# Patient Record
Sex: FEMALE | Race: WHITE | NOT HISPANIC OR LATINO | Employment: OTHER | URBAN - METROPOLITAN AREA
[De-identification: names, ages, dates, MRNs, and addresses within clinical notes are randomized per-mention and may not be internally consistent; named-entity substitution may affect disease eponyms.]

---

## 2017-01-09 ENCOUNTER — HOSPITAL ENCOUNTER (OUTPATIENT)
Dept: RADIOLOGY | Facility: HOSPITAL | Age: 67
Discharge: HOME | End: 2017-01-09
Attending: NURSE PRACTITIONER
Payer: COMMERCIAL

## 2017-01-09 ENCOUNTER — OFFICE VISIT (OUTPATIENT)
Dept: FAMILY MEDICINE | Facility: CLINIC | Age: 67
End: 2017-01-09
Payer: COMMERCIAL

## 2017-01-09 VITALS
DIASTOLIC BLOOD PRESSURE: 60 MMHG | HEART RATE: 74 BPM | TEMPERATURE: 98 F | SYSTOLIC BLOOD PRESSURE: 134 MMHG | HEIGHT: 67 IN | BODY MASS INDEX: 36.2 KG/M2 | WEIGHT: 230.63 LBS

## 2017-01-09 DIAGNOSIS — R10.9 ABDOMINAL SPASMS: Primary | ICD-10-CM

## 2017-01-09 DIAGNOSIS — R10.9 ABDOMINAL SPASMS: ICD-10-CM

## 2017-01-09 PROCEDURE — 1159F MED LIST DOCD IN RCRD: CPT | Mod: S$GLB,,, | Performed by: NURSE PRACTITIONER

## 2017-01-09 PROCEDURE — 99999 PR PBB SHADOW E&M-EST. PATIENT-LVL IV: CPT | Mod: PBBFAC,,, | Performed by: NURSE PRACTITIONER

## 2017-01-09 PROCEDURE — 3075F SYST BP GE 130 - 139MM HG: CPT | Mod: S$GLB,,, | Performed by: NURSE PRACTITIONER

## 2017-01-09 PROCEDURE — 3078F DIAST BP <80 MM HG: CPT | Mod: S$GLB,,, | Performed by: NURSE PRACTITIONER

## 2017-01-09 PROCEDURE — 74020 XR ABDOMEN FLAT AND ERECT: CPT | Mod: 26,,, | Performed by: RADIOLOGY

## 2017-01-09 PROCEDURE — 99213 OFFICE O/P EST LOW 20 MIN: CPT | Mod: S$GLB,,, | Performed by: NURSE PRACTITIONER

## 2017-01-09 PROCEDURE — 74020 XR ABDOMEN FLAT AND ERECT: CPT | Mod: TC,PO

## 2017-01-09 RX ORDER — DICYCLOMINE HYDROCHLORIDE 20 MG/1
20 TABLET ORAL 2 TIMES DAILY
Qty: 20 TABLET | Refills: 0 | Status: SHIPPED | OUTPATIENT
Start: 2017-01-09 | End: 2017-03-31 | Stop reason: SDUPTHER

## 2017-01-09 NOTE — PROGRESS NOTES
Subjective:       Patient ID: Zora Davis is a 66 y.o. female.    Chief Complaint: Abdominal Pain and Back Pain    Abdominal Pain   This is a new problem. The current episode started in the past 7 days. The onset quality is gradual. The problem occurs intermittently. The problem has been unchanged. The pain is located in the RUQ and LUQ. The quality of the pain is cramping (spasm). Pain radiation: back. Pertinent negatives include no anorexia, arthralgias, belching, constipation, diarrhea, dysuria, fever, flatus, frequency, headaches, hematochezia, hematuria, melena, myalgias, nausea, vomiting or weight loss. Nothing aggravates the pain. The pain is relieved by nothing. She has tried nothing for the symptoms. The treatment provided no relief. Prior diagnostic workup includes ultrasound (US, flat and erect, labs ordered today). There is no history of abdominal surgery, colon cancer, Crohn's disease, gallstones, GERD, irritable bowel syndrome, pancreatitis, PUD or ulcerative colitis.     Past Medical History   Diagnosis Date    Atrial fibrillation     Diverticulosis     Fatty liver     Hypertension     Liver disease     FLORES (nonalcoholic steatohepatitis)     Thyroid disease      Social History     Social History    Marital status:      Spouse name: N/A    Number of children: N/A    Years of education: N/A     Occupational History    Not on file.     Social History Main Topics    Smoking status: Former Smoker     Years: 40.00    Smokeless tobacco: Not on file      Comment: quit 35 years ago    Alcohol use Yes      Comment: socially    Drug use: No    Sexual activity: Yes     Partners: Male     Social History Narrative     Past Surgical History   Procedure Laterality Date     section      Hysterectomy      Breast surgery      Breast tumor      Cholecystectomy         Review of Systems   Constitutional: Negative.  Negative for fever and weight loss.   HENT: Negative.    Eyes:  Negative.    Respiratory: Negative.    Cardiovascular: Negative.    Gastrointestinal: Negative for anorexia, constipation, diarrhea, flatus, hematochezia, melena, nausea and vomiting.        Abdominal cramping   Endocrine: Negative.    Genitourinary: Negative.  Negative for dysuria, frequency and hematuria.   Musculoskeletal: Negative.  Negative for arthralgias and myalgias.   Skin: Negative.    Allergic/Immunologic: Negative.    Neurological: Negative.  Negative for headaches.   Psychiatric/Behavioral: Negative.        Objective:      Physical Exam   Constitutional: She is oriented to person, place, and time. She appears well-developed and well-nourished.   HENT:   Head: Normocephalic.   Right Ear: External ear normal.   Left Ear: External ear normal.   Nose: Nose normal.   Mouth/Throat: Oropharynx is clear and moist.   Eyes: Conjunctivae are normal. Pupils are equal, round, and reactive to light.   Neck: Normal range of motion. Neck supple.   Cardiovascular: Normal rate, regular rhythm and normal heart sounds.    Pulmonary/Chest: Effort normal and breath sounds normal.   Abdominal: Soft. Bowel sounds are normal. There is no tenderness.   Musculoskeletal: Normal range of motion.   Neurological: She is alert and oriented to person, place, and time.   Skin: Skin is warm and dry.   Psychiatric: She has a normal mood and affect. Her behavior is normal. Judgment and thought content normal.   Nursing note and vitals reviewed.      Assessment:       1. Abdominal spasms        Plan:           Zora was seen today for abdominal pain and back pain.    Diagnoses and all orders for this visit:    Abdominal spasms  Comments:  Upper abdomen  Orders:  -     Amylase; Future  -     Lipase; Future  -     WBC; Future  -     Hepatic function panel; Future  -     US Abdomen Limited; Future  -     X-Ray Abdomen Flat And Erect; Future  -     dicyclomine (BENTYL) 20 mg tablet; Take 1 tablet (20 mg total) by mouth 2 (two) times  daily.

## 2017-01-09 NOTE — MR AVS SNAPSHOT
[facility]  [address]  [place] [address]  Phone: [phone]  Fax: [phone]                  Zora Davis   2017 3:00 PM   Office Visit    Description:  Female : 1950   Provider:  Dara Stephens NP   Department:  [facility]           Reason for Visit     Abdominal Pain     Back Pain           Diagnoses this Visit        Comments    Abdominal spasms    -  Primary Upper abdomen           To Do List           Future Appointments        Provider Department Dept Phone    1/10/2017 2:45 PM [facility] US1 [facility] [phone]    2017 7:45 AM [facility] XR1 [facility] [phone]    2017 4:15 PM LABORATORY, [place] [facility] [phone]      Goals (5 Years of Data)     None       These Medications        Disp Refills Start End    dicyclomine (BENTYL) 20 mg tablet 20 tablet 0 2017    Take 1 tablet (20 mg total) by mouth 2 (two) times daily. - Oral    [facility]  [address] Ph #: [phone]         [facility]     [facility] -  Assistance  Registered nurses in the [facility] provide clinical advisement, health education, appointment booking, and other advisory services.  Call for this free service at [phone].             Medications           Message regarding Medications     Verify the changes and/or additions to your medication regime listed below are the same as discussed with your clinician today.  If any of these changes or additions are incorrect, please notify your healthcare provider.        START taking these NEW medications        Refills    dicyclomine (BENTYL) 20 mg tablet 0    Sig: Take 1 tablet (20 mg total) by mouth 2 (two) times daily.    Class: Normal    Route: Oral           Verify that the below list of medications is an accurate  "representation of the medications you are currently taking.  If none reported, the list may be blank. If incorrect, please contact your healthcare provider. Carry this list with you in case of emergency.           Current Medications     albuterol (PROVENTIL HFA) 90 mcg/actuation inhaler Inhale 2 puffs into the lungs every 6 (six) hours as needed for Wheezing.    aspirin (ECOTRIN) 81 MG EC tablet Take 81 mg by mouth once daily.    hydrochlorothiazide (HYDRODIURIL) 25 MG tablet TAKE ONE TABLET BY MOUTH EVERY DAY    hydrocodone-acetaminophen 7.5-325mg (NORCO) 7.5-325 mg per tablet Take 1 tablet by mouth every 6 (six) hours as needed for Pain.    levothyroxine (SYNTHROID) 50 MCG tablet Take 1 tablet (50 mcg total) by mouth once daily.    losartan (COZAAR) 100 MG tablet TAKE 1 TABLET BY MOUTH ONCE DAILY    metoprolol tartrate (LOPRESSOR) 25 MG tablet Take 1 tablet (25 mg total) by mouth once daily.    sucralfate (CARAFATE) 1 gram tablet TAKE ONE TABLET BY MOUTH FOUR TIMES DAILY    dicyclomine (BENTYL) 20 mg tablet Take 1 tablet (20 mg total) by mouth 2 (two) times daily.    methylPREDNISolone (MEDROL DOSEPACK) 4 mg tablet use as directed    pantoprazole (PROTONIX) 40 MG tablet Take 1 tablet (40 mg total) by mouth once daily.           Clinical Reference Information           Vital Signs - Last Recorded  Most recent update: 1/9/2017  2:56 PM by Funmilayo Nuñez LPN    BP Pulse Temp Ht Wt BMI    134/60 74 97.9 °F (36.6 °C) 5' 7" (1.702 m) 104.6 kg (230 lb 9.6 oz) 36.12 kg/m2      Blood Pressure          Most Recent Value    BP  134/60      Allergies as of 1/9/2017     No Known Allergies      Immunizations Administered on Date of Encounter - 1/9/2017     None      Orders Placed During Today's Visit      Normal Orders This Visit    Amylase     Hepatic function panel     Lipase     Future Labs/Procedures Expected by Expires    Amylase  1/9/2017 3/10/2018    Amylase  1/9/2017 3/10/2018    Hepatic function panel  1/9/2017 " 3/10/2018    Hepatic function panel  1/9/2017 3/10/2018    Lipase  1/9/2017 3/10/2018    Lipase  1/9/2017 3/10/2018    US Abdomen Limited  1/9/2017 1/9/2018    WBC  1/9/2017 3/10/2018    WBC  1/9/2017 3/10/2018    X-Ray Abdomen Flat And Erect  1/9/2017 1/9/2018      Instructions    Report to ER immediately if symptoms worsen

## 2017-01-10 ENCOUNTER — HOSPITAL ENCOUNTER (OUTPATIENT)
Dept: RADIOLOGY | Facility: HOSPITAL | Age: 67
Discharge: HOME | End: 2017-01-10
Attending: NURSE PRACTITIONER
Payer: COMMERCIAL

## 2017-01-10 DIAGNOSIS — R10.9 ABDOMINAL SPASMS: ICD-10-CM

## 2017-01-10 PROCEDURE — 76705 ECHO EXAM OF ABDOMEN: CPT | Mod: 26,,, | Performed by: RADIOLOGY

## 2017-01-10 PROCEDURE — 76705 ECHO EXAM OF ABDOMEN: CPT | Mod: TC,PO

## 2017-01-11 LAB
ALBUMIN SERPL-MCNC: 3.6 G/DL (ref 3.6–5.1)
ALBUMIN/GLOB SERPL: 1 (CALC) (ref 1–2.5)
ALP SERPL-CCNC: 78 U/L (ref 33–130)
ALT SERPL-CCNC: 13 U/L (ref 6–29)
AMYLASE SERPL-CCNC: 42 U/L (ref 21–101)
AST SERPL-CCNC: 13 U/L (ref 10–35)
BILIRUB DIRECT SERPL-MCNC: 0.1 MG/DL
BILIRUB INDIRECT SERPL-MCNC: 0.5 MG/DL (CALC) (ref 0.2–1.2)
BILIRUB SERPL-MCNC: 0.6 MG/DL (ref 0.2–1.2)
GLOBULIN SER CALC-MCNC: 3.6 G/DL (CALC) (ref 1.9–3.7)
LIPASE SERPL-CCNC: 10 U/L (ref 7–60)
PROT SERPL-MCNC: 7.2 G/DL (ref 6.1–8.1)
WBC # BLD AUTO: 7.8 THOUSAND/UL (ref 3.8–10.8)

## 2017-01-24 ENCOUNTER — PATIENT MESSAGE (OUTPATIENT)
Dept: FAMILY MEDICINE | Facility: CLINIC | Age: 67
End: 2017-01-24

## 2017-01-24 DIAGNOSIS — I10 ESSENTIAL HYPERTENSION: ICD-10-CM

## 2017-01-24 RX ORDER — LOSARTAN POTASSIUM 100 MG/1
100 TABLET ORAL DAILY
Qty: 30 TABLET | Refills: 5 | Status: SHIPPED | OUTPATIENT
Start: 2017-01-24 | End: 2017-02-15 | Stop reason: SDUPTHER

## 2017-02-07 ENCOUNTER — OFFICE VISIT (OUTPATIENT)
Dept: GASTROENTEROLOGY | Facility: CLINIC | Age: 67
End: 2017-02-07
Payer: COMMERCIAL

## 2017-02-07 ENCOUNTER — PATIENT MESSAGE (OUTPATIENT)
Dept: GASTROENTEROLOGY | Facility: CLINIC | Age: 67
End: 2017-02-07

## 2017-02-07 VITALS
HEIGHT: 67 IN | DIASTOLIC BLOOD PRESSURE: 74 MMHG | SYSTOLIC BLOOD PRESSURE: 129 MMHG | HEART RATE: 80 BPM | BODY MASS INDEX: 45.99 KG/M2 | WEIGHT: 293 LBS

## 2017-02-07 DIAGNOSIS — K76.0 FATTY LIVER DISEASE, NONALCOHOLIC: Primary | ICD-10-CM

## 2017-02-07 PROCEDURE — 1159F MED LIST DOCD IN RCRD: CPT | Mod: S$GLB,,, | Performed by: INTERNAL MEDICINE

## 2017-02-07 PROCEDURE — 1160F RVW MEDS BY RX/DR IN RCRD: CPT | Mod: S$GLB,,, | Performed by: INTERNAL MEDICINE

## 2017-02-07 PROCEDURE — 3074F SYST BP LT 130 MM HG: CPT | Mod: S$GLB,,, | Performed by: INTERNAL MEDICINE

## 2017-02-07 PROCEDURE — 3078F DIAST BP <80 MM HG: CPT | Mod: S$GLB,,, | Performed by: INTERNAL MEDICINE

## 2017-02-07 PROCEDURE — 99214 OFFICE O/P EST MOD 30 MIN: CPT | Mod: S$GLB,,, | Performed by: INTERNAL MEDICINE

## 2017-02-07 PROCEDURE — 1126F AMNT PAIN NOTED NONE PRSNT: CPT | Mod: S$GLB,,, | Performed by: INTERNAL MEDICINE

## 2017-02-07 PROCEDURE — 99999 PR PBB SHADOW E&M-EST. PATIENT-LVL III: CPT | Mod: PBBFAC,,, | Performed by: INTERNAL MEDICINE

## 2017-02-07 PROCEDURE — 1157F ADVNC CARE PLAN IN RCRD: CPT | Mod: S$GLB,,, | Performed by: INTERNAL MEDICINE

## 2017-02-07 RX ORDER — CLINDAMYCIN PHOSPHATE 11.9 MG/ML
SOLUTION TOPICAL
COMMUNITY
Start: 2017-01-27 | End: 2017-04-13

## 2017-02-07 RX ORDER — DOXYCYCLINE HYCLATE 50 MG/1
50 CAPSULE ORAL
COMMUNITY
Start: 2017-01-27 | End: 2017-02-26

## 2017-02-07 NOTE — PATIENT INSTRUCTIONS
Non-Alcoholic Fatty Liver Disease (NAFLD)  NAFLD is a common disease of the liver. It occurs when there is too much fat in the liver. If NAFLD is severe, it can cause liver damage that appears similar to the damage caused by drinking too much alcohol. However, NAFLD is not caused by drinking alcohol. This sheet tells you more about NAFLD and how it can be managed.    How the Liver Works  The liver is an organ located in the upper right side of the abdomen. It has many important functions. These include:  · Metabolizing proteins, carbohydrates, and fats  · Making a substance called bile that helps break down fats  · Storing and releasing sugar (glucose) into the blood to give the body energy  · Removing toxins from the blood  · Helping with the clotting of blood  Understanding NAFLD  A healthy liver may contain some fat. But if too much fat builds up in the liver, this causes NAFLD. NAFLD can be mild, causing fatty liver. Or it can be more severe and show inflammation, as well as the fat, and cause non-alcoholic steatohepatitis (FLORES). FLORES can lead to cirrhosis. With fatty liver, the liver simply contains more fat than normal. This extra fat usually causes no damage to the liver. With FLORES, the fatty liver becomes inflamed over time. FLORES is serious because it can lead to scarring of the liver (fibrosis). Over time, the scarring may lead to cirrhosis, which can eventually cause liver failure or liver cancer.  Causes and Risk Factors of NAFLD  The cause of NAFLD is unknown. But certain risk factors make the problem more likely to occur. These include:  · Obesity  · Prediabetes or diabetes  · High levels of cholesterol and triglycerides (types of fat found in the blood)  · Exposure to certain medications   Symptoms of NAFLD  Most people with NAFLD have no symptoms. If symptoms do occur, they can include:  · Tiredness  · Weakness  · Weight loss  · Loss of appetite  · Nausea and vomiting  · Abdominal pain and  cramping  · Yellowing of the skin and eyes (jaundice); dark urine, or light-colored stools  · Swelling in the abdomen or legs  Diagnosing NAFLD  Your health care provider may suspect you have NAFLD if routine blood tests show elevated levels of liver enzymes. This may mean that a liver problem is possible. One or more imaging tests, such as an ultrasound, CT scan, or MRI scan, may be done. Additional blood tests may be done to look for other causes of liver disease. A liver biopsy may also be done. During this test, a hollow needle is used to remove a tiny amount of tissue from the liver. This tissue is then studied in a lab. This test can detect signs of damage involving liver tissue. It can also help determine the cause of the damage and tell the difference between fatty liver and FLORES.  Treating NAFLD  Treatment for NAFLD varies for each person. Your doctor will monitor your health and treat any symptoms or underlying health problems you have. Your doctor will also work with you to control your risk factors so that damage to your liver is less likely. Your plan may include:  · Losing excess weight  · Getting regular exercise  · Controlling diabetes and high cholesterol or triglyceride levels  · Taking medications and vitamins as prescribed by your doctor  · Quitting smoking  · Avoiding drinking alcohol  · Eating a healthy and balanced diet  Living with NAFLD  If NAFLD is caught early, it can be managed with treatment. Your health care provider will discuss further treatment options with you as needed.  Date Last Reviewed: 11/26/2014 © 2000-2016 [employer]. [address] [place] [address], [place] [address]. All rights reserved. This information is not intended as a substitute for professional medical care. Always follow your healthcare professional's instructions.

## 2017-02-07 NOTE — MR AVS SNAPSHOT
[facility]  [address] [facility] [address]  Phone: [phone]                  Zora Davis   2017 3:40 PM   Office Visit    Description:  Female : 1950   Provider:  Nate Richey MD   Department:  [facility]           Reason for Visit     Fatty Liver           Diagnoses this Visit        Comments    Fatty liver disease, nonalcoholic    -  Primary            To Do List           Future Appointments        Provider Department Dept Phone    2017 3:40 PM Nate Richey MD [facility] [phone]      Goals (5 Years of Data)     None      Follow-Up and Disposition     Return if symptoms worsen or fail to improve.      [facility]     [facility] -  Assistance  Registered nurses in the [facility] provide clinical advisement, health education, appointment booking, and other advisory services.  Call for this free service at [phone].             Medications           Message regarding Medications     Verify the changes and/or additions to your medication regime listed below are the same as discussed with your clinician today.  If any of these changes or additions are incorrect, please notify your healthcare provider.             Verify that the below list of medications is an accurate representation of the medications you are currently taking.  If none reported, the list may be blank. If incorrect, please contact your healthcare provider. Carry this list with you in case of emergency.           Current Medications     albuterol (PROVENTIL HFA) 90 mcg/actuation inhaler Inhale 2 puffs into the lungs every 6 (six) hours as needed for Wheezing.    aspirin (ECOTRIN) 81 MG EC tablet Take 81 mg by mouth once daily.    clindamycin (CLEOCIN T) 1 % external solution Apply topically 2 (two) times daily.    doxycycline (VIBRAMYCIN) 50 MG capsule Take 50 mg by mouth.    hydrochlorothiazide  "(HYDRODIURIL) 25 MG tablet TAKE ONE TABLET BY MOUTH EVERY DAY    hydrocodone-acetaminophen 7.5-325mg (NORCO) 7.5-325 mg per tablet Take 1 tablet by mouth every 6 (six) hours as needed for Pain.    levothyroxine (SYNTHROID) 50 MCG tablet Take 1 tablet (50 mcg total) by mouth once daily.    losartan (COZAAR) 100 MG tablet Take 1 tablet (100 mg total) by mouth once daily.    methylPREDNISolone (MEDROL DOSEPACK) 4 mg tablet use as directed    metoprolol tartrate (LOPRESSOR) 25 MG tablet Take 1 tablet (25 mg total) by mouth once daily.    sucralfate (CARAFATE) 1 gram tablet TAKE ONE TABLET BY MOUTH FOUR TIMES DAILY    pantoprazole (PROTONIX) 40 MG tablet Take 1 tablet (40 mg total) by mouth once daily.           Clinical Reference Information           Your Vitals Were     BP Pulse Height Weight BMI    129/74 80 5' 7" (1.702 m) 151 kg (332 lb 14.3 oz) 52.14 kg/m2      Blood Pressure          Most Recent Value    BP  129/74      Allergies as of 2/7/2017     No Known Allergies      Immunizations Administered on Date of Encounter - 2/7/2017     None      Instructions      Non-Alcoholic Fatty Liver Disease (NAFLD)  NAFLD is a common disease of the liver. It occurs when there is too much fat in the liver. If NAFLD is severe, it can cause liver damage that appears similar to the damage caused by drinking too much alcohol. However, NAFLD is not caused by drinking alcohol. This sheet tells you more about NAFLD and how it can be managed.    How the Liver Works  The liver is an organ located in the upper right side of the abdomen. It has many important functions. These include:  · Metabolizing proteins, carbohydrates, and fats  · Making a substance called bile that helps break down fats  · Storing and releasing sugar (glucose) into the blood to give the body energy  · Removing toxins from the blood  · Helping with the clotting of blood  Understanding NAFLD  A healthy liver may contain some fat. But if too much fat builds up in the " liver, this causes NAFLD. NAFLD can be mild, causing fatty liver. Or it can be more severe and show inflammation, as well as the fat, and cause non-alcoholic steatohepatitis (FLORES). FLORES can lead to cirrhosis. With fatty liver, the liver simply contains more fat than normal. This extra fat usually causes no damage to the liver. With FLORES, the fatty liver becomes inflamed over time. FLORES is serious because it can lead to scarring of the liver (fibrosis). Over time, the scarring may lead to cirrhosis, which can eventually cause liver failure or liver cancer.  Causes and Risk Factors of NAFLD  The cause of NAFLD is unknown. But certain risk factors make the problem more likely to occur. These include:  · Obesity  · Prediabetes or diabetes  · High levels of cholesterol and triglycerides (types of fat found in the blood)  · Exposure to certain medications   Symptoms of NAFLD  Most people with NAFLD have no symptoms. If symptoms do occur, they can include:  · Tiredness  · Weakness  · Weight loss  · Loss of appetite  · Nausea and vomiting  · Abdominal pain and cramping  · Yellowing of the skin and eyes (jaundice); dark urine, or light-colored stools  · Swelling in the abdomen or legs  Diagnosing NAFLD  Your health care provider may suspect you have NAFLD if routine blood tests show elevated levels of liver enzymes. This may mean that a liver problem is possible. One or more imaging tests, such as an ultrasound, CT scan, or MRI scan, may be done. Additional blood tests may be done to look for other causes of liver disease. A liver biopsy may also be done. During this test, a hollow needle is used to remove a tiny amount of tissue from the liver. This tissue is then studied in a lab. This test can detect signs of damage involving liver tissue. It can also help determine the cause of the damage and tell the difference between fatty liver and FLORES.  Treating NAFLD  Treatment for NAFLD varies for each person. Your doctor will  monitor your health and treat any symptoms or underlying health problems you have. Your doctor will also work with you to control your risk factors so that damage to your liver is less likely. Your plan may include:  · Losing excess weight  · Getting regular exercise  · Controlling diabetes and high cholesterol or triglyceride levels  · Taking medications and vitamins as prescribed by your doctor  · Quitting smoking  · Avoiding drinking alcohol  · Eating a healthy and balanced diet  Living with NAFLD  If NAFLD is caught early, it can be managed with treatment. Your health care provider will discuss further treatment options with you as needed.  Date Last Reviewed: 11/26/2014 © 2000-2016 [employer]. [address] [place] [address], [place] [address]. All rights reserved. This information is not intended as a substitute for professional medical care. Always follow your healthcare professional's instructions.             Language Assistance Services     ATTENTION: Language assistance services are available, free of charge. Please call [phone].      ATENCIÓN: Si habla español, tiene a vance disposición servicios gratuitos de asistencia lingüística. Llame al [phone].     DEZ Ý: N?u b?n nói Ti?ng Vi?t, có các d?ch v? h? tr? ngôn ng? mi?n phí dành cho b?n. G?i s? [phone].         [facility] complies with applicable Federal civil rights laws and does not discriminate on the basis of race, color, national origin, age, disability, or sex.

## 2017-02-07 NOTE — PROGRESS NOTES
"Subjective:       Zora Davis is a 67 y.o. female who is referred for evaluation of abdominal pain. The pain is located in the LUQ. The pain is described as sharp, and is 6/10 in intensity. Onset was 4 weeks ago. Symptoms have been completely resolved since. Aggravating factors include: movement.  Alleviating factors include: none. Associated symptoms include: none. The patient denies anorexia, belching, chills, constipation, diarrhea, fever, headache, hematochezia, melena and vomiting.    The following portions of the patient's history were reviewed and updated as appropriate: She  has a past medical history of Atrial fibrillation; Diverticulosis; Fatty liver; Hypertension; Liver disease; FLORES (nonalcoholic steatohepatitis); and Thyroid disease.  She  has a past surgical history that includes  section; Hysterectomy; Breast surgery; breast tumor; and Cholecystectomy.  Her family history includes Colon cancer in her paternal aunt; Hypertension in her father.  She  reports that she has quit smoking. She quit after 40.00 years of use. She does not have any smokeless tobacco history on file. She reports that she drinks alcohol. She reports that she does not use illicit drugs.  She has a current medication list which includes the following prescription(s): albuterol, aspirin, clindamycin, doxycycline, hydrochlorothiazide, hydrocodone-acetaminophen 7.5-325mg, levothyroxine, losartan, methylprednisolone, metoprolol tartrate, sucralfate, and pantoprazole.  She has No Known Allergies..    Review of Systems  A comprehensive review of systems was negative except for: Gastrointestinal: positive for abdominal pain      Objective:         Visit Vitals    /74    Pulse 80    Ht 5' 7" (1.702 m)    Wt (!) 151 kg (332 lb 14.3 oz)    BMI 52.14 kg/m2       Visit Vitals    /74    Pulse 80    Ht 5' 7" (1.702 m)    Wt (!) 151 kg (332 lb 14.3 oz)    BMI 52.14 kg/m2       General Appearance:    Alert, " cooperative, no distress, appears stated age   Head:    Normocephalic, without obvious abnormality, atraumatic   Eyes:    PERRL, conjunctiva/corneas clear, EOM's intact, fundi     benign, both eyes   Ears:    Normal TM's and external ear canals, both ears   Nose:   Nares normal, septum midline, mucosa normal, no drainage    or sinus tenderness   Throat:   Lips, mucosa, and tongue normal; teeth and gums normal   Neck:   Supple, symmetrical, trachea midline, no adenopathy;     thyroid:  no enlargement/tenderness/nodules; no carotid    bruit or JVD   Back:     Symmetric, no curvature, ROM normal, no CVA tenderness   Lungs:     Clear to auscultation bilaterally, respirations unlabored   Chest Wall:    No tenderness or deformity    Heart:    Regular rate and rhythm, S1 and S2 normal, no murmur, rub   or gallop   Abdomen:     Soft, non-tender, bowel sounds active all four quadrants,     no masses, no organomegaly   Extremities:   Extremities normal, atraumatic, no cyanosis or edema   Pulses:   2+ and symmetric all extremities   Skin:   Skin color, texture, turgor normal, no rashes or lesions   Lymph nodes:   Cervical, supraclavicular, and axillary nodes normal   Neurologic:   CNII-XII intact, normal strength, sensation and reflexes     throughout       Laboratory  Lab Results   Component Value Date    WBC 7.8 01/10/2017    RBC 4.71 07/22/2015    HGB 13.3 07/22/2015    HCT 40.9 07/22/2015     07/22/2015     Lab Results   Component Value Date    AMYLASE 42 01/10/2017     Lab Results   Component Value Date    LIPASE 10 01/10/2017         Assessment:      Abdominal pain, likely secondary to back issues.     1. Fatty liver disease, nonalcoholic      Abdominal pain has resolved. Patient's labs are all normal. She recently had an EGD and a Colonoscopy. A recent ultrasound was OK except for fatty liver.        Plan:      The management of Fatty Liver consists of:  -- Gradual Weight loss. A 20% weight reduction translates  to improved symptoms.  -- Exercise 4 times a week for 30 minutes will mobilize some of the fat away from your liver and make your insulin more effective.  -- A low carb high protein diet, rich in vegetables and increased water consumption, staying away from carbonated, sugary drinks.   -- Avoid processed foods.   These are the only effective measures to manage fatty liver. If after a year of adhering to these measures and effectively reducing your Body Mass Index plus decreasing the insulin resistance, you continue to have symptoms, we may need to start medications to treat Fatty liver. These medications are used to treat diabetes and work well for insulin resistance. Unfortunately, these medications can have side effects.     The risks and benefits of my recommendations, as well as other treatment options were discussed with the patient today. Questions were answered.  Follow up: 1 year and as needed.

## 2017-02-15 DIAGNOSIS — E03.9 HYPOTHYROIDISM (ACQUIRED): ICD-10-CM

## 2017-02-15 DIAGNOSIS — I10 ESSENTIAL HYPERTENSION: ICD-10-CM

## 2017-02-15 RX ORDER — LEVOTHYROXINE SODIUM 50 UG/1
50 TABLET ORAL DAILY
Qty: 90 TABLET | Refills: 1 | Status: SHIPPED | OUTPATIENT
Start: 2017-02-15 | End: 2017-11-14 | Stop reason: SDUPTHER

## 2017-02-15 RX ORDER — LOSARTAN POTASSIUM 100 MG/1
100 TABLET ORAL DAILY
Qty: 90 TABLET | Refills: 1 | Status: SHIPPED | OUTPATIENT
Start: 2017-02-15 | End: 2017-08-14 | Stop reason: SDUPTHER

## 2017-02-15 RX ORDER — HYDROCHLOROTHIAZIDE 25 MG/1
25 TABLET ORAL DAILY
Qty: 90 TABLET | Refills: 1 | Status: SHIPPED | OUTPATIENT
Start: 2017-02-15 | End: 2017-11-14 | Stop reason: SDUPTHER

## 2017-02-15 RX ORDER — METOPROLOL TARTRATE 25 MG/1
25 TABLET, FILM COATED ORAL DAILY
Qty: 90 TABLET | Refills: 1 | Status: SHIPPED | OUTPATIENT
Start: 2017-02-15 | End: 2018-02-22 | Stop reason: SDUPTHER

## 2017-02-15 NOTE — TELEPHONE ENCOUNTER
I am refilling a requested medication x 1 for the patient and reviewed the chart.  The patient is due for a physical.  Please book the patient with me 2-6 weeks

## 2017-03-03 ENCOUNTER — TELEPHONE (OUTPATIENT)
Dept: FAMILY MEDICINE | Facility: CLINIC | Age: 67
End: 2017-03-03

## 2017-03-03 DIAGNOSIS — Z00.00 ROUTINE ADULT HEALTH MAINTENANCE: Primary | ICD-10-CM

## 2017-04-03 RX ORDER — DICYCLOMINE HYDROCHLORIDE 20 MG/1
TABLET ORAL
Qty: 60 TABLET | Refills: 11 | Status: SHIPPED | OUTPATIENT
Start: 2017-04-03 | End: 2017-04-13

## 2017-04-04 ENCOUNTER — PATIENT OUTREACH (OUTPATIENT)
Dept: ADMINISTRATIVE | Facility: HOSPITAL | Age: 67
End: 2017-04-04
Payer: COMMERCIAL

## 2017-04-04 DIAGNOSIS — Z12.31 ENCOUNTER FOR SCREENING MAMMOGRAM FOR BREAST CANCER: Primary | ICD-10-CM

## 2017-04-07 ENCOUNTER — LAB VISIT (OUTPATIENT)
Dept: LAB | Facility: HOSPITAL | Age: 67
End: 2017-04-07
Attending: FAMILY MEDICINE
Payer: COMMERCIAL

## 2017-04-07 DIAGNOSIS — Z00.00 ROUTINE ADULT HEALTH MAINTENANCE: ICD-10-CM

## 2017-04-07 DIAGNOSIS — Z11.59 NEED FOR HEPATITIS C SCREENING TEST: ICD-10-CM

## 2017-04-07 LAB
ALBUMIN SERPL BCP-MCNC: 3.3 G/DL
ALP SERPL-CCNC: 80 U/L
ALT SERPL W/O P-5'-P-CCNC: 12 U/L
ANION GAP SERPL CALC-SCNC: 10 MMOL/L
AST SERPL-CCNC: 15 U/L
BASOPHILS # BLD AUTO: 0.03 K/UL
BASOPHILS NFR BLD: 0.5 %
BILIRUB SERPL-MCNC: 0.5 MG/DL
BUN SERPL-MCNC: 15 MG/DL
CALCIUM SERPL-MCNC: 8.6 MG/DL
CHLORIDE SERPL-SCNC: 107 MMOL/L
CHOLEST/HDLC SERPL: 4 {RATIO}
CO2 SERPL-SCNC: 25 MMOL/L
CREAT SERPL-MCNC: 0.8 MG/DL
DIFFERENTIAL METHOD: NORMAL
EOSINOPHIL # BLD AUTO: 0.2 K/UL
EOSINOPHIL NFR BLD: 2.8 %
ERYTHROCYTE [DISTWIDTH] IN BLOOD BY AUTOMATED COUNT: 13.9 %
EST. GFR  (AFRICAN AMERICAN): >60 ML/MIN/1.73 M^2
EST. GFR  (NON AFRICAN AMERICAN): >60 ML/MIN/1.73 M^2
GLUCOSE SERPL-MCNC: 91 MG/DL
HCT VFR BLD AUTO: 38.9 %
HCV AB SERPL QL IA: NEGATIVE
HDL/CHOLESTEROL RATIO: 24.7 %
HDLC SERPL-MCNC: 194 MG/DL
HDLC SERPL-MCNC: 48 MG/DL
HGB BLD-MCNC: 13 G/DL
LDLC SERPL CALC-MCNC: 125.2 MG/DL
LYMPHOCYTES # BLD AUTO: 2.1 K/UL
LYMPHOCYTES NFR BLD: 32.3 %
MCH RBC QN AUTO: 27.5 PG
MCHC RBC AUTO-ENTMCNC: 33.4 %
MCV RBC AUTO: 82 FL
MONOCYTES # BLD AUTO: 0.5 K/UL
MONOCYTES NFR BLD: 7.8 %
NEUTROPHILS # BLD AUTO: 3.7 K/UL
NEUTROPHILS NFR BLD: 56.6 %
NONHDLC SERPL-MCNC: 146 MG/DL
PLATELET # BLD AUTO: 258 K/UL
PMV BLD AUTO: 10.1 FL
POTASSIUM SERPL-SCNC: 3.9 MMOL/L
PROT SERPL-MCNC: 7.6 G/DL
RBC # BLD AUTO: 4.72 M/UL
SODIUM SERPL-SCNC: 142 MMOL/L
TRIGL SERPL-MCNC: 104 MG/DL
TSH SERPL DL<=0.005 MIU/L-ACNC: 2.62 UIU/ML
WBC # BLD AUTO: 6.51 K/UL

## 2017-04-07 PROCEDURE — 80061 LIPID PANEL: CPT

## 2017-04-07 PROCEDURE — 84443 ASSAY THYROID STIM HORMONE: CPT

## 2017-04-07 PROCEDURE — 85025 COMPLETE CBC W/AUTO DIFF WBC: CPT

## 2017-04-07 PROCEDURE — 80053 COMPREHEN METABOLIC PANEL: CPT

## 2017-04-07 PROCEDURE — 36415 COLL VENOUS BLD VENIPUNCTURE: CPT | Mod: PO

## 2017-04-07 PROCEDURE — 86803 HEPATITIS C AB TEST: CPT

## 2017-04-13 ENCOUNTER — OFFICE VISIT (OUTPATIENT)
Dept: FAMILY MEDICINE | Facility: CLINIC | Age: 67
End: 2017-04-13
Payer: COMMERCIAL

## 2017-04-13 ENCOUNTER — HOSPITAL ENCOUNTER (OUTPATIENT)
Dept: RADIOLOGY | Facility: HOSPITAL | Age: 67
Discharge: HOME | End: 2017-04-13
Attending: FAMILY MEDICINE
Payer: COMMERCIAL

## 2017-04-13 VITALS
WEIGHT: 237 LBS | DIASTOLIC BLOOD PRESSURE: 71 MMHG | BODY MASS INDEX: 37.2 KG/M2 | HEIGHT: 67 IN | SYSTOLIC BLOOD PRESSURE: 137 MMHG | HEART RATE: 70 BPM

## 2017-04-13 DIAGNOSIS — Z12.31 OTHER SCREENING MAMMOGRAM: ICD-10-CM

## 2017-04-13 DIAGNOSIS — Z00.00 ROUTINE CHECK-UP: Primary | ICD-10-CM

## 2017-04-13 PROCEDURE — 99999 PR PBB SHADOW E&M-EST. PATIENT-LVL III: CPT | Mod: PBBFAC,,, | Performed by: FAMILY MEDICINE

## 2017-04-13 PROCEDURE — 3078F DIAST BP <80 MM HG: CPT | Mod: S$GLB,,, | Performed by: FAMILY MEDICINE

## 2017-04-13 PROCEDURE — 77067 SCR MAMMO BI INCL CAD: CPT | Mod: 26,,, | Performed by: RADIOLOGY

## 2017-04-13 PROCEDURE — 77063 BREAST TOMOSYNTHESIS BI: CPT | Mod: 26,,, | Performed by: RADIOLOGY

## 2017-04-13 PROCEDURE — 3075F SYST BP GE 130 - 139MM HG: CPT | Mod: S$GLB,,, | Performed by: FAMILY MEDICINE

## 2017-04-13 PROCEDURE — 99397 PER PM REEVAL EST PAT 65+ YR: CPT | Mod: S$GLB,,, | Performed by: FAMILY MEDICINE

## 2017-04-13 PROCEDURE — 77067 SCR MAMMO BI INCL CAD: CPT | Mod: TC

## 2017-04-13 NOTE — PROGRESS NOTES
The patient presents today for general health evaluation and counseling      Past Medical History:  Past Medical History:   Diagnosis Date    Atrial fibrillation     Diverticulosis     Fatty liver     Hypertension     Liver disease     FLORES (nonalcoholic steatohepatitis)     Thyroid disease      Past Surgical History:   Procedure Laterality Date    BREAST SURGERY      breast tumor       SECTION      CHOLECYSTECTOMY      HYSTERECTOMY       Review of patient's allergies indicates:  No Known Allergies  Current Outpatient Prescriptions on File Prior to Visit   Medication Sig Dispense Refill    aspirin (ECOTRIN) 81 MG EC tablet Take 81 mg by mouth once daily.      hydrochlorothiazide (HYDRODIURIL) 25 MG tablet Take 1 tablet (25 mg total) by mouth once daily. 90 tablet 1    levothyroxine (SYNTHROID) 50 MCG tablet Take 1 tablet (50 mcg total) by mouth once daily. 90 tablet 1    losartan (COZAAR) 100 MG tablet Take 1 tablet (100 mg total) by mouth once daily. 90 tablet 1    metoprolol tartrate (LOPRESSOR) 25 MG tablet Take 1 tablet (25 mg total) by mouth once daily. 90 tablet 1    sucralfate (CARAFATE) 1 gram tablet TAKE ONE TABLET BY MOUTH FOUR TIMES DAILY 120 tablet 5    pantoprazole (PROTONIX) 40 MG tablet Take 1 tablet (40 mg total) by mouth once daily. 30 tablet 11    [DISCONTINUED] albuterol (PROVENTIL HFA) 90 mcg/actuation inhaler Inhale 2 puffs into the lungs every 6 (six) hours as needed for Wheezing. 18 g 0    [DISCONTINUED] clindamycin (CLEOCIN T) 1 % external solution Apply topically 2 (two) times daily.      [DISCONTINUED] dicyclomine (BENTYL) 20 mg tablet TAKE 1 TABLET BY MOUTH TWICE DAILY 60 tablet 11    [DISCONTINUED] hydrocodone-acetaminophen 7.5-325mg (NORCO) 7.5-325 mg per tablet Take 1 tablet by mouth every 6 (six) hours as needed for Pain. 20 tablet 0    [DISCONTINUED] methylPREDNISolone (MEDROL DOSEPACK) 4 mg tablet use as directed 1 Package 0     No current  facility-administered medications on file prior to visit.      Social History     Social History    Marital status:      Spouse name: N/A    Number of children: N/A    Years of education: N/A     Occupational History    Not on file.     Social History Main Topics    Smoking status: Former Smoker     Years: 40.00    Smokeless tobacco: Not on file      Comment: quit 35 years ago    Alcohol use Yes      Comment: socially    Drug use: No    Sexual activity: Yes     Partners: Male     Other Topics Concern    Not on file     Social History Narrative     Family History   Problem Relation Age of Onset    Colon cancer Paternal Aunt     Hypertension Father          ROS:GENERAL: No fever, chills, fatigability or weight loss.  SKIN: No rashes, itching or changes in color or texture of skin.  HEAD: No headaches or recent head trauma.EYES: Visual acuity fine. No photophobia, ocular pain or diplopia.EARS: Denies ear pain, discharge or vertigo.NOSE: No loss of smell, no epistaxis or postnasal drip.MOUTH & THROAT: No hoarseness or change in voice. No excessive gum bleeding.NODES: Denies swollen glands.  CHEST: Denies DE PAZ, cyanosis, wheezing, cough and sputum production.  CARDIOVASCULAR: Denies chest pain, PND, orthopnea or reduced exercise tolerance.  ABDOMEN: Appetite fine. No weight loss. Denies diarrhea, abdominal pain, hematemesis or blood in stool.  URINARY: No flank pain, dysuria or hematuria.  PERIPHERAL VASCULAR: No claudication or cyanosis.  MUSCULOSKELETAL: See above.  NEUROLOGIC: No history of seizures, paralysis, alteration of gait or coordination.  PE:   HEAD: Normocephalic, atraumatic.EYES: PERRL. EOMI.   EARS: TM's intact. Light reflex normal. No retraction or perforation.   NOSE: Mucosa pink. Airway clear.MOUTH & THROAT: No tonsillar enlargement. No pharyngeal erythema or exudate. No stridor.  NODES: No cervical, axillary or inguinal lymph node enlargement.  CHEST: Lungs clear to  auscultation.  CARDIOVASCULAR: Normal S1, S2. No rubs, murmurs or gallops.  ABDOMEN: Bowel sounds normal. Not distended. Soft. No tenderness or masses.  MUSCULOSKELETAL: No palpable abnormality  NEUROLOGIC: Cranial Nerves: II-XII grossly intact.  Motor: 5/5 strength major flexors/extensors.  DTR's: Knees, Ankles 2+ and equal bilaterally; downgoing toes.  Sensory: Intact to light touch distally.  Gait & Posture: Normal gait and fine motion. No cerebellar signs.     Impression:Routine health check  Plan:Lab eval  Rec diet and ex recs  Rev age appropriate screenings

## 2017-08-14 DIAGNOSIS — I10 ESSENTIAL HYPERTENSION: ICD-10-CM

## 2017-08-14 RX ORDER — LOSARTAN POTASSIUM 100 MG/1
TABLET ORAL
Qty: 90 TABLET | Refills: 1 | Status: SHIPPED | OUTPATIENT
Start: 2017-08-14 | End: 2018-02-22 | Stop reason: SDUPTHER

## 2017-11-14 DIAGNOSIS — E03.9 HYPOTHYROIDISM (ACQUIRED): ICD-10-CM

## 2017-11-15 RX ORDER — LEVOTHYROXINE SODIUM 50 UG/1
TABLET ORAL
Qty: 90 TABLET | Refills: 4 | Status: SHIPPED | OUTPATIENT
Start: 2017-11-15 | End: 2018-02-22 | Stop reason: SDUPTHER

## 2017-11-15 RX ORDER — HYDROCHLOROTHIAZIDE 25 MG/1
TABLET ORAL
Qty: 90 TABLET | Refills: 4 | Status: SHIPPED | OUTPATIENT
Start: 2017-11-15 | End: 2018-02-22 | Stop reason: SDUPTHER

## 2018-01-04 ENCOUNTER — OFFICE VISIT (OUTPATIENT)
Dept: FAMILY MEDICINE | Facility: CLINIC | Age: 68
End: 2018-01-04
Payer: MEDICARE

## 2018-01-04 VITALS
HEIGHT: 67 IN | TEMPERATURE: 98 F | BODY MASS INDEX: 38.65 KG/M2 | SYSTOLIC BLOOD PRESSURE: 133 MMHG | WEIGHT: 246.25 LBS | DIASTOLIC BLOOD PRESSURE: 76 MMHG | HEART RATE: 87 BPM

## 2018-01-04 DIAGNOSIS — J06.9 VIRAL UPPER RESPIRATORY TRACT INFECTION WITH COUGH: Primary | ICD-10-CM

## 2018-01-04 PROCEDURE — 99213 OFFICE O/P EST LOW 20 MIN: CPT | Mod: S$GLB,,, | Performed by: NURSE PRACTITIONER

## 2018-01-04 PROCEDURE — 99999 PR PBB SHADOW E&M-EST. PATIENT-LVL III: CPT | Mod: PBBFAC,,, | Performed by: NURSE PRACTITIONER

## 2018-01-04 RX ORDER — PREDNISONE 20 MG/1
20 TABLET ORAL 2 TIMES DAILY
Qty: 10 TABLET | Refills: 0 | Status: SHIPPED | OUTPATIENT
Start: 2018-01-04 | End: 2018-01-09

## 2018-01-04 RX ORDER — ALBUTEROL SULFATE 90 UG/1
2 AEROSOL, METERED RESPIRATORY (INHALATION) EVERY 6 HOURS PRN
Qty: 18 G | Refills: 1 | Status: ON HOLD | OUTPATIENT
Start: 2018-01-04 | End: 2020-06-01 | Stop reason: CLARIF

## 2018-01-04 NOTE — PROGRESS NOTES
Subjective:       Patient ID: Zora Davis is a 67 y.o. female.    Chief Complaint: head congestion and Cough    URI    This is a new problem. The current episode started in the past 7 days. The problem has been gradually worsening. Associated symptoms include congestion, coughing, rhinorrhea, sinus pain and sneezing. Pertinent negatives include no abdominal pain, chest pain, diarrhea, ear pain, headaches, rash, sore throat or vomiting.       Review of Systems   Constitutional: Negative for fatigue, fever and unexpected weight change.   HENT: Positive for congestion, rhinorrhea, sinus pain and sneezing. Negative for ear pain and sore throat.    Eyes: Negative for pain and visual disturbance.   Respiratory: Positive for cough. Negative for shortness of breath.    Cardiovascular: Negative for chest pain and palpitations.   Gastrointestinal: Negative for abdominal pain, diarrhea and vomiting.   Musculoskeletal: Negative for arthralgias and myalgias.   Skin: Negative for color change and rash.   Neurological: Negative for dizziness and headaches.   Psychiatric/Behavioral: Negative for dysphoric mood and sleep disturbance. The patient is not nervous/anxious.        Vitals:    01/04/18 1312   BP: 133/76   Pulse: 87   Temp: 98.2 °F (36.8 °C)       Objective:     Current Outpatient Prescriptions   Medication Sig Dispense Refill    hydroCHLOROthiazide (HYDRODIURIL) 25 MG tablet TAKE 1 TABLET ONE TIME DAILY 90 tablet 4    levothyroxine (SYNTHROID) 50 MCG tablet TAKE 1 TABLET ONE TIME DAILY 90 tablet 4    losartan (COZAAR) 100 MG tablet TAKE 1 TABLET ONE TIME DAILY 90 tablet 1    metoprolol tartrate (LOPRESSOR) 25 MG tablet Take 1 tablet (25 mg total) by mouth once daily. 90 tablet 1    albuterol 90 mcg/actuation inhaler Inhale 2 puffs into the lungs every 6 (six) hours as needed for Wheezing or Shortness of Breath. Rescue 18 g 1     No current facility-administered medications for this visit.        Physical Exam    Constitutional: She is oriented to person, place, and time. She appears well-developed and well-nourished. No distress.   HENT:   Head: Normocephalic and atraumatic.   Right Ear: Tympanic membrane normal.   Left Ear: Tympanic membrane normal.   Nose: Mucosal edema and rhinorrhea present.   Mouth/Throat: Posterior oropharyngeal edema (post nasal mucus) present.   Eyes: EOM are normal. Pupils are equal, round, and reactive to light.   Neck: Normal range of motion. Neck supple.   Cardiovascular: Normal rate and regular rhythm.    Pulmonary/Chest: Effort normal and breath sounds normal.   Dry cough   Musculoskeletal: Normal range of motion.   Neurological: She is alert and oriented to person, place, and time.   Skin: Skin is warm and dry. No rash noted.   Psychiatric: She has a normal mood and affect. Judgment normal.   Nursing note and vitals reviewed.      Assessment:       1. Viral upper respiratory tract infection with cough        Plan:   Viral upper respiratory tract infection with cough    Other orders  -     predniSONE (DELTASONE) 20 MG tablet; Take 1 tablet (20 mg total) by mouth 2 (two) times daily.  Dispense: 10 tablet; Refill: 0  -     albuterol 90 mcg/actuation inhaler; Inhale 2 puffs into the lungs every 6 (six) hours as needed for Wheezing or Shortness of Breath. Rescue  Dispense: 18 g; Refill: 1        Return if symptoms worsen or fail to improve.

## 2018-01-11 ENCOUNTER — TELEPHONE (OUTPATIENT)
Dept: FAMILY MEDICINE | Facility: CLINIC | Age: 68
End: 2018-01-11

## 2018-01-11 RX ORDER — AZITHROMYCIN 250 MG/1
TABLET, FILM COATED ORAL
Qty: 6 TABLET | Refills: 0 | Status: SHIPPED | OUTPATIENT
Start: 2018-01-11 | End: 2018-05-03

## 2018-01-11 NOTE — TELEPHONE ENCOUNTER
----- Message from Nanda Jarvis sent at 1/10/2018 12:39 PM CST -----  Contact: pt  States she was seen on Thursday last week and she thinks she has a sinus infection now. States she would like to get an antibiotic called in. States the mucus coming out of her head is green. Please call pt at [phone]. Thank you

## 2018-02-22 DIAGNOSIS — I10 ESSENTIAL HYPERTENSION: ICD-10-CM

## 2018-02-22 DIAGNOSIS — E03.9 HYPOTHYROIDISM (ACQUIRED): ICD-10-CM

## 2018-02-22 RX ORDER — LOSARTAN POTASSIUM 100 MG/1
TABLET ORAL
Qty: 90 TABLET | Refills: 0 | Status: SHIPPED | OUTPATIENT
Start: 2018-02-22 | End: 2018-05-09 | Stop reason: SDUPTHER

## 2018-02-22 RX ORDER — METOPROLOL TARTRATE 25 MG/1
25 TABLET, FILM COATED ORAL DAILY
Qty: 90 TABLET | Refills: 0 | Status: SHIPPED | OUTPATIENT
Start: 2018-02-22 | End: 2018-05-09 | Stop reason: SDUPTHER

## 2018-02-22 RX ORDER — HYDROCHLOROTHIAZIDE 25 MG/1
TABLET ORAL
Qty: 90 TABLET | Refills: 0 | Status: SHIPPED | OUTPATIENT
Start: 2018-02-22 | End: 2018-05-09 | Stop reason: SDUPTHER

## 2018-02-22 RX ORDER — LEVOTHYROXINE SODIUM 50 UG/1
TABLET ORAL
Qty: 90 TABLET | Refills: 0 | Status: SHIPPED | OUTPATIENT
Start: 2018-02-22 | End: 2018-05-09 | Stop reason: SDUPTHER

## 2018-05-03 ENCOUNTER — LAB VISIT (OUTPATIENT)
Dept: LAB | Facility: HOSPITAL | Age: 68
End: 2018-05-03
Attending: FAMILY MEDICINE
Payer: MEDICARE

## 2018-05-03 ENCOUNTER — OFFICE VISIT (OUTPATIENT)
Dept: FAMILY MEDICINE | Facility: CLINIC | Age: 68
End: 2018-05-03
Payer: MEDICARE

## 2018-05-03 ENCOUNTER — HOSPITAL ENCOUNTER (OUTPATIENT)
Dept: RADIOLOGY | Facility: HOSPITAL | Age: 68
Discharge: HOME | End: 2018-05-03
Attending: NURSE PRACTITIONER
Payer: MEDICARE

## 2018-05-03 VITALS
WEIGHT: 241 LBS | HEART RATE: 69 BPM | OXYGEN SATURATION: 98 % | HEIGHT: 67 IN | BODY MASS INDEX: 37.83 KG/M2 | DIASTOLIC BLOOD PRESSURE: 69 MMHG | SYSTOLIC BLOOD PRESSURE: 166 MMHG | TEMPERATURE: 98 F

## 2018-05-03 DIAGNOSIS — R10.84 GENERALIZED ABDOMINAL PAIN: ICD-10-CM

## 2018-05-03 DIAGNOSIS — R10.10 UPPER ABDOMINAL PAIN: ICD-10-CM

## 2018-05-03 DIAGNOSIS — R10.13 EPIGASTRIC PAIN: ICD-10-CM

## 2018-05-03 DIAGNOSIS — R10.10 UPPER ABDOMINAL PAIN: Primary | ICD-10-CM

## 2018-05-03 LAB
ALBUMIN SERPL BCP-MCNC: 3.5 G/DL
ALP SERPL-CCNC: 77 U/L
ALT SERPL W/O P-5'-P-CCNC: 38 U/L
AMYLASE SERPL-CCNC: 49 U/L
AST SERPL-CCNC: 26 U/L
BILIRUB DIRECT SERPL-MCNC: 0.2 MG/DL
BILIRUB SERPL-MCNC: 0.7 MG/DL
LIPASE SERPL-CCNC: 13 U/L
PROT SERPL-MCNC: 7.8 G/DL
WBC # BLD AUTO: 6.59 K/UL

## 2018-05-03 PROCEDURE — 99213 OFFICE O/P EST LOW 20 MIN: CPT | Mod: S$GLB,,, | Performed by: NURSE PRACTITIONER

## 2018-05-03 PROCEDURE — 3077F SYST BP >= 140 MM HG: CPT | Mod: S$GLB,,, | Performed by: NURSE PRACTITIONER

## 2018-05-03 PROCEDURE — 82150 ASSAY OF AMYLASE: CPT

## 2018-05-03 PROCEDURE — 36415 COLL VENOUS BLD VENIPUNCTURE: CPT | Mod: PO

## 2018-05-03 PROCEDURE — 74176 CT ABD & PELVIS W/O CONTRAST: CPT | Mod: 26,,, | Performed by: RADIOLOGY

## 2018-05-03 PROCEDURE — 83690 ASSAY OF LIPASE: CPT

## 2018-05-03 PROCEDURE — 25500020 PHARM REV CODE 255: Mod: PO | Performed by: NURSE PRACTITIONER

## 2018-05-03 PROCEDURE — 3078F DIAST BP <80 MM HG: CPT | Mod: S$GLB,,, | Performed by: NURSE PRACTITIONER

## 2018-05-03 PROCEDURE — 93010 ELECTROCARDIOGRAM REPORT: CPT | Mod: S$GLB,,, | Performed by: INTERNAL MEDICINE

## 2018-05-03 PROCEDURE — 80076 HEPATIC FUNCTION PANEL: CPT

## 2018-05-03 PROCEDURE — 3008F BODY MASS INDEX DOCD: CPT | Mod: S$GLB,,, | Performed by: NURSE PRACTITIONER

## 2018-05-03 PROCEDURE — 99999 PR PBB SHADOW E&M-EST. PATIENT-LVL IV: CPT | Mod: PBBFAC,,, | Performed by: NURSE PRACTITIONER

## 2018-05-03 PROCEDURE — 74176 CT ABD & PELVIS W/O CONTRAST: CPT | Mod: TC,PO

## 2018-05-03 PROCEDURE — 85048 AUTOMATED LEUKOCYTE COUNT: CPT

## 2018-05-03 PROCEDURE — 93005 ELECTROCARDIOGRAM TRACING: CPT | Mod: S$GLB,,, | Performed by: NURSE PRACTITIONER

## 2018-05-03 RX ORDER — DICYCLOMINE HYDROCHLORIDE 20 MG/1
20 TABLET ORAL 2 TIMES DAILY
Qty: 20 TABLET | Refills: 0 | Status: SHIPPED | OUTPATIENT
Start: 2018-05-03 | End: 2018-05-13

## 2018-05-03 RX ADMIN — IOHEXOL 30 ML: 350 INJECTION, SOLUTION INTRAVENOUS at 02:05

## 2018-05-03 NOTE — PROGRESS NOTES
Subjective:       Patient ID: Zora Davis is a 68 y.o. female.    Chief Complaint: Abdominal Pain (on the left)    Abdominal Pain   This is a new problem. The current episode started in the past 7 days (x 2 d). The onset quality is gradual. The problem occurs intermittently. The problem has been gradually improving (No pain at present). The pain is located in the epigastric region, LUQ and RUQ. The pain is moderate. The quality of the pain is aching. The abdominal pain does not radiate. Pertinent negatives include no anorexia, arthralgias, belching, constipation, diarrhea, dysuria, fever, flatus, frequency, headaches, hematochezia, hematuria, melena, myalgias, nausea, vomiting or weight loss. Nothing aggravates the pain. The pain is relieved by nothing. She has tried nothing for the symptoms. The treatment provided no relief. Prior diagnostic workup includes CT scan. There is no history of abdominal surgery, colon cancer, Crohn's disease, gallstones, GERD, irritable bowel syndrome, pancreatitis, PUD or ulcerative colitis. Patient's medical history does not include kidney stones and UTI.     Past Medical History:   Diagnosis Date    Atrial fibrillation     Diverticulosis     Fatty liver     Hypertension     Liver disease     FLORES (nonalcoholic steatohepatitis)     Thyroid disease      Social History     Social History    Marital status:      Spouse name: N/A    Number of children: N/A    Years of education: N/A     Occupational History    Not on file.     Social History Main Topics    Smoking status: Former Smoker     Years: 40.00    Smokeless tobacco: Not on file      Comment: quit 35 years ago    Alcohol use Yes      Comment: socially    Drug use: No    Sexual activity: Yes     Partners: Male     Social History Narrative    No narrative on file     Past Surgical History:   Procedure Laterality Date    BREAST SURGERY      breast tumor       SECTION      CHOLECYSTECTOMY       HYSTERECTOMY         Review of Systems   Constitutional: Negative.  Negative for fever and weight loss.   HENT: Negative.    Eyes: Negative.    Respiratory: Negative.    Cardiovascular: Negative.    Gastrointestinal: Positive for abdominal pain. Negative for anorexia, constipation, diarrhea, flatus, hematochezia, melena, nausea and vomiting.   Endocrine: Negative.    Genitourinary: Negative.  Negative for dysuria, frequency and hematuria.   Musculoskeletal: Negative.  Negative for arthralgias and myalgias.   Skin: Negative.    Allergic/Immunologic: Negative.    Neurological: Negative.  Negative for headaches.   Psychiatric/Behavioral: Negative.        Objective:      Physical Exam   Constitutional: She is oriented to person, place, and time. She appears well-developed and well-nourished.   HENT:   Head: Normocephalic.   Right Ear: External ear normal.   Left Ear: External ear normal.   Nose: Nose normal.   Mouth/Throat: Oropharynx is clear and moist.   Eyes: Conjunctivae are normal. Pupils are equal, round, and reactive to light.   Neck: Normal range of motion. Neck supple.   Cardiovascular: Normal rate, regular rhythm and normal heart sounds.    Pulmonary/Chest: Effort normal and breath sounds normal.   Abdominal: Soft. Bowel sounds are normal. There is no tenderness.   Musculoskeletal: Normal range of motion.   Neurological: She is alert and oriented to person, place, and time.   Skin: Skin is warm and dry. Capillary refill takes 2 to 3 seconds.   Psychiatric: She has a normal mood and affect. Her behavior is normal. Judgment and thought content normal.   Nursing note and vitals reviewed.      Assessment:       1. Upper abdominal pain    2. Epigastric pain    3. Generalized abdominal pain        Plan:           Zora was seen today for abdominal pain.    Diagnoses and all orders for this visit:    Upper abdominal pain  Epigastric pain  Generalized abdominal pain  -     Amylase; Future  -     Lipase;  Future  -     Hepatic function panel; Future  -     WBC; Future  -     H. pylori antigen, stool; Future  -     IN OFFICE EKG 12-LEAD (to Muse)  -     CT Abdomen Without Contrast; Future  -     dicyclomine (BENTYL) 20 mg tablet; Take 1 tablet (20 mg total) by mouth 2 (two) times daily.  Report to ER immediately if symptoms worsen

## 2018-05-04 ENCOUNTER — LAB VISIT (OUTPATIENT)
Dept: LAB | Facility: HOSPITAL | Age: 68
End: 2018-05-04
Attending: NURSE PRACTITIONER
Payer: MEDICARE

## 2018-05-04 DIAGNOSIS — R10.13 EPIGASTRIC PAIN: ICD-10-CM

## 2018-05-04 PROCEDURE — 87338 HPYLORI STOOL AG IA: CPT

## 2018-05-08 ENCOUNTER — TELEPHONE (OUTPATIENT)
Dept: FAMILY MEDICINE | Facility: CLINIC | Age: 68
End: 2018-05-08

## 2018-05-08 DIAGNOSIS — R10.13 EPIGASTRIC PAIN: Primary | ICD-10-CM

## 2018-05-08 RX ORDER — SUCRALFATE 1 G/1
1 TABLET ORAL 4 TIMES DAILY
Qty: 40 TABLET | Refills: 0 | Status: SHIPPED | OUTPATIENT
Start: 2018-05-08 | End: 2018-05-18

## 2018-05-08 NOTE — TELEPHONE ENCOUNTER
----- Message from Yanira Almanzar sent at 5/8/2018  9:43 AM CDT -----  Pt states she's calling to get her test results / received the CAT scan results and is still having sme discomfort and states that an upper GI was mentioned and is following up on that being scheduled and can be reached at [phone]

## 2018-05-08 NOTE — TELEPHONE ENCOUNTER
Pt states she is still have upper GI pain and is wondering what the next step would be on this issue. Pt also states she has take Carafate before and is wonder would that help with the issue.

## 2018-05-09 DIAGNOSIS — E03.9 HYPOTHYROIDISM (ACQUIRED): ICD-10-CM

## 2018-05-09 DIAGNOSIS — I10 ESSENTIAL HYPERTENSION: ICD-10-CM

## 2018-05-09 LAB — H PYLORI AG STL QL: NOT DETECTED

## 2018-05-10 RX ORDER — LOSARTAN POTASSIUM 100 MG/1
TABLET ORAL
Qty: 90 TABLET | Refills: 1 | Status: SHIPPED | OUTPATIENT
Start: 2018-05-10 | End: 2018-11-03 | Stop reason: SDUPTHER

## 2018-05-10 RX ORDER — HYDROCHLOROTHIAZIDE 25 MG/1
TABLET ORAL
Qty: 90 TABLET | Refills: 1 | Status: SHIPPED | OUTPATIENT
Start: 2018-05-10 | End: 2018-11-03 | Stop reason: SDUPTHER

## 2018-05-10 RX ORDER — LEVOTHYROXINE SODIUM 50 UG/1
TABLET ORAL
Qty: 90 TABLET | Refills: 1 | Status: SHIPPED | OUTPATIENT
Start: 2018-05-10 | End: 2018-11-03 | Stop reason: SDUPTHER

## 2018-05-10 RX ORDER — METOPROLOL TARTRATE 25 MG/1
TABLET, FILM COATED ORAL
Qty: 90 TABLET | Refills: 1 | Status: SHIPPED | OUTPATIENT
Start: 2018-05-10 | End: 2018-11-03 | Stop reason: SDUPTHER

## 2018-05-10 NOTE — TELEPHONE ENCOUNTER
I am refilling a requested medication x 1 for the patient and reviewed the chart.  The patient is due for a Mammo/dexa/lab /physical.  Please book the patient for lab and OV with NP 2-6 weeks

## 2018-05-11 ENCOUNTER — INITIAL CONSULT (OUTPATIENT)
Dept: GASTROENTEROLOGY | Facility: CLINIC | Age: 68
End: 2018-05-11
Payer: MEDICARE

## 2018-05-11 VITALS
HEART RATE: 71 BPM | HEIGHT: 67 IN | DIASTOLIC BLOOD PRESSURE: 80 MMHG | SYSTOLIC BLOOD PRESSURE: 130 MMHG | WEIGHT: 240.94 LBS | BODY MASS INDEX: 37.82 KG/M2

## 2018-05-11 DIAGNOSIS — R13.14 PHARYNGOESOPHAGEAL DYSPHAGIA: ICD-10-CM

## 2018-05-11 DIAGNOSIS — R12 HEARTBURN: ICD-10-CM

## 2018-05-11 DIAGNOSIS — R10.13 EPIGASTRIC PAIN: Primary | ICD-10-CM

## 2018-05-11 PROCEDURE — 99214 OFFICE O/P EST MOD 30 MIN: CPT | Mod: S$GLB,,, | Performed by: NURSE PRACTITIONER

## 2018-05-11 PROCEDURE — 3075F SYST BP GE 130 - 139MM HG: CPT | Mod: S$GLB,,, | Performed by: NURSE PRACTITIONER

## 2018-05-11 PROCEDURE — 3079F DIAST BP 80-89 MM HG: CPT | Mod: S$GLB,,, | Performed by: NURSE PRACTITIONER

## 2018-05-11 PROCEDURE — 99999 PR PBB SHADOW E&M-EST. PATIENT-LVL IV: CPT | Mod: PBBFAC,,, | Performed by: NURSE PRACTITIONER

## 2018-05-11 RX ORDER — DOXYCYCLINE HYCLATE 50 MG/1
50 CAPSULE ORAL 2 TIMES DAILY
Refills: 1 | COMMUNITY
Start: 2018-05-09 | End: 2018-05-11

## 2018-05-11 RX ORDER — PANTOPRAZOLE SODIUM 40 MG/1
40 TABLET, DELAYED RELEASE ORAL DAILY
Qty: 30 TABLET | Refills: 2 | Status: SHIPPED | OUTPATIENT
Start: 2018-05-11 | End: 2018-10-24 | Stop reason: SDUPTHER

## 2018-05-11 NOTE — PATIENT INSTRUCTIONS
Abdominal Pain    Abdominal pain is pain in the stomach or belly area. Everyone has this pain from time to time. In many cases it goes away on its own. But abdominal pain can sometimes be due to a serious problem, such as appendicitis. So its important to know when to seek help.  Causes of abdominal pain  There are many possible causes of abdominal pain. Common causes in adults include:  · Constipation, diarrhea, or gas  · Stomach acid flowing back up into the esophagus (acid reflux or heartburn)  · Severe acid reflux, called GERD (gastroesophageal reflux disease)  · A sore in the lining of the stomach or small intestine (peptic ulcer)  · Inflammation of the gallbladder, liver, or pancreas  · Gallstones or kidney stones  · Appendicitis   · Intestinal blockage   · An internal organ pushing through a muscle or other tissue (hernia)  · Urinary tract infections  · In women, menstrual cramps, fibroids, or endometriosis  · Inflammation or infection of the intestines  Diagnosing the cause of abdominal pain  Your healthcare provider will do a physical exam help find the cause of your pain. If needed, tests will be ordered. Belly pain has many possible causes. So it can be hard to find the reason for your pain. Giving details about your pain can help. Tell your provider where and when you feel the pain, and what makes it better or worse. Also let your provider know if you have other symptoms such as:  · Fever  · Tiredness  · Upset stomach (nausea)  · Vomiting  · Changes in bathroom habits  Treating abdominal pain  Some causes of pain need emergency medical treatment right away. These include appendicitis or a bowel blockage. Other problems can be treated with rest, fluids, or medicines. Your healthcare provider can give you specific instructions for treatment or self-care based on what is causing your pain.  If you have vomiting or diarrhea, sip water or other clear fluids. When you are ready to eat solid foods again,  start with small amounts of easy-to-digest, low-fat foods. These include apple sauce, toast, or crackers.   When to seek medical care  Call 911 or go to the hospital right away if you:  · Cant pass stool and are vomiting  · Are vomiting blood or have bloody diarrhea or black, tarry diarrhea  · Have chest, neck, or shoulder pain  · Feel like you might pass out  · Have pain in your shoulder blades with nausea  · Have sudden, severe belly pain  · Have new, severe pain unlike any you have felt before  · Have a belly that is rigid, hard, and tender to touch  Call your healthcare provider if you have:  · Pain for more than 5 days  · Bloating for more than 2 days  · Diarrhea for more than 5 days  · A fever of 100.4°F (38.0°C) or higher, or as directed by your provider  · Pain that gets worse  · Weight loss for no reason  · Continued lack of appetite  · Blood in your stool  How to prevent abdominal pain  Here are some tips to help prevent abdominal pain:  · Eat smaller amounts of food at one time.  · Avoid greasy, fried, or other high-fat foods.  · Avoid foods that give you gas.  · Exercise regularly.  · Drink plenty of fluids.  To help prevent GERD symptoms:  · Quit smoking.  · Reduce alcohol and certain foods that increase stomach acid.  · Avoid aspirin and over-the-counter pain and fever medicines (NSAIDS or nonsteroidal anti-inflammatory drugs), if possible  · Lose extra weight.  · Finish eating at least 2 hours before you go to bed or lie down.  · Raise the head of your bed.  Date Last Reviewed: 7/1/2016  © 4712-7873 [email or web address]. [address] [place] [address], [place] [address]. All rights reserved. This information is not intended as a substitute for professional medical care. Always follow your healthcare professional's instructions.

## 2018-05-11 NOTE — LETTER
May 14, 2018      [place]  [address] [place] [address]           [facility]  [address] [facility] [address] [place] [address]  Phone: [phone]          Patient: Zora Davis   MR Number: [id number]   YOB: 1950   Date of Visit: 5/11/2018       Dear Dara Stephens:    Thank you for referring Zora Davis to me for evaluation. Attached you will find relevant portions of my assessment and plan of care.    If you have questions, please do not hesitate to call me. I look forward to following Zora Davis along with you.    Sincerely,        Enclosure  CC:  No Recipients    If you would like to receive this communication electronically, please contact [email or web address] or [phone] to request more information on [employer] Link access.    For providers and/or their staff who would like to refer a patient to [facility], please contact us through our one-stop-shop provider referral line, Remi Alicea, at [phone].    If you feel you have received this communication in error or would no longer like to receive these types of communications, please e-mail [email or web address]

## 2018-05-21 RX ORDER — MULTIVITAMIN/IRON/FOLIC ACID 18MG-0.4MG
TABLET ORAL
COMMUNITY
End: 2019-04-24

## 2018-05-21 RX ORDER — EPINEPHRINE 0.22MG
200 AEROSOL WITH ADAPTER (ML) INHALATION DAILY
COMMUNITY
End: 2019-04-24

## 2018-05-22 ENCOUNTER — ANESTHESIA (OUTPATIENT)
Dept: ENDOSCOPY | Facility: HOSPITAL | Age: 68
End: 2018-05-22
Payer: MEDICARE

## 2018-05-22 ENCOUNTER — HOSPITAL ENCOUNTER (OUTPATIENT)
Facility: HOSPITAL | Age: 68
Discharge: HOME | End: 2018-05-22
Attending: INTERNAL MEDICINE | Admitting: INTERNAL MEDICINE
Payer: MEDICARE

## 2018-05-22 ENCOUNTER — SURGERY (OUTPATIENT)
Age: 68
End: 2018-05-22

## 2018-05-22 ENCOUNTER — ANESTHESIA EVENT (OUTPATIENT)
Dept: ENDOSCOPY | Facility: HOSPITAL | Age: 68
End: 2018-05-22
Payer: MEDICARE

## 2018-05-22 VITALS
DIASTOLIC BLOOD PRESSURE: 90 MMHG | SYSTOLIC BLOOD PRESSURE: 153 MMHG | WEIGHT: 242 LBS | BODY MASS INDEX: 37.98 KG/M2 | OXYGEN SATURATION: 99 % | HEIGHT: 67 IN | HEART RATE: 70 BPM | TEMPERATURE: 98 F | RESPIRATION RATE: 16 BRPM

## 2018-05-22 DIAGNOSIS — R13.10 DYSPHAGIA: ICD-10-CM

## 2018-05-22 LAB — H PYLORI INDEX VALUE: NEGATIVE

## 2018-05-22 PROCEDURE — 63600175 PHARM REV CODE 636 W HCPCS: Mod: PO | Performed by: NURSE ANESTHETIST, CERTIFIED REGISTERED

## 2018-05-22 PROCEDURE — 43248 EGD GUIDE WIRE INSERTION: CPT | Mod: ,,, | Performed by: INTERNAL MEDICINE

## 2018-05-22 PROCEDURE — D9220A PRA ANESTHESIA: Mod: ANES,,, | Performed by: ANESTHESIOLOGY

## 2018-05-22 PROCEDURE — 43239 EGD BIOPSY SINGLE/MULTIPLE: CPT | Mod: PO | Performed by: INTERNAL MEDICINE

## 2018-05-22 PROCEDURE — 88305 TISSUE EXAM BY PATHOLOGIST: CPT | Performed by: PATHOLOGY

## 2018-05-22 PROCEDURE — 87449 NOS EACH ORGANISM AG IA: CPT | Mod: PO | Performed by: INTERNAL MEDICINE

## 2018-05-22 PROCEDURE — 43239 EGD BIOPSY SINGLE/MULTIPLE: CPT | Mod: 59,,, | Performed by: INTERNAL MEDICINE

## 2018-05-22 PROCEDURE — 37000008 HC ANESTHESIA 1ST 15 MINUTES: Mod: PO | Performed by: INTERNAL MEDICINE

## 2018-05-22 PROCEDURE — 43248 EGD GUIDE WIRE INSERTION: CPT | Mod: PO | Performed by: INTERNAL MEDICINE

## 2018-05-22 PROCEDURE — 27201012 HC FORCEPS, HOT/COLD, DISP: Mod: PO | Performed by: INTERNAL MEDICINE

## 2018-05-22 PROCEDURE — D9220A PRA ANESTHESIA: Mod: CRNA,,, | Performed by: NURSE ANESTHETIST, CERTIFIED REGISTERED

## 2018-05-22 PROCEDURE — 37000009 HC ANESTHESIA EA ADD 15 MINS: Mod: PO | Performed by: INTERNAL MEDICINE

## 2018-05-22 PROCEDURE — 88305 TISSUE EXAM BY PATHOLOGIST: CPT | Mod: 26,,, | Performed by: PATHOLOGY

## 2018-05-22 RX ORDER — PROPOFOL 10 MG/ML
VIAL (ML) INTRAVENOUS
Status: DISCONTINUED | OUTPATIENT
Start: 2018-05-22 | End: 2018-05-22

## 2018-05-22 RX ORDER — LIDOCAINE HCL/PF 100 MG/5ML
SYRINGE (ML) INTRAVENOUS
Status: DISCONTINUED | OUTPATIENT
Start: 2018-05-22 | End: 2018-05-22

## 2018-05-22 RX ORDER — SODIUM CHLORIDE, SODIUM LACTATE, POTASSIUM CHLORIDE, CALCIUM CHLORIDE 600; 310; 30; 20 MG/100ML; MG/100ML; MG/100ML; MG/100ML
INJECTION, SOLUTION INTRAVENOUS CONTINUOUS
Status: DISCONTINUED | OUTPATIENT
Start: 2018-05-22 | End: 2018-05-22 | Stop reason: HOSPADM

## 2018-05-22 RX ADMIN — PROPOFOL 50 MG: 10 INJECTION, EMULSION INTRAVENOUS at 07:05

## 2018-05-22 RX ADMIN — SODIUM CHLORIDE, SODIUM LACTATE, POTASSIUM CHLORIDE, CALCIUM CHLORIDE: 600; 310; 30; 20 INJECTION, SOLUTION INTRAVENOUS at 07:05

## 2018-05-22 RX ADMIN — LIDOCAINE HYDROCHLORIDE 75 MG: 20 INJECTION, SOLUTION INTRAVENOUS at 07:05

## 2018-05-22 RX ADMIN — PROPOFOL 125 MG: 10 INJECTION, EMULSION INTRAVENOUS at 07:05

## 2018-05-22 NOTE — TRANSFER OF CARE
"Anesthesia Transfer of Care Note    Patient: Zora Davis    Procedure(s) Performed: Procedure(s) (LRB):  ESOPHAGOGASTRODUODENOSCOPY (EGD) (N/A)    Patient location: PACU    Anesthesia Type: general    Transport from OR: Transported from OR on room air with adequate spontaneous ventilation    Post pain: adequate analgesia    Post assessment: no apparent anesthetic complications    Post vital signs: stable    Level of consciousness: awake    Nausea/Vomiting: no nausea/vomiting    Complications: none    Transfer of care protocol was followed      Last vitals:   Visit Vitals  BP (!) 147/80 (BP Location: Right arm, Patient Position: Lying)   Pulse 81   Temp 36.2 °C (97.2 °F) (Skin)   Resp 18   Ht 5' 7" (1.702 m)   Wt 109.8 kg (242 lb)   SpO2 96%   Breastfeeding? No   BMI 37.90 kg/m²     "

## 2018-05-22 NOTE — H&P
History & Physical - Short Stay  Gastroenterology      SUBJECTIVE:     Procedure: EGD    Chief Complaint/Indication for Procedure: Dysphagia, Epigastric Pain and Reflux    PTA Medications   Medication Sig    coenzyme Q10 (CO Q-10) 100 mg capsule Take 200 mg by mouth once daily.    ergocalciferol, vitamin D2, (VITAMIN D ORAL) Take 5,000 mg by mouth once daily.    hydroCHLOROthiazide (HYDRODIURIL) 25 MG tablet TAKE 1 TABLET DAILY    levothyroxine (SYNTHROID) 50 MCG tablet TAKE 1 TABLET DAILY    losartan (COZAAR) 100 MG tablet TAKE 1 TABLET DAILY    metoprolol tartrate (LOPRESSOR) 25 MG tablet TAKE 1 TABLET DAILY    multivitamin-minerals-lutein Tab Take 1 tablet by mouth once daily.    pantoprazole (PROTONIX) 40 MG tablet Take 1 tablet (40 mg total) by mouth once daily. Before breakfast    albuterol 90 mcg/actuation inhaler Inhale 2 puffs into the lungs every 6 (six) hours as needed for Wheezing or Shortness of Breath. Rescue    glucosamine/chondr vance A sod (GLUCOSAMINE-CHONDROITIN) 1,500-1,200 mg/30 mL Liqd Take by mouth.       Review of patient's allergies indicates:  No Known Allergies     Past Medical History:   Diagnosis Date    Atrial fibrillation     Diverticulosis     Fatty liver     Hypertension     Liver disease     FLORES (nonalcoholic steatohepatitis)     Thyroid disease      Past Surgical History:   Procedure Laterality Date    BREAST SURGERY      breast tumor       SECTION      CHOLECYSTECTOMY      COLONOSCOPY  2014    Dr. Richey, repeat in 5 years    HYSTERECTOMY      UPPER GASTROINTESTINAL ENDOSCOPY  2015    Dr. Padron     Family History   Problem Relation Age of Onset    Colon cancer Paternal Aunt     COPD Paternal Aunt         colon    Hypertension Father     Crohn's disease Neg Hx     Stomach cancer Neg Hx     Ulcerative colitis Neg Hx     Esophageal cancer Neg Hx      Social History   Substance Use Topics    Smoking status: Former Smoker     Years:  40.00    Smokeless tobacco: Never Used      Comment: quit 35 years ago    Alcohol use Yes      Comment: socially         OBJECTIVE:     Vital Signs (Most Recent)  Temp: 97.2 °F (36.2 °C) (05/22/18 0700)  Pulse: 81 (05/22/18 0700)  Resp: 18 (05/22/18 0700)  BP: (!) 147/80 (05/22/18 0700)  SpO2: 96 % (05/22/18 0700)    Physical Exam:                                                       GENERAL:  Comfortable, in no acute distress.                                 HEENT EXAM:  Nonicteric.  No adenopathy.  Oropharynx is clear.               NECK:  Supple.                                                               LUNGS:  Clear.                                                               CARDIAC:  Regular rate and rhythm.  S1, S2.  No murmur.                      ABDOMEN:  Soft, positive bowel sounds, nontender.  No hepatosplenomegaly or masses.  No rebound or guarding.                                             EXTREMITIES:  No edema.     MENTAL STATUS:  Normal, alert and oriented.      ASSESSMENT/PLAN:     Assessment: Dysphagia, Epigastric Pain and Reflux    Plan: EGD    Anesthesia Plan: General    ASA Grade: ASA 2 - Patient with mild systemic disease with no functional limitations    MALLAMPATI SCORE:  I (soft palate, uvula, fauces, and tonsillar pillars visible)

## 2018-05-22 NOTE — [employer] PATIENT INSTRUCTIONS
Discharge Summary/Instructions after an Endoscopic Procedure  Patient Name: Zora Davis  Patient MRN: [id number]  Patient YOB: 1950  Tuesday, May 22, 2018  Efrain Steel MD  RESTRICTIONS:  During your procedure today, you received medications for sedation.  These   medications may affect your judgment, balance and coordination.  Therefore,   for 24 hours, you have the following restrictions:   - DO NOT drive a car, operate machinery, make legal/financial decisions,   sign important papers or drink alcohol.    ACTIVITY:  The following day: return to full activity including work, except no heavy   lifting, straining or running for 3 days if polyps were removed.  DIET:  Eat and drink normally unless instructed otherwise.     TREATMENT FOR COMMON SIDE EFFECTS:  - Mild abdominal pain, nausea, belching, bloating or excessive gas:  rest,   eat lightly and use a heating pad.  - Sore Throat: treat with throat lozenges and/or gargle with warm salt   water.  - Because air was used during the procedure, expelling large amounts of air   from your rectum or belching is normal.  - If a bowel prep was taken, you may not have a bowel movement for 1-3 days.    This is normal.  SYMPTOMS TO WATCH FOR AND REPORT TO YOUR PHYSICIAN:  1. Abdominal pain or bloating, other than gas cramps.  2. Chest pain.  3. Back pain.  4. Signs of infection such as: chills or fever occurring within 24 hours   after the procedure.  5. Rectal bleeding, which would show as bright red, maroon, or black stools.   (A tablespoon of blood from the rectum is not serious, especially if   hemorrhoids are present.)  6. Vomiting.  7. Weakness or dizziness.  GO DIRECTLY TO THE NEAREST EMERGENCY ROOM IF YOU HAVE ANY OF THE FOLLOWING:      Difficulty breathing              Chills and/or fever over 101 F   Persistent vomiting and/or vomiting blood   Severe abdominal pain   Severe chest pain   Black, tarry stools   Bleeding- more than one  tablespoon   Any other symptom or condition that you feel may need urgent attention  Your doctor recommends these additional instructions:  If any biopsies were taken, your doctors clinic will contact you in 1 to 2   weeks with any results.  - Await pathology and Crystal test results.   - Continue present medications.   - Discharge patient to home (ambulatory).  For questions, problems or results please call your physician - Efrain Steel MD at Work: [phone].  EMERGENCY PHONE NUMBER: [phone], LAB RESULTS: [phone]  IF A COMPLICATION OR EMERGENCY SITUATION ARISES AND YOU ARE UNABLE TO REACH   YOUR PHYSICIAN - GO DIRECTLY TO THE EMERGENCY ROOM.  ___________________________________________  Nurse Signature  ___________________________________________  Patient/Designated Responsible Party Signature  Efrain Steel MD  5/22/2018 7:48:19 AM  This report has been verified and signed electronically.  [employer]

## 2018-05-22 NOTE — ANESTHESIA POSTPROCEDURE EVALUATION
"Anesthesia Post Evaluation    Patient: Zora Davis    Procedure(s) Performed: Procedure(s) (LRB):  ESOPHAGOGASTRODUODENOSCOPY (EGD) (N/A)    Final Anesthesia Type: general  Patient location during evaluation: PACU  Patient participation: Yes- Able to Participate  Level of consciousness: awake and alert  Post-procedure vital signs: reviewed and stable  Pain management: adequate  Airway patency: patent  PONV status at discharge: No PONV  Anesthetic complications: no      Cardiovascular status: hemodynamically stable and blood pressure returned to baseline  Respiratory status: unassisted, spontaneous ventilation and room air  Hydration status: euvolemic  Follow-up not needed.        Visit Vitals  BP (!) 153/90 (BP Location: Left arm, Patient Position: Lying)   Pulse 70   Temp 36.7 °C (98 °F) (Skin)   Resp 16   Ht 5' 7" (1.702 m)   Wt 109.8 kg (242 lb)   SpO2 99%   Breastfeeding? No   BMI 37.90 kg/m²       Pain/Andrew Score: Pain Assessment Performed: Yes (5/22/2018  8:14 AM)  Presence of Pain: denies (5/22/2018  8:14 AM)  Andrew Score: 10 (5/22/2018  8:14 AM)      "

## 2018-05-22 NOTE — ANESTHESIA PREPROCEDURE EVALUATION
05/22/2018  Zora Davis is a 68 y.o., female.    Anesthesia Evaluation      I have reviewed the Medications.     Review of Systems  Anesthesia Hx:  No problems with previous Anesthesia   Social:  Former Smoker    Cardiovascular:   Hypertension Dysrhythmias atrial fibrillation    Pulmonary:  Pulmonary Normal    Renal/:  Renal/ Normal     Hepatic/GI:   Hepatitis (FLORES)    Neurological:  Neurology Normal    Endocrine:   Hypothyroidism        Physical Exam  General:  Obesity    Airway/Jaw/Neck:  Airway Findings: Mouth Opening: Normal General Airway Assessment: Adult  Mallampati: II  Jaw/Neck Findings:  Neck ROM: Extension Decreased, Mild       Chest/Lungs:  Chest/Lungs Findings: Clear to auscultation, Normal Respiratory Rate     Heart/Vascular:  Heart Findings: Rate: Normal  Rhythm: Regular Rhythm  Sounds: Normal  Heart murmur: negative Vascular Findings: Normal (No carotid bruits.)       Mental Status:  Mental Status Findings:  Cooperative, Alert and Oriented         Anesthesia Plan  Type of Anesthesia, risks & benefits discussed:  Anesthesia Type:  general  Patient's Preference:   Intra-op Monitoring Plan:   Intra-op Monitoring Plan Comments:   Post Op Pain Control Plan:   Post Op Pain Control Plan Comments:   Induction:   IV  Beta Blocker:  Patient is on a Beta-Blocker and has received one dose within the past 24 hours (No further documentation required).       Informed Consent: Patient understands risks and agrees with Anesthesia plan.  Questions answered. Anesthesia consent signed with patient.  ASA Score: 3     Day of Surgery Review of History & Physical:        Anesthesia Plan Notes: Propofol general.        Ready For Surgery From Anesthesia Perspective.

## 2018-05-22 NOTE — DISCHARGE SUMMARY
Discharge Note  Short Stay      SUMMARY     Admit Date: 5/22/2018    Attending Physician: Efrani Steel MD     Discharge Physician: Efrain Steel MD    Discharge Date: 5/22/2018 7:49 AM    Final Diagnosis: Heartburn [R12]  Epigastric abdominal pain [R10.13]  Dysphagia, unspecified type [R13.10]    Disposition: HOME OR SELF CARE    Patient Instructions:   Current Discharge Medication List      START taking these medications    Details   ranitidine (ZANTAC) 300 MG tablet Take 1 tablet (300 mg total) by mouth every evening.  Qty: 30 tablet, Refills: 2         CONTINUE these medications which have NOT CHANGED    Details   coenzyme Q10 (CO Q-10) 100 mg capsule Take 200 mg by mouth once daily.      ergocalciferol, vitamin D2, (VITAMIN D ORAL) Take 5,000 mg by mouth once daily.      hydroCHLOROthiazide (HYDRODIURIL) 25 MG tablet TAKE 1 TABLET DAILY  Qty: 90 tablet, Refills: 1      levothyroxine (SYNTHROID) 50 MCG tablet TAKE 1 TABLET DAILY  Qty: 90 tablet, Refills: 1    Associated Diagnoses: Hypothyroidism (acquired)      losartan (COZAAR) 100 MG tablet TAKE 1 TABLET DAILY  Qty: 90 tablet, Refills: 1    Associated Diagnoses: Essential hypertension      metoprolol tartrate (LOPRESSOR) 25 MG tablet TAKE 1 TABLET DAILY  Qty: 90 tablet, Refills: 1    Associated Diagnoses: Essential hypertension      multivitamin-minerals-lutein Tab Take 1 tablet by mouth once daily.      pantoprazole (PROTONIX) 40 MG tablet Take 1 tablet (40 mg total) by mouth once daily. Before breakfast  Qty: 30 tablet, Refills: 2    Associated Diagnoses: Epigastric pain; Heartburn      albuterol 90 mcg/actuation inhaler Inhale 2 puffs into the lungs every 6 (six) hours as needed for Wheezing or Shortness of Breath. Rescue  Qty: 18 g, Refills: 1      glucosamine/chondr vance A sod (GLUCOSAMINE-CHONDROITIN) 1,500-1,200 mg/30 mL Liqd Take by mouth.             Discharge Procedure Orders (must include Diet, Follow-up, Activity)    Follow Up:  Follow up  with PCP as previously scheduled  Resume routine diet.  Activity as tolerated.    No driving day of procedure.

## 2018-05-22 NOTE — DISCHARGE INSTRUCTIONS

## 2018-06-01 ENCOUNTER — PATIENT OUTREACH (OUTPATIENT)
Dept: ADMINISTRATIVE | Facility: HOSPITAL | Age: 68
End: 2018-06-01

## 2018-06-01 DIAGNOSIS — M89.9 BONE DISORDER: Primary | ICD-10-CM

## 2018-06-01 DIAGNOSIS — Z12.31 ENCOUNTER FOR SCREENING MAMMOGRAM FOR BREAST CANCER: ICD-10-CM

## 2018-06-01 NOTE — PROGRESS NOTES
Spoke with pt concerning overdue health maintenance. Pt scheduled mammogram and dexa on 6/15/2018. Dexa scan ordered. Appt letter sent.

## 2018-07-16 ENCOUNTER — PATIENT OUTREACH (OUTPATIENT)
Dept: ADMINISTRATIVE | Facility: HOSPITAL | Age: 68
End: 2018-07-16

## 2018-07-16 DIAGNOSIS — E07.9 THYROID DISEASE: ICD-10-CM

## 2018-07-16 DIAGNOSIS — Z00.00 HEALTH CARE MAINTENANCE: ICD-10-CM

## 2018-07-16 DIAGNOSIS — I10 HYPERTENSION, UNSPECIFIED TYPE: Primary | ICD-10-CM

## 2018-07-16 DIAGNOSIS — Z13.1 SCREENING FOR DIABETES MELLITUS: ICD-10-CM

## 2018-07-16 DIAGNOSIS — Z13.220 SCREENING FOR CHOLESTEROL LEVEL: ICD-10-CM

## 2018-07-24 ENCOUNTER — OFFICE VISIT (OUTPATIENT)
Dept: FAMILY MEDICINE | Facility: CLINIC | Age: 68
End: 2018-07-24
Payer: MEDICARE

## 2018-07-24 ENCOUNTER — LAB VISIT (OUTPATIENT)
Dept: LAB | Facility: HOSPITAL | Age: 68
End: 2018-07-24
Attending: FAMILY MEDICINE
Payer: MEDICARE

## 2018-07-24 VITALS
SYSTOLIC BLOOD PRESSURE: 129 MMHG | HEART RATE: 76 BPM | DIASTOLIC BLOOD PRESSURE: 70 MMHG | BODY MASS INDEX: 37.61 KG/M2 | TEMPERATURE: 98 F | WEIGHT: 239.63 LBS | HEIGHT: 67 IN

## 2018-07-24 DIAGNOSIS — Z00.00 ROUTINE CHECK-UP: ICD-10-CM

## 2018-07-24 DIAGNOSIS — Z23 IMMUNIZATION DUE: ICD-10-CM

## 2018-07-24 DIAGNOSIS — I10 HYPERTENSION, UNSPECIFIED TYPE: ICD-10-CM

## 2018-07-24 DIAGNOSIS — Z00.00 ROUTINE CHECK-UP: Primary | ICD-10-CM

## 2018-07-24 DIAGNOSIS — E07.9 THYROID DISEASE: ICD-10-CM

## 2018-07-24 DIAGNOSIS — G47.33 OSA (OBSTRUCTIVE SLEEP APNEA): ICD-10-CM

## 2018-07-24 DIAGNOSIS — K29.70 GASTRITIS, PRESENCE OF BLEEDING UNSPECIFIED, UNSPECIFIED CHRONICITY, UNSPECIFIED GASTRITIS TYPE: ICD-10-CM

## 2018-07-24 LAB
BASOPHILS # BLD AUTO: 0.06 K/UL
BASOPHILS NFR BLD: 0.7 %
DIFFERENTIAL METHOD: ABNORMAL
EOSINOPHIL # BLD AUTO: 0.1 K/UL
EOSINOPHIL NFR BLD: 1.5 %
ERYTHROCYTE [DISTWIDTH] IN BLOOD BY AUTOMATED COUNT: 14.3 %
HCT VFR BLD AUTO: 42.9 %
HGB BLD-MCNC: 13.9 G/DL
IMM GRANULOCYTES # BLD AUTO: 0.02 K/UL
IMM GRANULOCYTES NFR BLD AUTO: 0.2 %
LYMPHOCYTES # BLD AUTO: 4.9 K/UL
LYMPHOCYTES NFR BLD: 54.7 %
MCH RBC QN AUTO: 28.5 PG
MCHC RBC AUTO-ENTMCNC: 32.4 G/DL
MCV RBC AUTO: 88 FL
MONOCYTES # BLD AUTO: 0.7 K/UL
MONOCYTES NFR BLD: 8.3 %
NEUTROPHILS # BLD AUTO: 3.1 K/UL
NEUTROPHILS NFR BLD: 34.6 %
NRBC BLD-RTO: 0 /100 WBC
PLATELET # BLD AUTO: 265 K/UL
PMV BLD AUTO: 10.4 FL
RBC # BLD AUTO: 4.88 M/UL
WBC # BLD AUTO: 8.87 K/UL

## 2018-07-24 PROCEDURE — 3074F SYST BP LT 130 MM HG: CPT | Mod: S$GLB,,, | Performed by: FAMILY MEDICINE

## 2018-07-24 PROCEDURE — 80053 COMPREHEN METABOLIC PANEL: CPT

## 2018-07-24 PROCEDURE — 85025 COMPLETE CBC W/AUTO DIFF WBC: CPT

## 2018-07-24 PROCEDURE — 80061 LIPID PANEL: CPT

## 2018-07-24 PROCEDURE — 99397 PER PM REEVAL EST PAT 65+ YR: CPT | Mod: 25,S$GLB,, | Performed by: FAMILY MEDICINE

## 2018-07-24 PROCEDURE — 36415 COLL VENOUS BLD VENIPUNCTURE: CPT | Mod: PO

## 2018-07-24 PROCEDURE — G0009 ADMIN PNEUMOCOCCAL VACCINE: HCPCS | Mod: S$GLB,,, | Performed by: FAMILY MEDICINE

## 2018-07-24 PROCEDURE — 84443 ASSAY THYROID STIM HORMONE: CPT

## 2018-07-24 PROCEDURE — 82607 VITAMIN B-12: CPT

## 2018-07-24 PROCEDURE — 99999 PR PBB SHADOW E&M-EST. PATIENT-LVL III: CPT | Mod: PBBFAC,,, | Performed by: FAMILY MEDICINE

## 2018-07-24 PROCEDURE — 3078F DIAST BP <80 MM HG: CPT | Mod: S$GLB,,, | Performed by: FAMILY MEDICINE

## 2018-07-24 PROCEDURE — 90670 PCV13 VACCINE IM: CPT | Mod: S$GLB,,, | Performed by: FAMILY MEDICINE

## 2018-07-24 RX ORDER — UBIDECARENONE 75 MG
500 CAPSULE ORAL
COMMUNITY
End: 2021-12-30

## 2018-07-24 NOTE — PROGRESS NOTES
The patient presents today for general health evaluation and counseling Also fu gastritis recent EGD neg h pylori. Hx obesity and HBP and AF Today pt reports  witnesses apnic episodes      Past Medical History:  Past Medical History:   Diagnosis Date    Atrial fibrillation     Diverticulosis     Fatty liver     Hypertension     Liver disease     FLORES (nonalcoholic steatohepatitis)     Thyroid disease      Past Surgical History:   Procedure Laterality Date    BREAST SURGERY      breast tumor       SECTION      CHOLECYSTECTOMY      COLONOSCOPY  2014    Dr. Richey, repeat in 5 years    ESOPHAGOGASTRODUODENOSCOPY N/A 2018    Procedure: ESOPHAGOGASTRODUODENOSCOPY (EGD);  Surgeon: Efrain Steel MD;  Location[facility];  Service: Endoscopy;  Laterality: N/A;    HYSTERECTOMY      UPPER GASTROINTESTINAL ENDOSCOPY  2015    Dr. Padron     Review of patient's allergies indicates:  No Known Allergies  Current Outpatient Prescriptions on File Prior to Visit   Medication Sig Dispense Refill    albuterol 90 mcg/actuation inhaler Inhale 2 puffs into the lungs every 6 (six) hours as needed for Wheezing or Shortness of Breath. Rescue 18 g 1    coenzyme Q10 (CO Q-10) 100 mg capsule Take 200 mg by mouth once daily.      ergocalciferol, vitamin D2, (VITAMIN D ORAL) Take 5,000 mg by mouth once daily.      glucosamine/chondr vance A sod (GLUCOSAMINE-CHONDROITIN) 1,500-1,200 mg/30 mL Liqd Take by mouth.      hydroCHLOROthiazide (HYDRODIURIL) 25 MG tablet TAKE 1 TABLET DAILY 90 tablet 1    levothyroxine (SYNTHROID) 50 MCG tablet TAKE 1 TABLET DAILY 90 tablet 1    losartan (COZAAR) 100 MG tablet TAKE 1 TABLET DAILY 90 tablet 1    metoprolol tartrate (LOPRESSOR) 25 MG tablet TAKE 1 TABLET DAILY 90 tablet 1    multivitamin-minerals-lutein Tab Take 1 tablet by mouth once daily.      pantoprazole (PROTONIX) 40 MG tablet Take 1 tablet (40 mg total) by mouth once daily. Before breakfast  30 tablet 2    ranitidine (ZANTAC) 300 MG tablet Take 1 tablet (300 mg total) by mouth every evening. 30 tablet 2     No current facility-administered medications on file prior to visit.      Social History     Social History    Marital status:      Spouse name: N/A    Number of children: N/A    Years of education: N/A     Occupational History    Not on file.     Social History Main Topics    Smoking status: Former Smoker     Years: 40.00    Smokeless tobacco: Never Used      Comment: quit 35 years ago    Alcohol use Yes      Comment: socially    Drug use: No    Sexual activity: Yes     Partners: Male     Other Topics Concern    Not on file     Social History Narrative    No narrative on file     Family History   Problem Relation Age of Onset    Colon cancer Paternal Aunt     COPD Paternal Aunt         colon    Hypertension Father     Crohn's disease Neg Hx     Stomach cancer Neg Hx     Ulcerative colitis Neg Hx     Esophageal cancer Neg Hx          ROS:GENERAL: No fever, chills, fatigability or weight loss.  SKIN: No rashes, itching or changes in color or texture of skin.  HEAD: No headaches or recent head trauma.EYES: Visual acuity fine. No photophobia, ocular pain or diplopia.EARS: Denies ear pain, discharge or vertigo.NOSE: No loss of smell, no epistaxis or postnasal drip.MOUTH & THROAT: No hoarseness or change in voice. No excessive gum bleeding.NODES: Denies swollen glands.  CHEST: Denies DE PAZ, cyanosis, wheezing, cough and sputum production.  CARDIOVASCULAR: Denies chest pain, PND, orthopnea or reduced exercise tolerance.  ABDOMEN: Appetite fine. No weight loss. Denies diarrhea, abdominal pain, hematemesis or blood in stool.  URINARY: No flank pain, dysuria or hematuria.  PERIPHERAL VASCULAR: No claudication or cyanosis.  MUSCULOSKELETAL: See above.  NEUROLOGIC: No history of seizures, paralysis, alteration of gait or coordination.  PE:   HEAD: Normocephalic, atraumatic.EYES: PERRL.  EOMI.   EARS: TM's intact. Light reflex normal. No retraction or perforation.   NOSE: Mucosa pink. Airway clear.MOUTH & THROAT: No tonsillar enlargement. No pharyngeal erythema or exudate. No stridor.  NODES: No cervical, axillary or inguinal lymph node enlargement.  CHEST: Lungs clear to auscultation.  CARDIOVASCULAR: Normal S1, S2. No rubs, murmurs or gallops.  ABDOMEN: Bowel sounds normal. Not distended. Soft. No tenderness or masses.  MUSCULOSKELETAL: No palpable abnormality  NEUROLOGIC: Cranial Nerves: II-XII grossly intact.  Motor: 5/5 strength major flexors/extensors.  DTR's: Knees, Ankles 2+ and equal bilaterally; downgoing toes.  Sensory: Intact to light touch distally.  Gait & Posture: Normal gait and fine motion. No cerebellar signs.     Impression:Routine health check  JOSE DE JESUS-witness apnea and daytime hypersomnolence   Gastritis  Obesity  Hypothyroid   Plan:Lab eval  Rec diet and ex recs  Rev age appropriate screenings    Pn vaccine Prev 13 today in 1 yr start Pn 23

## 2018-07-25 ENCOUNTER — PATIENT MESSAGE (OUTPATIENT)
Dept: FAMILY MEDICINE | Facility: CLINIC | Age: 68
End: 2018-07-25

## 2018-07-25 LAB
ALBUMIN SERPL BCP-MCNC: 3.7 G/DL
ALP SERPL-CCNC: 75 U/L
ALT SERPL W/O P-5'-P-CCNC: 32 U/L
ANION GAP SERPL CALC-SCNC: 9 MMOL/L
AST SERPL-CCNC: 24 U/L
BILIRUB SERPL-MCNC: 0.7 MG/DL
BUN SERPL-MCNC: 13 MG/DL
CALCIUM SERPL-MCNC: 9.7 MG/DL
CHLORIDE SERPL-SCNC: 104 MMOL/L
CHOLEST SERPL-MCNC: 235 MG/DL
CHOLEST/HDLC SERPL: 4.8 {RATIO}
CO2 SERPL-SCNC: 28 MMOL/L
CREAT SERPL-MCNC: 0.9 MG/DL
EST. GFR  (AFRICAN AMERICAN): >60 ML/MIN/1.73 M^2
EST. GFR  (NON AFRICAN AMERICAN): >60 ML/MIN/1.73 M^2
GLUCOSE SERPL-MCNC: 97 MG/DL
HDLC SERPL-MCNC: 49 MG/DL
HDLC SERPL: 20.9 %
LDLC SERPL CALC-MCNC: 149 MG/DL
NONHDLC SERPL-MCNC: 186 MG/DL
POTASSIUM SERPL-SCNC: 4.2 MMOL/L
PROT SERPL-MCNC: 8.3 G/DL
SODIUM SERPL-SCNC: 141 MMOL/L
TRIGL SERPL-MCNC: 185 MG/DL
TSH SERPL DL<=0.005 MIU/L-ACNC: 2.09 UIU/ML
VIT B12 SERPL-MCNC: 455 PG/ML

## 2018-07-26 ENCOUNTER — HOSPITAL ENCOUNTER (OUTPATIENT)
Dept: RADIOLOGY | Facility: HOSPITAL | Age: 68
Discharge: HOME | End: 2018-07-26
Attending: FAMILY MEDICINE
Payer: MEDICARE

## 2018-07-26 VITALS — WEIGHT: 239 LBS | HEIGHT: 67 IN | BODY MASS INDEX: 37.51 KG/M2

## 2018-07-26 DIAGNOSIS — Z12.31 ENCOUNTER FOR SCREENING MAMMOGRAM FOR BREAST CANCER: ICD-10-CM

## 2018-07-26 DIAGNOSIS — M89.9 BONE DISORDER: ICD-10-CM

## 2018-07-26 PROCEDURE — 77067 SCR MAMMO BI INCL CAD: CPT | Mod: 26,,, | Performed by: RADIOLOGY

## 2018-07-26 PROCEDURE — 77063 BREAST TOMOSYNTHESIS BI: CPT | Mod: 26,,, | Performed by: RADIOLOGY

## 2018-07-26 PROCEDURE — 77080 DXA BONE DENSITY AXIAL: CPT | Mod: TC,PO

## 2018-07-26 PROCEDURE — 77080 DXA BONE DENSITY AXIAL: CPT | Mod: 26,,, | Performed by: RADIOLOGY

## 2018-07-26 PROCEDURE — 77063 BREAST TOMOSYNTHESIS BI: CPT | Mod: TC,PO

## 2018-07-27 ENCOUNTER — TELEPHONE (OUTPATIENT)
Dept: FAMILY MEDICINE | Facility: CLINIC | Age: 68
End: 2018-07-27

## 2018-07-27 DIAGNOSIS — E55.9 VITAMIN D DEFICIENCY: Primary | ICD-10-CM

## 2018-07-27 NOTE — TELEPHONE ENCOUNTER
----- Message from Bossman Muro MD sent at 7/27/2018  8:23 AM CDT -----  Bone density-Borderline osteopenia-no worse than previous. Mammo was good.Repeat bone density 3y, get vit d level when convenient

## 2018-07-27 NOTE — TELEPHONE ENCOUNTER
----- Message from Donita Morales sent at 7/27/2018  9:21 AM CDT -----  Pt at [phone]//states she is returning your call//please call again//thanks/lh

## 2018-07-30 ENCOUNTER — LAB VISIT (OUTPATIENT)
Dept: LAB | Facility: HOSPITAL | Age: 68
End: 2018-07-30
Attending: FAMILY MEDICINE
Payer: MEDICARE

## 2018-07-30 DIAGNOSIS — E55.9 VITAMIN D DEFICIENCY: ICD-10-CM

## 2018-07-30 LAB — 25(OH)D3+25(OH)D2 SERPL-MCNC: 45 NG/ML

## 2018-07-30 PROCEDURE — 82306 VITAMIN D 25 HYDROXY: CPT

## 2018-07-30 PROCEDURE — 36415 COLL VENOUS BLD VENIPUNCTURE: CPT | Mod: PO

## 2018-08-06 ENCOUNTER — HOSPITAL ENCOUNTER (OUTPATIENT)
Dept: SLEEP MEDICINE | Facility: HOSPITAL | Age: 68
Discharge: HOME | End: 2018-08-06
Attending: FAMILY MEDICINE
Payer: MEDICARE

## 2018-08-06 DIAGNOSIS — F51.5 NIGHTMARES: ICD-10-CM

## 2018-08-06 DIAGNOSIS — F51.04 PSYCHOPHYSIOLOGICAL INSOMNIA: ICD-10-CM

## 2018-08-06 DIAGNOSIS — G47.33 OBSTRUCTIVE SLEEP APNEA: Primary | ICD-10-CM

## 2018-08-06 DIAGNOSIS — Z72.821 INADEQUATE SLEEP HYGIENE: ICD-10-CM

## 2018-08-06 DIAGNOSIS — G47.63 SLEEP-RELATED BRUXISM: ICD-10-CM

## 2018-08-06 PROCEDURE — 95810 POLYSOM 6/> YRS 4/> PARAM: CPT | Mod: 26,,, | Performed by: PSYCHOLOGIST

## 2018-08-06 PROCEDURE — 95810 POLYSOM 6/> YRS 4/> PARAM: CPT

## 2018-08-29 ENCOUNTER — PATIENT MESSAGE (OUTPATIENT)
Dept: FAMILY MEDICINE | Facility: CLINIC | Age: 68
End: 2018-08-29

## 2018-08-29 ENCOUNTER — OFFICE VISIT (OUTPATIENT)
Dept: PULMONOLOGY | Facility: CLINIC | Age: 68
End: 2018-08-29
Payer: MEDICARE

## 2018-08-29 ENCOUNTER — TELEPHONE (OUTPATIENT)
Dept: FAMILY MEDICINE | Facility: CLINIC | Age: 68
End: 2018-08-29

## 2018-08-29 VITALS
DIASTOLIC BLOOD PRESSURE: 78 MMHG | OXYGEN SATURATION: 98 % | WEIGHT: 240.31 LBS | HEIGHT: 67 IN | BODY MASS INDEX: 37.72 KG/M2 | RESPIRATION RATE: 16 BRPM | HEART RATE: 62 BPM | SYSTOLIC BLOOD PRESSURE: 126 MMHG

## 2018-08-29 DIAGNOSIS — G47.33 OBSTRUCTIVE SLEEP APNEA: Primary | ICD-10-CM

## 2018-08-29 DIAGNOSIS — E66.01 SEVERE OBESITY (BMI 35.0-35.9 WITH COMORBIDITY): ICD-10-CM

## 2018-08-29 DIAGNOSIS — I10 HYPERTENSION, UNSPECIFIED TYPE: ICD-10-CM

## 2018-08-29 DIAGNOSIS — G47.33 OSA (OBSTRUCTIVE SLEEP APNEA): Primary | ICD-10-CM

## 2018-08-29 DIAGNOSIS — F51.04 PSYCHOPHYSIOLOGICAL INSOMNIA: ICD-10-CM

## 2018-08-29 DIAGNOSIS — G47.63 SLEEP-RELATED BRUXISM: ICD-10-CM

## 2018-08-29 PROCEDURE — 99204 OFFICE O/P NEW MOD 45 MIN: CPT | Mod: S$GLB,,, | Performed by: NURSE PRACTITIONER

## 2018-08-29 PROCEDURE — 3078F DIAST BP <80 MM HG: CPT | Mod: S$GLB,,, | Performed by: NURSE PRACTITIONER

## 2018-08-29 PROCEDURE — 99999 PR PBB SHADOW E&M-EST. PATIENT-LVL III: CPT | Mod: PBBFAC,,, | Performed by: NURSE PRACTITIONER

## 2018-08-29 PROCEDURE — 3074F SYST BP LT 130 MM HG: CPT | Mod: S$GLB,,, | Performed by: NURSE PRACTITIONER

## 2018-08-29 NOTE — LETTER
August 29, 2018      Bossman Muro MD  [address] [facility] [address]           [facility]  [address] [place] [address]  Phone: [phone]  Fax: [phone]          Patient: Zora Davis   MR Number: [id number]   YOB: 1950   Date of Visit: 8/29/2018       Dear Dr. Bossman Muro:    Thank you for referring Zora Davis to me for evaluation. Attached you will find relevant portions of my assessment and plan of care.    If you have questions, please do not hesitate to call me. I look forward to following Zora Davis along with you.    Sincerely,    Briana Harding, NP    Enclosure  CC:  No Recipients    If you would like to receive this communication electronically, please contact [email or web address] or [phone] to request more information on [employer] Link access.    For providers and/or their staff who would like to refer a patient to [facility], please contact us through our one-stop-shop provider referral line, [facility], at [phone].    If you feel you have received this communication in error or would no longer like to receive these types of communications, please e-mail [email or web address]

## 2018-08-29 NOTE — ASSESSMENT & PLAN NOTE
Improves with benadryl 50 mg or tylenol pm.  Improved since Retired from [employer] in April 2018.

## 2018-08-29 NOTE — ASSESSMENT & PLAN NOTE
Thinking about bariatric sleeve surgery  Obesity present all of adult life, highest weight 250 lbs  Lost to 216 lbs in 2016 with low carb diet and gain all weight back.   Present weight 240 lbs.

## 2018-08-29 NOTE — PROGRESS NOTES
Subjective:      Patient ID: Zora Davis is a 68 y.o. female.    Patient Active Problem List   Diagnosis    Hypertension    Abnormal CT of the abdomen    Abdominal pain, right lower quadrant    Other chronic nonalcoholic liver disease    Family history of malignant neoplasm of gastrointestinal tract    Abdominal pain    Gastritis    Dysphagia    Thyroid disease    Obstructive sleep apnea    Psychophysiological insomnia    Sleep-related bruxism    Inadequate sleep hygiene    Severe obesity (BMI 35.0-35.9 with comorbidity)     she has been referred by Bossman Muro MD for evaluation and management for   Chief Complaint   Patient presents with    Sleep Apnea     Chief Complaint: Sleep Apnea    HPI:  She presents for a sleep evaluation with review of PSG 8/6/2018 ordered by Primary Care Provider, Dr. Bossman Muro that revealed mild to moderate obstructive sleep apnea with AHI 17.5/hr.   Patient has observed snoring, awakening with sensation of not breathing and ht racing occurring about twice a month.  Patient reports non restful sleep, has difficulty falling asleep, sleeps better sitting up so often sleeps in recliner, gets up from bed to go to recliner  She denies morning headache.   She reports day time napping; duration 15 - 20 Minutes  She denies recent weight gain.  Cardiovascular risk factors: hypertension and obesity  Bed time is 1100 - 1200  Wake time is 0600  Sleep onset is within 15 Minutes when ready to go to sleep.   Sleep maintenance difficulties related to difficulty falling asleep and non-restful sleep  Wake after sleep onset occurs one time a night.  Nocturia occurs one time a night,   Sleep aids : YES, tylenol pm or equal sleep aid with equals to 50 mg of benadryl.   Dry mouth : YES  Sleep walking:  NO  Sleep talking :  NO  Sleep eating: NO  Vivid Dreams : YES   Cataplexy :  NO    [place] sleepiness score was 6.  Neck circumference is 38.5 cm. (15.2 inches).  Mallampati score  3    Previous Report Reviewed: lab reports and office notes     Past Medical History: The following portions of the patient's history were reviewed and updated as appropriate:   She  has a past surgical history that includes  section; Hysterectomy; breast tumor; Cholecystectomy; Colonoscopy (2014); Upper gastrointestinal endoscopy (2015); Breast biopsy; Total Reduction Mammoplasty; ESOPHAGOGASTRODUODENOSCOPY (EGD) (N/A, 2018); ESOPHAGOGASTRODUODENOSCOPY (EGD) (N/A, 2015); and COLONOSCOPY (N/A, 2014).  Her family history includes COPD in her paternal aunt; Colon cancer in her paternal aunt; Hypertension in her father.  She  reports that she has quit smoking. She quit after 40.00 years of use. she has never used smokeless tobacco. She reports that she drinks alcohol. She reports that she does not use drugs.  She has a current medication list which includes the following prescription(s): albuterol, coenzyme q10, cyanocobalamin, ergocalciferol (vitamin d2), glucosamine-chondroitin, hydrochlorothiazide, levothyroxine, losartan, metoprolol tartrate, multivitamin-minerals-lutein, ranitidine, and pantoprazole.  She has No Known Allergies..    Review of Systems   Constitutional: Negative for fever, chills, weight loss, weight gain, activity change, appetite change, fatigue and night sweats.   HENT: Negative for postnasal drip, rhinorrhea, sinus pressure, voice change and congestion.    Eyes: Negative for redness and itching.   Respiratory: Negative for snoring, cough, sputum production, chest tightness, shortness of breath, wheezing, orthopnea, asthma nighttime symptoms, dyspnea on extertion, use of rescue inhaler and somnolence.    Cardiovascular: Negative.  Negative for chest pain, palpitations and leg swelling.   Genitourinary: Negative for difficulty urinating and hematuria.   Endocrine: Negative for cold intolerance and heat intolerance.    Musculoskeletal: Negative for arthralgias,  "gait problem, joint swelling and myalgias.   Skin: Negative.    Gastrointestinal: Negative for nausea, vomiting, abdominal pain and acid reflux (controlled on zantac ).   Neurological: Negative for dizziness, weakness, light-headedness and headaches.   Hematological: Negative for adenopathy. No excessive bruising.   All other systems reviewed and are negative.     Objective:   /78   Pulse 62   Resp 16   Ht 5' 7" (1.702 m)   Wt 109 kg (240 lb 4.8 oz)   SpO2 98%   BMI 37.64 kg/m²   Physical Exam   Constitutional: She is oriented to person, place, and time. She appears well-developed and well-nourished. She is active and cooperative.  Non-toxic appearance. She does not appear ill. No distress.   HENT:   Head: Normocephalic.   Right Ear: External ear normal.   Left Ear: External ear normal.   Nose: Nose normal.   Mouth/Throat: Oropharynx is clear and moist. No oropharyngeal exudate.   Eyes: Conjunctivae are normal.   Neck: Normal range of motion. Neck supple.   Cardiovascular: Normal rate, regular rhythm, normal heart sounds and intact distal pulses.   Pulmonary/Chest: Effort normal and breath sounds normal. No stridor.   Abdominal: Soft.   Musculoskeletal: Normal range of motion.   Lymphadenopathy:     She has no cervical adenopathy.   Neurological: She is alert and oriented to person, place, and time.   Skin: Skin is warm and dry.   Psychiatric: She has a normal mood and affect. Her behavior is normal. Judgment and thought content normal.   Vitals reviewed.    Personal Diagnostic Review  Review of labs, xray's, cardiology reports.     Assessment:     1. Obstructive sleep apnea    2. Sleep-related bruxism    3. Psychophysiological insomnia    4. Severe obesity (BMI 35.0-35.9 with comorbidity)      Orders Placed This Encounter   Procedures    CPAP Titration (Must have dx of JOSE DE JESUS from previous sleep study.)     Standing Status:   Future     Standing Expiration Date:   8/29/2019     Plan:   Discussed " diagnosis, its evaluation, treatment and usual course. All questions answered.  Problem List Items Addressed This Visit     Obstructive sleep apnea - Primary     [facility] PSG 8/6/2018 AHI 17.5 moderate obstructive sleep apnea   Proceed with CPAP titration            Relevant Orders    CPAP Titration (Must have dx of JOSE DE JESUS from previous sleep study.)    Psychophysiological insomnia     Improves with benadryl 50 mg or tylenol pm.  Improved since Retired from [employer] in April 2018.          Relevant Orders    CPAP Titration (Must have dx of JOSE DE JESUS from previous sleep study.)    Severe obesity (BMI 35.0-35.9 with comorbidity)     Thinking about bariatric sleeve surgery  Obesity present all of adult life, highest weight 250 lbs  Lost to 216 lbs in 2016 with low carb diet and gain all weight back.   Present weight 240 lbs.          Sleep-related bruxism     Advised by dentist, she has a mouth guard advised, not currently wearing         Relevant Orders    CPAP Titration (Must have dx of JOSE DE JESUS from previous sleep study.)        TIME SPENT WITH PATIENT:   Time spent 40 minutes in face to face  discussion concerning diagnosis, prognosis, review of lab and test results, benefits of treatment as well as management of disease, counseling of patient and coordination of care between various health  care providers . Greater than half the time spent was used for coordination of care and counseling of patient.     Follow-up for CPAP titration results review.

## 2018-08-29 NOTE — PATIENT INSTRUCTIONS
What Are Snoring and Obstructive Sleep Apnea?  If youve ever had a stuffed-up nose, you know the feeling of trying to breathe through a very narrow passageway. This is what happens in your throat when you snore. While you sleep, structures in your throat partially block your air passage, making the passage narrow and hard to breathe through. If the entire passage becomes blocked and you cant breathe at all, you have sleep apnea.      Snoring Obstructive sleep apnea   Snoring  If your throat structures are too large or the muscles relax too much during sleep, the air passage may be partially blocked. As air from the nose or mouth passes around this blockage, the throat structures vibrate, causing the familiar sound of snoring. At times, this sound can be so loud that snorers wake up others, or even themselves, during the night. Snoring gets worse as more and more of the air passage is blocked.  Obstructive sleep apnea  If the structures completely block the throat, air cant flow to the lungs at all. This is called apnea (meaning no breathing). Since the lungs arent getting fresh air, the brain tells the body to wake up just enough to tighten the muscles and unblock the air passage. With a loud gasp, breathing begins again. This process may be repeated over and over again throughout the night, making your sleep fragmented with a lighter stage of sleep. Even though you do not remember waking up many times during the night to a lighter sleep, you feel tired the next day. The lack of sleep and fresh air can also strain your lungs, heart, and other organs, leading to problems such as high blood pressure, heart attack, or stroke.  Problems in the nose and jaw  Problems in the structure of the nose may obstruct breathing. A crooked (deviated) septum or swollen turbinates can make snoring worse or lead to apnea. Also, a receding jaw may make the tongue sit too far back, so its more likely to block the airway when  youre asleep.        Date Last Reviewed: 7/18/2015  © 3689-3435 [employer]. [address] [place] [address]. All rights reserved. This information is not intended as a substitute for professional medical care. Always follow your healthcare professional's instructions.        Continuous Positive Air Pressure (CPAP)     A mask over the nose gently directs air into the throat to keep the airway open.   Continuous positive air pressure (CPAP) uses gentle air pressure to hold the airway open. CPAP is often the most effective treatment for sleep apnea and severe snoring. It works very well for many people. But keep in mind that it can take several adjustments before the setup is right for you.  How CPAP works  The CPAP machine  is a small portable pump beside the bed. The pump sends air through a hose, which is held over your nose and mouth by a mask. Mild air pressure is gently pushed through your airway. The air pressure nudges sagging tissues aside. This widens the airway so you can breathe better. CPAP may be combined with other kinds of therapy for sleep apnea.  Types of air pressure treatments  There are different types of CPAP. Your doctor or CPAP technician will help you decide which type is best for you:  · Basic CPAP keeps the pressure constant all night long.  · A bilevel device (BiPAP) provides more pressure when you breathe in and less when you breathe out. A BiPAP machine also may be set to provide automatic breaths to maintain breathing if you stop breathing while sleeping.  · An autoCPAP device automatically adjusts pressure throughout the night and in response to changes such as body position, sleep stage, and snoring.  Date Last Reviewed: 8/10/2015  © 8692-9669 [employer]. [address] [place], [place] [address]. All rights reserved. This information is not intended as a substitute for professional medical care. Always follow your healthcare professional's  instructions.        Visiting a Sleep Clinic    Are you worried about having a sleep study? Talk to your healthcare provider if you have any concerns. Learn what to expect at the sleep clinic and try to relax before you come.  Before your study  Your healthcare provider will tell you how to prepare. Ask if you should take your usual medicines. Also:  · Avoid napping.  · Avoid caffeine and alcohol.  · Take a shower and wash your hair (dont use hair conditioner, hair spray, and skin lotions).  · Eat dinner before you come to the sleep clinic. Pack a snack if you need one before bedtime.  · Bring what will make you comfortable, such as your pajamas, robe, slippers, hygiene items, and even your own pillow.  What you can expect   When you arrive at the sleep clinic, you will usually be checked in by a . A specialized sleep technologist will meet you in your room. Then you may change into your nightclothes. Small sensors are placed on your head and body with tape and gel. The sensors are then plugged into a machine that will monitor your sleep. If you need to use a restroom, the sensors can be unplugged. A camera in your room will record your body movements. The technologist will stay in a nearby room. If you need to talk to him or her, use the intercom.  What a sleep study does  A sleep study monitors all the stages of your sleep. To do this, the following are recorded:  · Eye movements  · Heart rate, brain waves, and muscle activity  · Level of oxygen in your blood  · Breathing and snoring  · Sudden leg or body movements  If you have breathing problems, Continuous Positive Airway Pressure (CPAP) may be used. CPAP is a device that can help you breathe by adding mild air pressure to your airway passages and improve your sleep. It may be used during the second half of your study or on another night.  Getting your results  The technologist can answer some of your questions about the sleep study. But only your  healthcare provider can explain the results. He or she will have the report of your sleep study within 1 to 2 weeks. Then your treatment options can be discussed with your healthcare provider, or you may be referred to a sleep disorders specialist.  Date Last Reviewed: 8/9/2015  © 5375-8467 [employer]. [address] [place] [address], [place] [address]. All rights reserved. This information is not intended as a substitute for professional medical care. Always follow your healthcare professional's instructions.

## 2018-08-29 NOTE — ASSESSMENT & PLAN NOTE
[facility] PSG 8/6/2018 AHI 17.5 moderate obstructive sleep apnea   Proceed with CPAP titration

## 2018-08-30 ENCOUNTER — TELEPHONE (OUTPATIENT)
Dept: SURGERY | Facility: CLINIC | Age: 68
End: 2018-08-30

## 2018-09-17 ENCOUNTER — HOSPITAL ENCOUNTER (OUTPATIENT)
Dept: RADIOLOGY | Facility: HOSPITAL | Age: 68
Discharge: HOME | End: 2018-09-17
Attending: SURGERY
Payer: MEDICARE

## 2018-09-17 ENCOUNTER — OFFICE VISIT (OUTPATIENT)
Dept: SURGERY | Facility: CLINIC | Age: 68
End: 2018-09-17
Payer: MEDICARE

## 2018-09-17 ENCOUNTER — CLINICAL SUPPORT (OUTPATIENT)
Dept: CARDIOLOGY | Facility: CLINIC | Age: 68
End: 2018-09-17
Payer: MEDICARE

## 2018-09-17 VITALS
DIASTOLIC BLOOD PRESSURE: 90 MMHG | HEIGHT: 67 IN | HEART RATE: 104 BPM | WEIGHT: 238.75 LBS | SYSTOLIC BLOOD PRESSURE: 146 MMHG | BODY MASS INDEX: 37.47 KG/M2 | TEMPERATURE: 99 F

## 2018-09-17 DIAGNOSIS — I10 HYPERTENSION, UNSPECIFIED TYPE: ICD-10-CM

## 2018-09-17 DIAGNOSIS — M54.9 BACK PAIN, UNSPECIFIED BACK LOCATION, UNSPECIFIED BACK PAIN LATERALITY, UNSPECIFIED CHRONICITY: ICD-10-CM

## 2018-09-17 DIAGNOSIS — I10 HYPERTENSION, UNSPECIFIED TYPE: Primary | ICD-10-CM

## 2018-09-17 DIAGNOSIS — M25.50 ARTHRALGIA, UNSPECIFIED JOINT: ICD-10-CM

## 2018-09-17 DIAGNOSIS — G47.33 OBSTRUCTIVE SLEEP APNEA: ICD-10-CM

## 2018-09-17 DIAGNOSIS — E66.01 SEVERE OBESITY (BMI 35.0-35.9 WITH COMORBIDITY): ICD-10-CM

## 2018-09-17 PROCEDURE — 93010 ELECTROCARDIOGRAM REPORT: CPT | Mod: S$PBB,,, | Performed by: NUCLEAR MEDICINE

## 2018-09-17 PROCEDURE — 1101F PT FALLS ASSESS-DOCD LE1/YR: CPT | Mod: ,,, | Performed by: SURGERY

## 2018-09-17 PROCEDURE — 71046 X-RAY EXAM CHEST 2 VIEWS: CPT | Mod: 26,,, | Performed by: RADIOLOGY

## 2018-09-17 PROCEDURE — 99204 OFFICE O/P NEW MOD 45 MIN: CPT | Mod: S$PBB,,, | Performed by: SURGERY

## 2018-09-17 PROCEDURE — 3077F SYST BP >= 140 MM HG: CPT | Mod: ,,, | Performed by: SURGERY

## 2018-09-17 PROCEDURE — 3080F DIAST BP >= 90 MM HG: CPT | Mod: ,,, | Performed by: SURGERY

## 2018-09-17 PROCEDURE — 99999 PR PBB SHADOW E&M-EST. PATIENT-LVL IV: CPT | Mod: PBBFAC,,, | Performed by: SURGERY

## 2018-09-17 PROCEDURE — 99214 OFFICE O/P EST MOD 30 MIN: CPT | Mod: PBBFAC,25,PO | Performed by: SURGERY

## 2018-09-17 PROCEDURE — 71046 X-RAY EXAM CHEST 2 VIEWS: CPT | Mod: TC,FY,PO

## 2018-09-17 PROCEDURE — 93005 ELECTROCARDIOGRAM TRACING: CPT | Mod: PBBFAC,PO | Performed by: NUCLEAR MEDICINE

## 2018-09-17 NOTE — LETTER
September 17, 2018      Bossman Muro MD  [address] [facility] [place] [address]           [facility] General Surgery  [address] [facility] [address] [facility]  [place] [address]  Phone: [phone]  Fax: [phone]          Patient: Zora Davis   MR Number: [id number]   YOB: 1950   Date of Visit: 9/17/2018       Dear Dr. Bossman Muro:    Thank you for referring Zora Davis to me for evaluation. Attached you will find relevant portions of my assessment and plan of care.    If you have questions, please do not hesitate to call me. I look forward to following Zora Davis along with you.    Sincerely,    Cole Armstrong MD    Enclosure  CC:  No Recipients    If you would like to receive this communication electronically, please contact [email or web address] or [phone] to request more information on [employer] access.    For providers and/or their staff who would like to refer a patient to [facility], please contact us through our one-stop-shop provider referral line, Remi Alicea, at [phone].    If you feel you have received this communication in error or would no longer like to receive these types of communications, please e-mail [email or web address]

## 2018-09-17 NOTE — PROGRESS NOTES
BARIATRIC NEW PATIENT EVALUATION    CHIEF COMPLAINT:   Morbid obesity with a BMI of 37.39 and inability to maintain weight loss.    HISTORY OF PRESENT ILLNESS:  Zora Davis is a 68 y.o.-year-old female presenting for morbid obesity with a BMI of 37.39 and inability to maintain weight loss. The patient has tried diet and exercise. She is able to lose 20-30lbs but is unable to keep the weight off. She recently joined a gym and is increasing her activity.    HPI    CO-MORBIDITIES:  hypertension, obstructive sleep apnea and back pain/joint pain    PAST MEDICAL HISTORY:  Past Medical History:   Diagnosis Date    Atrial fibrillation     Diverticulosis     Fatty liver     Hypertension     Liver disease     FLORES (nonalcoholic steatohepatitis)     Thyroid disease         PAST SURGICAL HISTORY:  Past Surgical History:   Procedure Laterality Date    BREAST BIOPSY      breast tumor       SECTION      CHOLECYSTECTOMY      COLONOSCOPY  2014    Dr. Richey, repeat in 5 years    COLONOSCOPY N/A 2014    Performed by Nate R[facility]y MD at [facility] ENDO    ESOPHAGOGASTRODUODENOSCOPY N/A 2018    Procedure: ESOPHAGOGASTRODUODENOSCOPY (EGD);  Surgeon: Efrain Steel MD;  Location: [facility];  Service: Endoscopy;  Laterality: N/A;    ESOPHAGOGASTRODUODENOSCOPY (EGD) N/A 2018    [facility]rain [facility] at [facility] ENDO    ESOPHAGOGASTRODUODENOSCOPY (EGD) N/A 2015    Performed by Chinedu Padron MD at [facility] ENDO    HYSTERECTOMY      TOTAL REDUCTION MAMMOPLASTY      UPPER GASTROINTESTINAL ENDOSCOPY  2015    Dr. Padron       FAMILY HISTORY:  Family History   Problem Relation Age of Onset    Colon cancer Paternal Aunt     COPD Paternal Aunt         colon    Hypertension Father     Crohn's disease Neg Hx     Stomach cancer Neg Hx     Ulcerative colitis Neg Hx     Esophageal cancer Neg Hx         SOCIAL HISTORY:   reports that she has quit smoking. She quit after  40.00 years of use. she has never used smokeless tobacco. She reports that she drinks alcohol. She reports that she does not use drugs.     MEDICATIONS:  Current Outpatient Medications on File Prior to Visit   Medication Sig Dispense Refill    albuterol 90 mcg/actuation inhaler Inhale 2 puffs into the lungs every 6 (six) hours as needed for Wheezing or Shortness of Breath. Rescue 18 g 1    coenzyme Q10 (CO Q-10) 100 mg capsule Take 200 mg by mouth once daily.      cyanocobalamin (VITAMIN B-12) 500 MCG tablet Take 500 mcg by mouth once daily.      ergocalciferol, vitamin D2, (VITAMIN D ORAL) Take 5,000 mg by mouth once daily.      glucosamine/chondr vance A sod (GLUCOSAMINE-CHONDROITIN) 1,500-1,200 mg/30 mL Liqd Take by mouth.      hydroCHLOROthiazide (HYDRODIURIL) 25 MG tablet TAKE 1 TABLET DAILY 90 tablet 1    levothyroxine (SYNTHROID) 50 MCG tablet TAKE 1 TABLET DAILY 90 tablet 1    losartan (COZAAR) 100 MG tablet TAKE 1 TABLET DAILY 90 tablet 1    metoprolol tartrate (LOPRESSOR) 25 MG tablet TAKE 1 TABLET DAILY 90 tablet 1    multivitamin-minerals-lutein Tab Take 1 tablet by mouth once daily.      ranitidine (ZANTAC) 300 MG tablet Take 1 tablet (300 mg total) by mouth every evening. 30 tablet 2    pantoprazole (PROTONIX) 40 MG tablet Take 1 tablet (40 mg total) by mouth once daily. Before breakfast 30 tablet 2     No current facility-administered medications on file prior to visit.        Medications have been reviewed.    ALLERGIES:  Review of patient's allergies indicates:  No Known Allergies    Allergies have been reviewed.    ROS:  Review of Systems   Constitutional: Positive for activity change. Negative for appetite change, chills, diaphoresis, fatigue, fever and unexpected weight change.   HENT: Negative for congestion, dental problem, hearing loss, rhinorrhea, sore throat and trouble swallowing.    Eyes: Negative for discharge and visual disturbance.   Respiratory: Negative for cough, chest  tightness, shortness of breath and wheezing.    Cardiovascular: Positive for palpitations. Negative for chest pain and leg swelling.   Gastrointestinal: Negative for abdominal distention, abdominal pain, blood in stool, constipation, diarrhea, nausea and vomiting.   Endocrine: Negative for cold intolerance, heat intolerance, polydipsia, polyphagia and polyuria.   Genitourinary: Negative for difficulty urinating, dysuria, frequency, hematuria, menstrual problem and urgency.   Musculoskeletal: Positive for arthralgias. Negative for gait problem, joint swelling, myalgias and neck pain.   Skin: Negative for color change, pallor, rash and wound.   Neurological: Positive for weakness. Negative for dizziness, syncope, light-headedness, numbness and headaches.   Hematological: Negative for adenopathy. Does not bruise/bleed easily.   Psychiatric/Behavioral: Negative for confusion, decreased concentration, dysphoric mood and sleep disturbance. The patient is not nervous/anxious.        PE:  Physical Exam   Constitutional: She is oriented to person, place, and time. She appears well-developed and well-nourished. No distress.   HENT:   Head: Normocephalic and atraumatic.   Right Ear: External ear normal.   Left Ear: External ear normal.   Eyes: Conjunctivae and EOM are normal. Pupils are equal, round, and reactive to light. No scleral icterus.   Neck: Normal range of motion. Neck supple. No tracheal deviation present. No thyromegaly present.   Cardiovascular: Normal rate, regular rhythm, normal heart sounds and intact distal pulses. Exam reveals no gallop and no friction rub.   No murmur heard.  Pulmonary/Chest: Effort normal and breath sounds normal. No respiratory distress. She has no wheezes. She has no rales. She exhibits no tenderness.   Abdominal: Soft. Bowel sounds are normal. She exhibits no distension. There is no tenderness. No hernia.   Well healed lap taty and c-sxn scars   Musculoskeletal: Normal range of motion.  She exhibits no edema, tenderness or deformity.   Lymphadenopathy:     She has no cervical adenopathy.   Neurological: She is alert and oriented to person, place, and time.   Skin: Skin is warm and dry. No rash noted. She is not diaphoretic. No erythema. No pallor.   Psychiatric: She has a normal mood and affect. Her behavior is normal. Judgment and thought content normal.   Vitals reviewed.      DIAGNOSIS:  1. Morbid obesity with a BMI of 37.39 and inability to maintain weight loss.  2. Co-morbidities: hypertension, obstructive sleep apnea and joint pain/back pain    PLAN:  The patient is a good candidate for Bariatric Surgery. She is interested in sleeve gastrectomy. The surgery and post-op care was discussed in detail with the patient. All questions were answered.    She understands that bariatric surgery is a tool to aid in weight loss and that she needs to be committed to the diet and exercise post-operatively for successful weight loss. Discussed with patient that bariatric surgery is not the easy way out and that it will take plenty of dedication on the patient's part to be successful. Also discussed the possibility of weight regain if the patient strays from the diet guidelines or exercise requirements. Patient verbalized understanding and wishes to proceed with the work-up.    The patient is a good candidate for operatively-induced weight loss.  The patient's comorbidities can significantly improve from weight loss following surgery.    We discussed preliminary steps of the program including the evaluation process.  I have outlined the risks of the procedures including, but not limited to, bleeding, slippage, erosion, stricture, death, deep venous thrombosis, pulmonary embolus, healing issues including intra-abdominal leakage or perforation with abscess or need for drainage or reoperation, wound infection, need for open surgery, injury to intra-abdominal organs or bleeding requiring transfusion, intensive  care unit care, and possible prolonged hospitalization.      Other long-term issues were discussed including, but not limited to, permanent change in volume of food and type of foods eaten and importance of ongoing long-term followup.  I advised the patient that if they can lose weight before surgery, it will reduce her operative risk.  The patient understands and wishes to proceed.    PLAN: The patient is interested in proceeding with laparoscopic vertical sleeve gastrectomy with possible robotic assistance. We will start the next step of the evaluation process to include acquiring letters of medical necessity and insurance preapproval.  In addition, she will be sent for a pre-surgical psychological evaluation.  Once insurance approves request or the patient has made other financial arrangements for surgery, we will schedule the following preoperative tests:  EKG, chest x-ray, full panel of baseline labs to evaluate the extent of reflux and/or presence of a hiatal hernia.  The patient will also see the dietician preoperatively.  We will see her back for a final visit after the above have been completed.    Referring Physician: Bossman Muro MD  RTC: As scheduled.

## 2018-09-19 ENCOUNTER — PATIENT MESSAGE (OUTPATIENT)
Dept: SURGERY | Facility: CLINIC | Age: 68
End: 2018-09-19

## 2018-09-19 ENCOUNTER — TELEPHONE (OUTPATIENT)
Dept: SURGERY | Facility: CLINIC | Age: 68
End: 2018-09-19

## 2018-09-25 ENCOUNTER — HOSPITAL ENCOUNTER (OUTPATIENT)
Dept: SLEEP MEDICINE | Facility: HOSPITAL | Age: 68
Discharge: HOME | End: 2018-09-25
Attending: OBSTETRICS & GYNECOLOGY
Payer: MEDICARE

## 2018-09-25 DIAGNOSIS — Z72.821 INADEQUATE SLEEP HYGIENE: ICD-10-CM

## 2018-09-25 DIAGNOSIS — F51.5 NIGHTMARES: ICD-10-CM

## 2018-09-25 DIAGNOSIS — G47.63 SLEEP-RELATED BRUXISM: ICD-10-CM

## 2018-09-25 DIAGNOSIS — F51.04 PSYCHOPHYSIOLOGICAL INSOMNIA: ICD-10-CM

## 2018-09-25 DIAGNOSIS — G47.33 OBSTRUCTIVE SLEEP APNEA: Primary | ICD-10-CM

## 2018-09-25 PROCEDURE — 95811 POLYSOM 6/>YRS CPAP 4/> PARM: CPT

## 2018-09-25 PROCEDURE — 95811 POLYSOM 6/>YRS CPAP 4/> PARM: CPT | Mod: 26,,, | Performed by: PSYCHOLOGIST

## 2018-09-28 NOTE — PROCEDURES
Patient Name: Zora Davis  Date of Report: 18 Date of PS2018   [facility] No.: [id number]  : 1950                   Time of PSG:  10:57:37 PM - 5:11:58 AM  Sex:  Female   Age:  68   Weight:  240.0 lbs Height:  5  7         Type of PSG:  CPAP titration    REASONS FOR REFERRAL: Ms Lyons diagnostic polysomnography (18) revealed an Apnea + Hypopnea Index = 17.5 events / hr asleep, mild to moderate obstructive sleep apnea / hypopnea syndrome.  CPAP titration was recommended, and. Briana Harding NP, the referring provider, ordered it.  Dr. Bossman Muro was the originating referring physician, and is the patients primary care physician.         STUDY PARAMETERS: This study involved analysis of the patient's sleep pattern while breathing was assisted. The study was performed with a sleep technologist in attendance for the entire test period, with video monitoring throughout the study, and routine laboratory clinical parameters recorded:  NOTE: The polysomnography electrophysiological record for the patient has been reviewed in its entirety by Dr. Whatley.    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  1. The CPAP titration polysomnography revealed that 5 cm H2O pressure (C - Flex 3, humidity 2), the most effective pressure most completely tested, was optimal, as it reduced the rate of respiratory events to zero in 1.7 hrs sleep (0.3 hrs REM sleep). Snoring was eliminated at all pressures tested.  AutoCPAP (4 cm - 20 cm) could be tried if necessary.    The overall A + H Index was 0.9 / hr asleep, with  only 0.6 respiratory  event - related arousals / hr asleep (plus an additional  0.5 RERAs / hr asleep) for the titration trial.  The mean SpO2 value was 92.9 % throughout the study, with a minimum oxygen saturation during sleep of 88.0 %  (waking baseline SpO2 was 96 %).   A large Respironics Comfort Wisp nasal CPAP mask was used and was tolerated,  but  sleep during CPAP was reduced, and significantly impaired (extremely high spontaneous transient arousals).  Please see PAP trial  outcomes table, below.        2. 5 cm CPAP (C - Flex 3, humidity 2) is recommended, but  only  after  successful habituation to CPAP at home (gradually increased CPAP pressures used for increasing amounts of time, initially while awake and occupied, and then while asleep).      The following treatment recommendations from the Sleep Disorder Evaluation of  8-10-18  likely still apply:    PRIMARY TREATMENT RECOMMENDATIONS  Treat, or refer to Sleep Disorders Center.  1. The diagnostic polysomnography revealed a mild to moderate obstructive sleep apnea / hypopnea syndrome (A + H Index   = 17.5 events / hr asleep with 2.0 respiratory event - related arousals / hr asleep for the study, and no RERAs (respiratory effort -  related arousals).  The mean SpO2 value was 93.2 %, moderate, minimum oxygen saturation during sleep was 81.0 %, and waking baseline SpO2 was 96 %.  Sporadic, mild snoring was noted.  A  CPAP titration polysomnography is recommended.      2. Weight loss to the normal range is recommended as it can decrease respiratory events and snoring in overweight patients.  3. The following changes in sleep hygiene / sleep - related behavior are recommended after medical treatments are successful   Avoid naps; none longer than 20 min or later than mid - afternoon.    SECONDARY TREATMENT RECOMMENDATIONS  Treat, or refer to SDC if problems are not satisfactorily resolved by the above.  1. If  insomnia persists after treatments for medical sleep disorders have  proven effective, recommend  follow - up inquiry regarding frequency, duration and nature of reported sleep loss, poor sleep maintenance and involuntary early awakening (stress - related and / or conditioned psychophysiological insomnia) and referral for behavioral treatment of insomnia, as indicated.  Please see SDI.  2. Consider a referral for a dental examination and possible dental splint for sleep bruxism.    3. Also consider behavioral and cognitive / behavioral treatments for stress and nightmares; sleep bruxism also might be expected to improve.    See below for a complete interpretation of data from the polysomnography and Sleep Disorders Inventory.     Thank you for referring this patient to the [facility].      Bryce Whatley, Ph.D., ABPP; Diplomate, American Board of Sleep Medicine

## 2018-10-01 ENCOUNTER — NUTRITION (OUTPATIENT)
Dept: SURGERY | Facility: CLINIC | Age: 68
End: 2018-10-01
Payer: MEDICARE

## 2018-10-01 ENCOUNTER — OFFICE VISIT (OUTPATIENT)
Dept: PULMONOLOGY | Facility: CLINIC | Age: 68
End: 2018-10-01
Payer: MEDICARE

## 2018-10-01 VITALS
WEIGHT: 237.88 LBS | HEART RATE: 90 BPM | OXYGEN SATURATION: 96 % | SYSTOLIC BLOOD PRESSURE: 120 MMHG | HEIGHT: 67 IN | RESPIRATION RATE: 18 BRPM | DIASTOLIC BLOOD PRESSURE: 62 MMHG | BODY MASS INDEX: 37.34 KG/M2

## 2018-10-01 VITALS — WEIGHT: 237 LBS | BODY MASS INDEX: 37.2 KG/M2 | HEIGHT: 67 IN

## 2018-10-01 DIAGNOSIS — G47.33 OBSTRUCTIVE SLEEP APNEA: ICD-10-CM

## 2018-10-01 DIAGNOSIS — G47.33 OBSTRUCTIVE SLEEP APNEA: Primary | ICD-10-CM

## 2018-10-01 DIAGNOSIS — I10 HYPERTENSION, UNSPECIFIED TYPE: Primary | ICD-10-CM

## 2018-10-01 DIAGNOSIS — E66.9 OBESITY (BMI 30-39.9): ICD-10-CM

## 2018-10-01 DIAGNOSIS — G47.63 SLEEP-RELATED BRUXISM: ICD-10-CM

## 2018-10-01 DIAGNOSIS — M54.9 BACK PAIN, UNSPECIFIED BACK LOCATION, UNSPECIFIED BACK PAIN LATERALITY, UNSPECIFIED CHRONICITY: ICD-10-CM

## 2018-10-01 DIAGNOSIS — E66.01 SEVERE OBESITY (BMI 35.0-39.9) WITH COMORBIDITY: Chronic | ICD-10-CM

## 2018-10-01 DIAGNOSIS — M25.50 PAIN IN JOINT, MULTIPLE SITES: ICD-10-CM

## 2018-10-01 PROCEDURE — 3078F DIAST BP <80 MM HG: CPT | Mod: ,,, | Performed by: NURSE PRACTITIONER

## 2018-10-01 PROCEDURE — 97802 MEDICAL NUTRITION INDIV IN: CPT | Mod: PBBFAC,PO

## 2018-10-01 PROCEDURE — 99213 OFFICE O/P EST LOW 20 MIN: CPT | Mod: PBBFAC,PO | Performed by: DIETITIAN, REGISTERED

## 2018-10-01 PROCEDURE — 99213 OFFICE O/P EST LOW 20 MIN: CPT | Mod: S$PBB,,, | Performed by: NURSE PRACTITIONER

## 2018-10-01 PROCEDURE — 90662 IIV NO PRSV INCREASED AG IM: CPT | Mod: PBBFAC

## 2018-10-01 PROCEDURE — 99999 PR PBB SHADOW E&M-EST. PATIENT-LVL III: CPT | Mod: PBBFAC,,, | Performed by: DIETITIAN, REGISTERED

## 2018-10-01 PROCEDURE — 1101F PT FALLS ASSESS-DOCD LE1/YR: CPT | Mod: ,,, | Performed by: NURSE PRACTITIONER

## 2018-10-01 PROCEDURE — 99999 PR PBB SHADOW E&M-EST. PATIENT-LVL III: CPT | Mod: PBBFAC,,, | Performed by: NURSE PRACTITIONER

## 2018-10-01 PROCEDURE — 99213 OFFICE O/P EST LOW 20 MIN: CPT | Mod: PBBFAC,27 | Performed by: NURSE PRACTITIONER

## 2018-10-01 PROCEDURE — 3074F SYST BP LT 130 MM HG: CPT | Mod: ,,, | Performed by: NURSE PRACTITIONER

## 2018-10-01 NOTE — PROGRESS NOTES
"NUTRITIONAL CONSULT    Referring Physician: Dr Armstrong  Reason for MNT Referral: Initial assessment for sleeve gastrectomy work-up. DX: Obesity, HTN    PAST MEDICAL HISTORY:   68 y.o. female  Body mass index is 37.12 kg/m²..  Weight history includes normal wt during childhood, started gaining wt after having children and could not lose it.   Dieting attempts include diet pills, medifast, ideal protein.    Past Medical History:   Diagnosis Date    Atrial fibrillation     Diverticulosis     Fatty liver     Hypertension     Liver disease     FLORES (nonalcoholic steatohepatitis)     Thyroid disease        CLINICAL DATA:  68 y.o.-year-old White female.  Height: 67"  Weight: 236 lbs  IBW: 147 lbs  BMI: 37.12  The patient's goal weight (60% EBW): 183 lbs  Personal goal weight:  160 lbs    Goal for Bariatric Surgery: to improve health, to improve quality of life, to lose weight and to prevent future medical conditions    NUTRITIONAL NEEDS:  1200 Calories  80-120g/day Protein    NUTRITION & HEALTH HISTORY:  Greatest challenge: starchy CHO, portion control, snacking at night and irregular meal patterns    Current diet recall:   B: premier protein or sargento balanced breaks  L: skips  S: chips, cookies  D: rice and gravy, smothered pork chops, mashed potatoes  S: ice cream    Current Diet:  Meal pattern: 2 meals/day  Protein supplements: 1shake/day  Snackin-2 snacks / day  Vegetables: Likes a variety. Eats almost daily.  Fruits: Likes a variety. Eats almost daily.  Beverages: water, diet tea and 1% milk  Dining out: Weekly. Mostly restaurants.  Cooking at home: Daily. Mostly baked and grilled meat and starchy CHO.    Exercise:  Past exercise: Fair    Current exercise: None  Restrictions to exercise: NA    Vitamins / Minerals / Herbs:   B12, CoQ10, Vitamin D, glucosamine      Food Allergies:   NA    Social:  Retired.  Lives with  and takes care of grandchildren.  Grocery shopping and food prep self.  Patient " believes the household will be supportive after surgery.  Alcohol: None.  Smoking: None.    ASSESSMENT:  · Patient reports attempts at weight loss, only to regain lost weight.  · Patient demonstrated knowledge of healthy eating behaviors and exercise patterns; admits to not eating healthy and not exercising at this point.  · Patient states willingness to change lifestyle and make behavior modifications as evidenced by increased vegetables, more food preparation at nilam and more consistent meal patterns.    Insurance requires medically supervised diet prior to consideration for bariatric surgery.    Barriers to Education: none    Stage of change: determination    NUTRITION DIAGNOSIS:    Obesity related to Excessive carbohydrate intake and Excessive calorie intake as evidence by BMI.    BARIATRIC DIET DISCUSSION/PLAN:  Discussed diet after surgery and related to patient's food record.  Reviewed nutrition guidelines for before and after surgery.  Answered all questions.  Resume work-up for surgery.  Continue to review Bariatric Nutrition Guidebook at home and call with any questions.  Work on expanding variety of vegetables.  1200-calorie diet.  Start including protein supplements in the diet plan daily.  Increase exercise.    RECOMMENDATIONS:  Patient is a good candidate for bariatric surgery.    Patient verbalized understanding.    Expect good  compliance after surgery at this time.    Communicated nutrition plan with bariatric team.    SESSION TIME:  60 minutes

## 2018-10-01 NOTE — PROGRESS NOTES
PCP: Bossman Muro MD  REFERRING PROVIDER:  Cole Armstrong MD      HISTORY OF PRESENT ILLNESS:  68 y.o. female patient is in clinic today for bariatric surgery assessment. Patient has 0 month(s) of MSD. Patient is planning to have gastric sleeve procedure.    Weight difficulties for 30 years.  Weight loss strategies include medifast, ideal protein, diet pills with most lost (regained) 50. Patient-reported goal weight of 160 lbs - long term after surgery.     There were no encounter diagnoses.    VITAL SIGNS:  There were no vitals filed for this visit.  IBW: *** lbs +/-10%, AdjIBW: *** lbs/***kg and ***    ALLERGIES & MEDICATIONS: Reviewed and Reconciled  MEDICAL/SURGICAL & FAMILY HISTORY: Reviewed and Reconciled    LABORATORY:  A1C:   Hemoglobin A1C   Date Value Ref Range Status   09/17/2018 5.5 4.0 - 5.6 % Final     Comment:     ADA Screening Guidelines:  5.7-6.4%  Consistent with prediabetes  >or=6.5%  Consistent with diabetes  High levels of fetal hemoglobin interfere with the HbA1C  assay. Heterozygous hemoglobin variants (HbS, HgC, etc)do  not significantly interfere with this assay.   However, presence of multiple variants may affect accuracy.       Chol:   Cholesterol   Date Value Ref Range Status   07/24/2018 235 (H) 120 - 199 mg/dL Final     Comment:     The National Cholesterol Education Program (NCEP) has set the  following guidelines (reference ranges) for Cholesterol:  Optimal.....................<200 mg/dL  Borderline High.............200-239 mg/dL  High........................> or = 240 mg/dL       Trig:   Triglycerides   Date Value Ref Range Status   07/24/2018 185 (H) 30 - 150 mg/dL Final     Comment:     The National Cholesterol Education Program (NCEP) has set the  following guidelines (reference values) for triglycerides:  Normal......................<150 mg/dL  Borderline High.............150-199 mg/dL  High........................200-499 mg/dL       HDL:   HDL   Date Value Ref Range Status    07/24/2018 49 40 - 75 mg/dL Final     Comment:     The National Cholesterol Education Program (NCEP) has set the  following guidelines (reference values) for HDL Cholesterol:  Low...............<40 mg/dL  Optimal...........>60 mg/dL       LDL: No components found for: LDL   BUN:      BUN, Bld   Date Value Ref Range Status   09/17/2018 17 8 - 23 mg/dL Final     AST:    AST   Date Value Ref Range Status   09/17/2018 23 10 - 40 U/L Final         ALT:    ALT   Date Value Ref Range Status   09/17/2018 29 10 - 44 U/L Final     Micro/Cr Ratio:    Creatinine   Date Value Ref Range Status   09/17/2018 0.8 0.5 - 1.4 mg/dL Final       SELF-MONITORING: Glucometer - ***; Food - {SELF-MONITORING DIABETES:23127} are avail    ACTIVITY LEVEL: Aerobic {ACTIVITY LEVEL MNT:57695}. Resistance {ACTIVITY LEVEL MNT:47998}.    NUTRITION INTAKE: Meal patterns include 3 meals, 1-2 snacks daily with intake *** cals/d. Patient is not limiting carbohydrates, saturated fat or sodium. Inadequate intakes of fruits, vegetables, whole grains.  B - premier protein or balanced breaks, coffee (half and half, stevia)  S - na  L - na  S - chips/ popcorn  D - rice and gravy, smothered pork chops, potatoes, vegetables  S - bowl of ice cream/ peanut butter and honey on crackers  Beverages - milk, water 16 oz/day, tea with stevia  DIning out - 2x/wk- mexican/ chinese- fastfood- <1x/wk would get a hamburger    PSYCHOSOCIAL: Stage of change - {STAGE OF CHANGE:54522}  Barriers to change - {BARRIERS TO CHANGE:53509}  Motivation to change (10high): {Numbers; 1-10:80508}  Predicted compliance (10high): {Numbers; 1-10:03762}  Realistic expectation for wt loss (10high): {Numbers; 1-10:58506}  Verbalizes understanding of dietary changes post procedure and willingness to participate in physical activity (10high): {Numbers; 1-10:12626}    Nutrition Diagnosis     Nutrition Diagnosis Related to (Etiology) As Evidenced By (Signs/Symptoms)   {AMB ONC NUTRITION DIAGNOSIS  STATEMENTS:23509} *** ***   {AMB ONC NUTRITION DIAGNOSIS STATEMENTS:99546} *** ***   {AMB ONC NUTRITION DIAGNOSIS STATEMENTS:13976} *** ***       Nutritional Intervention and Prescription        Nutrition Intervention {AMB ONC NUTRITION INTERVENTIONS:30922}   Goals/Expected Outcomes ***   Progress {AMB ONC NUTRITION INTERVENTION PROGRESS:22873}     Nutrition Intervention {AMB ONC NUTRITION INTERVENTIONS:88821}   Goals/Expected Outcomes ***   Progress {AMB ONC NUTRITION INTERVENTION PROGRESS:22873}     Nutrition Intervention {AMB ONC NUTRITION INTERVENTIONS:67102}   Goals/Expected Outcomes ***   Progress {AMB ONC NUTRITION INTERVENTION PROGRESS:22873}   MNT ASSESSMENT:   *** calories, *** {OUNCE GRAM:18997} protein daily  *** grams carb/meal, *** grams carb/snack  increase fruit 2 serv/d, vegetables 2+ cups/d, whole grains 3+serv/d, lowfat dairy 3+serv/d  low-fat, low-sodium  150 min physical activity per week, moderate intensity, as tolerated    PLAN:    Reviewed MNT guidelines for obesity and weight management.   Encouraged daily self-monitoring of food & activity patterns.    Provided pre & post surgery meal-planning instruction via bariatric diet manual, foodlists, plate method, food models, food labels and/or ADA booklet. Reviewed micro/macronutrient effect on weight management. Discussed carbohydrate counting and spacing techniques with emphasis on supplementation necessary for altered metabolism. Reviewed principles of energy metabolism to assist weight and health management.                                                          Discussed physical activity with review of benefits, methods, precautions.    Discussed bx strategies for improving, strategies for improving social & environmental support of lifestyle changes.     GOALS: Self monitoring: daily food & activity journal. Meal plan-90% accuracy, Physical activity-150 minutes per week. 1. Protein shake in place of breakfast daily, 2. Meal plan  guide as provided, 3. Review manual and return with questions/concerns.  FU: No Follow-up on file.    Visit Time Spent:  {[facility] VISIT MINUTES:77420}    Thank you for the opportunity to work with your patient.

## 2018-10-01 NOTE — ASSESSMENT & PLAN NOTE
Encouraged calorie reduction and 30 minutes of exercise daily. Discussed impact of obesity on general health.  Plan for bariatric surgery, gastric sleeve with Dr. Armstrong beginning evaluations.

## 2018-10-01 NOTE — ASSESSMENT & PLAN NOTE
[facility] PSG 8/6/2018 AHI 17.5 moderate obstructive sleep apnea   CPAP titration 9/25/2018   10/1/2018 begin CPAP 6 cm

## 2018-10-01 NOTE — PROGRESS NOTES
Subjective:      Patient ID: Zora Davis is a 68 y.o. female.    Patient Active Problem List   Diagnosis    Hypertension    Abnormal CT of the abdomen    Abdominal pain, right lower quadrant    Other chronic nonalcoholic liver disease    Family history of malignant neoplasm of gastrointestinal tract    Abdominal pain    Gastritis    Dysphagia    Thyroid disease    Obstructive sleep apnea    Psychophysiological insomnia    Sleep-related bruxism    Inadequate sleep hygiene    Severe obesity (BMI 35.0-39.9) with comorbidity    Nightmares     she has been referred by No ref. provider found for evaluation and management for   Chief Complaint   Patient presents with    Sleep Apnea     Chief Complaint: Sleep Apnea    HPI:  She presents for a sleep evaluation with review of PSG 2018 ordered by Primary Care Provider, Dr. Bossman Muro that revealed mild to moderate obstructive sleep apnea with AHI 17.5/hr.   She had CPAP titration on 2018 revealed CPAP 5 optimally tested. Patient reports she felt 6 cm was better tolerated, she like the airflow when turned up to CPAP 6 cm.  Bed time is in improved to 1030 - 1100, instead of 1200mn  Wake time is 0600  Sleep onset is within 15 Minutes when ready to go to sleep, she is going to bed earlier and not having as much difficulty with sleep onset.   [place] sleepiness score was 6.  Orders today to begin CPAP 6 cm     Previous Report Reviewed: lab reports and office notes     Past Medical History: The following portions of the patient's history were reviewed and updated as appropriate:   She  has a past surgical history that includes  section; Hysterectomy; breast tumor; Cholecystectomy; Colonoscopy (2014); Upper gastrointestinal endoscopy (2015); Breast biopsy; Total Reduction Mammoplasty; ESOPHAGOGASTRODUODENOSCOPY (EGD) (N/A, 2018); ESOPHAGOGASTRODUODENOSCOPY (EGD) (N/A, 2015); and COLONOSCOPY (N/A, 2014).  Her family  "history includes COPD in her paternal aunt; Colon cancer in her paternal aunt; Hypertension in her father.  She  reports that she quit smoking about 40 years ago. Her smoking use included cigarettes. She started smoking about 53 years ago. She has a 3.25 pack-year smoking history. she has never used smokeless tobacco. She reports that she drinks alcohol. She reports that she does not use drugs.  She has a current medication list which includes the following prescription(s): albuterol, coenzyme q10, cyanocobalamin, ergocalciferol (vitamin d2), glucosamine-chondroitin, hydrochlorothiazide, levothyroxine, losartan, metoprolol tartrate, multivitamin-minerals-lutein, pantoprazole, and ranitidine.  She has No Known Allergies.    Review of Systems   Constitutional: Negative for fever, chills, weight loss, weight gain, activity change, appetite change, fatigue and night sweats.   HENT: Negative for postnasal drip, rhinorrhea, sinus pressure, voice change and congestion.    Eyes: Negative for redness and itching.   Respiratory: Negative for snoring, cough, sputum production, chest tightness, shortness of breath, wheezing, orthopnea, asthma nighttime symptoms, dyspnea on extertion, use of rescue inhaler and somnolence.    Cardiovascular: Negative.  Negative for chest pain, palpitations and leg swelling.   Genitourinary: Negative for difficulty urinating and hematuria.   Endocrine: Negative for cold intolerance and heat intolerance.    Musculoskeletal: Negative for arthralgias, gait problem, joint swelling and myalgias.   Skin: Negative.    Gastrointestinal: Negative for nausea, vomiting, abdominal pain and acid reflux (controlled on zantac ).   Neurological: Negative for dizziness, weakness, light-headedness and headaches.   Hematological: Negative for adenopathy. No excessive bruising.   All other systems reviewed and are negative.     Objective:   /62   Pulse 90   Resp 18   Ht 5' 7" (1.702 m)   Wt 107.9 kg (237 lb " 14 oz)   SpO2 96%   BMI 37.26 kg/m²   Physical Exam   Constitutional: She is oriented to person, place, and time. She appears well-developed and well-nourished. She is active and cooperative.  Non-toxic appearance. She does not appear ill. No distress.   HENT:   Head: Normocephalic.   Right Ear: External ear normal.   Left Ear: External ear normal.   Nose: Nose normal.   Mouth/Throat: Oropharynx is clear and moist. No oropharyngeal exudate.   Eyes: Conjunctivae are normal.   Neck: Normal range of motion. Neck supple.   Cardiovascular: Normal rate, regular rhythm, normal heart sounds and intact distal pulses.   Pulmonary/Chest: Effort normal and breath sounds normal. No stridor.   Abdominal: Soft.   Musculoskeletal: Normal range of motion.   Lymphadenopathy:     She has no cervical adenopathy.   Neurological: She is alert and oriented to person, place, and time.   Skin: Skin is warm and dry.   Psychiatric: She has a normal mood and affect. Her behavior is normal. Judgment and thought content normal.   Vitals reviewed.    Personal Diagnostic Review  Review of labs, xray's, cardiology reports.   CPAP titration 9/25/2018  TREATMENT RECOMMENDATIONS  Treat, or refer to [facility].  1. The CPAP titration polysomnography revealed that 5 cm H2O pressure (C - Flex 3, humidity 2), the most effective pressure most completely tested, was optimal, as it reduced the rate of respiratory events to zero in 1.7 hrs sleep (0.3 hrs REM sleep). Snoring was eliminated at all pressures tested.  AutoCPAP (4 cm - 20 cm) could be tried if necessary.    The overall A + H Index was 0.9 / hr asleep, with  only 0.6 respiratory event - related arousals / hr asleep (plus an additional  0.5 RERAs / hr asleep) for the titration trial.  The mean SpO2 value was 92.9 % throughout the study, with a minimum oxygen saturation during sleep of 88.0 %  (waking baseline SpO2 was 96 %).   A large Respirtribrs Comfort Wisp nasal CPAP mask     PSG  8/6/2018   diagnostic polysomnography revealed a mild to moderate obstructive sleep apnea / hypopnea syndrome (A + H Index =  17.5 events / hr asleep with 2.0 respiratory event - related arousals / hr asleep for the study, and no RERAs (respiratory effort  - related arousals). The mean SpO2 value was 93.2 %, moderate, minimum oxygen saturation during sleep was 81.0 %, and  waking baseline SpO2 was 96 %. Sporadic, mild snoring was noted. A CPAP titration polysomnography is recommended.  2. Weight loss to the normal range is recommended as it can decrease respiratory events and snoring in overweight patients.  3. The following changes in sleep hygiene / sleep - related behavior are recommended after medical treatments are successful    Assessment:     1. Obstructive sleep apnea    2. Sleep-related bruxism    3. Severe obesity (BMI 35.0-39.9) with comorbidity      Orders Placed This Encounter   Procedures    CPAP FOR HOME USE     Large respironics comfort Wisp nasal mask tolerated in sleep lab.     Order Specific Question:   Type:     Answer:   Auto CPAP     Order Specific Question:   Auto CPAP pressure setting range (cmH20):     Answer:   6 cm     Order Specific Question:   Length of need (1-99 months):     Answer:   99     Order Specific Question:   Humidification:     Answer:   Heated     Order Specific Question:   Type of mask:     Answer:   Nasal     Order Specific Question:   Headgear?     Answer:   Yes     Order Specific Question:   Tubing?     Answer:   Yes     Order Specific Question:   Humidifier chamber?     Answer:   Yes     Order Specific Question:   Chin strap?     Answer:   Yes     Order Specific Question:   Filters?     Answer:   Yes    CPAP/BIPAP SUPPLIES     90 day supply. 4 refills.     Order Specific Question:   Type of mask:     Answer:   Nasal     Order Specific Question:   Headgear?     Answer:   Yes     Order Specific Question:   Tubing?     Answer:   Yes     Order Specific Question:    Humidifier chamber?     Answer:   Yes     Order Specific Question:   Chin strap?     Answer:   Yes     Order Specific Question:   Filters?     Answer:   Yes     Order Specific Question:   Length of need (1-99 months):     Answer:   99     Plan:   Discussed diagnosis, its evaluation, treatment and usual course. All questions answered.  Problem List Items Addressed This Visit     Obstructive sleep apnea - Primary     [facility] PSG 8/6/2018 AHI 17.5 moderate obstructive sleep apnea   CPAP titration 9/25/2018   10/1/2018 begin CPAP 6 cm            Relevant Orders    CPAP FOR HOME USE    CPAP/BIPAP SUPPLIES    Severe obesity (BMI 35.0-39.9) with comorbidity (Chronic)     Encouraged calorie reduction and 30 minutes of exercise daily. Discussed impact of obesity on general health.  Plan for bariatric surgery, gastric sleeve with Dr. Armstrong beginning evaluations.                Sleep-related bruxism     Dental mouth guard, currently not wearing              Follow-up in about 8 weeks (around 11/26/2018) for CPAP compliance download after initial set up.

## 2018-10-02 ENCOUNTER — PATIENT MESSAGE (OUTPATIENT)
Dept: DIABETES | Facility: CLINIC | Age: 68
End: 2018-10-02

## 2018-10-24 DIAGNOSIS — R10.13 EPIGASTRIC PAIN: ICD-10-CM

## 2018-10-24 DIAGNOSIS — R12 HEARTBURN: ICD-10-CM

## 2018-10-25 RX ORDER — PANTOPRAZOLE SODIUM 40 MG/1
TABLET, DELAYED RELEASE ORAL
Qty: 30 TABLET | Refills: 2 | Status: SHIPPED | OUTPATIENT
Start: 2018-10-25 | End: 2019-01-09 | Stop reason: SDUPTHER

## 2018-10-29 ENCOUNTER — TELEPHONE (OUTPATIENT)
Dept: PULMONOLOGY | Facility: CLINIC | Age: 68
End: 2018-10-29

## 2018-10-29 NOTE — TELEPHONE ENCOUNTER
Returned patients call regarding getting her cpap machine. Per BT notes Laura attempted to call patient for setup. Gave patient the number to call [facility]. Patient states she will call them now.

## 2018-10-29 NOTE — TELEPHONE ENCOUNTER
----- Message from Antwan Cao sent at 10/29/2018  8:35 AM CDT -----  Contact: pt  She is calling in regards to getting her CPAP machine and has not gotten a response after waiting 10 days, please advise [phone] (home)

## 2018-11-03 DIAGNOSIS — I10 ESSENTIAL HYPERTENSION: ICD-10-CM

## 2018-11-03 DIAGNOSIS — E03.9 HYPOTHYROIDISM (ACQUIRED): ICD-10-CM

## 2018-11-04 RX ORDER — LEVOTHYROXINE SODIUM 50 UG/1
TABLET ORAL
Qty: 90 TABLET | Refills: 1 | Status: SHIPPED | OUTPATIENT
Start: 2018-11-04 | End: 2019-06-18 | Stop reason: SDUPTHER

## 2018-11-04 RX ORDER — LOSARTAN POTASSIUM 100 MG/1
TABLET ORAL
Qty: 90 TABLET | Refills: 1 | Status: SHIPPED | OUTPATIENT
Start: 2018-11-04 | End: 2019-02-19

## 2018-11-04 RX ORDER — HYDROCHLOROTHIAZIDE 25 MG/1
TABLET ORAL
Qty: 90 TABLET | Refills: 1 | Status: SHIPPED | OUTPATIENT
Start: 2018-11-04 | End: 2019-04-24

## 2018-11-04 RX ORDER — METOPROLOL TARTRATE 25 MG/1
TABLET, FILM COATED ORAL
Qty: 90 TABLET | Refills: 1 | Status: SHIPPED | OUTPATIENT
Start: 2018-11-04 | End: 2019-06-29 | Stop reason: SDUPTHER

## 2018-11-08 ENCOUNTER — PES CALL (OUTPATIENT)
Dept: ADMINISTRATIVE | Facility: CLINIC | Age: 68
End: 2018-11-08

## 2018-11-13 ENCOUNTER — INITIAL CONSULT (OUTPATIENT)
Dept: PSYCHIATRY | Facility: CLINIC | Age: 68
End: 2018-11-13
Payer: MEDICARE

## 2018-11-13 DIAGNOSIS — E66.01 MORBID (SEVERE) OBESITY DUE TO EXCESS CALORIES: ICD-10-CM

## 2018-11-13 DIAGNOSIS — G47.33 OBSTRUCTIVE SLEEP APNEA (ADULT) (PEDIATRIC): ICD-10-CM

## 2018-11-13 DIAGNOSIS — Z01.818 ENCOUNTER FOR OTHER PREPROCEDURAL EXAMINATION: Primary | ICD-10-CM

## 2018-11-13 PROCEDURE — 90791 PSYCH DIAGNOSTIC EVALUATION: CPT | Mod: S$GLB,,, | Performed by: SOCIAL WORKER

## 2018-11-14 NOTE — PROGRESS NOTES
Psychiatry Initial Visit (PhD/LCSW)  Diagnostic Interview - CPT 58825    Date: 11/13/2018    Site: [place]    Referral source:   Cole Armstrong MD, General Surgery    Clinical status of patient: Outpatient    Zora Davis, a 68 y.o. female, for initial evaluation visit.  Met with patient.    Chief complaint/reason for encounter: Psychological Evaluation prior to bariatric surgery    History of present illness:   68 year old  female presented for request for psychiatric preprocedural evaluation.  Patient seeking bariatric surgery and referred by the bariatric team.  She presented alert and oriented, casually dressed, relaxed, good eye contact and clearly expressed linear train of thought.  Her BMI was 37.39 at most recent medical encounter, per the encounter record, and she reported medical co-morbidities of hypertension, obstructive sleep apnea, back pain from degenerative disc disease and joint pain from osteoarthritis, as well as a fatty liver.  The patient reported she was not overweight as a child but that throughout adulthood she gradually put on additional weight, trying over the years to lose it, usually without success, but on occasions when she did lose a noticeable amount of weight, she was unable to keep it off long.  She reported her most significant weight loss was 60 pounds she managed to lose in the 1970s, only with the help of prescription diet pills that she found out only later were illegal amphetamines.  She said the doctor reportedly lost his license for dispensing to her and others.  She said within two years she had gained back all the weight she had lost.  She reported it has been in the past four years that she has been experiencing frustration with a mix of health problems that all seem exacerbated by her obesity.  She began doing some personal research into bariatric treatments, frustrated with her many failed attempts at dieting.  She identified her chief reason for eating  is not emotional, rather simply appetite and enjoyment of the taste.  She stated her motives for weight loss as solidly a matter of her health and physical comfort.  Her joints and back hurt, she is having breathing difficulties at night and is unable to get comfortable, and is concerned about her fatty liver and her hypertension.  She denied any symptoms of depression, anxiety, mood swings, psychosis, or substance abuse.  Reports rare intake of alcohol, estimating two or three glasses of wine per month on average.  Denied any social drug use.  Remote history of cigarette smoking from at 15 to 22, but none since then.  Reporting a standard routine of a cup and a half of coffee each morning and occasional tea, but tea less than daily.  Patient described a positive and stable social support net and no personal mental health history, thought stating her late father was alcoholic but maintained sobriety for many years until his death.  Her late mother she said had some depression issues.  The patient expressed reasonable understanding of food-related lifestyle changes that bariatric surgery with require of her and stated she is prepared for them and values the guidance of her assigned dietician.  There appear to be no psychiatric contra-indications against her receiving bariatric surgery.  She is psychiatrically cleared to proceed with the bariatric team's recommendation regarding surgery.      Pain: 3    Symptoms:   · Mood: denied  · Anxiety: denied  · Substance abuse: denied  · Cognitive functioning: denied  · Health behaviors: chronic overeating    Psychiatric history: none    Medical history: See above, JOSE DE JESUS --wears a C-pap, HTN, back and joint pain, fatty liver    Family history of psychiatric illness: father alcoholic, successfully sober for many years; mother with years of depression    Social history (marriage, employment, etc.):  Born in [facility], the 2nd of 4 siblings, having 3 brothers.  Described a happy  childhood; raised by two parents.  She was aware of her father having an alcohol problem, but was able to find sobriety and maintain it and to her knowledge his alcoholism was not disruptive to her family.  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  Denied any childhood trauma.  Graducated high school in 1969,  at age 19 and quickly became a mother to a boy, then 9 years later and second boy.  No grandmother to 5 and great-grandmother to 2 so far.   for many years.   Bachelor of science in Business obtained in 1997, with [employer] certification.  Worked as  for 20 years.  Retired in spring of 2018.      Substance use:   Alcohol: infrequent   Drugs: none   Tobacco: none   Caffeine: 1.5 cups coffee per day--morning    Current medications and drug reactions (include OTC, herbal): see medication list      Strengths and liabilities: Strength: Patient accepts guidance/feedback, Strength: Patient is expressive/articulate., Strength: Patient is intelligent., Strength: Patient is motivated for change., Strength: Patient has positive support network., Strength: Patient has reasonable judgment., Strength: Patient is stable.    Current Evaluation:     Mental Status Exam:  General Appearance:  age appropriate, casually dressed, neatly groomed, obese   Speech: normal tone, normal rate, normal pitch, normal volume      Level of Cooperation: cooperative      Thought Processes: normal and logical   Mood: steady      Thought Content: normal, no suicidality, no homicidality, delusions, or paranoia   Affect: congruent and appropriate   Orientation: Oriented x3   Memory: recent and remote memory intact   Attention Span & Concentration: intact   Fund of General Knowledge: intact and appropriate to age and level of education   Abstract Reasoning: not formally assessed   Judgment & Insight: good     Language  intact     Diagnostic Impression - Plan:       ICD-10-CM ICD-9-CM    1. Encounter for other preprocedural examination Z01.818 V72.83   2. Morbid (severe) obesity due to excess calories E66.01 278.01   3. Obstructive sleep apnea (adult) (pediatric) G47.33 327.23   4. Body mass index 37.0-37.9, adult Z68.37 V85.37       Plan:psychiatrically cleared for bariatric procedure    Return to Clinic: as needed    Length of Service (minutes): 45

## 2018-11-29 ENCOUNTER — PATIENT MESSAGE (OUTPATIENT)
Dept: SURGERY | Facility: CLINIC | Age: 68
End: 2018-11-29

## 2018-11-30 ENCOUNTER — OFFICE VISIT (OUTPATIENT)
Dept: FAMILY MEDICINE | Facility: CLINIC | Age: 68
End: 2018-11-30
Payer: MEDICARE

## 2018-11-30 VITALS
WEIGHT: 236.81 LBS | DIASTOLIC BLOOD PRESSURE: 60 MMHG | HEIGHT: 67 IN | SYSTOLIC BLOOD PRESSURE: 130 MMHG | HEART RATE: 63 BPM | TEMPERATURE: 98 F | BODY MASS INDEX: 37.17 KG/M2

## 2018-11-30 DIAGNOSIS — J06.9 VIRAL UPPER RESPIRATORY INFECTION: Primary | ICD-10-CM

## 2018-11-30 PROCEDURE — 96372 THER/PROPH/DIAG INJ SC/IM: CPT | Mod: S$GLB,,, | Performed by: NURSE PRACTITIONER

## 2018-11-30 PROCEDURE — 99999 PR PBB SHADOW E&M-EST. PATIENT-LVL IV: CPT | Mod: PBBFAC,,, | Performed by: NURSE PRACTITIONER

## 2018-11-30 PROCEDURE — 3075F SYST BP GE 130 - 139MM HG: CPT | Mod: S$GLB,,, | Performed by: NURSE PRACTITIONER

## 2018-11-30 PROCEDURE — 99213 OFFICE O/P EST LOW 20 MIN: CPT | Mod: 25,S$GLB,, | Performed by: NURSE PRACTITIONER

## 2018-11-30 PROCEDURE — 3078F DIAST BP <80 MM HG: CPT | Mod: S$GLB,,, | Performed by: NURSE PRACTITIONER

## 2018-11-30 PROCEDURE — 1101F PT FALLS ASSESS-DOCD LE1/YR: CPT | Mod: S$GLB,,, | Performed by: NURSE PRACTITIONER

## 2018-11-30 RX ORDER — METHYLPREDNISOLONE ACETATE 80 MG/ML
80 INJECTION, SUSPENSION INTRA-ARTICULAR; INTRALESIONAL; INTRAMUSCULAR; SOFT TISSUE ONCE
Status: COMPLETED | OUTPATIENT
Start: 2018-11-30 | End: 2018-11-30

## 2018-11-30 RX ORDER — BENZONATATE 200 MG/1
200 CAPSULE ORAL 3 TIMES DAILY PRN
Qty: 30 CAPSULE | Refills: 0 | Status: SHIPPED | OUTPATIENT
Start: 2018-11-30 | End: 2018-12-10

## 2018-11-30 RX ADMIN — METHYLPREDNISOLONE ACETATE 80 MG: 80 INJECTION, SUSPENSION INTRA-ARTICULAR; INTRALESIONAL; INTRAMUSCULAR; SOFT TISSUE at 11:11

## 2018-11-30 NOTE — PROGRESS NOTES
Subjective:       Patient ID: Zora Davis is a 68 y.o. female.    Chief Complaint: Sinusitis    URI    This is a new problem. The current episode started yesterday. The problem has been unchanged. The maximum temperature recorded prior to her arrival was 100.4 - 100.9 F. The fever has been present for less than 1 day. Associated symptoms include congestion, coughing, headaches and rhinorrhea. Pertinent negatives include no abdominal pain, chest pain, diarrhea, ear pain, rash, sore throat or vomiting. She has tried acetaminophen for the symptoms. The treatment provided no relief.       Review of Systems   Constitutional: Negative for fatigue, fever and unexpected weight change.   HENT: Positive for congestion and rhinorrhea. Negative for ear pain and sore throat.    Eyes: Negative for pain and visual disturbance.   Respiratory: Positive for cough. Negative for shortness of breath.    Cardiovascular: Negative for chest pain and palpitations.   Gastrointestinal: Negative for abdominal pain, diarrhea and vomiting.   Musculoskeletal: Negative for arthralgias and myalgias.   Skin: Negative for color change and rash.   Neurological: Positive for headaches. Negative for dizziness.   Psychiatric/Behavioral: Negative for dysphoric mood and sleep disturbance. The patient is not nervous/anxious.        Vitals:    11/30/18 1020   BP: 130/60   Pulse: 63   Temp: 98.3 °F (36.8 °C)       Objective:     Current Outpatient Medications   Medication Sig Dispense Refill    albuterol 90 mcg/actuation inhaler Inhale 2 puffs into the lungs every 6 (six) hours as needed for Wheezing or Shortness of Breath. Rescue 18 g 1    benzonatate (TESSALON) 200 MG capsule Take 1 capsule (200 mg total) by mouth 3 (three) times daily as needed. 30 capsule 0    coenzyme Q10 (CO Q-10) 100 mg capsule Take 200 mg by mouth once daily.      cyanocobalamin (VITAMIN B-12) 500 MCG tablet Take 500 mcg by mouth once daily.      ergocalciferol, vitamin  D2, (VITAMIN D ORAL) Take 5,000 mg by mouth once daily.      glucosamine/chondr vance A sod (GLUCOSAMINE-CHONDROITIN) 1,500-1,200 mg/30 mL Liqd Take by mouth.      hydroCHLOROthiazide (HYDRODIURIL) 25 MG tablet TAKE 1 TABLET DAILY 90 tablet 1    levothyroxine (SYNTHROID) 50 MCG tablet TAKE 1 TABLET DAILY 90 tablet 1    losartan (COZAAR) 100 MG tablet TAKE 1 TABLET DAILY 90 tablet 1    metoprolol tartrate (LOPRESSOR) 25 MG tablet TAKE 1 TABLET DAILY 90 tablet 1    multivitamin-minerals-lutein Tab Take 1 tablet by mouth once daily.      pantoprazole (PROTONIX) 40 MG tablet TAKE 1 TABLET BY MOUTH ONCE DAILY BEFORE BREAKFAST 30 tablet 2    ranitidine (ZANTAC) 300 MG tablet TAKE 1 TABLET BY MOUTH EVERY EVENING 30 tablet 2     No current facility-administered medications for this visit.        Physical Exam   Constitutional: She is oriented to person, place, and time. She appears well-developed and well-nourished. No distress.   HENT:   Head: Normocephalic and atraumatic.   Right Ear: Tympanic membrane normal.   Left Ear: Tympanic membrane normal.   Nose: Mucosal edema present.   Mouth/Throat: Posterior oropharyngeal edema (post nasal mucus) present.   Eyes: EOM are normal. Pupils are equal, round, and reactive to light.   Neck: Normal range of motion. Neck supple.   Cardiovascular: Normal rate and regular rhythm.   Pulmonary/Chest: Effort normal and breath sounds normal.   Dry cough   Musculoskeletal: Normal range of motion.   Neurological: She is alert and oriented to person, place, and time.   Skin: Skin is warm and dry. No rash noted.   Psychiatric: She has a normal mood and affect. Judgment normal.   Nursing note and vitals reviewed.      Assessment:       1. Viral upper respiratory infection        Plan:   Viral upper respiratory infection  -     methylPREDNISolone acetate injection 80 mg    Other orders  -     benzonatate (TESSALON) 200 MG capsule; Take 1 capsule (200 mg total) by mouth 3 (three) times daily  as needed.  Dispense: 30 capsule; Refill: 0        No Follow-up on file.    Patient Instructions   Over the counter Allegra or zyrtec once daily

## 2018-12-03 ENCOUNTER — PATIENT MESSAGE (OUTPATIENT)
Dept: DIABETES | Facility: CLINIC | Age: 68
End: 2018-12-03

## 2018-12-04 ENCOUNTER — TELEPHONE (OUTPATIENT)
Dept: SURGERY | Facility: CLINIC | Age: 68
End: 2018-12-04

## 2018-12-04 NOTE — TELEPHONE ENCOUNTER
Called the number provided in regards to scheduling pre-op appt ([phone]) with roxanne Pelletier l/m via vm to call back, and call back number was provided.

## 2018-12-04 NOTE — TELEPHONE ENCOUNTER
----- Message from Heena Rudolph sent at 12/3/2018  4:02 PM CST -----  Regarding: Auth  Received carrier Auth for Cpt [id number]. Pt ok to schedule for pre op and surgery.

## 2018-12-05 ENCOUNTER — TELEPHONE (OUTPATIENT)
Dept: PULMONOLOGY | Facility: CLINIC | Age: 68
End: 2018-12-05

## 2018-12-05 NOTE — TELEPHONE ENCOUNTER
Returned patient call regarding her appointment for cpap. She stated she had changed her appt to a later date because she got her cpap late and she knew she didn't have it for long enough prior to appt. She stated she was only able to get appointment with DR Bucio and not DANIEL Harding and wanted to know if it made any difference. I advised patient no it did not. Everyone in the clinic can do the same thing for her. Patient voiced understanding.

## 2018-12-05 NOTE — TELEPHONE ENCOUNTER
----- Message from Aziza Magallon sent at 12/5/2018  9:27 AM CST -----  Contact: self  Pt is calling in regards to follow up for CPAP machine. Please call pt back at [phone].      Thanks,   Aziza Magallon

## 2018-12-17 ENCOUNTER — OFFICE VISIT (OUTPATIENT)
Dept: SURGERY | Facility: CLINIC | Age: 68
End: 2018-12-17
Payer: MEDICARE

## 2018-12-17 ENCOUNTER — OFFICE VISIT (OUTPATIENT)
Dept: PULMONOLOGY | Facility: CLINIC | Age: 68
End: 2018-12-17
Payer: MEDICARE

## 2018-12-17 VITALS
BODY MASS INDEX: 37.35 KG/M2 | DIASTOLIC BLOOD PRESSURE: 60 MMHG | WEIGHT: 238 LBS | RESPIRATION RATE: 18 BRPM | SYSTOLIC BLOOD PRESSURE: 120 MMHG | HEART RATE: 74 BPM | HEIGHT: 67 IN | OXYGEN SATURATION: 98 %

## 2018-12-17 VITALS
BODY MASS INDEX: 37.34 KG/M2 | HEART RATE: 66 BPM | DIASTOLIC BLOOD PRESSURE: 77 MMHG | WEIGHT: 237.88 LBS | HEIGHT: 67 IN | SYSTOLIC BLOOD PRESSURE: 140 MMHG | TEMPERATURE: 98 F

## 2018-12-17 DIAGNOSIS — E66.01 SEVERE OBESITY (BMI 35.0-39.9) WITH COMORBIDITY: Chronic | ICD-10-CM

## 2018-12-17 DIAGNOSIS — G47.33 OBSTRUCTIVE SLEEP APNEA: Primary | ICD-10-CM

## 2018-12-17 DIAGNOSIS — E66.01 SEVERE OBESITY (BMI 35.0-39.9) WITH COMORBIDITY: ICD-10-CM

## 2018-12-17 DIAGNOSIS — R06.83 SNORING: ICD-10-CM

## 2018-12-17 DIAGNOSIS — G47.33 OBSTRUCTIVE SLEEP APNEA: ICD-10-CM

## 2018-12-17 DIAGNOSIS — E66.01 MORBID OBESITY: Primary | ICD-10-CM

## 2018-12-17 PROCEDURE — 99999 PR PBB SHADOW E&M-EST. PATIENT-LVL III: CPT | Mod: PBBFAC,,, | Performed by: INTERNAL MEDICINE

## 2018-12-17 PROCEDURE — 99212 OFFICE O/P EST SF 10 MIN: CPT | Mod: S$GLB,,, | Performed by: SURGERY

## 2018-12-17 PROCEDURE — 3074F SYST BP LT 130 MM HG: CPT | Mod: S$GLB,,, | Performed by: SURGERY

## 2018-12-17 PROCEDURE — 3074F SYST BP LT 130 MM HG: CPT | Mod: S$GLB,,, | Performed by: INTERNAL MEDICINE

## 2018-12-17 PROCEDURE — 3078F DIAST BP <80 MM HG: CPT | Mod: S$GLB,,, | Performed by: INTERNAL MEDICINE

## 2018-12-17 PROCEDURE — 1101F PT FALLS ASSESS-DOCD LE1/YR: CPT | Mod: S$GLB,,, | Performed by: INTERNAL MEDICINE

## 2018-12-17 PROCEDURE — 3078F DIAST BP <80 MM HG: CPT | Mod: S$GLB,,, | Performed by: SURGERY

## 2018-12-17 PROCEDURE — 1101F PT FALLS ASSESS-DOCD LE1/YR: CPT | Mod: S$GLB,,, | Performed by: SURGERY

## 2018-12-17 PROCEDURE — 99213 OFFICE O/P EST LOW 20 MIN: CPT | Mod: S$GLB,,, | Performed by: INTERNAL MEDICINE

## 2018-12-17 PROCEDURE — 99999 PR PBB SHADOW E&M-EST. PATIENT-LVL V: CPT | Mod: PBBFAC,,, | Performed by: SURGERY

## 2018-12-17 RX ORDER — LIDOCAINE HYDROCHLORIDE 10 MG/ML
1 INJECTION, SOLUTION EPIDURAL; INFILTRATION; INTRACAUDAL; PERINEURAL ONCE
Status: DISCONTINUED | OUTPATIENT
Start: 2018-12-17 | End: 2019-03-23 | Stop reason: HOSPADM

## 2018-12-17 NOTE — PROGRESS NOTES
BARIATRIC NEW PATIENT EVALUATION    CHIEF COMPLAINT:   Morbid obesity with a BMI of 37.39 and inability to maintain weight loss.    HISTORY OF PRESENT ILLNESS:  Zora Davis is a 68 y.o.-year-old female presenting for morbid obesity with a BMI of 37.39 and inability to maintain weight loss. The patient has tried diet and exercise. She is able to lose 20-30lbs but is unable to keep the weight off. She recently joined a gym and is increasing her activity.    HPI    CO-MORBIDITIES:  hypertension, obstructive sleep apnea and back pain/joint pain    PAST MEDICAL HISTORY:  Past Medical History:   Diagnosis Date    Atrial fibrillation     Diverticulosis     Fatty liver     Hypertension     Liver disease     FLORES (nonalcoholic steatohepatitis)     Nightmares     Thyroid disease         PAST SURGICAL HISTORY:  Past Surgical History:   Procedure Laterality Date    BREAST BIOPSY      breast tumor       SECTION      CHOLECYSTECTOMY      COLONOSCOPY  2014    Dr. Richey, repeat in 5 years    COLONOSCOPY N/A 2014    Performed by [facility] at [facility] ENDO    ESOPHAGOGASTRODUODENOSCOPY N/A 2018    Procedure: ESOPHAGOGASTRODUODENOSCOPY (EGD);  Surgeon: Efrain Steel MD;  Location: [facility];  Service: Endoscopy;  Laterality: N/A;    ESOPHAGOGASTRODUODENOSCOPY (EGD) N/A 2018    [facility]    ESOPHAGOGASTRODUODENOSCOPY (EGD) N/A 2015    Performed by Chinedu Padron MD at [facility] ENDO    HYSTERECTOMY      TOTAL REDUCTION MAMMOPLASTY      UPPER GASTROINTESTINAL ENDOSCOPY  2015    Dr. Padron       FAMILY HISTORY:  Family History   Problem Relation Age of Onset    Colon cancer Paternal Aunt     COPD Paternal Aunt         colon    Hypertension Father     Crohn's disease Neg Hx     Stomach cancer Neg Hx     Ulcerative colitis Neg Hx     Esophageal cancer Neg Hx         SOCIAL HISTORY:   reports that she quit smoking about  40 years ago. Her smoking use included cigarettes. She started smoking about 53 years ago. She has a 3.25 pack-year smoking history. she has never used smokeless tobacco. She reports that she drinks about 0.6 oz of alcohol per week. She reports that she does not use drugs.     MEDICATIONS:  Current Outpatient Medications on File Prior to Visit   Medication Sig Dispense Refill    albuterol 90 mcg/actuation inhaler Inhale 2 puffs into the lungs every 6 (six) hours as needed for Wheezing or Shortness of Breath. Rescue 18 g 1    coenzyme Q10 (CO Q-10) 100 mg capsule Take 200 mg by mouth once daily.      cyanocobalamin (VITAMIN B-12) 500 MCG tablet Take 500 mcg by mouth once daily.      ergocalciferol, vitamin D2, (VITAMIN D ORAL) Take 5,000 mg by mouth once daily.      glucosamine/chondr vance A sod (GLUCOSAMINE-CHONDROITIN) 1,500-1,200 mg/30 mL Liqd Take by mouth.      hydroCHLOROthiazide (HYDRODIURIL) 25 MG tablet TAKE 1 TABLET DAILY 90 tablet 1    levothyroxine (SYNTHROID) 50 MCG tablet TAKE 1 TABLET DAILY 90 tablet 1    losartan (COZAAR) 100 MG tablet TAKE 1 TABLET DAILY 90 tablet 1    metoprolol tartrate (LOPRESSOR) 25 MG tablet TAKE 1 TABLET DAILY 90 tablet 1    multivitamin-minerals-lutein Tab Take 1 tablet by mouth once daily.      pantoprazole (PROTONIX) 40 MG tablet TAKE 1 TABLET BY MOUTH ONCE DAILY BEFORE BREAKFAST 30 tablet 2    [DISCONTINUED] ranitidine (ZANTAC) 300 MG tablet TAKE 1 TABLET BY MOUTH EVERY EVENING 30 tablet 2     No current facility-administered medications on file prior to visit.        Medications have been reviewed.    ALLERGIES:  Review of patient's allergies indicates:  No Known Allergies    Allergies have been reviewed.    ROS:  Review of Systems   Constitutional: Positive for activity change. Negative for appetite change, chills, diaphoresis, fatigue, fever and unexpected weight change.   HENT: Negative for congestion, dental problem, hearing loss, rhinorrhea, sore throat and  trouble swallowing.    Eyes: Negative for discharge and visual disturbance.   Respiratory: Negative for cough, chest tightness, shortness of breath and wheezing.         Sleep apnea   Cardiovascular: Positive for palpitations. Negative for chest pain and leg swelling.   Gastrointestinal: Negative for abdominal distention, abdominal pain, blood in stool, constipation, diarrhea, nausea and vomiting.   Endocrine: Negative for cold intolerance, heat intolerance, polydipsia, polyphagia and polyuria.   Genitourinary: Negative for difficulty urinating, dysuria, frequency, hematuria, menstrual problem and urgency.   Musculoskeletal: Positive for arthralgias. Negative for gait problem, joint swelling, myalgias and neck pain.   Skin: Negative for color change, pallor, rash and wound.   Neurological: Positive for weakness. Negative for dizziness, syncope, light-headedness, numbness and headaches.   Hematological: Negative for adenopathy. Does not bruise/bleed easily.   Psychiatric/Behavioral: Negative for confusion, decreased concentration, dysphoric mood and sleep disturbance. The patient is not nervous/anxious.        PE:  Physical Exam   Constitutional: She is oriented to person, place, and time. She appears well-developed and well-nourished. No distress.   obese   HENT:   Head: Normocephalic and atraumatic.   Right Ear: External ear normal.   Left Ear: External ear normal.   Eyes: Conjunctivae and EOM are normal. Pupils are equal, round, and reactive to light. No scleral icterus.   Neck: Normal range of motion. Neck supple. No tracheal deviation present. No thyromegaly present.   Cardiovascular: Normal rate, regular rhythm, normal heart sounds and intact distal pulses. Exam reveals no gallop and no friction rub.   No murmur heard.  Pulmonary/Chest: Effort normal and breath sounds normal. No respiratory distress. She has no wheezes. She has no rales. She exhibits no tenderness.   Abdominal: Soft. Bowel sounds are normal.  She exhibits no distension. There is no tenderness. No hernia.   Well healed lap taty and c-sxn scars   Musculoskeletal: Normal range of motion. She exhibits no edema, tenderness or deformity.   Lymphadenopathy:     She has no cervical adenopathy.   Neurological: She is alert and oriented to person, place, and time.   Skin: Skin is warm and dry. No rash noted. She is not diaphoretic. No erythema. No pallor.   Psychiatric: She has a normal mood and affect. Her behavior is normal. Judgment and thought content normal.   Vitals reviewed.      DIAGNOSIS:  1. Morbid obesity with a BMI of 37.39 and inability to maintain weight loss.  2. Co-morbidities: hypertension, obstructive sleep apnea and joint pain/back pain    PLAN:  The patient is a good candidate for Bariatric Surgery. She is interested in sleeve gastrectomy. The surgery and post-op care was discussed in detail with the patient. All questions were answered.    She understands that bariatric surgery is a tool to aid in weight loss and that she needs to be committed to the diet and exercise post-operatively for successful weight loss. Discussed with patient that bariatric surgery is not the easy way out and that it will take plenty of dedication on the patient's part to be successful. Also discussed the possibility of weight regain if the patient strays from the diet guidelines or exercise requirements. Patient verbalized understanding and wishes to proceed with surgery.    The patient is a good candidate for operatively-induced weight loss.  The patient's comorbidities can significantly improve from weight loss following surgery.      She has completed all necessary preliminary steps of the program including the evaluation process.  I have outlined the risks of the procedures including, but not limited to, bleeding, slippage, erosion, stricture, death, deep venous thrombosis, pulmonary embolus, healing issues including intra-abdominal leakage or perforation with  abscess or need for drainage or reoperation, wound infection, need for open surgery, injury to intra-abdominal organs or bleeding requiring transfusion, intensive care unit care, and possible prolonged hospitalization.      Other long-term issues were discussed including, but not limited to, permanent change in volume of food and type of foods eaten and importance of ongoing long-term followup.  I advised the patient that if they can lose weight before surgery, it will reduce her operative risk.  The patient understands and wishes to proceed.    PLAN: The patient is interested in proceeding with laparoscopic vertical sleeve gastrectomy with possible robotic assistance. We will proceed with scheduling surgery for February 19, 2019. She will begin OptiFast 2 weeks prior to surgery. Pre op labs will be scheduled.     Referring Physician: No ref. provider found  RTC: As scheduled.

## 2018-12-17 NOTE — PROGRESS NOTES
Pulmonary Outpatient Follow Up Visit     Subjective:    This patient is new to me.   Patient ID: Zora Davis is a 68 y.o. female.    Chief Complaint: Sleep Apnea      HPI  68-year-old female presenting for follow-up.    She is known with recent diagnosis of obstructive sleep apnea, on CPAP 6 cm.    She has changed her mask to nasal pillow, currently has suboptimal compliance with 40% of the nights more than 4 hr of usage.    Snoring has improved.  Excessive daytime sleepiness present with [place] Sleepiness Scale score today of 8.    Bed time is 1100 - 1200  Wake time is 0600  Sleep onset is within 15 Minutes when ready to go to sleep.       Review of Systems   Constitutional: Negative for fever and chills.   HENT: Negative for nosebleeds and hearing loss.    Eyes: Negative for redness.   Respiratory: Positive for apnea and snoring.    Cardiovascular: Negative for chest pain.   Genitourinary: Negative for hematuria.   Endocrine: Hypothyroid Negative for polyphagia.    Musculoskeletal: Negative for gait problem.   Skin: Negative for rash.   Gastrointestinal: Negative for vomiting.        Dysphagia   Neurological: Negative for syncope.   Hematological: Negative for adenopathy.   Psychiatric/Behavioral: Positive for sleep disturbance. The patient is not nervous/anxious.          Outpatient Encounter Medications as of 12/17/2018   Medication Sig Dispense Refill    albuterol 90 mcg/actuation inhaler Inhale 2 puffs into the lungs every 6 (six) hours as needed for Wheezing or Shortness of Breath. Rescue 18 g 1    coenzyme Q10 (CO Q-10) 100 mg capsule Take 200 mg by mouth once daily.      cyanocobalamin (VITAMIN B-12) 500 MCG tablet Take 500 mcg by mouth once daily.      ergocalciferol, vitamin D2, (VITAMIN D ORAL) Take 5,000 mg by mouth once daily.      glucosamine/chondr vance A sod (GLUCOSAMINE-CHONDROITIN) 1,500-1,200 mg/30 mL Liqd Take by mouth.       "hydroCHLOROthiazide (HYDRODIURIL) 25 MG tablet TAKE 1 TABLET DAILY 90 tablet 1    levothyroxine (SYNTHROID) 50 MCG tablet TAKE 1 TABLET DAILY 90 tablet 1    losartan (COZAAR) 100 MG tablet TAKE 1 TABLET DAILY 90 tablet 1    metoprolol tartrate (LOPRESSOR) 25 MG tablet TAKE 1 TABLET DAILY 90 tablet 1    multivitamin-minerals-lutein Tab Take 1 tablet by mouth once daily.      pantoprazole (PROTONIX) 40 MG tablet TAKE 1 TABLET BY MOUTH ONCE DAILY BEFORE BREAKFAST 30 tablet 2    ranitidine (ZANTAC) 300 MG tablet TAKE 1 TABLET BY MOUTH EVERY EVENING 30 tablet 2     No facility-administered encounter medications on file as of 12/17/2018.        Objective:     Vital Signs (Most Recent)  Vital Signs  Pulse: 74  Resp: 18  SpO2: 98 %  BP: 120/60  Height and Weight  Height: 5' 7" (170.2 cm)  Weight: 108 kg (237 lb 15.8 oz)  BSA (Calculated - sq m): 2.26 sq meters  BMI (Calculated): 37.4  Weight in (lb) to have BMI = 25: 159.3]  Wt Readings from Last 3 Encounters:   12/17/18 108 kg (237 lb 15.8 oz)   11/30/18 107.4 kg (236 lb 12.8 oz)   10/01/18 107.9 kg (237 lb 14 oz)     Temp Readings from Last 3 Encounters:   11/30/18 98.3 °F (36.8 °C) (Oral)   09/17/18 99.4 °F (37.4 °C) (Oral)   07/24/18 98 °F (36.7 °C) (Oral)     Height: 5' 7" (170.2 cm)          Physical Exam   Constitutional: She is oriented to person, place, and time. She appears well-developed and well-nourished.   HENT:   Head: Normocephalic.   Mouth/Throat: Mallampati Score: III.   Neck: Neck supple.   Neck circumference 15.2 in   Cardiovascular: Normal rate and regular rhythm.   Pulmonary/Chest: Normal expansion and effort normal.   Abdominal: Soft.   Musculoskeletal: She exhibits no edema or tenderness.   Neurological: She is alert and oriented to person, place, and time. Gait normal.   Skin: Skin is warm.   Psychiatric: She has a normal mood and affect.   Nursing note and vitals reviewed.    Laboratory    Lab Results   Component Value Date    WBC 9.60 " 09/17/2018    HGB 14.2 09/17/2018    HCT 42.8 09/17/2018    MCV 85 09/17/2018     09/17/2018     BMP  Lab Results   Component Value Date     09/17/2018    K 3.3 (L) 09/17/2018     09/17/2018    CO2 26 09/17/2018    BUN 17 09/17/2018    CREATININE 0.8 09/17/2018    CALCIUM 10.0 09/17/2018    ANIONGAP 11 09/17/2018    ESTGFRAFRICA >60.0 09/17/2018    EGFRNONAA >60.0 09/17/2018     BNP  @LABRCNTIP(BNP,BNPTRIAGEBLO)@  Lab Results   Component Value Date    TSH 2.826 09/17/2018       Diagnostic Results:  CXR Personally reviewed 9/18    Clear lungs.    Compliance Summary  11/17/2018 - 12/16/2018 (30 days)  Days with Device Usage 27 days  Days without Device Usage 3 days  Percent Days with Device Usage 90.0%  Cumulative Usage 4 days 3 hrs. 49 mins. 39 secs.  Maximum Usage (1 Day) 6 hrs. 18 mins. 35 secs.  Average Usage (All Days) 3 hrs. 19 mins. 39 secs.  Average Usage (Days Used) 3 hrs. 41 mins. 50 secs.  Minimum Usage (1 Day) 6 mins. 58 secs.  Percent of Days with Usage >= 4 Hours 40.0%  Percent of Days with Usage < 4 Hours 60.0%  Date Range  Total Blower Time 4 days 12 hrs. 9 mins. 1 secs.  CPAP Summary  Average Time in Large Leak Per Day 2 secs.  Average AHI 3.2  CPAP 6.0 cmH2O    Assessment/Plan:   Obstructive sleep apnea    Snoring    Severe obesity (BMI 35.0-39.9) with comorbidity    Discussed, instructed and advised patient to use machine ideally continuously during sleep with a minimum of 4 hr a night 7 nights out of 10.    General weight loss/lifestyle modification strategies  (elicit support from others; identify saboteurs; non-food rewards).  Diet interventions: low calorie (1000 kCal/d) deficit diet    Up-to-date on influenza vaccination and Prevnar 13.      Follow-up in about 3 months (around 3/17/2019).    This note was prepared using voice recognition system and is likely to have sound alike errors that may have been overlooked even after proof reading.  Please call me with any  questions    Discussed diagnosis, its evaluation, treatment and usual course. All questions answered.    Thank you for the courtesy of participating in the care of this patient    Ronan Bucio MD

## 2019-01-09 ENCOUNTER — OFFICE VISIT (OUTPATIENT)
Dept: FAMILY MEDICINE | Facility: CLINIC | Age: 69
End: 2019-01-09
Payer: MEDICARE

## 2019-01-09 VITALS
BODY MASS INDEX: 37.2 KG/M2 | HEIGHT: 67 IN | WEIGHT: 237 LBS | DIASTOLIC BLOOD PRESSURE: 68 MMHG | HEART RATE: 78 BPM | TEMPERATURE: 98 F | SYSTOLIC BLOOD PRESSURE: 139 MMHG

## 2019-01-09 DIAGNOSIS — J34.89 NASAL LESION: Primary | ICD-10-CM

## 2019-01-09 DIAGNOSIS — F41.9 ANXIETY: ICD-10-CM

## 2019-01-09 DIAGNOSIS — R12 HEARTBURN: ICD-10-CM

## 2019-01-09 DIAGNOSIS — Z76.0 MEDICATION REFILL: ICD-10-CM

## 2019-01-09 PROCEDURE — 1101F PT FALLS ASSESS-DOCD LE1/YR: CPT | Mod: S$GLB,,, | Performed by: NURSE PRACTITIONER

## 2019-01-09 PROCEDURE — 3075F SYST BP GE 130 - 139MM HG: CPT | Mod: S$GLB,,, | Performed by: NURSE PRACTITIONER

## 2019-01-09 PROCEDURE — 99999 PR PBB SHADOW E&M-EST. PATIENT-LVL V: ICD-10-PCS | Mod: PBBFAC,,, | Performed by: NURSE PRACTITIONER

## 2019-01-09 PROCEDURE — 3078F PR MOST RECENT DIASTOLIC BLOOD PRESSURE < 80 MM HG: ICD-10-PCS | Mod: S$GLB,,, | Performed by: NURSE PRACTITIONER

## 2019-01-09 PROCEDURE — 99213 PR OFFICE/OUTPT VISIT, EST, LEVL III, 20-29 MIN: ICD-10-PCS | Mod: S$GLB,,, | Performed by: NURSE PRACTITIONER

## 2019-01-09 PROCEDURE — 99213 OFFICE O/P EST LOW 20 MIN: CPT | Mod: S$GLB,,, | Performed by: NURSE PRACTITIONER

## 2019-01-09 PROCEDURE — 1101F PR PT FALLS ASSESS DOC 0-1 FALLS W/OUT INJ PAST YR: ICD-10-PCS | Mod: S$GLB,,, | Performed by: NURSE PRACTITIONER

## 2019-01-09 PROCEDURE — 87081 CULTURE SCREEN ONLY: CPT

## 2019-01-09 PROCEDURE — 3075F PR MOST RECENT SYSTOLIC BLOOD PRESS GE 130-139MM HG: ICD-10-PCS | Mod: S$GLB,,, | Performed by: NURSE PRACTITIONER

## 2019-01-09 PROCEDURE — 99999 PR PBB SHADOW E&M-EST. PATIENT-LVL V: CPT | Mod: PBBFAC,,, | Performed by: NURSE PRACTITIONER

## 2019-01-09 PROCEDURE — 3078F DIAST BP <80 MM HG: CPT | Mod: S$GLB,,, | Performed by: NURSE PRACTITIONER

## 2019-01-09 RX ORDER — MUPIROCIN 20 MG/G
OINTMENT TOPICAL 3 TIMES DAILY
Qty: 22 G | Refills: 0 | Status: SHIPPED | OUTPATIENT
Start: 2019-01-09 | End: 2019-01-19

## 2019-01-09 RX ORDER — ALPRAZOLAM 1 MG/1
1 TABLET ORAL 2 TIMES DAILY PRN
Qty: 10 TABLET | Refills: 0 | Status: SHIPPED | OUTPATIENT
Start: 2019-01-09 | End: 2020-01-16

## 2019-01-09 RX ORDER — PANTOPRAZOLE SODIUM 40 MG/1
40 TABLET, DELAYED RELEASE ORAL DAILY
Qty: 30 TABLET | Refills: 0 | Status: SHIPPED | OUTPATIENT
Start: 2019-01-09 | End: 2019-01-09 | Stop reason: SDUPTHER

## 2019-01-09 RX ORDER — PANTOPRAZOLE SODIUM 40 MG/1
40 TABLET, DELAYED RELEASE ORAL DAILY
Qty: 90 TABLET | Refills: 0 | Status: SHIPPED | OUTPATIENT
Start: 2019-01-09 | End: 2019-06-18 | Stop reason: SDUPTHER

## 2019-01-09 NOTE — PROGRESS NOTES
Subjective:       Patient ID: Zora Davis is a 68 y.o. female.    Chief Complaint: Sores (nose ) and Medication Refill  Pt in today for nasal lesion, medication refill. Pt states that she has had small painful lesions x 1 to bilateral nares over the past 5 d; uses CPAP. Pt also requests protonix, xanax refill. GERD well controlled with protonix. Pt has fear of flying and only takes xanax when traveling by plane; states has worked well in the past. Pt has no other complaints today.  Past Medical History:   Diagnosis Date    Atrial fibrillation     Diverticulosis     Fatty liver     Hypertension     Liver disease     FLORES (nonalcoholic steatohepatitis)     Nightmares     Thyroid disease      Social History     Socioeconomic History    Marital status:      Spouse name: Not on file    Number of children: 2    Years of education: Not on file    Highest education level: Not on file   Social Needs    Financial resource strain: Not on file    Food insecurity - worry: Not on file    Food insecurity - inability: Not on file    Transportation needs - medical: Not on file    Transportation needs - non-medical: Not on file   Occupational History    Occupation: retired    Tobacco Use    Smoking status: Former Smoker     Packs/day: 0.25     Years: 13.00     Pack years: 3.25     Types: Cigarettes     Start date:      Last attempt to quit:      Years since quittin.0    Smokeless tobacco: Never Used   Substance and Sexual Activity    Alcohol use: Yes     Alcohol/week: 0.6 oz     Types: 1 Glasses of wine per week     Comment: socially    Drug use: No    Sexual activity: Yes     Partners: Male   Other Topics Concern    Patient feels they ought to cut down on drinking/drug use Not Asked    Patient annoyed by others criticizing their drinking/drug use Not Asked    Patient has felt bad or guilty about drinking/drug use Not Asked    Patient has had a drink/used drugs as  an eye opener in the AM Not Asked   Social History Narrative    Not on file     Past Surgical History:   Procedure Laterality Date    BREAST BIOPSY      breast tumor       SECTION      CHOLECYSTECTOMY      COLONOSCOPY  2014    Dr. Richey, repeat in 5 years    COLONOSCOPY N/A 2014    [facility] ENDO    ESOPHAGOGASTRODUODENOSCOPY (EGD) N/A 2018    Performed by Efrain Steel MD at [facility] ENDO    ESOPHAGOGASTRODUODENOSCOPY (EGD) N/A 2015    Performed by [facility]u [facility]ron MD at [facility] ENDO    HYSTERECTOMY      TOTAL REDUCTION MAMMOPLASTY      UPPER GASTROINTESTINAL ENDOSCOPY  2015    Dr. Padron       HPI  Review of Systems   Constitutional: Negative.  Negative for activity change and unexpected weight change.   HENT: Negative.  Negative for hearing loss, rhinorrhea and trouble swallowing.    Eyes: Negative.  Negative for discharge and visual disturbance.   Respiratory: Negative.  Negative for chest tightness and wheezing.    Cardiovascular: Negative.  Negative for chest pain and palpitations.   Gastrointestinal: Negative.  Negative for blood in stool, constipation, diarrhea and vomiting.   Endocrine: Negative.  Negative for polydipsia and polyuria.   Genitourinary: Negative.  Negative for difficulty urinating, dysuria, hematuria and menstrual problem.   Musculoskeletal: Negative.  Negative for arthralgias, joint swelling and neck pain.   Skin: Negative.         Nasal lesion   Allergic/Immunologic: Negative.    Neurological: Negative.  Negative for weakness and headaches.   Psychiatric/Behavioral: Negative.  Negative for confusion and dysphoric mood.       Objective:      Physical Exam   Constitutional: She is oriented to person, place, and time. She appears well-developed and well-nourished.   HENT:   Head: Normocephalic.   Right Ear: External ear normal.   Left Ear: External ear normal.   Nose: Sinus tenderness (small lesion x 1 to bilateral  nares) present.   Mouth/Throat: Oropharynx is clear and moist.   Eyes: Conjunctivae are normal. Pupils are equal, round, and reactive to light.   Neck: Normal range of motion. Neck supple.   Cardiovascular: Normal rate, regular rhythm and normal heart sounds.   Pulmonary/Chest: Effort normal and breath sounds normal.   Abdominal: Soft. Bowel sounds are normal.   Musculoskeletal: Normal range of motion.   Neurological: She is alert and oriented to person, place, and time.   Skin: Skin is warm and dry. Capillary refill takes 2 to 3 seconds.   Psychiatric: She has a normal mood and affect. Her behavior is normal. Judgment and thought content normal.   Nursing note and vitals reviewed.      Assessment:       1. Nasal lesion    2. Medication refill    3. Heartburn    4. Anxiety        Plan:           Zora was seen today for sores and medication refill.    Diagnoses and all orders for this visit:    Nasal lesion  -     Culture, MRSA  -     mupirocin (BACTROBAN) 2 % ointment; Apply topically 3 (three) times daily. for 10 days    Medication refill  Heartburn  -     Discontinue: pantoprazole (PROTONIX) 40 MG tablet; Take 1 tablet (40 mg total) by mouth once daily.  -     pantoprazole (PROTONIX) 40 MG tablet; Take 1 tablet (40 mg total) by mouth once daily.    Anxiety  Xanax refill request sent to PCP to approve

## 2019-01-09 NOTE — TELEPHONE ENCOUNTER
Anxiety when flying; states taking trip at the end of the month. She states that she has taken xanax for this before.

## 2019-01-10 ENCOUNTER — TELEPHONE (OUTPATIENT)
Dept: FAMILY MEDICINE | Facility: CLINIC | Age: 69
End: 2019-01-10

## 2019-01-10 NOTE — TELEPHONE ENCOUNTER
----- Message from Berta Montalvo sent at 1/10/2019  1:34 PM CST -----  Contact: Eve/ [employer]  [phone]  States that she is calling to clarify some questions on the PA for alprazolam. Please call back at [phone]//thank you acc

## 2019-01-10 NOTE — TELEPHONE ENCOUNTER
----- Message from Berta Montalvo sent at 1/10/2019  1:34 PM CST -----  Contact: [facility] [employer]  [phone]  States that she is calling to clarify some questions on the PA for alprazolam. Please call back at [phone]//thank you acc

## 2019-01-11 LAB — MRSA SPEC QL CULT: NORMAL

## 2019-01-21 ENCOUNTER — OFFICE VISIT (OUTPATIENT)
Dept: FAMILY MEDICINE | Facility: CLINIC | Age: 69
End: 2019-01-21
Payer: MEDICARE

## 2019-01-21 ENCOUNTER — PATIENT MESSAGE (OUTPATIENT)
Dept: FAMILY MEDICINE | Facility: CLINIC | Age: 69
End: 2019-01-21

## 2019-01-21 VITALS
TEMPERATURE: 98 F | DIASTOLIC BLOOD PRESSURE: 75 MMHG | WEIGHT: 241 LBS | HEART RATE: 110 BPM | BODY MASS INDEX: 37.83 KG/M2 | SYSTOLIC BLOOD PRESSURE: 150 MMHG | HEIGHT: 67 IN

## 2019-01-21 DIAGNOSIS — R05.9 COUGH: ICD-10-CM

## 2019-01-21 DIAGNOSIS — J32.9 SINUSITIS, UNSPECIFIED CHRONICITY, UNSPECIFIED LOCATION: Primary | ICD-10-CM

## 2019-01-21 DIAGNOSIS — R50.9 FEVER, UNSPECIFIED FEVER CAUSE: ICD-10-CM

## 2019-01-21 LAB
INFLUENZA A, MOLECULAR: NEGATIVE
INFLUENZA B, MOLECULAR: NEGATIVE
SPECIMEN SOURCE: NORMAL

## 2019-01-21 PROCEDURE — 99213 OFFICE O/P EST LOW 20 MIN: CPT | Mod: S$GLB,,, | Performed by: NURSE PRACTITIONER

## 2019-01-21 PROCEDURE — 1101F PR PT FALLS ASSESS DOC 0-1 FALLS W/OUT INJ PAST YR: ICD-10-PCS | Mod: S$GLB,,, | Performed by: NURSE PRACTITIONER

## 2019-01-21 PROCEDURE — 3078F PR MOST RECENT DIASTOLIC BLOOD PRESSURE < 80 MM HG: ICD-10-PCS | Mod: S$GLB,,, | Performed by: NURSE PRACTITIONER

## 2019-01-21 PROCEDURE — 99999 PR PBB SHADOW E&M-EST. PATIENT-LVL V: ICD-10-PCS | Mod: PBBFAC,,, | Performed by: NURSE PRACTITIONER

## 2019-01-21 PROCEDURE — 1101F PT FALLS ASSESS-DOCD LE1/YR: CPT | Mod: S$GLB,,, | Performed by: NURSE PRACTITIONER

## 2019-01-21 PROCEDURE — 3078F DIAST BP <80 MM HG: CPT | Mod: S$GLB,,, | Performed by: NURSE PRACTITIONER

## 2019-01-21 PROCEDURE — 99213 PR OFFICE/OUTPT VISIT, EST, LEVL III, 20-29 MIN: ICD-10-PCS | Mod: S$GLB,,, | Performed by: NURSE PRACTITIONER

## 2019-01-21 PROCEDURE — 3077F PR MOST RECENT SYSTOLIC BLOOD PRESSURE >= 140 MM HG: ICD-10-PCS | Mod: S$GLB,,, | Performed by: NURSE PRACTITIONER

## 2019-01-21 PROCEDURE — 99999 PR PBB SHADOW E&M-EST. PATIENT-LVL V: CPT | Mod: PBBFAC,,, | Performed by: NURSE PRACTITIONER

## 2019-01-21 PROCEDURE — 3077F SYST BP >= 140 MM HG: CPT | Mod: S$GLB,,, | Performed by: NURSE PRACTITIONER

## 2019-01-21 PROCEDURE — 87502 INFLUENZA DNA AMP PROBE: CPT | Mod: PO

## 2019-01-21 RX ORDER — BENZONATATE 200 MG/1
200 CAPSULE ORAL 3 TIMES DAILY PRN
Qty: 30 CAPSULE | Refills: 0 | Status: SHIPPED | OUTPATIENT
Start: 2019-01-21 | End: 2019-01-31

## 2019-01-21 RX ORDER — FLUTICASONE PROPIONATE 50 MCG
1 SPRAY, SUSPENSION (ML) NASAL DAILY
Qty: 1 BOTTLE | Refills: 0 | Status: SHIPPED | OUTPATIENT
Start: 2019-01-21 | End: 2019-07-08

## 2019-01-21 RX ORDER — DOXYCYCLINE HYCLATE 100 MG
100 TABLET ORAL 2 TIMES DAILY
Qty: 20 TABLET | Refills: 0 | Status: SHIPPED | OUTPATIENT
Start: 2019-01-21 | End: 2019-01-31

## 2019-01-21 NOTE — PROGRESS NOTES
Subjective:       Patient ID: Zora Davis is a 68 y.o. female.    Chief Complaint: Cough; Chest Congestion; Nasal Congestion; and Fever    Cough   This is a new problem. The current episode started yesterday. The problem has been gradually worsening. The problem occurs every few minutes. The cough is non-productive. Associated symptoms include chills, ear pain, a fever, myalgias, nasal congestion, postnasal drip, rhinorrhea and a sore throat. Pertinent negatives include no chest pain, ear congestion, headaches, heartburn, hemoptysis, rash, shortness of breath, sweats, weight loss or wheezing. The symptoms are aggravated by lying down. She has tried OTC cough suppressant for the symptoms. The treatment provided no relief. Her past medical history is significant for asthma and bronchitis. There is no history of bronchiectasis, COPD, emphysema, environmental allergies or pneumonia.     Past Medical History:   Diagnosis Date    Atrial fibrillation     Diverticulosis     Fatty liver     Hypertension     Liver disease     FLORES (nonalcoholic steatohepatitis)     Nightmares     Thyroid disease      Social History     Socioeconomic History    Marital status:      Spouse name: Not on file    Number of children: 2    Years of education: Not on file    Highest education level: Not on file   Social Needs    Financial resource strain: Not on file    Food insecurity - worry: Not on file    Food insecurity - inability: Not on file    Transportation needs - medical: Not on file    Transportation needs - non-medical: Not on file   Occupational History    Occupation: retired    Tobacco Use    Smoking status: Former Smoker     Packs/day: 0.25     Years: 13.00     Pack years: 3.25     Types: Cigarettes     Start date:      Last attempt to quit:      Years since quittin.0    Smokeless tobacco: Never Used   Substance and Sexual Activity    Alcohol use: Yes     Alcohol/week:  0.6 oz     Types: 1 Glasses of wine per week     Comment: socially    Drug use: No    Sexual activity: Yes     Partners: Male   Other Topics Concern    Patient feels they ought to cut down on drinking/drug use Not Asked    Patient annoyed by others criticizing their drinking/drug use Not Asked    Patient has felt bad or guilty about drinking/drug use Not Asked    Patient has had a drink/used drugs as an eye opener in the AM Not Asked   Social History Narrative    Not on file     Past Surgical History:   Procedure Laterality Date    BREAST BIOPSY      breast tumor       SECTION      CHOLECYSTECTOMY      COLONOSCOPY  2014    Dr. Richey, repeat in 5 years    COLONOSCOPY N/A 2014    [facility]    ESOPHAGOGASTRODUODENOSCOPY (EGD) N/A 2018    Performed by Efrain Steel [facility] at [facility]    ESOPHAGOGASTRODUODENOSCOPY (EGD) N/A 2015    Per[facility] by Chinedu [facility] at [facility] END[facility]    HYSTERECTOMY      TOTAL REDUCTION MAMMOPLASTY      UPPER GASTROINTESTINAL ENDOSCOPY  2015    Dr. Padron       Review of Systems   Constitutional: Positive for chills and fever. Negative for weight loss.   HENT: Positive for ear pain, postnasal drip, rhinorrhea and sore throat.    Eyes: Negative.    Respiratory: Positive for cough. Negative for hemoptysis, shortness of breath and wheezing.    Cardiovascular: Negative.  Negative for chest pain.   Gastrointestinal: Negative.  Negative for heartburn.   Endocrine: Negative.    Genitourinary: Negative.    Musculoskeletal: Positive for myalgias.   Skin: Negative.  Negative for rash.   Allergic/Immunologic: Negative.  Negative for environmental allergies.   Neurological: Negative.  Negative for headaches.   Psychiatric/Behavioral: Negative.        Objective:      Physical Exam   Constitutional: She is oriented to person, place, and time. She appears well-developed and well-nourished.   HENT:   Head: Normocephalic.    Right Ear: Hearing, tympanic membrane, external ear and ear canal normal.   Left Ear: Hearing, tympanic membrane, external ear and ear canal normal.   Nose: Mucosal edema and rhinorrhea present. Right sinus exhibits maxillary sinus tenderness. Right sinus exhibits no frontal sinus tenderness. Left sinus exhibits maxillary sinus tenderness. Left sinus exhibits no frontal sinus tenderness.   Mouth/Throat: Uvula is midline, oropharynx is clear and moist and mucous membranes are normal.   Eyes: Conjunctivae are normal. Pupils are equal, round, and reactive to light.   Neck: Normal range of motion. Neck supple.   Cardiovascular: Normal rate, regular rhythm and normal heart sounds.   Pulmonary/Chest: Effort normal and breath sounds normal.   Abdominal: Soft. Bowel sounds are normal.   Musculoskeletal: Normal range of motion.   Neurological: She is alert and oriented to person, place, and time.   Skin: Skin is warm and dry. Capillary refill takes 2 to 3 seconds.   Psychiatric: She has a normal mood and affect. Her behavior is normal. Judgment and thought content normal.   Nursing note and vitals reviewed.      Assessment:       1. Sinusitis, unspecified chronicity, unspecified location   2. Cough    3.      Fever, unspecified fever cause   Plan:           Zora was seen today for cough, chest congestion, nasal congestion and fever.    Diagnoses and all orders for this visit:    Sinusitis, unspecified chronicity, unspecified location  Fever, unspecified fever cause  Cough  -     Influenza A & B by Molecular  -     fluticasone (FLONASE) 50 mcg/actuation nasal spray; 1 spray (50 mcg total) by Each Nare route once daily.  -     doxycycline (VIBRA-TABS) 100 MG tablet; Take 1 tablet (100 mg total) by mouth 2 (two) times daily. for 10 days  Tessalon as prescribed  Report to ER immediately if symptoms worsen

## 2019-01-23 ENCOUNTER — PATIENT MESSAGE (OUTPATIENT)
Dept: FAMILY MEDICINE | Facility: CLINIC | Age: 69
End: 2019-01-23

## 2019-01-23 RX ORDER — VALSARTAN 160 MG/1
160 TABLET ORAL DAILY
Qty: 90 TABLET | Refills: 3 | Status: SHIPPED | OUTPATIENT
Start: 2019-01-23 | End: 2019-07-08

## 2019-02-04 ENCOUNTER — PATIENT MESSAGE (OUTPATIENT)
Dept: SURGERY | Facility: CLINIC | Age: 69
End: 2019-02-04

## 2019-02-05 ENCOUNTER — LAB VISIT (OUTPATIENT)
Dept: LAB | Facility: HOSPITAL | Age: 69
End: 2019-02-05
Attending: PHYSICIAN ASSISTANT
Payer: MEDICARE

## 2019-02-05 DIAGNOSIS — E66.01 MORBID OBESITY: ICD-10-CM

## 2019-02-05 LAB
ALBUMIN SERPL BCP-MCNC: 3.4 G/DL
ALP SERPL-CCNC: 65 U/L
ALT SERPL W/O P-5'-P-CCNC: 28 U/L
ANION GAP SERPL CALC-SCNC: 6 MMOL/L
AST SERPL-CCNC: 22 U/L
BASOPHILS # BLD AUTO: 0.06 K/UL
BASOPHILS NFR BLD: 0.7 %
BILIRUB SERPL-MCNC: 0.6 MG/DL
BUN SERPL-MCNC: 21 MG/DL
CALCIUM SERPL-MCNC: 9.6 MG/DL
CHLORIDE SERPL-SCNC: 103 MMOL/L
CO2 SERPL-SCNC: 30 MMOL/L
CREAT SERPL-MCNC: 0.8 MG/DL
DIFFERENTIAL METHOD: ABNORMAL
EOSINOPHIL # BLD AUTO: 0.2 K/UL
EOSINOPHIL NFR BLD: 2 %
ERYTHROCYTE [DISTWIDTH] IN BLOOD BY AUTOMATED COUNT: 14.3 %
EST. GFR  (AFRICAN AMERICAN): >60 ML/MIN/1.73 M^2
EST. GFR  (NON AFRICAN AMERICAN): >60 ML/MIN/1.73 M^2
GLUCOSE SERPL-MCNC: 73 MG/DL
HCT VFR BLD AUTO: 38.4 %
HGB BLD-MCNC: 12.1 G/DL
IMM GRANULOCYTES # BLD AUTO: 0.03 K/UL
IMM GRANULOCYTES NFR BLD AUTO: 0.4 %
LYMPHOCYTES # BLD AUTO: 3.3 K/UL
LYMPHOCYTES NFR BLD: 39 %
MCH RBC QN AUTO: 27.5 PG
MCHC RBC AUTO-ENTMCNC: 31.5 G/DL
MCV RBC AUTO: 87 FL
MONOCYTES # BLD AUTO: 0.8 K/UL
MONOCYTES NFR BLD: 9.6 %
NEUTROPHILS # BLD AUTO: 4 K/UL
NEUTROPHILS NFR BLD: 48.3 %
NRBC BLD-RTO: 0 /100 WBC
PLATELET # BLD AUTO: 251 K/UL
PMV BLD AUTO: 10.2 FL
POTASSIUM SERPL-SCNC: 3.8 MMOL/L
PROT SERPL-MCNC: 7.5 G/DL
RBC # BLD AUTO: 4.4 M/UL
SODIUM SERPL-SCNC: 139 MMOL/L
WBC # BLD AUTO: 8.34 K/UL

## 2019-02-05 PROCEDURE — 85025 COMPLETE CBC W/AUTO DIFF WBC: CPT

## 2019-02-05 PROCEDURE — 36415 COLL VENOUS BLD VENIPUNCTURE: CPT | Mod: PO

## 2019-02-05 PROCEDURE — 80053 COMPREHEN METABOLIC PANEL: CPT

## 2019-02-06 ENCOUNTER — PATIENT MESSAGE (OUTPATIENT)
Dept: SURGERY | Facility: HOSPITAL | Age: 69
End: 2019-02-06

## 2019-02-06 NOTE — PRE ADMISSION SCREENING
Pre op instructions reviewed with patient per phone:    To confirm, Your surgeon has instructed you:  Surgery is scheduled 02/19/19at 0700.      Please report to [facility] 1st floor main lobby by 0530.   Pre admit office to call afternoon prior to surgery with final arrival time      INSTRUCTIONS IMPORTANT!!! Pt currently on opti-fast diet  ¨ Do not eat, drink, or smoke after 12 midnight-including water. OK to brush teeth, no gum, candy or mints!    ¨ Take only these medicines with a small swallow of water-morning of surgery.  Synthroid, Protonix      ____  Do not wear makeup, including mascara.  ____  No powder, lotions or creams to surgical area.  ____  Please remove all jewelry, including piercings and leave at home.  ____  No money or valuables needed. Please leave at home.  ____  Please bring identification and insurance information to hospital.  ____  If going home the same day, arrange for a ride home. You will not be able to   drive if Anesthesia was used.  ____  Children, under 12 years old, must remain in the waiting room with an adult.  They are not allowed in patient areas.  ____  Wear loose fitting clothing. Allow for dressings, bandages.  ____  Stop Aspirin, Ibuprofen, Motrin and Aleve at least 5-7 days before surgery, unless otherwise instructed by your doctor, or the nurse.   You MAY use Tylenol/acetaminophen until day of surgery.  ____  If you take diabetic medication, do not take am of surgery unless instructed by   Doctor.  ____ Stop taking any Fish Oil supplement or any Vitamins that contain Vitamin E at least 5 days prior to surgery.          Bathing Instructions-- The night before surgery and the morning prior to coming to the hospital:   -Do not shave the surgical area.   -Shower and wash your hair and body as usual with anti-bacterial  soap and shampoo.   -Rinse your hair and body completely.   -Use one packet of hibiclens to wash the surgical site (using your hand)  gently for 5 minutes.  Do not scrub you skin too hard.   -Do not use hibiclens on your head, face, or genitals.   -Do not wash with anti-bacterial soap after you use the hibiclens.   -Rinse your body thoroughly.   -Dry with clean, soft towel.  Do not use lotion, cream, deodorant, or powders on   the surgical site.    Use antibacterial soap in place of hibiclens if your surgery is on the head, face or genitals.         Surgical Site Infection    Prevention of surgical site infections:     -Keep incisions clean and dry.   -Do not soak/submerge incisions in water until completely healed.   -Do not apply lotions, powders, creams, or deodorants to site.   -Always make sure hands are cleaned with antibacterial soap/ alcohol-based   prior to touching the surgical site.  (This includes doctors, nurses, staff, and yourself.)    Signs and symptoms:   -Redness and pain around the area where you had surgery   -Drainage of cloudy fluid from your surgical wound   -Fever over 100.4  I have read or had read and explained to me, and understand the above information.

## 2019-02-08 ENCOUNTER — PATIENT MESSAGE (OUTPATIENT)
Dept: DIABETES | Facility: CLINIC | Age: 69
End: 2019-02-08

## 2019-02-11 ENCOUNTER — PATIENT MESSAGE (OUTPATIENT)
Dept: SURGERY | Facility: CLINIC | Age: 69
End: 2019-02-11

## 2019-02-17 ENCOUNTER — PATIENT MESSAGE (OUTPATIENT)
Dept: SURGERY | Facility: HOSPITAL | Age: 69
End: 2019-02-17

## 2019-02-18 ENCOUNTER — TELEPHONE (OUTPATIENT)
Dept: NUTRITION | Facility: CLINIC | Age: 69
End: 2019-02-18

## 2019-02-18 ENCOUNTER — TELEPHONE (OUTPATIENT)
Dept: SURGERY | Facility: CLINIC | Age: 69
End: 2019-02-18

## 2019-02-18 ENCOUNTER — PATIENT MESSAGE (OUTPATIENT)
Dept: SURGERY | Facility: CLINIC | Age: 69
End: 2019-02-18

## 2019-02-18 NOTE — TELEPHONE ENCOUNTER
Contacted pt regarding dietary protocol as gastric sleeve sx date has been delayed to 3/1/19; pt believes she is sick with the flu. Pt reports she will call back at another time to discuss, busy at time of call. Provided contact information.

## 2019-02-18 NOTE — TELEPHONE ENCOUNTER
----- Message from Solange Irwin sent at 2/18/2019 10:49 AM CST -----  Contact: self  Type:  Needs Medical Advice    Who Called: pt  Symptoms (please be specific): n/a   How long has patient had these symptoms:  n/a  Pharmacy name and phone #:  n/a  Would the patient rather a call back or a response via [facility]? Call back  Best Call Back Number: [phone]  Additional Information: pt requesting order to have flu test and strep test. Pt is suppose to have surgery tomorrow and states doctor is going to have to postpone due to being sick.    Thanks,  Solange Irwin

## 2019-02-18 NOTE — TELEPHONE ENCOUNTER
----- Message from Vielka Solorio sent at 2/18/2019  8:03 AM CST -----  Contact: Patient  Type:  Needs Medical Advice    Who Called:  Zora  Symptoms (please be specific):  Congestion/Fever  How long has patient had these symptoms:  2/17/19  Pharmacy name and phone #:  n/a  Would the patient rather a call back or a response via [facility]?  Call back  Best Call Back Number: [phone] or [phone]  Additional Information: Patient called and stated she is supposed to have surgery tomorrow, but she woke up yesterday with congestion and a fever. Please call to discuss, she might need to reschedule.    Thanks,  Vielka

## 2019-02-19 ENCOUNTER — TELEPHONE (OUTPATIENT)
Dept: NUTRITION | Facility: CLINIC | Age: 69
End: 2019-02-19

## 2019-02-19 ENCOUNTER — TELEPHONE (OUTPATIENT)
Dept: FAMILY MEDICINE | Facility: CLINIC | Age: 69
End: 2019-02-19

## 2019-02-19 ENCOUNTER — OFFICE VISIT (OUTPATIENT)
Dept: FAMILY MEDICINE | Facility: CLINIC | Age: 69
End: 2019-02-19
Payer: MEDICARE

## 2019-02-19 VITALS
WEIGHT: 231 LBS | HEIGHT: 67 IN | SYSTOLIC BLOOD PRESSURE: 123 MMHG | HEART RATE: 101 BPM | TEMPERATURE: 99 F | BODY MASS INDEX: 36.26 KG/M2 | DIASTOLIC BLOOD PRESSURE: 69 MMHG

## 2019-02-19 DIAGNOSIS — J02.9 SORE THROAT: ICD-10-CM

## 2019-02-19 DIAGNOSIS — J10.1 INFLUENZA A: Primary | ICD-10-CM

## 2019-02-19 DIAGNOSIS — R50.9 FEVER, UNSPECIFIED FEVER CAUSE: ICD-10-CM

## 2019-02-19 LAB
DEPRECATED S PYO AG THROAT QL EIA: NEGATIVE
INFLUENZA A, MOLECULAR: POSITIVE
INFLUENZA B, MOLECULAR: NEGATIVE
SPECIMEN SOURCE: ABNORMAL

## 2019-02-19 PROCEDURE — 3078F DIAST BP <80 MM HG: CPT | Mod: S$GLB,,, | Performed by: NURSE PRACTITIONER

## 2019-02-19 PROCEDURE — 3074F PR MOST RECENT SYSTOLIC BLOOD PRESSURE < 130 MM HG: ICD-10-PCS | Mod: S$GLB,,, | Performed by: NURSE PRACTITIONER

## 2019-02-19 PROCEDURE — 3074F SYST BP LT 130 MM HG: CPT | Mod: S$GLB,,, | Performed by: NURSE PRACTITIONER

## 2019-02-19 PROCEDURE — 87502 INFLUENZA DNA AMP PROBE: CPT | Mod: PO

## 2019-02-19 PROCEDURE — 99213 PR OFFICE/OUTPT VISIT, EST, LEVL III, 20-29 MIN: ICD-10-PCS | Mod: S$GLB,,, | Performed by: NURSE PRACTITIONER

## 2019-02-19 PROCEDURE — 99999 PR PBB SHADOW E&M-EST. PATIENT-LVL V: ICD-10-PCS | Mod: PBBFAC,,, | Performed by: NURSE PRACTITIONER

## 2019-02-19 PROCEDURE — 87880 STREP A ASSAY W/OPTIC: CPT | Mod: PO

## 2019-02-19 PROCEDURE — 3078F PR MOST RECENT DIASTOLIC BLOOD PRESSURE < 80 MM HG: ICD-10-PCS | Mod: S$GLB,,, | Performed by: NURSE PRACTITIONER

## 2019-02-19 PROCEDURE — 99999 PR PBB SHADOW E&M-EST. PATIENT-LVL V: CPT | Mod: PBBFAC,,, | Performed by: NURSE PRACTITIONER

## 2019-02-19 PROCEDURE — 87081 CULTURE SCREEN ONLY: CPT

## 2019-02-19 PROCEDURE — 1101F PT FALLS ASSESS-DOCD LE1/YR: CPT | Mod: S$GLB,,, | Performed by: NURSE PRACTITIONER

## 2019-02-19 PROCEDURE — 99213 OFFICE O/P EST LOW 20 MIN: CPT | Mod: S$GLB,,, | Performed by: NURSE PRACTITIONER

## 2019-02-19 PROCEDURE — 1101F PR PT FALLS ASSESS DOC 0-1 FALLS W/OUT INJ PAST YR: ICD-10-PCS | Mod: S$GLB,,, | Performed by: NURSE PRACTITIONER

## 2019-02-19 RX ORDER — BENZONATATE 200 MG/1
200 CAPSULE ORAL 3 TIMES DAILY PRN
Qty: 30 CAPSULE | Refills: 0 | Status: SHIPPED | OUTPATIENT
Start: 2019-02-19 | End: 2019-03-01

## 2019-02-19 RX ORDER — OSELTAMIVIR PHOSPHATE 75 MG/1
75 CAPSULE ORAL 2 TIMES DAILY
Qty: 10 CAPSULE | Refills: 0 | Status: SHIPPED | OUTPATIENT
Start: 2019-02-19 | End: 2019-02-24

## 2019-02-19 NOTE — TELEPHONE ENCOUNTER
Called pt to discuss diet now that sx has been delayed. After talking with MD and another RD, have decided pt should have 5 products daily of protein shakes and bars until new sx date of 3/1/19. Pt has been on optifast for the last 2 weeks, rescheduled sx due to possibility that pt has the flu. Discussed products appropriate for pt. Also reviewed sugar free clear liquids. Answered all pt questions. Encouraged pt to call back with any questions/concerns moving forward.

## 2019-02-19 NOTE — PROGRESS NOTES
Subjective:       Patient ID: Zora Davis is a 69 y.o. female.    Chief Complaint: Cough; Fever; Chills; and Generalized Body Aches    Fever    This is a new problem. The current episode started in the past 7 days. The problem occurs daily. The problem has been waxing and waning. The maximum temperature noted was 100 to 100.9 F. The temperature was taken using an oral thermometer. Associated symptoms include congestion, coughing, muscle aches and a sore throat. Pertinent negatives include no abdominal pain, chest pain, diarrhea, ear pain, headaches, nausea, rash, sleepiness, urinary pain, vomiting or wheezing. She has tried nothing for the symptoms. The treatment provided no relief.   Risk factors: sick contacts    Risk factors: no contaminated food, no contaminated water, no hx of cancer, no immunosuppression, no occupational exposure, no recent sickness and no recent travel      Past Medical History:   Diagnosis Date    Arthritis     Atrial fibrillation     Diverticulosis     Fatty liver     General anesthetics causing adverse effect in therapeutic use     mild a-fib during hysteretomy, not on anti-coag    Hypertension     Liver disease     FLORES (nonalcoholic steatohepatitis)     Nightmares     JOSE DE JESUS on CPAP     Thyroid disease      Social History     Socioeconomic History    Marital status:      Spouse name: Not on file    Number of children: 2    Years of education: Not on file    Highest education level: Not on file   Social Needs    Financial resource strain: Not on file    Food insecurity - worry: Not on file    Food insecurity - inability: Not on file    Transportation needs - medical: Not on file    Transportation needs - non-medical: Not on file   Occupational History    Occupation: retired    Tobacco Use    Smoking status: Former Smoker     Packs/day: 0.25     Years: 13.00     Pack years: 3.25     Types: Cigarettes     Start date: 1965     Last attempt to  quit:      Years since quittin.1    Smokeless tobacco: Never Used   Substance and Sexual Activity    Alcohol use: Yes     Alcohol/week: 0.6 oz     Types: 1 Glasses of wine per week     Comment: socially  No alcohol 72h prior to sx    Drug use: No    Sexual activity: Yes     Partners: Male   Other Topics Concern    Patient feels they ought to cut down on drinking/drug use Not Asked    Patient annoyed by others criticizing their drinking/drug use Not Asked    Patient has felt bad or guilty about drinking/drug use Not Asked    Patient has had a drink/used drugs as an eye opener in the AM Not Asked   Social History Narrative    Not on file     Past Surgical History:   Procedure Laterality Date    BREAST BIOPSY      breast tumor       SECTION      x 2    CHOLECYSTECTOMY      COLONOSCOPY  2014    Dr. Richey, repeat in 5 years    COLONOSCOPY N/A 2014    [facility] at [facility] ENDO    ESOPHAGOGASTRODUODENOSCOPY (EGD) N/A 2018    [facility] Efrain Steel [facility]    ESOPHAGOGASTRODUODENOSCOPY (EGD) N/A 2015    Per[facility]u [facility] at [facility] END[facility]    HYSTERECTOMY      TOTAL REDUCTION MAMMOPLASTY Bilateral     UPPER GASTROINTESTINAL ENDOSCOPY  2015    Dr. Padron       Review of Systems   Constitutional: Positive for fever.   HENT: Positive for congestion and sore throat. Negative for ear pain.    Eyes: Negative.    Respiratory: Positive for cough. Negative for wheezing.    Cardiovascular: Negative.  Negative for chest pain.   Gastrointestinal: Negative.  Negative for abdominal pain, diarrhea, nausea and vomiting.   Endocrine: Negative.    Genitourinary: Negative.  Negative for dysuria.   Musculoskeletal: Positive for myalgias.   Skin: Negative.  Negative for rash.   Allergic/Immunologic: Negative.    Neurological: Negative.  Negative for headaches.   Psychiatric/Behavioral: Negative.        Objective:      Physical Exam    Constitutional: She is oriented to person, place, and time. She appears well-developed and well-nourished.   HENT:   Head: Normocephalic.   Right Ear: Hearing, tympanic membrane, external ear and ear canal normal.   Left Ear: Hearing, tympanic membrane, external ear and ear canal normal.   Nose: Rhinorrhea present. Right sinus exhibits no maxillary sinus tenderness and no frontal sinus tenderness. Left sinus exhibits no maxillary sinus tenderness and no frontal sinus tenderness.   Mouth/Throat: Uvula is midline and mucous membranes are normal. Posterior oropharyngeal erythema present.   Eyes: Conjunctivae are normal. Pupils are equal, round, and reactive to light.   Neck: Normal range of motion. Neck supple.   Cardiovascular: Normal rate, regular rhythm and normal heart sounds.   Pulmonary/Chest: Effort normal and breath sounds normal.   Abdominal: Soft. Bowel sounds are normal.   Musculoskeletal: Normal range of motion.   Neurological: She is alert and oriented to person, place, and time.   Skin: Skin is warm and dry. Capillary refill takes 2 to 3 seconds.   Psychiatric: She has a normal mood and affect. Her behavior is normal. Judgment and thought content normal.   Nursing note and vitals reviewed.      Assessment:       1. Influenza A   2.      Sore throat  3.      Fever, unspecified fever cause    Plan:             Zora was seen today for cough, fever, chills and generalized body aches.    Diagnoses and all orders for this visit:    Influenza A  Sore throat  Fever, unspecified fever cause  -     Influenza A & B by Molecular  -     Throat Screen, Rapid  -     Strep A culture, throat  -     oseltamivir (TAMIFLU) 75 MG capsule; Take 1 capsule (75 mg total) by mouth 2 (two) times daily. for 5 days  -     benzonatate (TESSALON) 200 MG capsule; Take 1 capsule (200 mg total) by mouth 3 (three) times daily as needed.  Tylenol OTC as directed  Hydrate well  Report to ER immediately if symptoms worsen

## 2019-02-19 NOTE — TELEPHONE ENCOUNTER
----- Message from Valarie Ho sent at 2/19/2019  3:38 PM CST -----  Contact: self  .Type:  Test Results    Who Called: lindy  Name of Test (Lab/Mammo/Etc): flu  Date of Test: 2/19  Ordering Provider: hortencia levin  Where the test was performed: [facility] [place]  Would the patient rather a call back or a response via [facility]? call  Best Call Back Number: .[phone] (home)   Additional Information:

## 2019-02-20 ENCOUNTER — PATIENT MESSAGE (OUTPATIENT)
Dept: SURGERY | Facility: CLINIC | Age: 69
End: 2019-02-20

## 2019-02-22 LAB — BACTERIA THROAT CULT: NORMAL

## 2019-02-25 ENCOUNTER — TELEPHONE (OUTPATIENT)
Dept: SURGERY | Facility: CLINIC | Age: 69
End: 2019-02-25

## 2019-02-25 NOTE — TELEPHONE ENCOUNTER
----- Message from Valarie Ho sent at 2/25/2019  9:35 AM CST -----  states that she doesn't feel well enough to go thru surgery, please advise..[phone]

## 2019-02-26 NOTE — TELEPHONE ENCOUNTER
She is okay to switch to 03/22/2019. I will call the OR and let them know. She is going to be switching back to solid diet until 2 weeks before surgery. She has verbally understood that she can switch back. Gema has spoke with her over the phone and has reinstated to switch back to liquid protein on 03/08/2019.    OR has been notified of the switch.

## 2019-02-26 NOTE — TELEPHONE ENCOUNTER
Patient feels a lot better than yesterday. She states that she still has a cough and is taking mucinex. Is the cough going to interfere with the surgery? Does she need to stop taking the medication? If she doesn't have the surgery on Friday, do you have any openings for the week you come back from surgery?

## 2019-03-21 ENCOUNTER — ANESTHESIA EVENT (OUTPATIENT)
Dept: SURGERY | Facility: HOSPITAL | Age: 69
DRG: 621 | End: 2019-03-21
Payer: MEDICARE

## 2019-03-21 ENCOUNTER — CLINICAL SUPPORT (OUTPATIENT)
Dept: FAMILY MEDICINE | Facility: CLINIC | Age: 69
DRG: 621 | End: 2019-03-21
Payer: MEDICARE

## 2019-03-21 DIAGNOSIS — Z01.818 PRE-OP TESTING: Primary | ICD-10-CM

## 2019-03-21 DIAGNOSIS — Z01.818 PRE-OP TESTING: ICD-10-CM

## 2019-03-21 PROCEDURE — 93010 ELECTROCARDIOGRAM REPORT: CPT | Mod: S$GLB,,, | Performed by: INTERNAL MEDICINE

## 2019-03-21 PROCEDURE — 99999 PR PBB SHADOW E&M-EST. PATIENT-LVL II: ICD-10-PCS | Mod: PBBFAC,,,

## 2019-03-21 PROCEDURE — 99999 PR PBB SHADOW E&M-EST. PATIENT-LVL II: CPT | Mod: PBBFAC,,,

## 2019-03-21 PROCEDURE — 93010 EKG 12-LEAD: ICD-10-PCS | Mod: S$GLB,,, | Performed by: INTERNAL MEDICINE

## 2019-03-21 PROCEDURE — 93005 ELECTROCARDIOGRAM TRACING: CPT | Mod: S$GLB,,, | Performed by: SURGERY

## 2019-03-21 PROCEDURE — 93005 EKG 12-LEAD: ICD-10-PCS | Mod: S$GLB,,, | Performed by: SURGERY

## 2019-03-21 NOTE — PRE ADMISSION SCREENING
Pre op instructions reviewed with patient per phone:    To confirm, Your surgeon has instructed you:  Surgery is scheduled 03/22/19at 0830.      Please report to [facility] 1st floor main lobby by 0700.   Pre admit office to call later today only if arrival time changes      INSTRUCTIONS IMPORTANT!!!  ¨ Do not eat, drink, or smoke after 12 midnight-including water. OK to brush teeth, no gum, candy or mints!    ¨ Take only these medicines with a small swallow of water-morning of surgery.  Synthroid, Protonix      ____  Do not wear makeup, including mascara.  ____  No powder, lotions or creams to surgical area.  ____  Please remove all jewelry, including piercings and leave at home.  ____  No money or valuables needed. Please leave at home.  ____  Please bring identification and insurance information to hospital.  ____  If going home the same day, arrange for a ride home. You will not be able to   drive if Anesthesia was used.  ____  Children, under 12 years old, must remain in the waiting room with an adult.  They are not allowed in patient areas.  ____  Wear loose fitting clothing. Allow for dressings, bandages.  ____  Stop Aspirin, Ibuprofen, Motrin and Aleve at least 5-7 days before surgery, unless otherwise instructed by your doctor, or the nurse.   You MAY use Tylenol/acetaminophen until day of surgery.  ____  If you take diabetic medication, do not take am of surgery unless instructed by   Doctor.  ____ Stop taking any Fish Oil supplement or any Vitamins that contain Vitamin E at least 5 days prior to surgery.          Bathing Instructions-- The night before surgery and the morning prior to coming to the hospital:   -Do not shave the surgical area.   -Shower and wash your hair and body as usual with anti-bacterial  soap and shampoo.   -Rinse your hair and body completely.   -Use one packet of hibiclens to wash the surgical site (using your hand) gently for 5 minutes.  Do not scrub you  skin too hard.   -Do not use hibiclens on your head, face, or genitals.   -Do not wash with anti-bacterial soap after you use the hibiclens.   -Rinse your body thoroughly.   -Dry with clean, soft towel.  Do not use lotion, cream, deodorant, or powders on   the surgical site.    Use antibacterial soap in place of hibiclens if your surgery is on the head, face or genitals.         Surgical Site Infection    Prevention of surgical site infections:     -Keep incisions clean and dry.   -Do not soak/submerge incisions in water until completely healed.   -Do not apply lotions, powders, creams, or deodorants to site.   -Always make sure hands are cleaned with antibacterial soap/ alcohol-based   prior to touching the surgical site.  (This includes doctors, nurses, staff, and yourself.)    Signs and symptoms:   -Redness and pain around the area where you had surgery   -Drainage of cloudy fluid from your surgical wound   -Fever over 100.4  I have read or had read and explained to me, and understand the above information.

## 2019-03-22 ENCOUNTER — HOSPITAL ENCOUNTER (INPATIENT)
Facility: HOSPITAL | Age: 69
LOS: 1 days | Discharge: HOME | DRG: 621 | End: 2019-03-23
Attending: SURGERY | Admitting: SURGERY
Payer: MEDICARE

## 2019-03-22 ENCOUNTER — ANESTHESIA (OUTPATIENT)
Dept: SURGERY | Facility: HOSPITAL | Age: 69
DRG: 621 | End: 2019-03-22
Payer: MEDICARE

## 2019-03-22 DIAGNOSIS — E66.01 SEVERE OBESITY (BMI 35.0-39.9) WITH COMORBIDITY: Chronic | ICD-10-CM

## 2019-03-22 DIAGNOSIS — E66.01 MORBID OBESITY: ICD-10-CM

## 2019-03-22 DIAGNOSIS — G47.33 OBSTRUCTIVE SLEEP APNEA: Primary | ICD-10-CM

## 2019-03-22 PROBLEM — Z98.84 S/P LAPAROSCOPIC SLEEVE GASTRECTOMY: Status: ACTIVE | Noted: 2019-03-22

## 2019-03-22 PROBLEM — I48.91 ATRIAL FIBRILLATION: Status: ACTIVE | Noted: 2019-03-22

## 2019-03-22 LAB
POCT GLUCOSE: 113 MG/DL (ref 70–110)
POCT GLUCOSE: 90 MG/DL (ref 70–110)

## 2019-03-22 PROCEDURE — 27201423 OPTIME MED/SURG SUP & DEVICES STERILE SUPPLY: Performed by: SURGERY

## 2019-03-22 PROCEDURE — 43775 LAP SLEEVE GASTRECTOMY: CPT | Mod: ,,, | Performed by: SURGERY

## 2019-03-22 PROCEDURE — 37000009 HC ANESTHESIA EA ADD 15 MINS: Performed by: SURGERY

## 2019-03-22 PROCEDURE — 37000008 HC ANESTHESIA 1ST 15 MINUTES: Performed by: SURGERY

## 2019-03-22 PROCEDURE — 36000712 HC OR TIME LEV V 1ST 15 MIN: Performed by: SURGERY

## 2019-03-22 PROCEDURE — 43775 PR LAP, GAST RESTRICT PROC, LONGITUDINAL GASTRECTOMY: ICD-10-PCS | Mod: ,,, | Performed by: SURGERY

## 2019-03-22 PROCEDURE — 25000003 PHARM REV CODE 250: Performed by: NURSE ANESTHETIST, CERTIFIED REGISTERED

## 2019-03-22 PROCEDURE — 97116 GAIT TRAINING THERAPY: CPT

## 2019-03-22 PROCEDURE — 88307 TISSUE EXAM BY PATHOLOGIST: CPT | Performed by: PATHOLOGY

## 2019-03-22 PROCEDURE — 94770 HC EXHALED C02 TEST: CPT

## 2019-03-22 PROCEDURE — 94760 N-INVAS EAR/PLS OXIMETRY 1: CPT

## 2019-03-22 PROCEDURE — C1894 INTRO/SHEATH, NON-LASER: HCPCS | Performed by: SURGERY

## 2019-03-22 PROCEDURE — 63600175 PHARM REV CODE 636 W HCPCS: Performed by: SURGERY

## 2019-03-22 PROCEDURE — 25000003 PHARM REV CODE 250: Performed by: SURGERY

## 2019-03-22 PROCEDURE — 99900031 HC PATIENT EDUCATION (STAT)

## 2019-03-22 PROCEDURE — 25000003 PHARM REV CODE 250: Performed by: ANESTHESIOLOGY

## 2019-03-22 PROCEDURE — 63600175 PHARM REV CODE 636 W HCPCS: Performed by: NURSE ANESTHETIST, CERTIFIED REGISTERED

## 2019-03-22 PROCEDURE — 94799 UNLISTED PULMONARY SVC/PX: CPT

## 2019-03-22 PROCEDURE — 36000713 HC OR TIME LEV V EA ADD 15 MIN: Performed by: SURGERY

## 2019-03-22 PROCEDURE — 88307 TISSUE SPECIMEN TO PATHOLOGY - SURGERY: ICD-10-PCS | Mod: 26,,, | Performed by: PATHOLOGY

## 2019-03-22 PROCEDURE — 63600175 PHARM REV CODE 636 W HCPCS: Performed by: PHYSICIAN ASSISTANT

## 2019-03-22 PROCEDURE — 11000001 HC ACUTE MED/SURG PRIVATE ROOM

## 2019-03-22 PROCEDURE — 63600175 PHARM REV CODE 636 W HCPCS: Performed by: ANESTHESIOLOGY

## 2019-03-22 PROCEDURE — 21400001 HC TELEMETRY ROOM

## 2019-03-22 PROCEDURE — 71000033 HC RECOVERY, INTIAL HOUR: Performed by: SURGERY

## 2019-03-22 PROCEDURE — 97161 PT EVAL LOW COMPLEX 20 MIN: CPT

## 2019-03-22 PROCEDURE — 88307 TISSUE EXAM BY PATHOLOGIST: CPT | Mod: 26,,, | Performed by: PATHOLOGY

## 2019-03-22 PROCEDURE — 99900035 HC TECH TIME PER 15 MIN (STAT)

## 2019-03-22 PROCEDURE — 63600175 PHARM REV CODE 636 W HCPCS: Performed by: NURSE PRACTITIONER

## 2019-03-22 RX ORDER — HYDRALAZINE HYDROCHLORIDE 20 MG/ML
10 INJECTION INTRAMUSCULAR; INTRAVENOUS EVERY 6 HOURS PRN
Status: DISCONTINUED | OUTPATIENT
Start: 2019-03-22 | End: 2019-03-23 | Stop reason: HOSPADM

## 2019-03-22 RX ORDER — ENOXAPARIN SODIUM 100 MG/ML
40 INJECTION SUBCUTANEOUS EVERY 24 HOURS
Status: DISCONTINUED | OUTPATIENT
Start: 2019-03-23 | End: 2019-03-23 | Stop reason: HOSPADM

## 2019-03-22 RX ORDER — NALOXONE HCL 0.4 MG/ML
0.02 VIAL (ML) INJECTION
Status: DISCONTINUED | OUTPATIENT
Start: 2019-03-22 | End: 2019-03-23 | Stop reason: HOSPADM

## 2019-03-22 RX ORDER — CHLORHEXIDINE GLUCONATE ORAL RINSE 1.2 MG/ML
10 SOLUTION DENTAL 2 TIMES DAILY
Status: DISCONTINUED | OUTPATIENT
Start: 2019-03-22 | End: 2019-03-23 | Stop reason: HOSPADM

## 2019-03-22 RX ORDER — GLUCAGON 1 MG
1 KIT INJECTION
Status: DISCONTINUED | OUTPATIENT
Start: 2019-03-22 | End: 2019-03-22

## 2019-03-22 RX ORDER — HYDROMORPHONE HYDROCHLORIDE 1 MG/ML
1 INJECTION, SOLUTION INTRAMUSCULAR; INTRAVENOUS; SUBCUTANEOUS
Status: DISCONTINUED | OUTPATIENT
Start: 2019-03-22 | End: 2019-03-23 | Stop reason: HOSPADM

## 2019-03-22 RX ORDER — ALBUTEROL SULFATE 0.83 MG/ML
2.5 SOLUTION RESPIRATORY (INHALATION) EVERY 6 HOURS PRN
Status: DISCONTINUED | OUTPATIENT
Start: 2019-03-22 | End: 2019-03-23 | Stop reason: HOSPADM

## 2019-03-22 RX ORDER — PANTOPRAZOLE SODIUM 40 MG/10ML
40 INJECTION, POWDER, LYOPHILIZED, FOR SOLUTION INTRAVENOUS
Status: DISCONTINUED | OUTPATIENT
Start: 2019-03-23 | End: 2019-03-23 | Stop reason: HOSPADM

## 2019-03-22 RX ORDER — DIPHENHYDRAMINE HYDROCHLORIDE 50 MG/ML
25 INJECTION INTRAMUSCULAR; INTRAVENOUS EVERY 4 HOURS PRN
Status: DISCONTINUED | OUTPATIENT
Start: 2019-03-22 | End: 2019-03-23 | Stop reason: HOSPADM

## 2019-03-22 RX ORDER — NALOXONE HCL 0.4 MG/ML
0.02 VIAL (ML) INJECTION
Status: DISCONTINUED | OUTPATIENT
Start: 2019-03-22 | End: 2019-03-22 | Stop reason: SDUPTHER

## 2019-03-22 RX ORDER — INSULIN ASPART 100 [IU]/ML
0-5 INJECTION, SOLUTION INTRAVENOUS; SUBCUTANEOUS EVERY 6 HOURS PRN
Status: DISCONTINUED | OUTPATIENT
Start: 2019-03-22 | End: 2019-03-22

## 2019-03-22 RX ORDER — SODIUM CHLORIDE, SODIUM LACTATE, POTASSIUM CHLORIDE, CALCIUM CHLORIDE 600; 310; 30; 20 MG/100ML; MG/100ML; MG/100ML; MG/100ML
INJECTION, SOLUTION INTRAVENOUS CONTINUOUS
Status: DISCONTINUED | OUTPATIENT
Start: 2019-03-22 | End: 2019-03-22

## 2019-03-22 RX ORDER — CEFAZOLIN SODIUM 2 G/50ML
2 SOLUTION INTRAVENOUS
Status: DISCONTINUED | OUTPATIENT
Start: 2019-03-22 | End: 2019-03-22 | Stop reason: HOSPADM

## 2019-03-22 RX ORDER — SODIUM CHLORIDE 0.9 % (FLUSH) 0.9 %
3 SYRINGE (ML) INJECTION EVERY 8 HOURS
Status: DISCONTINUED | OUTPATIENT
Start: 2019-03-22 | End: 2019-03-22 | Stop reason: HOSPADM

## 2019-03-22 RX ORDER — ONDANSETRON 2 MG/ML
INJECTION INTRAMUSCULAR; INTRAVENOUS
Status: DISCONTINUED | OUTPATIENT
Start: 2019-03-22 | End: 2019-03-22

## 2019-03-22 RX ORDER — FENTANYL CITRATE 50 UG/ML
INJECTION, SOLUTION INTRAMUSCULAR; INTRAVENOUS
Status: DISCONTINUED | OUTPATIENT
Start: 2019-03-22 | End: 2019-03-22

## 2019-03-22 RX ORDER — PROPOFOL 10 MG/ML
VIAL (ML) INTRAVENOUS
Status: DISCONTINUED | OUTPATIENT
Start: 2019-03-22 | End: 2019-03-22

## 2019-03-22 RX ORDER — ROCURONIUM BROMIDE 10 MG/ML
INJECTION, SOLUTION INTRAVENOUS
Status: DISCONTINUED | OUTPATIENT
Start: 2019-03-22 | End: 2019-03-22

## 2019-03-22 RX ORDER — LIDOCAINE HYDROCHLORIDE 10 MG/ML
1 INJECTION, SOLUTION EPIDURAL; INFILTRATION; INTRACAUDAL; PERINEURAL ONCE
Status: DISCONTINUED | OUTPATIENT
Start: 2019-03-22 | End: 2019-03-22 | Stop reason: HOSPADM

## 2019-03-22 RX ORDER — MORPHINE SULFATE 4 MG/ML
2 INJECTION, SOLUTION INTRAMUSCULAR; INTRAVENOUS EVERY 5 MIN PRN
Status: DISCONTINUED | OUTPATIENT
Start: 2019-03-22 | End: 2019-03-22 | Stop reason: HOSPADM

## 2019-03-22 RX ORDER — SODIUM CHLORIDE 0.9 % (FLUSH) 0.9 %
3 SYRINGE (ML) INJECTION
Status: DISCONTINUED | OUTPATIENT
Start: 2019-03-22 | End: 2019-03-22

## 2019-03-22 RX ORDER — METOCLOPRAMIDE HYDROCHLORIDE 5 MG/ML
10 INJECTION INTRAMUSCULAR; INTRAVENOUS EVERY 10 MIN PRN
Status: DISCONTINUED | OUTPATIENT
Start: 2019-03-22 | End: 2019-03-22 | Stop reason: HOSPADM

## 2019-03-22 RX ORDER — OXYCODONE HYDROCHLORIDE 5 MG/1
5 TABLET ORAL
Status: DISCONTINUED | OUTPATIENT
Start: 2019-03-22 | End: 2019-03-22 | Stop reason: HOSPADM

## 2019-03-22 RX ORDER — ALBUTEROL SULFATE 90 UG/1
2 AEROSOL, METERED RESPIRATORY (INHALATION) EVERY 6 HOURS PRN
Status: DISCONTINUED | OUTPATIENT
Start: 2019-03-22 | End: 2019-03-22 | Stop reason: CLARIF

## 2019-03-22 RX ORDER — HYDROMORPHONE HCL IN 0.9% NACL 6 MG/30 ML
PATIENT CONTROLLED ANALGESIA SYRINGE INTRAVENOUS CONTINUOUS
Status: DISCONTINUED | OUTPATIENT
Start: 2019-03-22 | End: 2019-03-22

## 2019-03-22 RX ORDER — LIDOCAINE HCL/PF 100 MG/5ML
SYRINGE (ML) INTRAVENOUS
Status: DISCONTINUED | OUTPATIENT
Start: 2019-03-22 | End: 2019-03-22

## 2019-03-22 RX ORDER — MEPERIDINE HYDROCHLORIDE 50 MG/ML
12.5 INJECTION INTRAMUSCULAR; INTRAVENOUS; SUBCUTANEOUS ONCE AS NEEDED
Status: COMPLETED | OUTPATIENT
Start: 2019-03-22 | End: 2019-03-22

## 2019-03-22 RX ORDER — HEPARIN SODIUM 5000 [USP'U]/ML
5000 INJECTION, SOLUTION INTRAVENOUS; SUBCUTANEOUS ONCE
Status: COMPLETED | OUTPATIENT
Start: 2019-03-22 | End: 2019-03-22

## 2019-03-22 RX ORDER — METOPROLOL TARTRATE 1 MG/ML
5 INJECTION, SOLUTION INTRAVENOUS EVERY 6 HOURS
Status: DISCONTINUED | OUTPATIENT
Start: 2019-03-22 | End: 2019-03-23 | Stop reason: HOSPADM

## 2019-03-22 RX ORDER — ONDANSETRON 2 MG/ML
4 INJECTION INTRAMUSCULAR; INTRAVENOUS EVERY 8 HOURS PRN
Status: DISCONTINUED | OUTPATIENT
Start: 2019-03-22 | End: 2019-03-23 | Stop reason: HOSPADM

## 2019-03-22 RX ORDER — HYDROMORPHONE HYDROCHLORIDE 1 MG/ML
0.5 INJECTION, SOLUTION INTRAMUSCULAR; INTRAVENOUS; SUBCUTANEOUS
Status: DISCONTINUED | OUTPATIENT
Start: 2019-03-22 | End: 2019-03-23 | Stop reason: HOSPADM

## 2019-03-22 RX ORDER — ACETAMINOPHEN 500 MG
1000 TABLET ORAL
Status: COMPLETED | OUTPATIENT
Start: 2019-03-22 | End: 2019-03-22

## 2019-03-22 RX ORDER — MIDAZOLAM HYDROCHLORIDE 1 MG/ML
INJECTION, SOLUTION INTRAMUSCULAR; INTRAVENOUS
Status: DISCONTINUED | OUTPATIENT
Start: 2019-03-22 | End: 2019-03-22

## 2019-03-22 RX ORDER — BUPIVACAINE HYDROCHLORIDE 2.5 MG/ML
INJECTION, SOLUTION EPIDURAL; INFILTRATION; INTRACAUDAL
Status: DISCONTINUED | OUTPATIENT
Start: 2019-03-22 | End: 2019-03-22 | Stop reason: HOSPADM

## 2019-03-22 RX ORDER — SODIUM CHLORIDE, SODIUM LACTATE, POTASSIUM CHLORIDE, CALCIUM CHLORIDE 600; 310; 30; 20 MG/100ML; MG/100ML; MG/100ML; MG/100ML
INJECTION, SOLUTION INTRAVENOUS CONTINUOUS
Status: DISCONTINUED | OUTPATIENT
Start: 2019-03-22 | End: 2019-03-23 | Stop reason: HOSPADM

## 2019-03-22 RX ORDER — HYDROMORPHONE HYDROCHLORIDE 1 MG/ML
1 INJECTION, SOLUTION INTRAMUSCULAR; INTRAVENOUS; SUBCUTANEOUS ONCE
Status: COMPLETED | OUTPATIENT
Start: 2019-03-22 | End: 2019-03-22

## 2019-03-22 RX ORDER — LIDOCAINE HYDROCHLORIDE 10 MG/ML
INJECTION, SOLUTION EPIDURAL; INFILTRATION; INTRACAUDAL; PERINEURAL
Status: DISCONTINUED | OUTPATIENT
Start: 2019-03-22 | End: 2019-03-22 | Stop reason: HOSPADM

## 2019-03-22 RX ADMIN — MEPERIDINE HYDROCHLORIDE 12.5 MG: 50 INJECTION, SOLUTION INTRAMUSCULAR; INTRAVENOUS; SUBCUTANEOUS at 11:03

## 2019-03-22 RX ADMIN — METOPROLOL TARTRATE 5 MG: 5 INJECTION INTRAVENOUS at 11:03

## 2019-03-22 RX ADMIN — HYDROMORPHONE HYDROCHLORIDE 1 MG: 1 INJECTION, SOLUTION INTRAMUSCULAR; INTRAVENOUS; SUBCUTANEOUS at 04:03

## 2019-03-22 RX ADMIN — MORPHINE SULFATE 2 MG: 4 INJECTION INTRAVENOUS at 11:03

## 2019-03-22 RX ADMIN — PROMETHAZINE HYDROCHLORIDE 6.25 MG: 25 INJECTION INTRAMUSCULAR; INTRAVENOUS at 08:03

## 2019-03-22 RX ADMIN — ROCURONIUM BROMIDE 50 MG: 10 INJECTION, SOLUTION INTRAVENOUS at 09:03

## 2019-03-22 RX ADMIN — METOPROLOL TARTRATE 5 MG: 5 INJECTION INTRAVENOUS at 06:03

## 2019-03-22 RX ADMIN — FENTANYL CITRATE 100 MCG: 50 INJECTION, SOLUTION INTRAMUSCULAR; INTRAVENOUS at 09:03

## 2019-03-22 RX ADMIN — PROPOFOL 150 MG: 10 INJECTION, EMULSION INTRAVENOUS at 09:03

## 2019-03-22 RX ADMIN — ONDANSETRON 4 MG: 2 INJECTION INTRAMUSCULAR; INTRAVENOUS at 04:03

## 2019-03-22 RX ADMIN — ONDANSETRON 4 MG: 2 INJECTION, SOLUTION INTRAMUSCULAR; INTRAVENOUS at 10:03

## 2019-03-22 RX ADMIN — ROCURONIUM BROMIDE 20 MG: 10 INJECTION, SOLUTION INTRAVENOUS at 09:03

## 2019-03-22 RX ADMIN — ACETAMINOPHEN 1000 MG: 500 TABLET ORAL at 07:03

## 2019-03-22 RX ADMIN — SODIUM CHLORIDE, SODIUM LACTATE, POTASSIUM CHLORIDE, AND CALCIUM CHLORIDE: 600; 310; 30; 20 INJECTION, SOLUTION INTRAVENOUS at 09:03

## 2019-03-22 RX ADMIN — CEFAZOLIN SODIUM 2 G: 2 SOLUTION INTRAVENOUS at 09:03

## 2019-03-22 RX ADMIN — LIDOCAINE HYDROCHLORIDE 80 MG: 20 INJECTION, SOLUTION INTRAVENOUS at 09:03

## 2019-03-22 RX ADMIN — MIDAZOLAM 2 MG: 1 INJECTION INTRAMUSCULAR; INTRAVENOUS at 09:03

## 2019-03-22 RX ADMIN — HEPARIN SODIUM 5000 UNITS: 5000 INJECTION, SOLUTION INTRAVENOUS; SUBCUTANEOUS at 08:03

## 2019-03-22 RX ADMIN — SODIUM CHLORIDE, SODIUM LACTATE, POTASSIUM CHLORIDE, AND CALCIUM CHLORIDE: .6; .31; .03; .02 INJECTION, SOLUTION INTRAVENOUS at 12:03

## 2019-03-22 RX ADMIN — CHLORHEXIDINE GLUCONATE 0.12% ORAL RINSE 10 ML: 1.2 LIQUID ORAL at 08:03

## 2019-03-22 NOTE — ASSESSMENT & PLAN NOTE
Metoprolol IV q6 hours   Currently NSR   Heart rate controlled   Lovenox   Will resume po meds when appropriate     Age 1 - 65-75 years old   CHF History 0 - no   HTN History 1 - yes   Stroke/TIA/Thromboembolism History 0 - no   Vascular Disease History 0 - no   Diabetes Mellitus in history 0 - no   Female 1 - yes   [id number] VASc Scale Total Score 3     Will discuss long term anticoagulation for CVA prophylaxis.

## 2019-03-22 NOTE — PLAN OF CARE
Pt resting on stretcher s/p farida gastrectomy sleeve performed under general anesthesia by Dr. Armstrong. Respirations even and unlabored on 98% face tent with O2 sats of 100%/. Abd sites remain intact with edges approximated. VSS. Will cont to monitor. See flow sheet for detailed assessment.

## 2019-03-22 NOTE — SUBJECTIVE & OBJECTIVE
Past Medical History:   Diagnosis Date    Arthritis     Atrial fibrillation     Diverticulosis     Fatty liver     General anesthetics causing adverse effect in therapeutic use     mild a-fib during hysteretomy, not on anti-coag    Hypertension     Liver disease     FLORES (nonalcoholic steatohepatitis)     Nightmares     JOSE DE JESUS on CPAP     Thyroid disease        Past Surgical History:   Procedure Laterality Date    BREAST BIOPSY      breast tumor       SECTION      x 2    CHOLECYSTECTOMY      COLONOSCOPY  2014    Dr. Richey, repeat in 5 years    COLONOSCOPY N/A 2014    Performed by [facility] at [facility] ENDO    ESOPHAGOGASTRODUODENOSCOPY (EGD) N/A 2018    [facility] by Efrain Steel [facility] at [facility]    ESOPHAGOGASTRODUODENOSCOPY (EGD) N/A 2015    Performed by Chinedu Padron MD at [facility] ENDO    HYSTERECTOMY      TOTAL REDUCTION MAMMOPLASTY Bilateral     UPPER GASTROINTESTINAL ENDOSCOPY  2015    Dr. Padron       Review of patient's allergies indicates:  No Known Allergies    No current facility-administered medications on file prior to encounter.      Current Outpatient Medications on File Prior to Encounter   Medication Sig    albuterol 90 mcg/actuation inhaler Inhale 2 puffs into the lungs every 6 (six) hours as needed for Wheezing or Shortness of Breath. Rescue    coenzyme Q10 (CO Q-10) 100 mg capsule Take 200 mg by mouth once daily.    cyanocobalamin (VITAMIN B-12) 500 MCG tablet Take 500 mcg by mouth once daily.    ergocalciferol, vitamin D2, (VITAMIN D ORAL) Take 5,000 mg by mouth once daily.    glucosamine/chondr vance A sod (GLUCOSAMINE-CHONDROITIN) 1,500-1,200 mg/30 mL Liqd Take by mouth.    hydroCHLOROthiazide (HYDRODIURIL) 25 MG tablet TAKE 1 TABLET DAILY    levothyroxine (SYNTHROID) 50 MCG tablet TAKE 1 TABLET DAILY    metoprolol tartrate (LOPRESSOR) 25 MG tablet TAKE 1 TABLET DAILY (Patient taking differently: TAKE 1 TABLET NIGHTLY)     multivitamin-minerals-lutein Tab Take 1 tablet by mouth once daily.     Family History     Problem Relation (Age of Onset)    COPD Paternal Aunt    Colon cancer Paternal Aunt    Hypertension Father        Tobacco Use    Smoking status: Former Smoker     Packs/day: 0.25     Years: 13.00     Pack years: 3.25     Types: Cigarettes     Start date:      Last attempt to quit:      Years since quittin.2    Smokeless tobacco: Never Used   Substance and Sexual Activity    Alcohol use: Yes     Alcohol/week: 0.6 oz     Types: 1 Glasses of wine per week     Comment: socially  No alcohol 72h prior to sx    Drug use: No    Sexual activity: Yes     Partners: Male     Review of Systems   Constitutional: Negative.  Negative for activity change, appetite change, chills, fatigue and fever.   HENT: Negative.    Eyes: Negative.    Respiratory: Negative.  Negative for cough, chest tightness and shortness of breath.    Cardiovascular: Negative.  Negative for chest pain, palpitations and leg swelling.   Gastrointestinal: Negative.    Endocrine: Negative.    Genitourinary: Negative.  Negative for dysuria, flank pain, frequency and urgency.   Musculoskeletal: Positive for arthralgias.   Skin: Negative.    Allergic/Immunologic: Negative.    Neurological: Negative.  Negative for dizziness, weakness and light-headedness.   Hematological: Negative.    Psychiatric/Behavioral: Negative.      Objective:     Vital Signs (Most Recent):  Temp: 97.4 °F (36.3 °C) (19 1206)  Pulse: 70 (19 1206)  Resp: 15 (19 1206)  BP: (!) 144/65 (19 1206)  SpO2: 96 % (19 1206) Vital Signs (24h Range):  Temp:  [97.4 °F (36.3 °C)-97.9 °F (36.6 °C)] 97.4 °F (36.3 °C)  Pulse:  [57-80] 70  Resp:  [12-21] 15  SpO2:  [96 %-100 %] 96 %  BP: (109-168)/(51-74) 144/65     Weight: 100.6 kg (221 lb 12.5 oz)  Body mass index is 34.74 kg/m².    Physical Exam   Constitutional: She is oriented to person, place, and time. She appears  well-developed and well-nourished.   HENT:   Head: Normocephalic and atraumatic.   Eyes: Conjunctivae and EOM are normal. Pupils are equal, round, and reactive to light.   Neck: Normal range of motion. Neck supple.   Cardiovascular: Normal rate, regular rhythm, normal heart sounds and intact distal pulses.   Pulmonary/Chest: Effort normal and breath sounds normal.   Abdominal: Soft. Bowel sounds are normal. There is tenderness (incisional ).   X 5 surgical sites well approximate   Musculoskeletal: Normal range of motion.   Neurological: She is alert and oriented to person, place, and time. She has normal reflexes.   Skin: Skin is warm and dry.   Psychiatric: She has a normal mood and affect. Her behavior is normal. Judgment and thought content normal.   Nursing note and vitals reviewed.      Significant Labs:   BMP:   Recent Labs   Lab 03/21/19  1107         K 3.4*      CO2 30*   BUN 21   CREATININE 0.8   CALCIUM 10.2     CBC:   Recent Labs   Lab 03/21/19  1107   WBC 8.04   HGB 12.6   HCT 37.9        CMP:   Recent Labs   Lab 03/21/19  1107      K 3.4*      CO2 30*      BUN 21   CREATININE 0.8   CALCIUM 10.2   PROT 7.9   ALBUMIN 3.7   BILITOT 0.5   ALKPHOS 49*   AST 20   ALT 17   ANIONGAP 7*   EGFRNONAA >60.0     Cardiac Markers: No results for input(s): CKMB, MYOGLOBIN, BNP, TROPISTAT in the last 48 hours.  All pertinent labs within the past 24 hours have been reviewed.    Significant Imaging:

## 2019-03-22 NOTE — TRANSFER OF CARE
"Anesthesia Transfer of Care Note    Patient: Zora Davis    Procedure(s) Performed: Procedure(s) (LRB):  XI ROBOTIC SLEEVE GASTRECTOMY (N/A)    Patient location: PACU    Anesthesia Type: general    Transport from OR: Transported from OR on room air with adequate spontaneous ventilation    Post pain: adequate analgesia    Post assessment: no apparent anesthetic complications    Post vital signs: stable    Level of consciousness: awake    Nausea/Vomiting: no nausea/vomiting    Complications: none    Transfer of care protocol was followed      Last vitals:   Visit Vitals  BP (!) 168/74 (BP Location: Right arm, Patient Position: Sitting)   Pulse 72   Temp 36.6 °C (97.9 °F) (Tympanic)   Resp 20   Ht 5' 7" (1.702 m)   Wt 100.6 kg (221 lb 12.5 oz)   SpO2 99%   Breastfeeding? No   BMI 34.74 kg/m²     "

## 2019-03-22 NOTE — OP NOTE
[facility]  Surgery Department  Operative Note    SUMMARY     Date of Procedure: 3/22/2019     Procedure: Procedure(s) (LRB):  XI ROBOTIC SLEEVE GASTRECTOMY (N/A)   Lysis of adhesions    Surgeon(s) and Role:     * Cole Armstrong MD - Primary    Assisting Surgeon: None    Pre-Operative Diagnosis: Morbid obesity [E66.01] BMI 37  Hypertension  obstructive sleep apnea  back pain/joint pain  Camilo/fatty liver    Post-Operative Diagnosis: Post-Op Diagnosis Codes:     * Morbid obesity [E66.01]  BMI 37  Hypertension  obstructive sleep apnea  back pain/joint pain  Camilo/fatty liver    Anesthesia: General    Technical Procedures Used:  Robotic sleeve gastrectomy  Lysis of adhesions    Description of the Findings of the Procedure:  Lysis of the omental adhesions to abdominal wall,  Gastric sleeve    Complications: No    Estimated Blood Loss (EBL): * No values recorded between 3/22/2019  9:29 AM and 3/22/2019 10:59 AM *           Implants: * No implants in log *    Specimens:   Specimen (12h ago, onward)    Start     Ordered    03/22/19 1042  Specimen to Pathology - Surgery  Once     Comments:  1.) Greater Curvature of the StomachDX: Morbid Obesity     Start Status   03/22/19 1042 Collected (03/22/19 1042)       03/22/19 1042                  Condition: Good    Disposition: PACU - hemodynamically stable.    Procedure in detail:  The patient was placed under general anesthesia. Pre-op antibiotics were given within an hour before the incision and SCD's were placed for DVT prophylaxis. The abdomen was prepped and draped in sterile fashion. General anesthesia was applied with lidocaine.     An incision was made to the right of the umbilicus. The fascia was elevated and the Veress needle was inserted. Proper position was confirmed by aspiration and saline meniscus test. The abdomen was insufflated with carbon dioxide to a pressure of 15 mmHg. The patient tolerated insufflation well.  An 8-mm trocar was then inserted  under direct vision using the optiview technique. 8 mm trocars were also placed in the left mid abdomen x 2. A 5mm trocar was placed in the right subcostal area, and a 12 mm trocar was placed in the right mid abdomen.   A liver retractor was placed through the 5 mm port. Patient was noted to have multiple omental adhesions to the abdominal wall. These were lysed with the vessel sealer to allow use of left lateral abdominal ports of the robot.      The short gastric vessels were divided with the vessel sealer from 5 cm proximal to the pylorus to the GE junction. A 38-Mongolian bougie was then placed orally and positioned against the lesser curvature. A vertical sleeve gastrectomy was then performed. Starting 5 cm proximal to the pylorus, the stomach was stapled, keeping the staple line several centimeters from the lesser curve when inferior to the angularis. Superior to the angularis, the staple line was placed against the bougie. The final staple firing was 1 cm lateral to the bougie in order to avoid stapling esophageal tissue. Two green loads were used on the inferior aspect of the staple line, and the rest were blue loads.  There was good hemostasis.     The superior aspect of the staple line was reinforced using strata fix absorbable suture.  The greater curve of the stomach was removed.   The liver retractor and trocars were removed.   The 12 mm trocar site was closed with 0 vicryl.  Skin incisions were closed with 4-0 Monocryl and acrylate adhesive.  The patient tolerated the procedure in a stable and satisfactory condition and was transferred to recovery postoperatively.

## 2019-03-22 NOTE — HPI
Zora Davis is a 70 y/o female with PMHx of HTN, atrial fibrillation, liver disease, JOSE DE JESUS, and thyroid disease who underwent a Robotic sleeve gastrectomy and Lysis of adhesions by Dr. Armstrong. Hospital medicine was consulted to assist with medical management. Presently, she is awake reports some abdominal pain, denies nausea. VSS. Hospital Medicine was consulted to assist with medical management.

## 2019-03-22 NOTE — ASSESSMENT & PLAN NOTE
Managed per primary team   S/p sleeve gastrectomy today by Dr. Armstrong   Pain controlled   Pt. NPO   Continue IVFs

## 2019-03-22 NOTE — PT/OT/SLP EVAL
Physical Therapy Evaluation and Discharge Note    Patient Name:  Zora Davis   MRN:  [id number]    Recommendations:     Discharge Recommendations:  home   Discharge Equipment Recommendations: none   Barriers to discharge: None    Assessment:     Zora Davis is a 69 y.o. female admitted with a medical diagnosis of S/P laparoscopic sleeve gastrectomy. .  At this time, patient is functioning at their prior level of function and does not require further acute PT services.     Recent Surgery: Procedure(s) (LRB):  XI ROBOTIC SLEEVE GASTRECTOMY (N/A) Day of Surgery    Plan:     During this hospitalization, patient does not require further acute PT services.  Please re-consult if situation changes.      Subjective     Chief Complaint: GAS PAIN  Patient/Family Comments/goals:   Pain/Comfort:  · Pain Rating 1: 5/10  · Location 1: abdomen    Patients cultural, spiritual, Restorationism conflicts given the current situation:      Living Environment:  PT LIVES WITH  WHO IS ABLE TO ASSIST AS NEEDED, 1 STORY HOUSE NO STEPS, PT WAS AN INDEP COMMUNITY AMBULATOR PTA, STILL DRIVES, WORKS PART TIME, INDEP WITH ADL'S  Prior to admission, patients level of function was INDEP.  Equipment used at home: none.  DME owned (not currently used): none.  Upon discharge, patient will have assistance from .    Objective:     Communicated with NURSE SANDOVAL prior to session.  Patient found supine with peripheral IV upon PT entry to room.    General Precautions: Standard  Orthopedic Precautions:N/A   Braces: N/A     Exams:  · Cognitive Exam:  Patient is oriented to Person, Place, Time and Situation  · Postural Exam:  Patient presented with the following abnormalities:    · -       No postural abnormalities identified  · Sensation:    · -       Intact  · RLE ROM: WNL  · RLE Strength: WNL  · LLE ROM: WNL  · LLE Strength: WNL    Functional Mobility:  · Bed Mobility:     · Rolling Left:  supervision  · Scooting:  supervision  · Supine to Sit: supervision  · Transfers:     · Sit to Stand:  supervision with no AD  · Bed to Chair: supervision with  no AD  using  Step Transfer  · Gait: PT ' NO AD WITH SPV, NO LOB OR SOB ON ROOM AIR  · Balance: GOOD    AM-PAC 6 CLICK MOBILITY  Total Score:21     Therapeutic Activities and Exercises:   PT EDUCATED IN ROLE OF P.T. , PT EDUCATED IN LOG ROLLING FOR BED MOBILITY, PT EDUCATED IN BLE THEREX TO PERFORM WHILE SEATED IN CHAIR, PT ENCOURAGED TO INCREASE TIME OOB IN CHAIR, PT AGREEABLE, PT AGREEABLE TO AMB IN HALLWAYS WITH  2-3 TIMES A DAY    AM-PAC 6 CLICK MOBILITY  Total Score:21     Patient left up in chair with all lines intact, call button in reach and NURSE notified.    GOALS:   Multidisciplinary Problems     Physical Therapy Goals     Not on file                History:     Past Medical History:   Diagnosis Date    Arthritis     Atrial fibrillation     Diverticulosis     Fatty liver     General anesthetics causing adverse effect in therapeutic use     mild a-fib during hysteretomy, not on anti-coag    Hypertension     Liver disease     FLORES (nonalcoholic steatohepatitis)     Nightmares     JOSE DE JESUS on CPAP     Thyroid disease        Past Surgical History:   Procedure Laterality Date    BREAST BIOPSY      breast tumor       SECTION      x 2    CHOLECYSTECTOMY      COLONOSCOPY  2014    Dr. Richey, repeat in 5 years    COLONOSCOPY N/A 2014    [facility] ENDO    ESOPHAGOGASTRODUODENOSCOPY (EGD) N/A 2018    [facility] Efrain Steel [facility]    ESOPHAGOGASTRODUODENOSCOPY (EGD) N/A 2015    [facility]formed by [facility]u [facility]ron [facility] at [facility] ENDO    HYSTERECTOMY      TOTAL REDUCTION MAMMOPLASTY Bilateral     UPPER GASTROINTESTINAL ENDOSCOPY  2015    Dr. Padron       Time Tracking:     PT Received On: 19  PT Start Time: 1245     PT Stop Time: 1310  PT Total Time (min): 25 min     Billable  Minutes: Evaluation 15 and Gait Training 10    Latricia Hanson, PT  03/22/2019

## 2019-03-22 NOTE — ANESTHESIA PREPROCEDURE EVALUATION
03/22/2019  Zora Davis is a 69 y.o., female.    Anesthesia Evaluation    I have reviewed the Patient Summary Reports.    I have reviewed the Nursing Notes.   I have reviewed the Medications.     Review of Systems  Anesthesia Hx:  Hx of Anesthetic complications  Denies Family Hx of Anesthesia complications.   Denies Personal Hx of Anesthesia complications.   Social:  Non-Smoker    Cardiovascular:   Hypertension, well controlled Dysrhythmias atrial fibrillation ECG has been reviewed.   Normal sinus rhythm  Incomplete right bundle branch block  Nonspecific ST and/or T wave abnormalities  Abnormal ECG    Pt was told once that she had a fib but unsure of that.    Pulmonary:   Sleep Apnea, CPAP    Hepatic/GI:   Liver Disease, Hepatitis    Psych:   Psychiatric History          Physical Exam  General:  Obesity    Airway/Jaw/Neck:  Airway Findings: Mouth Opening: Normal Tongue: Normal  Mallampati: II      Dental:  DENTAL FINDINGS: Normal   Chest/Lungs:  Chest/Lungs Findings: Normal Respiratory Rate     Heart/Vascular:  Heart Findings: Normal            Anesthesia Plan  Type of Anesthesia, risks & benefits discussed:  Anesthesia Type:  general  Patient's Preference:   Intra-op Monitoring Plan: standard ASA monitors  Intra-op Monitoring Plan Comments:   Post Op Pain Control Plan: multimodal analgesia  Post Op Pain Control Plan Comments:   Induction:   IV  Beta Blocker:  Patient is on a Beta-Blocker and has received one dose within the past 24 hours (No further documentation required).       Informed Consent: Patient understands risks and agrees with Anesthesia plan.  Questions answered. Anesthesia consent signed with patient.  ASA Score: 3     Day of Surgery Review of History & Physical: I have interviewed and examined the patient. I have reviewed the patient's H&P dated:  There are no significant changes.           Ready For Surgery From Anesthesia Perspective.

## 2019-03-22 NOTE — PLAN OF CARE
Problem: Adult Inpatient Plan of Care  Goal: Plan of Care Review  Outcome: Ongoing (interventions implemented as appropriate)  Received from PACU, tolerating well at this time. No complaints of pain. IV fluids per order. Incisions clean,dry, and intact. NPO. Will continue to monitor. 12 hour chart check.

## 2019-03-22 NOTE — PROGRESS NOTES
[facility] Hospital Medicine  Progress Note    Patient Name: Zora Davis  MRN: [id number]  Patient Class: IP- Inpatient   Admission Date: 3/22/2019  Length of Stay: 0 days  Attending Physician: Cole Armstrong MD  Primary Care Provider: Bossman Muro MD        Subjective:     Principal Problem:S/P laparoscopic sleeve gastrectomy    HPI:  Zora Davis is a 70 y/o female with PMHx of HTN, atrial fibrillation, liver disease, JOSE DE JESUS, and thyroid disease who underwent a Robotic sleeve gastrectomy and Lysis of adhesions by Dr. Armstrong. Hospital medicine was consulted to assist with medical management. Presently, she is awake reports some abdominal pain, denies nausea. VSS. Hospital Medicine was consulted to assist with medical management.             Hospital Course:  No notes on file    Past Medical History:   Diagnosis Date    Arthritis     Atrial fibrillation     Diverticulosis     Fatty liver     General anesthetics causing adverse effect in therapeutic use     mild a-fib during hysteretomy, not on anti-coag    Hypertension     Liver disease     FLORES (nonalcoholic steatohepatitis)     Nightmares     JOSE DE JESUS on CPAP     Thyroid disease        Past Surgical History:   Procedure Laterality Date    BREAST BIOPSY      breast tumor       SECTION      x 2    CHOLECYSTECTOMY      COLONOSCOPY  2014    Dr. Richey, repeat in 5 years    COLONOSCOPY N/A 2014    Performed [facility] ENDO    ESOPHAGOGASTRODUODENOSCOPY (EGD) N/A 2018    Performed by Efrain Steel [facility] at [facility] ENDO    ESOPHAGOGASTRODUODENOSCOPY (EGD) N/A 2015    Performed by Chinedu Padron MD at [facility] END[facility]    HYSTERECTOMY      TOTAL REDUCTION MAMMOPLASTY Bilateral     UPPER GASTROINTESTINAL ENDOSCOPY  2015    Dr. Padron       Review of patient's allergies indicates:  No Known Allergies    No current facility-administered medications on file prior to encounter.      Current  Outpatient Medications on File Prior to Encounter   Medication Sig    albuterol 90 mcg/actuation inhaler Inhale 2 puffs into the lungs every 6 (six) hours as needed for Wheezing or Shortness of Breath. Rescue    coenzyme Q10 (CO Q-10) 100 mg capsule Take 200 mg by mouth once daily.    cyanocobalamin (VITAMIN B-12) 500 MCG tablet Take 500 mcg by mouth once daily.    ergocalciferol, vitamin D2, (VITAMIN D ORAL) Take 5,000 mg by mouth once daily.    glucosamine/chondr vance A sod (GLUCOSAMINE-CHONDROITIN) 1,500-1,200 mg/30 mL Liqd Take by mouth.    hydroCHLOROthiazide (HYDRODIURIL) 25 MG tablet TAKE 1 TABLET DAILY    levothyroxine (SYNTHROID) 50 MCG tablet TAKE 1 TABLET DAILY    metoprolol tartrate (LOPRESSOR) 25 MG tablet TAKE 1 TABLET DAILY (Patient taking differently: TAKE 1 TABLET NIGHTLY)    multivitamin-minerals-lutein Tab Take 1 tablet by mouth once daily.     Family History     Problem Relation (Age of Onset)    COPD Paternal Aunt    Colon cancer Paternal Aunt    Hypertension Father        Tobacco Use    Smoking status: Former Smoker     Packs/day: 0.25     Years: 13.00     Pack years: 3.25     Types: Cigarettes     Start date:      Last attempt to quit:      Years since quittin.2    Smokeless tobacco: Never Used   Substance and Sexual Activity    Alcohol use: Yes     Alcohol/week: 0.6 oz     Types: 1 Glasses of wine per week     Comment: socially  No alcohol 72h prior to sx    Drug use: No    Sexual activity: Yes     Partners: Male     Review of Systems   Constitutional: Negative.  Negative for activity change, appetite change, chills, fatigue and fever.   HENT: Negative.    Eyes: Negative.    Respiratory: Negative.  Negative for cough, chest tightness and shortness of breath.    Cardiovascular: Negative.  Negative for chest pain, palpitations and leg swelling.   Gastrointestinal: Negative.    Endocrine: Negative.    Genitourinary: Negative.  Negative for dysuria, flank pain, frequency  and urgency.   Musculoskeletal: Positive for arthralgias.   Skin: Negative.    Allergic/Immunologic: Negative.    Neurological: Negative.  Negative for dizziness, weakness and light-headedness.   Hematological: Negative.    Psychiatric/Behavioral: Negative.      Objective:     Vital Signs (Most Recent):  Temp: 97.4 °F (36.3 °C) (03/22/19 1206)  Pulse: 70 (03/22/19 1206)  Resp: 15 (03/22/19 1206)  BP: (!) 144/65 (03/22/19 1206)  SpO2: 96 % (03/22/19 1206) Vital Signs (24h Range):  Temp:  [97.4 °F (36.3 °C)-97.9 °F (36.6 °C)] 97.4 °F (36.3 °C)  Pulse:  [57-80] 70  Resp:  [12-21] 15  SpO2:  [96 %-100 %] 96 %  BP: (109-168)/(51-74) 144/65     Weight: 100.6 kg (221 lb 12.5 oz)  Body mass index is 34.74 kg/m².    Physical Exam   Constitutional: She is oriented to person, place, and time. She appears well-developed and well-nourished.   HENT:   Head: Normocephalic and atraumatic.   Eyes: Conjunctivae and EOM are normal. Pupils are equal, round, and reactive to light.   Neck: Normal range of motion. Neck supple.   Cardiovascular: Normal rate, regular rhythm, normal heart sounds and intact distal pulses.   Pulmonary/Chest: Effort normal and breath sounds normal.   Abdominal: Soft. Bowel sounds are normal. There is tenderness (incisional ).   X 5 surgical sites well approximate   Musculoskeletal: Normal range of motion.   Neurological: She is alert and oriented to person, place, and time. She has normal reflexes.   Skin: Skin is warm and dry.   Psychiatric: She has a normal mood and affect. Her behavior is normal. Judgment and thought content normal.   Nursing note and vitals reviewed.      Significant Labs:   BMP:   Recent Labs   Lab 03/21/19  1107         K 3.4*      CO2 30*   BUN 21   CREATININE 0.8   CALCIUM 10.2     CBC:   Recent Labs   Lab 03/21/19  1107   WBC 8.04   HGB 12.6   HCT 37.9        CMP:   Recent Labs   Lab 03/21/19  1107      K 3.4*      CO2 30*      BUN 21    CREATININE 0.8   CALCIUM 10.2   PROT 7.9   ALBUMIN 3.7   BILITOT 0.5   ALKPHOS 49*   AST 20   ALT 17   ANIONGAP 7*   EGFRNONAA >60.0     Cardiac Markers: No results for input(s): CKMB, MYOGLOBIN, BNP, TROPISTAT in the last 48 hours.  All pertinent labs within the past 24 hours have been reviewed.    Significant Imaging:       Assessment/Plan:      * S/P laparoscopic sleeve gastrectomy    Managed per primary team   S/p sleeve gastrectomy today by Dr. Armstrong   Pain controlled   Pt. NPO   Continue IVFs        Atrial fibrillation    Metoprolol IV q6 hours   Currently NSR   Heart rate controlled   Lovenox   Will resume po meds when appropriate     Age 1 - 65-75 years old   CHF History 0 - no   HTN History 1 - yes   Stroke/TIA/Thromboembolism History 0 - no   Vascular Disease History 0 - no   Diabetes Mellitus in history 0 - no   Female 1 - yes   [id number] VASc Scale Total Score 3     Will discuss long term anticoagulation for CVA prophylaxis.        Morbid obesity    S/p sleeve gastrectomy   See plan above        Obstructive sleep apnea    CPAP qhs        Hypertension    Bp stable   Hydralazine IV prn   Monitor            VTE Risk Mitigation (From admission, onward)        Ordered     enoxaparin injection 40 mg  Daily      03/22/19 1059     IP VTE HIGH RISK PATIENT  Once      03/22/19 1059     Place sequential compression device  Until discontinued      03/22/19 1059              Helena Brito NP  Department of Hospital Medicine   [facility]

## 2019-03-22 NOTE — H&P
BARIATRIC NEW PATIENT EVALUATION     CHIEF COMPLAINT:   Morbid obesity with a BMI of 37.39 and inability to maintain weight loss.     HISTORY OF PRESENT ILLNESS:  Presents for gastric sleeve.  She recently had the flu which delayed her surgery but is now recovered from it. Otherwise she is doing well and ready to proceed with surgery.    Zora Davis is a 68 y.o.-year-old female presenting for morbid obesity with a BMI of 37.39 and inability to maintain weight loss. The patient has tried diet and exercise. She is able to lose 20-30lbs but is unable to keep the weight off. She recently joined a gym and is increasing her activity.     HPI     CO-MORBIDITIES:  hypertension, obstructive sleep apnea and back pain/joint pain     PAST MEDICAL HISTORY:       Past Medical History:   Diagnosis Date    Atrial fibrillation      Diverticulosis      Fatty liver      Hypertension      Liver disease      FLORES (nonalcoholic steatohepatitis)      Nightmares      Thyroid disease           PAST SURGICAL HISTORY:        Past Surgical History:   Procedure Laterality Date    BREAST BIOPSY        breast tumor         SECTION        CHOLECYSTECTOMY        COLONOSCOPY   2014     Dr. Richey, repeat in 5 years    COLONOSCOPY N/A 2014     Performed by [facility] at [facility] ENDO    ESOPHAGOGASTRODUODENOSCOPY N/A 2018     Procedure: ESOPHAGOGASTRODUODENOSCOPY (EGD);  Surgeon: Efrain Steel MD;  [facility];  Service: Endoscopy;  Laterality: N/A;    ESOPHAGOGASTRODUODENOSCOPY (EGD) N/A 2018     Performed [facility] Efrain Steel MD at [facility] END[facility]    ESOPHAGOGASTRODUODENOSCOPY (EGD) N/A 2015     Performed by Chinedu Padron MD at [facility] ENDO    HYSTERECTOMY        TOTAL REDUCTION MAMMOPLASTY        UPPER GASTROINTESTINAL ENDOSCOPY   2015     Dr. Padron         FAMILY HISTORY:        Family History   Problem Relation Age of Onset    Colon cancer Paternal Aunt      COPD  Paternal Aunt           colon    Hypertension Father      Crohn's disease Neg Hx      Stomach cancer Neg Hx      Ulcerative colitis Neg Hx      Esophageal cancer Neg Hx           SOCIAL HISTORY:   reports that she quit smoking about 40 years ago. Her smoking use included cigarettes. She started smoking about 53 years ago. She has a 3.25 pack-year smoking history. she has never used smokeless tobacco. She reports that she drinks about 0.6 oz of alcohol per week. She reports that she does not use drugs.      MEDICATIONS:         Current Outpatient Medications on File Prior to Visit   Medication Sig Dispense Refill    albuterol 90 mcg/actuation inhaler Inhale 2 puffs into the lungs every 6 (six) hours as needed for Wheezing or Shortness of Breath. Rescue 18 g 1    coenzyme Q10 (CO Q-10) 100 mg capsule Take 200 mg by mouth once daily.        cyanocobalamin (VITAMIN B-12) 500 MCG tablet Take 500 mcg by mouth once daily.        ergocalciferol, vitamin D2, (VITAMIN D ORAL) Take 5,000 mg by mouth once daily.        glucosamine/chondr vance A sod (GLUCOSAMINE-CHONDROITIN) 1,500-1,200 mg/30 mL Liqd Take by mouth.        hydroCHLOROthiazide (HYDRODIURIL) 25 MG tablet TAKE 1 TABLET DAILY 90 tablet 1    levothyroxine (SYNTHROID) 50 MCG tablet TAKE 1 TABLET DAILY 90 tablet 1    losartan (COZAAR) 100 MG tablet TAKE 1 TABLET DAILY 90 tablet 1    metoprolol tartrate (LOPRESSOR) 25 MG tablet TAKE 1 TABLET DAILY 90 tablet 1    multivitamin-minerals-lutein Tab Take 1 tablet by mouth once daily.        pantoprazole (PROTONIX) 40 MG tablet TAKE 1 TABLET BY MOUTH ONCE DAILY BEFORE BREAKFAST 30 tablet 2    [DISCONTINUED] ranitidine (ZANTAC) 300 MG tablet TAKE 1 TABLET BY MOUTH EVERY EVENING 30 tablet 2      No current facility-administered medications on file prior to visit.          Medications have been reviewed.     ALLERGIES:  Review of patient's allergies indicates:  No Known Allergies     Allergies have been  reviewed.     ROS:  Review of Systems   Constitutional: Positive for activity change. Negative for appetite change, chills, diaphoresis, fatigue, fever and unexpected weight change.   HENT: Negative for congestion, dental problem, hearing loss, rhinorrhea, sore throat and trouble swallowing.    Eyes: Negative for discharge and visual disturbance.   Respiratory: Negative for cough, chest tightness, shortness of breath and wheezing.         Sleep apnea   Cardiovascular: Positive for palpitations. Negative for chest pain and leg swelling.   Gastrointestinal: Negative for abdominal distention, abdominal pain, blood in stool, constipation, diarrhea, nausea and vomiting.   Endocrine: Negative for cold intolerance, heat intolerance, polydipsia, polyphagia and polyuria.   Genitourinary: Negative for difficulty urinating, dysuria, frequency, hematuria, menstrual problem and urgency.   Musculoskeletal: Positive for arthralgias. Negative for gait problem, joint swelling, myalgias and neck pain.   Skin: Negative for color change, pallor, rash and wound.   Neurological: Positive for weakness. Negative for dizziness, syncope, light-headedness, numbness and headaches.   Hematological: Negative for adenopathy. Does not bruise/bleed easily.   Psychiatric/Behavioral: Negative for confusion, decreased concentration, dysphoric mood and sleep disturbance. The patient is not nervous/anxious.          PE:  Physical Exam   Constitutional: She is oriented to person, place, and time. She appears well-developed and well-nourished. No distress.   obese   HENT:   Head: Normocephalic and atraumatic.   Right Ear: External ear normal.   Left Ear: External ear normal.   Eyes: Conjunctivae and EOM are normal. Pupils are equal, round, and reactive to light. No scleral icterus.   Neck: Normal range of motion. Neck supple. No tracheal deviation present. No thyromegaly present.   Cardiovascular: Normal rate, regular rhythm, normal heart sounds and  intact distal pulses. Exam reveals no gallop and no friction rub.   No murmur heard.  Pulmonary/Chest: Effort normal and breath sounds normal. No respiratory distress. She has no wheezes. She has no rales. She exhibits no tenderness.   Abdominal: Soft. Bowel sounds are normal. She exhibits no distension. There is no tenderness. No hernia.   Well healed lap taty and c-sxn scars   Musculoskeletal: Normal range of motion. She exhibits no edema, tenderness or deformity.   Lymphadenopathy:     She has no cervical adenopathy.   Neurological: She is alert and oriented to person, place, and time.   Skin: Skin is warm and dry. No rash noted. She is not diaphoretic. No erythema. No pallor.   Psychiatric: She has a normal mood and affect. Her behavior is normal. Judgment and thought content normal.   Vitals reviewed.        DIAGNOSIS:  1. Morbid obesity with a BMI of 37.39 and inability to maintain weight loss.  2. Co-morbidities: hypertension, obstructive sleep apnea and joint pain/back pain     PLAN:  The patient is a good candidate for Bariatric Surgery. She is interested in sleeve gastrectomy. The surgery and post-op care was discussed in detail with the patient. All questions were answered.     She understands that bariatric surgery is a tool to aid in weight loss and that she needs to be committed to the diet and exercise post-operatively for successful weight loss. Discussed with patient that bariatric surgery is not the easy way out and that it will take plenty of dedication on the patient's part to be successful. Also discussed the possibility of weight regain if the patient strays from the diet guidelines or exercise requirements. Patient verbalized understanding and wishes to proceed with surgery.     The patient is a good candidate for operatively-induced weight loss.  The patient's comorbidities can significantly improve from weight loss following surgery.       She has completed all necessary preliminary steps  of the program including the evaluation process.  I have outlined the risks of the procedures including, but not limited to, bleeding, slippage, erosion, stricture, death, deep venous thrombosis, pulmonary embolus, healing issues including intra-abdominal leakage or perforation with abscess or need for drainage or reoperation, wound infection, need for open surgery, injury to intra-abdominal organs or bleeding requiring transfusion, intensive care unit care, and possible prolonged hospitalization.       Other long-term issues were discussed including, but not limited to, permanent change in volume of food and type of foods eaten and importance of ongoing long-term followup.  I advised the patient that if they can lose weight before surgery, it will reduce her operative risk.  The patient understands and wishes to proceed.     PLAN: The patient is interested in proceeding with laparoscopic vertical sleeve gastrectomy with possible robotic assistance. We will proceed with scheduling surgery for February 19, 2019. She will begin OptiFast 2 weeks prior to surgery. Pre op labs will be scheduled.      Referring Physician: No ref. provider found  RTC: As scheduled.    No changes okay to proceed with surgery    Morbid obesity-gastric sleeve today  Hypertension-antihypertensives postoperatively, controlled- Medicine consult  Obstructive sleep apnea- CPAP  Hypothyroidism Synthroid  History of Atrial fibrillation Lopressor postop

## 2019-03-23 VITALS
SYSTOLIC BLOOD PRESSURE: 146 MMHG | BODY MASS INDEX: 34.81 KG/M2 | WEIGHT: 221.81 LBS | OXYGEN SATURATION: 99 % | TEMPERATURE: 99 F | HEIGHT: 67 IN | DIASTOLIC BLOOD PRESSURE: 65 MMHG | HEART RATE: 58 BPM | RESPIRATION RATE: 18 BRPM

## 2019-03-23 LAB
ANION GAP SERPL CALC-SCNC: 9 MMOL/L
BUN SERPL-MCNC: 11 MG/DL
CALCIUM SERPL-MCNC: 8.9 MG/DL
CHLORIDE SERPL-SCNC: 105 MMOL/L
CO2 SERPL-SCNC: 27 MMOL/L
CREAT SERPL-MCNC: 0.7 MG/DL
ERYTHROCYTE [DISTWIDTH] IN BLOOD BY AUTOMATED COUNT: 14.9 %
EST. GFR  (AFRICAN AMERICAN): >60 ML/MIN/1.73 M^2
EST. GFR  (NON AFRICAN AMERICAN): >60 ML/MIN/1.73 M^2
GLUCOSE SERPL-MCNC: 81 MG/DL
HCT VFR BLD AUTO: 33.5 %
HGB BLD-MCNC: 11 G/DL
MCH RBC QN AUTO: 28.3 PG
MCHC RBC AUTO-ENTMCNC: 32.8 G/DL
MCV RBC AUTO: 86 FL
PLATELET # BLD AUTO: 204 K/UL
PMV BLD AUTO: 10.1 FL
POTASSIUM SERPL-SCNC: 3.4 MMOL/L
RBC # BLD AUTO: 3.89 M/UL
SODIUM SERPL-SCNC: 141 MMOL/L
WBC # BLD AUTO: 7.83 K/UL

## 2019-03-23 PROCEDURE — 99024 PR POST-OP FOLLOW-UP VISIT: ICD-10-PCS | Mod: ,,, | Performed by: COLON & RECTAL SURGERY

## 2019-03-23 PROCEDURE — 63600175 PHARM REV CODE 636 W HCPCS: Performed by: SURGERY

## 2019-03-23 PROCEDURE — C9113 INJ PANTOPRAZOLE SODIUM, VIA: HCPCS | Performed by: SURGERY

## 2019-03-23 PROCEDURE — 36415 COLL VENOUS BLD VENIPUNCTURE: CPT

## 2019-03-23 PROCEDURE — 99900035 HC TECH TIME PER 15 MIN (STAT)

## 2019-03-23 PROCEDURE — 99024 POSTOP FOLLOW-UP VISIT: CPT | Mod: ,,, | Performed by: COLON & RECTAL SURGERY

## 2019-03-23 PROCEDURE — 63600175 PHARM REV CODE 636 W HCPCS: Performed by: INTERNAL MEDICINE

## 2019-03-23 PROCEDURE — 94799 UNLISTED PULMONARY SVC/PX: CPT

## 2019-03-23 PROCEDURE — 80048 BASIC METABOLIC PNL TOTAL CA: CPT

## 2019-03-23 PROCEDURE — 85027 COMPLETE CBC AUTOMATED: CPT

## 2019-03-23 PROCEDURE — 25000003 PHARM REV CODE 250: Performed by: SURGERY

## 2019-03-23 RX ORDER — MEPERIDINE HYDROCHLORIDE 25 MG/ML
12.5 INJECTION INTRAMUSCULAR; INTRAVENOUS; SUBCUTANEOUS ONCE
Status: COMPLETED | OUTPATIENT
Start: 2019-03-23 | End: 2019-03-23

## 2019-03-23 RX ORDER — HYDROCODONE BITARTRATE AND ACETAMINOPHEN 5; 325 MG/1; MG/1
1 TABLET ORAL EVERY 6 HOURS PRN
Qty: 20 TABLET | Refills: 0 | Status: SHIPPED | OUTPATIENT
Start: 2019-03-23 | End: 2019-04-08

## 2019-03-23 RX ORDER — PANTOPRAZOLE SODIUM 40 MG/1
40 TABLET, DELAYED RELEASE ORAL DAILY
Qty: 30 TABLET | Refills: 11 | Status: SHIPPED | OUTPATIENT
Start: 2019-03-23 | End: 2019-04-24

## 2019-03-23 RX ORDER — ONDANSETRON 4 MG/1
4 TABLET, ORALLY DISINTEGRATING ORAL EVERY 6 HOURS PRN
Qty: 15 TABLET | Refills: 0 | Status: SHIPPED | OUTPATIENT
Start: 2019-03-23 | End: 2019-07-08

## 2019-03-23 RX ADMIN — CHLORHEXIDINE GLUCONATE 0.12% ORAL RINSE 10 ML: 1.2 LIQUID ORAL at 08:03

## 2019-03-23 RX ADMIN — ONDANSETRON 4 MG: 2 INJECTION INTRAMUSCULAR; INTRAVENOUS at 02:03

## 2019-03-23 RX ADMIN — ENOXAPARIN SODIUM 40 MG: 100 INJECTION SUBCUTANEOUS at 08:03

## 2019-03-23 RX ADMIN — METOPROLOL TARTRATE 5 MG: 5 INJECTION INTRAVENOUS at 11:03

## 2019-03-23 RX ADMIN — HYDROMORPHONE HYDROCHLORIDE 1 MG: 1 INJECTION, SOLUTION INTRAMUSCULAR; INTRAVENOUS; SUBCUTANEOUS at 11:03

## 2019-03-23 RX ADMIN — MEPERIDINE HYDROCHLORIDE 12.5 MG: 25 INJECTION INTRAMUSCULAR; INTRAVENOUS; SUBCUTANEOUS at 02:03

## 2019-03-23 RX ADMIN — PANTOPRAZOLE SODIUM 40 MG: 40 INJECTION, POWDER, LYOPHILIZED, FOR SOLUTION INTRAVENOUS at 05:03

## 2019-03-23 RX ADMIN — HYDROMORPHONE HYDROCHLORIDE 1 MG: 1 INJECTION, SOLUTION INTRAMUSCULAR; INTRAVENOUS; SUBCUTANEOUS at 04:03

## 2019-03-23 NOTE — ASSESSMENT & PLAN NOTE
Metoprolol IV q6 hours   Currently NSR   Heart rate controlled   Lovenox   Will resume po meds when appropriate     Age 1 - 65-75 years old   CHF History 0 - no   HTN History 1 - yes   Stroke/TIA/Thromboembolism History 0 - no   Vascular Disease History 0 - no   Diabetes Mellitus in history 0 - no   Female 1 - yes   [id number] VASc Scale Total Score 3     Will discuss long term anticoagulation for CVA prophylaxis.       3/23  Reviewed A Fib history with patient, who reports she was seen years ago by [facility] after having an episode of A Fib. Pt reports undergoing stress test, however not recommeded anticoagulation. She denies any further eposodes of A Fib. She was seen by Dr Harris.

## 2019-03-23 NOTE — HOSPITAL COURSE
Ms Davis is a 69 year old female who underwent robotic sleeve gastrectomy and lysis of adhesions on yesterday. She is feeling well today tolerating diet, vital signs and labs stable. Patient has been ambulation in rocha without difficult. General surgery following.

## 2019-03-23 NOTE — PLAN OF CARE
Problem: Adult Inpatient Plan of Care  Goal: Plan of Care Review  Outcome: Outcome(s) achieved Date Met: 03/23/19  Fall precautions maintained. Pt free from injuries/falls.  Ambulates and repositions   Pain controlled w ordered meds.   POC and meds discussed w/ pt. Pt verbalized understanding.  Chart check done.   Will cont to monitor.

## 2019-03-23 NOTE — NURSING
Pt receiving scheduled IV Metoprolol. Ordered cardiac monitor per Hosp Med to monitor HR for administrations.

## 2019-03-23 NOTE — PROGRESS NOTES
[facility] Hospital Medicine  Progress Note    Patient Name: Zora Davis  MRN: [id number]  Patient Class: IP- Inpatient   Admission Date: 3/22/2019  Length of Stay: 1 days  Attending Physician: Cole Armstrong MD  Primary Care Provider: Bossman Muro MD        Subjective:     Principal Problem:S/P laparoscopic sleeve gastrectomy    HPI:  Zora Davis is a 68 y/o female with PMHx of HTN, atrial fibrillation, liver disease, JOSE DE JESUS, and thyroid disease who underwent a Robotic sleeve gastrectomy and Lysis of adhesions by Dr. Armstrong. Hospital medicine was consulted to assist with medical management. Presently, she is awake reports some abdominal pain, denies nausea. VSS. Hospital Medicine was consulted to assist with medical management.             Hospital Course:  Ms Davis is a 69 year old female who underwent robotic sleeve gastrectomy and lysis of adhesions on yesterday. She is feeling well today tolerating diet, vital signs and labs stable. Patient has been ambulation in rocha without difficult. General surgery following.     Interval History:  Patient seen and examined. No distress. Vital signs and labs stable. General surgery primary team.     Review of Systems   Constitutional: Positive for appetite change. Negative for chills and fever.   HENT: Negative for congestion, rhinorrhea and sinus pressure.    Respiratory: Negative for apnea, cough, choking, chest tightness, shortness of breath, wheezing and stridor.    Cardiovascular: Negative for chest pain, palpitations and leg swelling.   Gastrointestinal: Positive for abdominal pain (mild tenderness ). Negative for abdominal distention, diarrhea, nausea and vomiting.   Endocrine: Negative for cold intolerance and heat intolerance.   Genitourinary: Negative for difficulty urinating and hematuria.   Musculoskeletal: Negative for arthralgias and joint swelling.   Skin: Negative for color change, pallor and rash.   Neurological: Negative for  dizziness, seizures, weakness, numbness and headaches.   Psychiatric/Behavioral: Negative for agitation. The patient is not nervous/anxious.      Objective:     Vital Signs (Most Recent):  Temp: 98.5 °F (36.9 °C) (03/23/19 1216)  Pulse: (!) 58 (03/23/19 1216)  Resp: 18 (03/23/19 1216)  BP: (!) 146/65 (03/23/19 1216)  SpO2: 99 % (03/23/19 1216) Vital Signs (24h Range):  Temp:  [97.3 °F (36.3 °C)-98.9 °F (37.2 °C)] 98.5 °F (36.9 °C)  Pulse:  [50-71] 58  Resp:  [18-19] 18  SpO2:  [95 %-99 %] 99 %  BP: (135-177)/(63-77) 146/65     Weight: 100.6 kg (221 lb 12.5 oz)  Body mass index is 34.74 kg/m².    Intake/Output Summary (Last 24 hours) at 3/23/2019 1349  Last data filed at 3/23/2019 0433  Gross per 24 hour   Intake 2036.42 ml   Output --   Net 2036.42 ml      Physical Exam   Constitutional: She is oriented to person, place, and time. No distress.   Eyes: Right eye exhibits no discharge. Left eye exhibits no discharge.   Neck: No JVD present.   Cardiovascular: Exam reveals no gallop and no friction rub.   No murmur heard.  Pulmonary/Chest: No stridor. No respiratory distress. She has no wheezes. She has no rales. She exhibits no tenderness.   Abdominal: She exhibits no distension and no mass. There is no tenderness. There is no rebound and no guarding. No hernia.   Lap incisions intact    Musculoskeletal: She exhibits no edema or deformity.   Neurological: She is alert and oriented to person, place, and time.   Skin: Skin is warm and dry. Capillary refill takes less than 2 seconds. She is not diaphoretic.   Nursing note and vitals reviewed.      Significant Labs:   CBC:   Recent Labs   Lab 03/23/19  0549   WBC 7.83   HGB 11.0*   HCT 33.5*        CMP:   Recent Labs   Lab 03/23/19  0549      K 3.4*      CO2 27   GLU 81   BUN 11   CREATININE 0.7   CALCIUM 8.9   ANIONGAP 9   EGFRNONAA >60                 Assessment/Plan:      * S/P laparoscopic sleeve gastrectomy    Managed per primary team   S/p sleeve  gastrectomy today by Dr. Armstrong   Pain controlled   Pt. NPO   Continue IVFs     3/23  POD # 1  General surgery primary   Tolerating diet   Bowl sounds positive   Abdominal incisions intact        Atrial fibrillation    Metoprolol IV q6 hours   Currently NSR   Heart rate controlled   Lovenox   Will resume po meds when appropriate     Age 1 - 65-75 years old   CHF History 0 - no   HTN History 1 - yes   Stroke/TIA/Thromboembolism History 0 - no   Vascular Disease History 0 - no   Diabetes Mellitus in history 0 - no   Female 1 - yes   [id number] VASc Scale Total Score 3     Will discuss long term anticoagulation for CVA prophylaxis.       3/23  Reviewed A Fib history with patient, who reports she was seen years ago by [facility] after having an episode of A Fib. Pt reports undergoing stress test, however not recommeded anticoagulation. She denies any further eposodes of A Fib. She was seen by Dr Harris.      Morbid obesity    S/p sleeve gastrectomy   See plan above        Obstructive sleep apnea    CPAP qhs        Hypertension    Bp stable   Hydralazine IV prn   Monitor            VTE Risk Mitigation (From admission, onward)        Ordered     enoxaparin injection 40 mg  Daily      03/22/19 1059     IP VTE HIGH RISK PATIENT  Once      03/22/19 1059     Place sequential compression device  Until discontinued      03/22/19 1059              Antonio Martinez NP  Department of Hospital Medicine   [facility]

## 2019-03-23 NOTE — SUBJECTIVE & OBJECTIVE
Interval History:  Patient seen and examined. No distress. Vital signs and labs stable. General surgery primary team.     Review of Systems   Constitutional: Positive for appetite change. Negative for chills and fever.   HENT: Negative for congestion, rhinorrhea and sinus pressure.    Respiratory: Negative for apnea, cough, choking, chest tightness, shortness of breath, wheezing and stridor.    Cardiovascular: Negative for chest pain, palpitations and leg swelling.   Gastrointestinal: Positive for abdominal pain (mild tenderness ). Negative for abdominal distention, diarrhea, nausea and vomiting.   Endocrine: Negative for cold intolerance and heat intolerance.   Genitourinary: Negative for difficulty urinating and hematuria.   Musculoskeletal: Negative for arthralgias and joint swelling.   Skin: Negative for color change, pallor and rash.   Neurological: Negative for dizziness, seizures, weakness, numbness and headaches.   Psychiatric/Behavioral: Negative for agitation. The patient is not nervous/anxious.      Objective:     Vital Signs (Most Recent):  Temp: 98.5 °F (36.9 °C) (03/23/19 1216)  Pulse: (!) 58 (03/23/19 1216)  Resp: 18 (03/23/19 1216)  BP: (!) 146/65 (03/23/19 1216)  SpO2: 99 % (03/23/19 1216) Vital Signs (24h Range):  Temp:  [97.3 °F (36.3 °C)-98.9 °F (37.2 °C)] 98.5 °F (36.9 °C)  Pulse:  [50-71] 58  Resp:  [18-19] 18  SpO2:  [95 %-99 %] 99 %  BP: (135-177)/(63-77) 146/65     Weight: 100.6 kg (221 lb 12.5 oz)  Body mass index is 34.74 kg/m².    Intake/Output Summary (Last 24 hours) at 3/23/2019 1349  Last data filed at 3/23/2019 0433  Gross per 24 hour   Intake 2036.42 ml   Output --   Net 2036.42 ml      Physical Exam   Constitutional: She is oriented to person, place, and time. No distress.   Eyes: Right eye exhibits no discharge. Left eye exhibits no discharge.   Neck: No JVD present.   Cardiovascular: Exam reveals no gallop and no friction rub.   No murmur heard.  Pulmonary/Chest: No stridor. No  respiratory distress. She has no wheezes. She has no rales. She exhibits no tenderness.   Abdominal: She exhibits no distension and no mass. There is no tenderness. There is no rebound and no guarding. No hernia.   Lap incisions intact    Musculoskeletal: She exhibits no edema or deformity.   Neurological: She is alert and oriented to person, place, and time.   Skin: Skin is warm and dry. Capillary refill takes less than 2 seconds. She is not diaphoretic.   Nursing note and vitals reviewed.      Significant Labs:   CBC:   Recent Labs   Lab 03/23/19  0549   WBC 7.83   HGB 11.0*   HCT 33.5*        CMP:   Recent Labs   Lab 03/23/19  0549      K 3.4*      CO2 27   GLU 81   BUN 11   CREATININE 0.7   CALCIUM 8.9   ANIONGAP 9   EGFRNONAA >60

## 2019-03-23 NOTE — NURSING
Pt called stating she was having little tremors like she did right after surgery and wants something to relieve that. Sent message to [facility].

## 2019-03-23 NOTE — HOSPITAL COURSE
03/22/2019: Underwent robotic sleeve gastrectomy, no issues postop  03/23/2019: Tolerating bariatric liquids well. Pain well controlled. Ambulating well. Safe for discharge. Discharged home after uneventful recovery postoperatively.

## 2019-03-23 NOTE — ANESTHESIA POSTPROCEDURE EVALUATION
"Anesthesia Post Evaluation    Patient: Zora Davis    Procedure(s) Performed: Procedure(s) (LRB):  XI ROBOTIC SLEEVE GASTRECTOMY (N/A)    Final Anesthesia Type: general  Patient location during evaluation: PACU  Patient participation: Yes- Able to Participate  Level of consciousness: awake and alert  Post-procedure vital signs: reviewed and stable  Pain management: adequate  Airway patency: patent  PONV status at discharge: No PONV  Anesthetic complications: no      Cardiovascular status: blood pressure returned to baseline  Respiratory status: unassisted  Hydration status: euvolemic  Follow-up not needed.        Visit Vitals  /63 (BP Location: Right arm, Patient Position: Lying)   Pulse 62   Temp 36.3 °C (97.3 °F) (Oral)   Resp 18   Ht 5' 7" (1.702 m)   Wt 100.6 kg (221 lb 12.5 oz)   SpO2 96%   Breastfeeding? No   BMI 34.74 kg/m²       Pain/Andrew Score: Pain Rating Prior to Med Admin: 6 (3/22/2019  4:49 PM)  Pain Rating Post Med Admin: 3 (3/22/2019  5:19 PM)  Andrew Score: 8 (3/22/2019 11:45 AM)        "

## 2019-03-23 NOTE — PLAN OF CARE
Pt provided with blue d/c folder.  Pt.'s pcp is Bossman Muro MD  And her pharmacy of choice is   [facility] [place] - [address]  [address]  [place] [address]  Phone: [phone] Fax: [phone]    [facility] - [place], [place] - [address]  [address] [address]  Phone: [phone] Fax: [phone]    [facility] [address]  [facility] [place] [address]  Phone: [phone] Fax: [phone]         03/23/19 1324   Discharge Assessment   Assessment Type Discharge Planning Assessment   Confirmed/corrected address and phone number on facesheet? Yes   Assessment information obtained from? Patient   Prior to hospitilization cognitive status: Alert/Oriented   Prior to hospitalization functional status: Independent   Current cognitive status: Alert/Oriented   Current Functional Status: Independent   Lives With spouse   Able to Return to Prior Arrangements yes   Is patient able to care for self after discharge? Yes   Who are your caregiver(s) and their phone number(s)? Erik Susan [phone]   Patient's perception of discharge disposition admitted as an inpatient   Readmission Within the Last 30 Days no previous admission in last 30 days   Patient currently being followed by outpatient case management? No   Patient currently receives any other outside agency services? No   Equipment Currently Used at Home none   Do you have any problems affording any of your prescribed medications? No   Is the patient taking medications as prescribed? yes   Does the patient have transportation home? Yes   Transportation Anticipated family or friend will provide   Does the patient receive services at the [facility]mad[facility] Clinic? No   Discharge Plan A Home   DME Needed Upon Discharge  none   Patient/Family in Agreement with Plan yes

## 2019-03-23 NOTE — ASSESSMENT & PLAN NOTE
Managed per primary team   S/p sleeve gastrectomy today by Dr. Armstrong   Pain controlled   Pt. NPO   Continue IVFs     3/23  POD # 1  General surgery primary   Tolerating diet   Bowl sounds positive   Abdominal incisions intact

## 2019-03-23 NOTE — PLAN OF CARE
Problem: Adult Inpatient Plan of Care  Goal: Plan of Care Review  Outcome: Ongoing (interventions implemented as appropriate)  Pt had no adverse events during shift. Pt free of falls. Call light in reach. Side rails x 2. Pt reports minimal pain well controlled w/ prn meds. NSR 60s on tele monitor.  Surgical sites CDI. IVF administered as ordered. VSS. Chart reviewed, will continue to monitor.

## 2019-03-26 ENCOUNTER — PATIENT MESSAGE (OUTPATIENT)
Dept: SURGERY | Facility: CLINIC | Age: 69
End: 2019-03-26

## 2019-04-08 ENCOUNTER — OFFICE VISIT (OUTPATIENT)
Dept: SURGERY | Facility: CLINIC | Age: 69
End: 2019-04-08
Payer: MEDICARE

## 2019-04-08 ENCOUNTER — NUTRITION (OUTPATIENT)
Dept: DIABETES | Facility: CLINIC | Age: 69
End: 2019-04-08
Payer: MEDICARE

## 2019-04-08 VITALS
SYSTOLIC BLOOD PRESSURE: 130 MMHG | BODY MASS INDEX: 33.57 KG/M2 | HEIGHT: 67 IN | HEART RATE: 70 BPM | WEIGHT: 213.88 LBS | TEMPERATURE: 98 F | DIASTOLIC BLOOD PRESSURE: 66 MMHG

## 2019-04-08 VITALS — WEIGHT: 213.88 LBS | HEIGHT: 67 IN | BODY MASS INDEX: 33.57 KG/M2

## 2019-04-08 DIAGNOSIS — Z98.84 STATUS POST BARIATRIC SURGERY: Primary | ICD-10-CM

## 2019-04-08 DIAGNOSIS — I10 ESSENTIAL HYPERTENSION, MALIGNANT: ICD-10-CM

## 2019-04-08 DIAGNOSIS — I10 HYPERTENSION, UNSPECIFIED TYPE: ICD-10-CM

## 2019-04-08 DIAGNOSIS — G47.33 OBSTRUCTIVE SLEEP APNEA: ICD-10-CM

## 2019-04-08 DIAGNOSIS — E66.01 MORBID OBESITY: ICD-10-CM

## 2019-04-08 DIAGNOSIS — G47.33 OBSTRUCTIVE SLEEP APNEA (ADULT) (PEDIATRIC): ICD-10-CM

## 2019-04-08 DIAGNOSIS — Z98.84 S/P LAPAROSCOPIC SLEEVE GASTRECTOMY: ICD-10-CM

## 2019-04-08 DIAGNOSIS — G47.33 OBSTRUCTIVE SLEEP APNEA: Primary | ICD-10-CM

## 2019-04-08 PROCEDURE — 99024 POSTOP FOLLOW-UP VISIT: CPT | Mod: S$GLB,,, | Performed by: SURGERY

## 2019-04-08 PROCEDURE — 97803 PR MED NUTR THER, SUBSQ, INDIV, EA 15 MIN: ICD-10-PCS | Mod: S$GLB,,, | Performed by: DIETITIAN, REGISTERED

## 2019-04-08 PROCEDURE — 99999 PR PBB SHADOW E&M-EST. PATIENT-LVL III: ICD-10-PCS | Mod: PBBFAC,,, | Performed by: SURGERY

## 2019-04-08 PROCEDURE — 99999 PR PBB SHADOW E&M-EST. PATIENT-LVL III: CPT | Mod: PBBFAC,,, | Performed by: SURGERY

## 2019-04-08 PROCEDURE — 97803 MED NUTRITION INDIV SUBSEQ: CPT | Mod: S$GLB,,, | Performed by: DIETITIAN, REGISTERED

## 2019-04-08 PROCEDURE — 99024 PR POST-OP FOLLOW-UP VISIT: ICD-10-PCS | Mod: S$GLB,,, | Performed by: SURGERY

## 2019-04-08 PROCEDURE — 99999 PR PBB SHADOW E&M-EST. PATIENT-LVL III: CPT | Mod: PBBFAC,,, | Performed by: DIETITIAN, REGISTERED

## 2019-04-08 PROCEDURE — 99999 PR PBB SHADOW E&M-EST. PATIENT-LVL III: ICD-10-PCS | Mod: PBBFAC,,, | Performed by: DIETITIAN, REGISTERED

## 2019-04-08 RX ORDER — FERROUS SULFATE, DRIED 160(50) MG
1 TABLET, EXTENDED RELEASE ORAL 2 TIMES DAILY WITH MEALS
COMMUNITY
End: 2020-01-16

## 2019-04-08 RX ORDER — MULTIVIT WITH MINERALS/HERBS
1 TABLET ORAL DAILY
COMMUNITY
End: 2021-02-01

## 2019-04-08 RX ORDER — IBUPROFEN 200 MG
1 CAPSULE ORAL DAILY
COMMUNITY
End: 2021-02-01

## 2019-04-08 NOTE — LETTER
April 8, 2019        Cole Armstrong MD  [id number] [place] [address] [address] [place] [address]             [facility]  [address]  [place] [address]  Phone: [phone]  Fax: [phone]   Patient: Zora Davis   MR Number: [id number]   YOB: 1950   Date of Visit: 4/8/2019       Dear Dr. Armstrong:    Thank you for referring Zora Davis to me for evaluation. Below are the relevant portions of my assessment and plan of care.    If you have questions, please do not hesitate to call me. I look forward to following Zora along with you.    Sincerely,      Dorcas Oneil RD, CDE           CC  Bossman Muro MD

## 2019-04-08 NOTE — PROGRESS NOTES
PCP: Bossman Muro MD  REFERRING PROVIDER:  No ref. provider found      HISTORY OF PRESENT ILLNESS:  69 y.o. female patient is in clinic today for s/p bariatric gastrectomy (surgery 3/22). Pt denies GI symptoms and reports following meal plans per bariatric manual in addition to rec daily multivitamins and protein supplements. Total weight loss since 3/22 8 lbs; net weight loss since initial surgical visit ~9/18 has been ~27 lbs.     VITAL SIGNS: IBW: 135 lbs +/-10% and AdjIBW: 160 lbs/73.3 kg  ALLERGIES/MEDICATIONS/LABS: Reviewed and Reconciled  MEDICAL/SURGICAL & FAMILY HISTORY: Reviewed and Reconciled    SELF-MONITORING: none avail    ACTIVITY LEVEL: none regular    NUTRITION INTAKE: Intake ~600-800 cals/d. Pureed foods started past 3 days. Adequate protein shakes (premier prot ~3/d) and pureed foods 1/3 c twice daily.    Recent pureed meal examples: chix w/ carrots OR cottage, peaches OR lean pork loin w/ green beans   Spacing shake/water and solid food ~2hr periods   Beverages - crystal light, clear isopure    Dining out - n/a     PSYCHOSOCIAL: Stage of change - action  Barriers to change - none     MNT ASSESSMENT:   800 calories,  grams protein daily  150 min physical activity per week, moderate intensity walking, as tolerated  Multivit/min and prot supplement as directed    PLAN:    Reviewed MNT guidelines for obesity, s/p  bariatric gastrectomy.   Encouraged daily self-monitoring of food & activity patterns.    Provided post surgery meal-planning instruction via bariatric diet manual, foodlists, plate method, food models, food labels and/or ADA booklet. Reviewed micro/macronutrient effect on weight management. Discussed carbohydrate counting and spacing techniques with emphasis on supplementation necessary for altered metabolism. Reviewed principles of energy metabolism to assist weight and health management.                                                          Discussed physical activity with  review of benefits, methods, precautions.    Discussed bx strategies for improving, strategies for improving social & environmental support of lifestyle changes.     GOALS: Self monitoring: daily food & activity journal. Meal plan-90% accuracy, Physical activity-150 minutes per week.   Visit Time Spent:  30 minutes    Thank you for the opportunity to work with your patient.

## 2019-04-09 NOTE — PROGRESS NOTES
History & Physical    SUBJECTIVE:     History of Present Illness:  Patient is a 69 y.o. female status post robotic sleeve gastrectomy 03/22/2019.  Presents for postop she is doing well with no complaints.  She is tolerating her diet and advancing along pathway.  She denies any discomfort.    Height 5 ft 7 in  Weight 213 lb  BMI 33.49    No chief complaint on file.      Review of patient's allergies indicates:  No Known Allergies    Current Outpatient Medications   Medication Sig Dispense Refill    albuterol 90 mcg/actuation inhaler Inhale 2 puffs into the lungs every 6 (six) hours as needed for Wheezing or Shortness of Breath. Rescue 18 g 1    ALPRAZolam (XANAX) 1 MG tablet Take 1 tablet (1 mg total) by mouth 2 (two) times daily as needed for Anxiety. 10 tablet 0    b complex vitamins tablet Take 1 tablet by mouth once daily.      calcium citrate (CALCITRATE) 200 mg (950 mg) tablet Take 1 tablet by mouth once daily.      calcium-vitamin D3 (CALCIUM 500 + D) 500 mg(1,250mg) -200 unit per tablet Take 1 tablet by mouth 2 (two) times daily with meals.      cyanocobalamin (VITAMIN B-12) 500 MCG tablet Take 500 mcg by mouth once daily.      ergocalciferol, vitamin D2, (VITAMIN D ORAL) Take 5,000 mg by mouth once daily.      fluticasone (FLONASE) 50 mcg/actuation nasal spray 1 spray (50 mcg total) by Each Nare route once daily. (Patient taking differently: 1 spray by Each Nare route daily as needed. ) 1 Bottle 0    hydroCHLOROthiazide (HYDRODIURIL) 25 MG tablet TAKE 1 TABLET DAILY 90 tablet 1    levothyroxine (SYNTHROID) 50 MCG tablet TAKE 1 TABLET DAILY 90 tablet 1    metoprolol tartrate (LOPRESSOR) 25 MG tablet TAKE 1 TABLET DAILY (Patient taking differently: TAKE 1 TABLET NIGHTLY) 90 tablet 1    multivitamin-minerals-lutein Tab Take 1 tablet by mouth once daily.      pantoprazole (PROTONIX) 40 MG tablet Take 1 tablet (40 mg total) by mouth once daily. 90 tablet 0    pantoprazole (PROTONIX) 40 MG tablet  Take 1 tablet (40 mg total) by mouth once daily. 30 tablet 11    valsartan (DIOVAN) 160 MG tablet Take 1 tablet (160 mg total) by mouth once daily. 90 tablet 3    coenzyme Q10 (CO Q-10) 100 mg capsule Take 200 mg by mouth once daily.      glucosamine/chondr vance A sod (GLUCOSAMINE-CHONDROITIN) 1,500-1,200 mg/30 mL Liqd Take by mouth.      ondansetron (ZOFRAN-ODT) 4 MG TbDL Take 1 tablet (4 mg total) by mouth every 6 (six) hours as needed (nausea). 15 tablet 0     No current facility-administered medications for this visit.        Past Medical History:   Diagnosis Date    Arthritis     Atrial fibrillation     Diverticulosis     Fatty liver     General anesthetics causing adverse effect in therapeutic use     mild a-fib during hysteretomy, not on anti-coag    Hypertension     Liver disease     FLORES (nonalcoholic steatohepatitis)     Nightmares     JOSE DE JESUS on CPAP     Thyroid disease      Past Surgical History:   Procedure Laterality Date    BREAST BIOPSY      breast tumor       SECTION      x 2    CHOLECYSTECTOMY      COLONOSCOPY  2014    Dr. Richey, repeat in 5 years    COLONOSCOPY N/A 2014    [facility]    ESOPHAGOGASTRODUODENOSCOPY (EGD) N/A 2018    [facility]    ESOPHAGOGASTRODUODENOSCOPY (EGD) N/A 2015    [facility]u [facility]ron [facility] at [facility] END[facility]    HYSTERECTOMY      LYSIS, ADHESIONS, LAPAROSCOPIC N/A 3/22/2019    [facility] Cole [facility] OR    TOTAL REDUCTION MAMMOPLASTY Bilateral     UPPER GASTROINTESTINAL ENDOSCOPY  2015    Dr. Padron    XI ROBOTIC SLEEVE GASTRECTOMY N/A 3/22/2019    [facility] OR     Family History   Problem Relation Age of Onset    Colon cancer Paternal Aunt     COPD Paternal Aunt         colon    Hypertension Father     Crohn's disease Neg Hx     Stomach cancer Neg Hx     Ulcerative colitis Neg Hx     Esophageal  "cancer Neg Hx      Social History     Tobacco Use    Smoking status: Former Smoker     Packs/day: 0.25     Years: 13.00     Pack years: 3.25     Types: Cigarettes     Start date:      Last attempt to quit:      Years since quittin.2    Smokeless tobacco: Never Used   Substance Use Topics    Alcohol use: Yes     Alcohol/week: 0.6 oz     Types: 1 Glasses of wine per week     Comment: socially  No alcohol 72h prior to sx    Drug use: No        Review of Systems:  Review of Systems   Constitutional: Negative for activity change, appetite change, chills, diaphoresis, fatigue, fever and unexpected weight change.   HENT: Negative for congestion, dental problem, rhinorrhea and sore throat.    Eyes: Negative for visual disturbance.   Respiratory: Negative for cough, chest tightness, shortness of breath, wheezing and stridor.    Cardiovascular: Negative for chest pain, palpitations and leg swelling.   Gastrointestinal: Negative for abdominal distention, abdominal pain, constipation, diarrhea, nausea and vomiting.   Endocrine: Negative for cold intolerance, heat intolerance, polydipsia, polyphagia and polyuria.   Genitourinary: Negative for difficulty urinating, dysuria, frequency, hematuria and urgency.   Musculoskeletal: Negative for arthralgias, gait problem, myalgias and neck pain.   Skin: Negative for color change, pallor, rash and wound.   Neurological: Negative for dizziness, syncope, weakness, light-headedness, numbness and headaches.   Hematological: Negative for adenopathy. Does not bruise/bleed easily.   Psychiatric/Behavioral: Negative for confusion, decreased concentration and sleep disturbance. The patient is not nervous/anxious.        OBJECTIVE:     Vital Signs (Most Recent)  Temp: 98.4 °F (36.9 °C) (19 1031)  Pulse: 70 (19 1031)  BP: 130/66 (19 1031)  5' 7" (1.702 m)  97 kg (213 lb 13.5 oz)     Physical Exam:  Physical Exam   Constitutional: She is oriented to person, place, " and time. She appears well-developed and well-nourished. No distress.   HENT:   Head: Normocephalic and atraumatic.   Right Ear: External ear normal.   Left Ear: External ear normal.   Eyes: Pupils are equal, round, and reactive to light. Conjunctivae and EOM are normal. No scleral icterus.   Neck: Normal range of motion. Neck supple. No tracheal deviation present. No thyromegaly present.   Cardiovascular: Normal rate, regular rhythm, normal heart sounds and intact distal pulses. Exam reveals no gallop and no friction rub.   No murmur heard.  Pulmonary/Chest: Effort normal and breath sounds normal. No respiratory distress. She has no wheezes. She has no rales. She exhibits no tenderness.   Abdominal: Soft. Bowel sounds are normal. She exhibits no distension. There is no tenderness. No hernia.   Incisions healing well no signs of infection   Musculoskeletal: Normal range of motion. She exhibits no edema, tenderness or deformity.   Lymphadenopathy:     She has no cervical adenopathy.   Neurological: She is alert and oriented to person, place, and time.   Skin: Skin is warm and dry. No rash noted. She is not diaphoretic. No erythema. No pallor.   Psychiatric: She has a normal mood and affect. Her behavior is normal. Judgment and thought content normal.   Vitals reviewed.        FINAL PATHOLOGIC DIAGNOSIS  Greater curvature of stomach, partial gastrectomy:  No significant histologic abnormalities.    ASSESSMENT/PLAN:     69-year-old female status post robotic sleeve gastrectomy    PLAN:Plan     Pathology reviewed  Healing well  Reinforced bariatric diet  Okay to resume regular activity in 2 weeks  Follow-up in 3 months will check nutritional labs at that time

## 2019-04-24 ENCOUNTER — OFFICE VISIT (OUTPATIENT)
Dept: FAMILY MEDICINE | Facility: CLINIC | Age: 69
End: 2019-04-24
Payer: MEDICARE

## 2019-04-24 ENCOUNTER — CLINICAL SUPPORT (OUTPATIENT)
Dept: FAMILY MEDICINE | Facility: CLINIC | Age: 69
End: 2019-04-24
Payer: MEDICARE

## 2019-04-24 ENCOUNTER — LAB VISIT (OUTPATIENT)
Dept: LAB | Facility: HOSPITAL | Age: 69
End: 2019-04-24
Attending: NURSE PRACTITIONER
Payer: MEDICARE

## 2019-04-24 VITALS
WEIGHT: 204.81 LBS | SYSTOLIC BLOOD PRESSURE: 110 MMHG | TEMPERATURE: 98 F | BODY MASS INDEX: 32.15 KG/M2 | DIASTOLIC BLOOD PRESSURE: 61 MMHG | HEART RATE: 70 BPM | HEIGHT: 67 IN

## 2019-04-24 DIAGNOSIS — I95.9 HYPOTENSION, UNSPECIFIED HYPOTENSION TYPE: Primary | ICD-10-CM

## 2019-04-24 DIAGNOSIS — I95.9 HYPOTENSION, UNSPECIFIED HYPOTENSION TYPE: ICD-10-CM

## 2019-04-24 DIAGNOSIS — I10 HYPERTENSION, UNSPECIFIED TYPE: ICD-10-CM

## 2019-04-24 LAB
ANION GAP SERPL CALC-SCNC: 9 MMOL/L (ref 8–16)
BASOPHILS # BLD AUTO: 0.06 K/UL (ref 0–0.2)
BASOPHILS NFR BLD: 0.7 % (ref 0–1.9)
BUN SERPL-MCNC: 28 MG/DL (ref 8–23)
CALCIUM SERPL-MCNC: 10.4 MG/DL (ref 8.7–10.5)
CHLORIDE SERPL-SCNC: 100 MMOL/L (ref 95–110)
CO2 SERPL-SCNC: 29 MMOL/L (ref 23–29)
CREAT SERPL-MCNC: 0.9 MG/DL (ref 0.5–1.4)
DIFFERENTIAL METHOD: ABNORMAL
EOSINOPHIL # BLD AUTO: 0.2 K/UL (ref 0–0.5)
EOSINOPHIL NFR BLD: 2.1 % (ref 0–8)
ERYTHROCYTE [DISTWIDTH] IN BLOOD BY AUTOMATED COUNT: 14.8 % (ref 11.5–14.5)
EST. GFR  (AFRICAN AMERICAN): >60 ML/MIN/1.73 M^2
EST. GFR  (NON AFRICAN AMERICAN): >60 ML/MIN/1.73 M^2
GLUCOSE SERPL-MCNC: 96 MG/DL (ref 70–110)
HCT VFR BLD AUTO: 41.1 % (ref 37–48.5)
HGB BLD-MCNC: 13.3 G/DL (ref 12–16)
IMM GRANULOCYTES # BLD AUTO: 0.02 K/UL (ref 0–0.04)
IMM GRANULOCYTES NFR BLD AUTO: 0.2 % (ref 0–0.5)
LYMPHOCYTES # BLD AUTO: 3.2 K/UL (ref 1–4.8)
LYMPHOCYTES NFR BLD: 39.2 % (ref 18–48)
MCH RBC QN AUTO: 27.9 PG (ref 27–31)
MCHC RBC AUTO-ENTMCNC: 32.4 G/DL (ref 32–36)
MCV RBC AUTO: 86 FL (ref 82–98)
MONOCYTES # BLD AUTO: 0.7 K/UL (ref 0.3–1)
MONOCYTES NFR BLD: 8.5 % (ref 4–15)
NEUTROPHILS # BLD AUTO: 4.1 K/UL (ref 1.8–7.7)
NEUTROPHILS NFR BLD: 49.3 % (ref 38–73)
NRBC BLD-RTO: 0 /100 WBC
PLATELET # BLD AUTO: 246 K/UL (ref 150–350)
PMV BLD AUTO: 12.2 FL (ref 9.2–12.9)
POTASSIUM SERPL-SCNC: 3.5 MMOL/L (ref 3.5–5.1)
RBC # BLD AUTO: 4.77 M/UL (ref 4–5.4)
SODIUM SERPL-SCNC: 138 MMOL/L (ref 136–145)
WBC # BLD AUTO: 8.26 K/UL (ref 3.9–12.7)

## 2019-04-24 PROCEDURE — 99213 PR OFFICE/OUTPT VISIT, EST, LEVL III, 20-29 MIN: ICD-10-PCS | Mod: S$GLB,,, | Performed by: NURSE PRACTITIONER

## 2019-04-24 PROCEDURE — 36415 COLL VENOUS BLD VENIPUNCTURE: CPT | Mod: PO

## 2019-04-24 PROCEDURE — 80048 BASIC METABOLIC PNL TOTAL CA: CPT

## 2019-04-24 PROCEDURE — 99999 PR PBB SHADOW E&M-EST. PATIENT-LVL IV: ICD-10-PCS | Mod: PBBFAC,,, | Performed by: NURSE PRACTITIONER

## 2019-04-24 PROCEDURE — 3078F DIAST BP <80 MM HG: CPT | Mod: S$GLB,,, | Performed by: NURSE PRACTITIONER

## 2019-04-24 PROCEDURE — 3078F PR MOST RECENT DIASTOLIC BLOOD PRESSURE < 80 MM HG: ICD-10-PCS | Mod: S$GLB,,, | Performed by: NURSE PRACTITIONER

## 2019-04-24 PROCEDURE — 93000 ELECTROCARDIOGRAM COMPLETE: CPT | Mod: S$GLB,,, | Performed by: INTERNAL MEDICINE

## 2019-04-24 PROCEDURE — 3074F PR MOST RECENT SYSTOLIC BLOOD PRESSURE < 130 MM HG: ICD-10-PCS | Mod: S$GLB,,, | Performed by: NURSE PRACTITIONER

## 2019-04-24 PROCEDURE — 1101F PR PT FALLS ASSESS DOC 0-1 FALLS W/OUT INJ PAST YR: ICD-10-PCS | Mod: S$GLB,,, | Performed by: NURSE PRACTITIONER

## 2019-04-24 PROCEDURE — 99213 OFFICE O/P EST LOW 20 MIN: CPT | Mod: S$GLB,,, | Performed by: NURSE PRACTITIONER

## 2019-04-24 PROCEDURE — 1101F PT FALLS ASSESS-DOCD LE1/YR: CPT | Mod: S$GLB,,, | Performed by: NURSE PRACTITIONER

## 2019-04-24 PROCEDURE — 93000 EKG 12-LEAD: ICD-10-PCS | Mod: S$GLB,,, | Performed by: INTERNAL MEDICINE

## 2019-04-24 PROCEDURE — 85025 COMPLETE CBC W/AUTO DIFF WBC: CPT

## 2019-04-24 PROCEDURE — 3074F SYST BP LT 130 MM HG: CPT | Mod: S$GLB,,, | Performed by: NURSE PRACTITIONER

## 2019-04-24 PROCEDURE — 99999 PR PBB SHADOW E&M-EST. PATIENT-LVL IV: CPT | Mod: PBBFAC,,, | Performed by: NURSE PRACTITIONER

## 2019-04-24 RX ORDER — ASPIRIN 81 MG/1
81 TABLET ORAL DAILY
COMMUNITY
End: 2021-02-01

## 2019-04-24 NOTE — PATIENT INSTRUCTIONS
Stop HCTZ  Monitor BP daily x 3-4; keep log  Return log to clinic for review  Report to ER immediately if symptoms worsen

## 2019-04-24 NOTE — PROGRESS NOTES
Subjective:       Patient ID: Zora Davis is a 69 y.o. female.    Chief Complaint: Hypotension    Hypertension   This is a chronic problem. The current episode started more than 1 year ago. The problem has been gradually improving (BP decreasing since gastric sleeve, weight loss) since onset. The problem is controlled (Hypotensive episode past 1-2 w; has lost weight due to gastric sleeve). Pertinent negatives include no anxiety, blurred vision, chest pain, headaches, malaise/fatigue, neck pain, orthopnea, palpitations, peripheral edema, PND, shortness of breath or sweats. Agents associated with hypertension include thyroid hormones. Risk factors for coronary artery disease include obesity and post-menopausal state. Past treatments include diuretics, beta blockers and angiotensin blockers. The current treatment provides significant improvement. There are no compliance problems.  There is no history of angina, kidney disease, CAD/MI, CVA, heart failure, left ventricular hypertrophy, PVD or retinopathy. Identifiable causes of hypertension include a hypertension causing med and a thyroid problem. There is no history of chronic renal disease, coarctation of the aorta, hyperaldosteronism, hypercortisolism, hyperparathyroidism, pheochromocytoma, renovascular disease or sleep apnea.     Past Medical History:   Diagnosis Date    Arthritis     Atrial fibrillation     Diverticulosis     Fatty liver     General anesthetics causing adverse effect in therapeutic use     mild a-fib during hysteretomy, not on anti-coag    Hypertension     Liver disease     FLORES (nonalcoholic steatohepatitis)     Nightmares     JOSE DE JESUS on CPAP     Thyroid disease      Social History     Socioeconomic History    Marital status:      Spouse name: Not on file    Number of children: 2    Years of education: Not on file    Highest education level: Not on file   Occupational History    Occupation: retired     Social Needs    Financial resource strain: Not on file    Food insecurity:     Worry: Not on file     Inability: Not on file    Transportation needs:     Medical: Not on file     Non-medical: Not on file   Tobacco Use    Smoking status: Former Smoker     Packs/day: 0.25     Years: 13.00     Pack years: 3.25     Types: Cigarettes     Start date:      Last attempt to quit:      Years since quittin.3    Smokeless tobacco: Never Used   Substance and Sexual Activity    Alcohol use: Yes     Alcohol/week: 0.6 oz     Types: 1 Glasses of wine per week     Comment: socially  No alcohol 72h prior to sx    Drug use: No    Sexual activity: Yes     Partners: Male   Lifestyle    Physical activity:     Days per week: Not on file     Minutes per session: Not on file    Stress: Not on file   Relationships    Social connections:     Talks on phone: Not on file     Gets together: Not on file     Attends Catholic service: Not on file     Active member of club or organization: Not on file     Attends meetings of clubs or organizations: Not on file     Relationship status: Not on file   Other Topics Concern    Patient feels they ought to cut down on drinking/drug use Not Asked    Patient annoyed by others criticizing their drinking/drug use Not Asked    Patient has felt bad or guilty about drinking/drug use Not Asked    Patient has had a drink/used drugs as an eye opener in the AM Not Asked   Social History Narrative    Not on file     Past Surgical History:   Procedure Laterality Date    BREAST BIOPSY      breast tumor       SECTION      x 2    CHOLECYSTECTOMY      COLONOSCOPY  2014    Dr. Richey, repeat in 5 years    COLONOSCOPY N/A 2014    [facility] ENDO    ESOPHAGOGASTRODUODENOSCOPY (EGD) N/A 2018    [facility] Efrain Steel [facility] at [facility]    ESOPHAGOGASTRODUODENOSCOPY (EGD) N/A 2015    [facility]u [facility] at [facility] ENDO     HYSTERECTOMY      LYSIS, ADHESIONS, LAPAROSCOPIC N/A 3/22/2019    [facility]    TOTAL REDUCTION MAMMOPLASTY Bilateral     UPPER GASTROINTESTINAL ENDOSCOPY  08/13/2015    Dr. Padron    XI ROBOTIC SLEEVE GASTRECTOMY N/A 3/22/2019    [facility]       Review of Systems   Constitutional: Negative.  Negative for malaise/fatigue.   HENT: Negative.    Eyes: Negative.  Negative for blurred vision.   Respiratory: Negative.  Negative for shortness of breath.    Cardiovascular: Negative.  Negative for chest pain, palpitations, orthopnea and PND.        Hypotension   Gastrointestinal: Negative.    Endocrine: Negative.    Genitourinary: Negative.    Musculoskeletal: Negative.  Negative for neck pain.   Skin: Negative.    Allergic/Immunologic: Negative.    Neurological: Negative.  Negative for headaches.   Psychiatric/Behavioral: Negative.        Objective:      Physical Exam   Constitutional: She is oriented to person, place, and time. She appears well-developed and well-nourished.   HENT:   Head: Normocephalic.   Right Ear: External ear normal.   Left Ear: External ear normal.   Nose: Nose normal.   Mouth/Throat: Oropharynx is clear and moist.   Eyes: Pupils are equal, round, and reactive to light. Conjunctivae are normal.   Neck: Normal range of motion. Neck supple.   Cardiovascular: Normal rate, regular rhythm and normal heart sounds.   Pulmonary/Chest: Effort normal and breath sounds normal.   Abdominal: Soft. Bowel sounds are normal.   Musculoskeletal: Normal range of motion.   Neurological: She is alert and oriented to person, place, and time.   Skin: Skin is warm and dry. Capillary refill takes 2 to 3 seconds.   Psychiatric: She has a normal mood and affect. Her behavior is normal. Judgment and thought content normal.   Nursing note and vitals reviewed.      Assessment:       1. Hypotension, unspecified hypotension type    2. Hypertension, unspecified  type        Plan:           Zora was seen today for hypotension.    Diagnoses and all orders for this visit:    Hypotension, unspecified hypotension type  -     Basic metabolic panel; Future  -     CBC auto differential; Future  -     IN OFFICE EKG 12-LEAD (to [place])    Hypertension, unspecified type  Stop HCTZ  Monitor BP daily x 3-4; keep log  Return log to clinic for review  Report to ER immediately if symptoms worsen

## 2019-04-26 ENCOUNTER — TELEPHONE (OUTPATIENT)
Dept: FAMILY MEDICINE | Facility: CLINIC | Age: 69
End: 2019-04-26

## 2019-04-26 DIAGNOSIS — R79.9 ELEVATED BUN: Primary | ICD-10-CM

## 2019-05-13 ENCOUNTER — LAB VISIT (OUTPATIENT)
Dept: LAB | Facility: HOSPITAL | Age: 69
End: 2019-05-13
Attending: FAMILY MEDICINE
Payer: MEDICARE

## 2019-05-13 DIAGNOSIS — R79.9 ELEVATED BUN: ICD-10-CM

## 2019-05-13 LAB — BUN SERPL-MCNC: 21 MG/DL (ref 8–23)

## 2019-05-13 PROCEDURE — 84520 ASSAY OF UREA NITROGEN: CPT

## 2019-05-13 PROCEDURE — 36415 COLL VENOUS BLD VENIPUNCTURE: CPT | Mod: PO

## 2019-05-17 ENCOUNTER — PATIENT MESSAGE (OUTPATIENT)
Dept: FAMILY MEDICINE | Facility: CLINIC | Age: 69
End: 2019-05-17

## 2019-05-17 ENCOUNTER — PATIENT MESSAGE (OUTPATIENT)
Dept: SURGERY | Facility: CLINIC | Age: 69
End: 2019-05-17

## 2019-05-27 ENCOUNTER — PATIENT MESSAGE (OUTPATIENT)
Dept: SURGERY | Facility: CLINIC | Age: 69
End: 2019-05-27

## 2019-05-27 ENCOUNTER — PATIENT MESSAGE (OUTPATIENT)
Dept: FAMILY MEDICINE | Facility: CLINIC | Age: 69
End: 2019-05-27

## 2019-05-28 ENCOUNTER — PATIENT MESSAGE (OUTPATIENT)
Dept: FAMILY MEDICINE | Facility: CLINIC | Age: 69
End: 2019-05-28

## 2019-05-28 ENCOUNTER — TELEPHONE (OUTPATIENT)
Dept: FAMILY MEDICINE | Facility: CLINIC | Age: 69
End: 2019-05-28

## 2019-05-28 ENCOUNTER — LAB VISIT (OUTPATIENT)
Dept: LAB | Facility: HOSPITAL | Age: 69
End: 2019-05-28
Attending: FAMILY MEDICINE
Payer: MEDICARE

## 2019-05-28 DIAGNOSIS — Z98.890 STATUS POST GASTRIC SURGERY: ICD-10-CM

## 2019-05-28 DIAGNOSIS — Z98.890 STATUS POST GASTRIC SURGERY: Primary | ICD-10-CM

## 2019-05-28 LAB — VIT B12 SERPL-MCNC: 1616 PG/ML (ref 210–950)

## 2019-05-28 PROCEDURE — 82607 VITAMIN B-12: CPT

## 2019-05-28 PROCEDURE — 36415 COLL VENOUS BLD VENIPUNCTURE: CPT | Mod: PO

## 2019-05-29 ENCOUNTER — PATIENT MESSAGE (OUTPATIENT)
Dept: FAMILY MEDICINE | Facility: CLINIC | Age: 69
End: 2019-05-29

## 2019-05-29 ENCOUNTER — TELEPHONE (OUTPATIENT)
Dept: DIABETES | Facility: CLINIC | Age: 69
End: 2019-05-29

## 2019-05-29 ENCOUNTER — PATIENT MESSAGE (OUTPATIENT)
Dept: DIABETES | Facility: CLINIC | Age: 69
End: 2019-05-29

## 2019-05-29 NOTE — TELEPHONE ENCOUNTER
Spoke with pt. Reviewed labs. Noted B-12 labs elevated. Advised pt to continue multivit, b-comples, calcium/vitD but hold extra B12 (Celebrate brand). Quests answered. Will see pt back in clinic June 19th.

## 2019-05-29 NOTE — TELEPHONE ENCOUNTER
----- Message from Purvi Cox sent at 5/29/2019  2:57 PM CDT -----  Contact: Pt  .Type:  Patient Returning Call    Who Called: Pt  Who Left Message for Patient: Nurse  Does the patient know what this is regarding?: return call   Would the patient rather a call back or a response via [facility]? Call back  Best Call Back Number: [phone]  Additional Information:

## 2019-05-29 NOTE — TELEPHONE ENCOUNTER
I might rec holding oral B12 for 3 m and hany B12 level then but ok to keep taking if she prefers

## 2019-05-29 NOTE — TELEPHONE ENCOUNTER
Patients is wanting advise regarding her B12 intake. Patient stated she is unsure if she is getting too much Vitamin D and B12. Please advise.

## 2019-06-18 DIAGNOSIS — E03.9 HYPOTHYROIDISM (ACQUIRED): ICD-10-CM

## 2019-06-18 DIAGNOSIS — R12 HEARTBURN: ICD-10-CM

## 2019-06-18 RX ORDER — PANTOPRAZOLE SODIUM 40 MG/1
TABLET, DELAYED RELEASE ORAL
Qty: 90 TABLET | Refills: 4 | Status: SHIPPED | OUTPATIENT
Start: 2019-06-18 | End: 2020-01-16

## 2019-06-18 RX ORDER — LEVOTHYROXINE SODIUM 50 UG/1
TABLET ORAL
Qty: 90 TABLET | Refills: 1 | Status: SHIPPED | OUTPATIENT
Start: 2019-06-18 | End: 2019-12-05 | Stop reason: SDUPTHER

## 2019-06-29 DIAGNOSIS — I10 ESSENTIAL HYPERTENSION: ICD-10-CM

## 2019-06-30 RX ORDER — METOPROLOL TARTRATE 25 MG/1
25 TABLET, FILM COATED ORAL NIGHTLY
Qty: 90 TABLET | Refills: 3 | Status: SHIPPED | OUTPATIENT
Start: 2019-06-30 | End: 2020-06-05

## 2019-07-01 ENCOUNTER — PATIENT MESSAGE (OUTPATIENT)
Dept: DIABETES | Facility: CLINIC | Age: 69
End: 2019-07-01

## 2019-07-08 ENCOUNTER — OFFICE VISIT (OUTPATIENT)
Dept: SURGERY | Facility: CLINIC | Age: 69
End: 2019-07-08
Payer: MEDICARE

## 2019-07-08 VITALS
TEMPERATURE: 98 F | HEART RATE: 53 BPM | HEIGHT: 67 IN | BODY MASS INDEX: 30.2 KG/M2 | WEIGHT: 192.44 LBS | DIASTOLIC BLOOD PRESSURE: 73 MMHG | SYSTOLIC BLOOD PRESSURE: 155 MMHG

## 2019-07-08 DIAGNOSIS — E66.9 OBESITY (BMI 30-39.9): ICD-10-CM

## 2019-07-08 DIAGNOSIS — E66.01 MORBID OBESITY: ICD-10-CM

## 2019-07-08 DIAGNOSIS — Z98.84 S/P LAPAROSCOPIC SLEEVE GASTRECTOMY: ICD-10-CM

## 2019-07-08 DIAGNOSIS — G47.33 OBSTRUCTIVE SLEEP APNEA: Primary | ICD-10-CM

## 2019-07-08 DIAGNOSIS — I10 HYPERTENSION, UNSPECIFIED TYPE: ICD-10-CM

## 2019-07-08 PROCEDURE — 99999 PR PBB SHADOW E&M-EST. PATIENT-LVL III: ICD-10-PCS | Mod: PBBFAC,,, | Performed by: SURGERY

## 2019-07-08 PROCEDURE — 99024 POSTOP FOLLOW-UP VISIT: CPT | Mod: S$GLB,,, | Performed by: SURGERY

## 2019-07-08 PROCEDURE — 99999 PR PBB SHADOW E&M-EST. PATIENT-LVL III: CPT | Mod: PBBFAC,,, | Performed by: SURGERY

## 2019-07-08 PROCEDURE — 99024 PR POST-OP FOLLOW-UP VISIT: ICD-10-PCS | Mod: S$GLB,,, | Performed by: SURGERY

## 2019-07-08 NOTE — PROGRESS NOTES
History & Physical    SUBJECTIVE:     History of Present Illness:  Patient is a 69 y.o. female status post robotic sleeve gastrectomy 03/22/2019.  Presents for 3 month follow-up she is doing well with no complaints.  She is very happy with the progress she has made. She does endorse some constipation for which she is taking fiber and stool softeners.    Height 5 ft 7 in  Weight 213 ->192lb  BMI 33.49->30    Chief Complaint   Patient presents with    Follow-up       Review of patient's allergies indicates:  No Known Allergies    Current Outpatient Medications   Medication Sig Dispense Refill    aspirin (ECOTRIN) 81 MG EC tablet Take 81 mg by mouth once daily.      b complex vitamins tablet Take 1 tablet by mouth once daily.      calcium citrate (CALCITRATE) 200 mg (950 mg) tablet Take 1 tablet by mouth once daily.      calcium-vitamin D3 (CALCIUM 500 + D) 500 mg(1,250mg) -200 unit per tablet Take 1 tablet by mouth 2 (two) times daily with meals.      cyanocobalamin (VITAMIN B-12) 500 MCG tablet Take 500 mcg by mouth once daily.      ergocalciferol, vitamin D2, (VITAMIN D ORAL) Take 5,000 mg by mouth once daily.      levothyroxine (SYNTHROID) 50 MCG tablet TAKE 1 TABLET DAILY 90 tablet 1    metoprolol tartrate (LOPRESSOR) 25 MG tablet Take 1 tablet (25 mg total) by mouth nightly. 90 tablet 3    multivitamin-minerals-lutein Tab Take 1 tablet by mouth once daily.      pantoprazole (PROTONIX) 40 MG tablet TAKE 1 TABLET DAILY 90 tablet 4    albuterol 90 mcg/actuation inhaler Inhale 2 puffs into the lungs every 6 (six) hours as needed for Wheezing or Shortness of Breath. Rescue 18 g 1    ALPRAZolam (XANAX) 1 MG tablet Take 1 tablet (1 mg total) by mouth 2 (two) times daily as needed for Anxiety. 10 tablet 0     No current facility-administered medications for this visit.        Past Medical History:   Diagnosis Date    Arthritis     Atrial fibrillation     Diverticulosis     Fatty liver     General  anesthetics causing adverse effect in therapeutic use     mild a-fib during hysteretomy, not on anti-coag    Hypertension     Liver disease     FLORES (nonalcoholic steatohepatitis)     Nightmares     JOSE DE JESUS on CPAP     Thyroid disease      Past Surgical History:   Procedure Laterality Date    BREAST BIOPSY      breast tumor       SECTION      x 2    CHOLECYSTECTOMY      COLONOSCOPY  2014    Dr. Richey, repeat in 5 years    COLONOSCOPY N/A 2014    [facility] ENDO    ESOPHAGOGASTRODUODENOSCOPY (EGD) N/A 2018    [facility]    ESOPHAGOGASTRODUODENOSCOPY (EGD) N/A 2015    [facility] at [facility] ENDO    HYSTERECTOMY      LYSIS, ADHESIONS, LAPAROSCOPIC N/A 3/22/2019    [facility] at [facility] OR    TOTAL REDUCTION MAMMOPLASTY Bilateral     UPPER GASTROINTESTINAL ENDOSCOPY  2015    Dr. Padron    XI ROBOTIC SLEEVE GASTRECTOMY N/A 3/22/2019    [facility] at [facility] OR     Family History   Problem Relation Age of Onset    Colon cancer Paternal Aunt     COPD Paternal Aunt         colon    Hypertension Father     Crohn's disease Neg Hx     Stomach cancer Neg Hx     Ulcerative colitis Neg Hx     Esophageal cancer Neg Hx      Social History     Tobacco Use    Smoking status: Former Smoker     Packs/day: 0.25     Years: 13.00     Pack years: 3.25     Types: Cigarettes     Start date:      Last attempt to quit:      Years since quittin.5    Smokeless tobacco: Never Used   Substance Use Topics    Alcohol use: Yes     Alcohol/week: 0.6 oz     Types: 1 Glasses of wine per week     Comment: socially  No alcohol 72h prior to sx    Drug use: No        Review of Systems:  Review of Systems   Constitutional: Negative for activity change, appetite change, chills, diaphoresis, fatigue, fever and unexpected weight change.   HENT: Negative for congestion,  "dental problem, rhinorrhea and sore throat.    Eyes: Negative for visual disturbance.   Respiratory: Negative for cough, chest tightness, shortness of breath, wheezing and stridor.    Cardiovascular: Negative for chest pain, palpitations and leg swelling.   Gastrointestinal: Negative for abdominal distention, abdominal pain, constipation, diarrhea, nausea and vomiting.   Endocrine: Negative for cold intolerance, heat intolerance, polydipsia, polyphagia and polyuria.   Genitourinary: Negative for difficulty urinating, dysuria, frequency, hematuria and urgency.   Musculoskeletal: Negative for arthralgias, gait problem, myalgias and neck pain.   Skin: Negative for color change, pallor, rash and wound.   Neurological: Negative for dizziness, syncope, weakness, light-headedness, numbness and headaches.   Hematological: Negative for adenopathy. Does not bruise/bleed easily.   Psychiatric/Behavioral: Negative for confusion, decreased concentration and sleep disturbance. The patient is not nervous/anxious.        OBJECTIVE:     Vital Signs (Most Recent)  Temp: 98.3 °F (36.8 °C) (07/08/19 1129)  Pulse: (!) 53 (07/08/19 1129)  BP: (!) 155/73 (07/08/19 1129)  5' 7" (1.702 m)  87.3 kg (192 lb 7.4 oz)     Physical Exam:  Physical Exam   Constitutional: She is oriented to person, place, and time. She appears well-developed and well-nourished. No distress.   HENT:   Head: Normocephalic and atraumatic.   Right Ear: External ear normal.   Left Ear: External ear normal.   Eyes: Pupils are equal, round, and reactive to light. Conjunctivae and EOM are normal. No scleral icterus.   Neck: Normal range of motion. Neck supple. No tracheal deviation present. No thyromegaly present.   Cardiovascular: Normal rate, regular rhythm, normal heart sounds and intact distal pulses. Exam reveals no gallop and no friction rub.   No murmur heard.  Pulmonary/Chest: Effort normal and breath sounds normal. No respiratory distress. She has no wheezes. She " has no rales. She exhibits no tenderness.   Abdominal: Soft. Bowel sounds are normal. She exhibits no distension. There is no tenderness. No hernia.   Incisions well healed   Musculoskeletal: Normal range of motion. She exhibits no edema, tenderness or deformity.   Lymphadenopathy:     She has no cervical adenopathy.   Neurological: She is alert and oriented to person, place, and time.   Skin: Skin is warm and dry. No rash noted. She is not diaphoretic. No erythema. No pallor.   Psychiatric: She has a normal mood and affect. Her behavior is normal. Judgment and thought content normal.   Vitals reviewed.        FINAL PATHOLOGIC DIAGNOSIS  Greater curvature of stomach, partial gastrectomy:  No significant histologic abnormalities.    ASSESSMENT/PLAN:     69-year-old female status post robotic sleeve gastrectomy    PLAN:Plan     Reinforced bariatric diet and exercise  Nutritional labs  Dietitian appointment  Follow-up in 3 months

## 2019-07-09 ENCOUNTER — LAB VISIT (OUTPATIENT)
Dept: LAB | Facility: HOSPITAL | Age: 69
End: 2019-07-09
Attending: SURGERY
Payer: MEDICARE

## 2019-07-09 DIAGNOSIS — Z98.84 S/P LAPAROSCOPIC SLEEVE GASTRECTOMY: ICD-10-CM

## 2019-07-09 DIAGNOSIS — G47.33 OBSTRUCTIVE SLEEP APNEA: ICD-10-CM

## 2019-07-09 DIAGNOSIS — I10 HYPERTENSION, UNSPECIFIED TYPE: ICD-10-CM

## 2019-07-09 DIAGNOSIS — E66.9 OBESITY (BMI 30-39.9): ICD-10-CM

## 2019-07-09 LAB
25(OH)D3+25(OH)D2 SERPL-MCNC: 53 NG/ML (ref 30–96)
ALBUMIN SERPL BCP-MCNC: 3.5 G/DL (ref 3.5–5.2)
ALP SERPL-CCNC: 50 U/L (ref 55–135)
ALT SERPL W/O P-5'-P-CCNC: 12 U/L (ref 10–44)
ANION GAP SERPL CALC-SCNC: 9 MMOL/L (ref 8–16)
AST SERPL-CCNC: 18 U/L (ref 10–40)
BASOPHILS # BLD AUTO: 0.05 K/UL (ref 0–0.2)
BASOPHILS NFR BLD: 0.6 % (ref 0–1.9)
BILIRUB SERPL-MCNC: 0.4 MG/DL (ref 0.1–1)
BUN SERPL-MCNC: 17 MG/DL (ref 8–23)
CALCIUM SERPL-MCNC: 9.7 MG/DL (ref 8.7–10.5)
CHLORIDE SERPL-SCNC: 105 MMOL/L (ref 95–110)
CO2 SERPL-SCNC: 26 MMOL/L (ref 23–29)
CREAT SERPL-MCNC: 0.8 MG/DL (ref 0.5–1.4)
DIFFERENTIAL METHOD: ABNORMAL
EOSINOPHIL # BLD AUTO: 0.1 K/UL (ref 0–0.5)
EOSINOPHIL NFR BLD: 1.4 % (ref 0–8)
ERYTHROCYTE [DISTWIDTH] IN BLOOD BY AUTOMATED COUNT: 15 % (ref 11.5–14.5)
EST. GFR  (AFRICAN AMERICAN): >60 ML/MIN/1.73 M^2
EST. GFR  (NON AFRICAN AMERICAN): >60 ML/MIN/1.73 M^2
ESTIMATED AVG GLUCOSE: 103 MG/DL (ref 68–131)
GLUCOSE SERPL-MCNC: 95 MG/DL (ref 70–110)
HBA1C MFR BLD HPLC: 5.2 % (ref 4–5.6)
HCT VFR BLD AUTO: 41.5 % (ref 37–48.5)
HGB BLD-MCNC: 13.2 G/DL (ref 12–16)
IMM GRANULOCYTES # BLD AUTO: 0.01 K/UL (ref 0–0.04)
IMM GRANULOCYTES NFR BLD AUTO: 0.1 % (ref 0–0.5)
IRON SERPL-MCNC: 40 UG/DL (ref 30–160)
LYMPHOCYTES # BLD AUTO: 2.9 K/UL (ref 1–4.8)
LYMPHOCYTES NFR BLD: 36.6 % (ref 18–48)
MCH RBC QN AUTO: 28.4 PG (ref 27–31)
MCHC RBC AUTO-ENTMCNC: 31.8 G/DL (ref 32–36)
MCV RBC AUTO: 89 FL (ref 82–98)
MONOCYTES # BLD AUTO: 0.6 K/UL (ref 0.3–1)
MONOCYTES NFR BLD: 8 % (ref 4–15)
NEUTROPHILS # BLD AUTO: 4.2 K/UL (ref 1.8–7.7)
NEUTROPHILS NFR BLD: 53.3 % (ref 38–73)
NRBC BLD-RTO: 0 /100 WBC
PLATELET # BLD AUTO: 239 K/UL (ref 150–350)
PMV BLD AUTO: 10.9 FL (ref 9.2–12.9)
POTASSIUM SERPL-SCNC: 3.8 MMOL/L (ref 3.5–5.1)
PROT SERPL-MCNC: 7.6 G/DL (ref 6–8.4)
RBC # BLD AUTO: 4.64 M/UL (ref 4–5.4)
SATURATED IRON: 11 % (ref 20–50)
SODIUM SERPL-SCNC: 140 MMOL/L (ref 136–145)
T4 FREE SERPL-MCNC: 1.11 NG/DL (ref 0.71–1.51)
TOTAL IRON BINDING CAPACITY: 358 UG/DL (ref 250–450)
TRANSFERRIN SERPL-MCNC: 242 MG/DL (ref 200–375)
TSH SERPL DL<=0.005 MIU/L-ACNC: 2.72 UIU/ML (ref 0.4–4)
WBC # BLD AUTO: 7.96 K/UL (ref 3.9–12.7)

## 2019-07-09 PROCEDURE — 82306 VITAMIN D 25 HYDROXY: CPT

## 2019-07-09 PROCEDURE — 36415 COLL VENOUS BLD VENIPUNCTURE: CPT | Mod: PO

## 2019-07-09 PROCEDURE — 84439 ASSAY OF FREE THYROXINE: CPT

## 2019-07-09 PROCEDURE — 84443 ASSAY THYROID STIM HORMONE: CPT

## 2019-07-09 PROCEDURE — 83540 ASSAY OF IRON: CPT

## 2019-07-09 PROCEDURE — 80053 COMPREHEN METABOLIC PANEL: CPT

## 2019-07-09 PROCEDURE — 84134 ASSAY OF PREALBUMIN: CPT

## 2019-07-09 PROCEDURE — 85025 COMPLETE CBC W/AUTO DIFF WBC: CPT

## 2019-07-09 PROCEDURE — 84425 ASSAY OF VITAMIN B-1: CPT

## 2019-07-09 PROCEDURE — 83036 HEMOGLOBIN GLYCOSYLATED A1C: CPT

## 2019-07-10 ENCOUNTER — PATIENT MESSAGE (OUTPATIENT)
Dept: SURGERY | Facility: CLINIC | Age: 69
End: 2019-07-10

## 2019-07-10 LAB — PREALB SERPL-MCNC: 14 MG/DL (ref 20–43)

## 2019-07-11 ENCOUNTER — PATIENT MESSAGE (OUTPATIENT)
Dept: SURGERY | Facility: HOSPITAL | Age: 69
End: 2019-07-11

## 2019-07-12 LAB — VIT B1 BLD-MCNC: 63 UG/L (ref 38–122)

## 2019-07-24 ENCOUNTER — NUTRITION (OUTPATIENT)
Dept: DIABETES | Facility: CLINIC | Age: 69
End: 2019-07-24
Payer: MEDICARE

## 2019-07-24 VITALS — HEIGHT: 67 IN | BODY MASS INDEX: 29.65 KG/M2 | WEIGHT: 188.94 LBS

## 2019-07-24 DIAGNOSIS — E66.01 MORBID (SEVERE) OBESITY DUE TO EXCESS CALORIES: Primary | ICD-10-CM

## 2019-07-24 PROCEDURE — 97802 MEDICAL NUTRITION INDIV IN: CPT | Mod: S$GLB,,, | Performed by: DIETITIAN, REGISTERED

## 2019-07-24 PROCEDURE — 97802 PR MED NUTR THER, 1ST, INDIV, EA 15 MIN: ICD-10-PCS | Mod: S$GLB,,, | Performed by: DIETITIAN, REGISTERED

## 2019-07-24 PROCEDURE — 99999 PR PBB SHADOW E&M-EST. PATIENT-LVL I: CPT | Mod: PBBFAC,,, | Performed by: DIETITIAN, REGISTERED

## 2019-07-24 PROCEDURE — 99999 PR PBB SHADOW E&M-EST. PATIENT-LVL I: ICD-10-PCS | Mod: PBBFAC,,, | Performed by: DIETITIAN, REGISTERED

## 2019-07-24 NOTE — PROGRESS NOTES
NUTRITION NOTE    Referring Physician: Dr. Dominique  Reason for MNT Referral: Follow-up of Gastric sleeve     PAST MEDICAL HISTORY:  Denies nausea, vomiting and diarrhea. C/o constipation and losing hair.  Reports doing well.    Past Medical History:   Diagnosis Date    Arthritis     Atrial fibrillation     Diverticulosis     Fatty liver     General anesthetics causing adverse effect in therapeutic use     mild a-fib during hysteretomy, not on anti-coag    Hypertension     Liver disease     FLORES (nonalcoholic steatohepatitis)     Nightmares     JOSE DE JESUS on CPAP     Thyroid disease        CLINICAL DATA:  69 y.o. female.    There were no vitals filed for this visit.    Current Weight: 189.9 lbs  Initial wt: 241 lbs   IBW: 148 lbs   Sx date (Mar 2019):  221 lbs   BMI: 29.5  Total Weight Loss: 52 lbs  Excess Weight Loss: 56%    LABS:  Reviewed.   Prealbumin 14 L     CURRENT DIET:  Regular bariatric Diet.  Diet Recall: 900-1000 calories/day; 80-85 grams of protein/day; 48-50 oz of fluids/day  9a: Pure Protein Shake  10a:  Sips on water w/Crystal Light  11a:  Balanced breaks - nuts, cheese, cranberries   12a: sips on Crystal Light   1:30p:  1/2 Meal from [employer] - stuffed pepper w/ground turkey and cauliflower (15 g protein)  Sips on water  3:30p:  Pure Protein shake   6p: other half of meal     Diet Includes:   Meal Pattern: 3 meal(s) + 1 snack(s) + 1-2 protein supplement(s)  Adequate protein supplement intake.  Inadequate dairy intake.- taking Ca supplements  Inadequate vegetable intake.  some days adequate, some days inadequate fruit intake.  Starchy CHO: avoiding   Other: n/a     EXERCISE:  Adequate light exercise.   Group exercise class, low impact 3x/wk; also walks the track 3x/wk     Restrictions to Exercise: None.    VITAMINS / MINERALS:  Multivitamins: prenatal   B-Complex: Jeromy coenzyme b-complex  Calcium Citrate + Vitamin D: Celebrate Ca chews tid   Vitamin B12: none - level was high - included in  b-complex     ASSESSMENT:  Doing well overall.  Weight loss, but pt reports as slow.   Band restriction and satiety have been good .  Adequate calorie intake.  Inadequate protein intake as indicated by hair loss and low prealbumin  Following diet plan inappropriately - needs more fluid intake for constipation   Exercising.  Adequate vitamins & minerals.    BARIATRIC DIET DISCUSSION:  Instructed and provided written materials on bariatric diet plan specific for gastric band.  Reinforced post-op nutrition guidelines.    PLAN / RECOMMENDATIONS:  Maintain calorie intake. May decrease slightly.   Increase protein intake.  Adjust diet by    1. decreasing intake of nuts to lower calories slightly.    2. Increase fluid intake from 48 oz to 64 oz daily - may add unflavored protein powder to Crystal light to increase protein as well as fluids.    3. Assure consistent daily intake of  grams of protein   Continue exercise.  Adjust vitamins & minerals by n/a.    Return to clinic in 3 months.    SESSION TIME: 40 minutes

## 2019-07-31 ENCOUNTER — OFFICE VISIT (OUTPATIENT)
Dept: FAMILY MEDICINE | Facility: CLINIC | Age: 69
End: 2019-07-31
Payer: MEDICARE

## 2019-07-31 VITALS
DIASTOLIC BLOOD PRESSURE: 78 MMHG | BODY MASS INDEX: 29.35 KG/M2 | WEIGHT: 187 LBS | SYSTOLIC BLOOD PRESSURE: 132 MMHG | HEIGHT: 67 IN | TEMPERATURE: 99 F

## 2019-07-31 DIAGNOSIS — J32.9 SINUSITIS, UNSPECIFIED CHRONICITY, UNSPECIFIED LOCATION: Primary | ICD-10-CM

## 2019-07-31 PROCEDURE — 3075F SYST BP GE 130 - 139MM HG: CPT | Mod: S$GLB,,, | Performed by: NURSE PRACTITIONER

## 2019-07-31 PROCEDURE — 96372 PR INJECTION,THERAP/PROPH/DIAG2ST, IM OR SUBCUT: ICD-10-PCS | Mod: S$GLB,,, | Performed by: NURSE PRACTITIONER

## 2019-07-31 PROCEDURE — 96372 THER/PROPH/DIAG INJ SC/IM: CPT | Mod: S$GLB,,, | Performed by: NURSE PRACTITIONER

## 2019-07-31 PROCEDURE — 99999 PR PBB SHADOW E&M-EST. PATIENT-LVL IV: ICD-10-PCS | Mod: PBBFAC,,, | Performed by: NURSE PRACTITIONER

## 2019-07-31 PROCEDURE — 1101F PT FALLS ASSESS-DOCD LE1/YR: CPT | Mod: S$GLB,,, | Performed by: NURSE PRACTITIONER

## 2019-07-31 PROCEDURE — 99213 PR OFFICE/OUTPT VISIT, EST, LEVL III, 20-29 MIN: ICD-10-PCS | Mod: 25,S$GLB,, | Performed by: NURSE PRACTITIONER

## 2019-07-31 PROCEDURE — 99213 OFFICE O/P EST LOW 20 MIN: CPT | Mod: 25,S$GLB,, | Performed by: NURSE PRACTITIONER

## 2019-07-31 PROCEDURE — 3075F PR MOST RECENT SYSTOLIC BLOOD PRESS GE 130-139MM HG: ICD-10-PCS | Mod: S$GLB,,, | Performed by: NURSE PRACTITIONER

## 2019-07-31 PROCEDURE — 3078F DIAST BP <80 MM HG: CPT | Mod: S$GLB,,, | Performed by: NURSE PRACTITIONER

## 2019-07-31 PROCEDURE — 1101F PR PT FALLS ASSESS DOC 0-1 FALLS W/OUT INJ PAST YR: ICD-10-PCS | Mod: S$GLB,,, | Performed by: NURSE PRACTITIONER

## 2019-07-31 PROCEDURE — 99999 PR PBB SHADOW E&M-EST. PATIENT-LVL IV: CPT | Mod: PBBFAC,,, | Performed by: NURSE PRACTITIONER

## 2019-07-31 PROCEDURE — 3078F PR MOST RECENT DIASTOLIC BLOOD PRESSURE < 80 MM HG: ICD-10-PCS | Mod: S$GLB,,, | Performed by: NURSE PRACTITIONER

## 2019-07-31 RX ORDER — AMOXICILLIN 875 MG/1
875 TABLET, FILM COATED ORAL EVERY 12 HOURS
Qty: 20 TABLET | Refills: 0 | Status: SHIPPED | OUTPATIENT
Start: 2019-07-31 | End: 2019-08-10

## 2019-07-31 RX ORDER — PROMETHAZINE HYDROCHLORIDE AND DEXTROMETHORPHAN HYDROBROMIDE 6.25; 15 MG/5ML; MG/5ML
5 SYRUP ORAL 2 TIMES DAILY PRN
Qty: 118 ML | Refills: 0 | Status: SHIPPED | OUTPATIENT
Start: 2019-07-31 | End: 2019-08-10

## 2019-07-31 RX ORDER — DEXAMETHASONE SODIUM PHOSPHATE 4 MG/ML
8 INJECTION, SOLUTION INTRA-ARTICULAR; INTRALESIONAL; INTRAMUSCULAR; INTRAVENOUS; SOFT TISSUE ONCE
Status: COMPLETED | OUTPATIENT
Start: 2019-07-31 | End: 2019-07-31

## 2019-07-31 RX ADMIN — DEXAMETHASONE SODIUM PHOSPHATE 8 MG: 4 INJECTION, SOLUTION INTRA-ARTICULAR; INTRALESIONAL; INTRAMUSCULAR; INTRAVENOUS; SOFT TISSUE at 10:07

## 2019-07-31 NOTE — PROGRESS NOTES
Subjective:       Patient ID: Zora Davis is a 69 y.o. female.    Chief Complaint: Cough and Nasal Congestion    Sinus Problem   This is a new problem. The current episode started 1 to 4 weeks ago. The problem is unchanged. There has been no fever. The pain is moderate. Associated symptoms include congestion, coughing, headaches and sinus pressure. Pertinent negatives include no chills, diaphoresis, ear pain, hoarse voice, neck pain, shortness of breath, sneezing, sore throat or swollen glands. Past treatments include nothing. The treatment provided no relief.     Past Medical History:   Diagnosis Date    Arthritis     Atrial fibrillation     Diverticulosis     Fatty liver     General anesthetics causing adverse effect in therapeutic use     mild a-fib during hysteretomy, not on anti-coag    Hypertension     Liver disease     FLORES (nonalcoholic steatohepatitis)     Nightmares     JOSE DE JESUS on CPAP     Thyroid disease      Social History     Socioeconomic History    Marital status:      Spouse name: Not on file    Number of children: 2    Years of education: Not on file    Highest education level: Not on file   Occupational History    Occupation: retired    Social Needs    Financial resource strain: Not on file    Food insecurity:     Worry: Not on file     Inability: Not on file    Transportation needs:     Medical: Not on file     Non-medical: Not on file   Tobacco Use    Smoking status: Former Smoker     Packs/day: 0.25     Years: 13.00     Pack years: 3.25     Types: Cigarettes     Start date:      Last attempt to quit:      Years since quittin.6    Smokeless tobacco: Never Used   Substance and Sexual Activity    Alcohol use: Yes     Alcohol/week: 0.6 oz     Types: 1 Glasses of wine per week     Comment: socially  No alcohol 72h prior to sx    Drug use: No    Sexual activity: Yes     Partners: Male   Lifestyle    Physical activity:     Days per week:  Not on file     Minutes per session: Not on file    Stress: Not on file   Relationships    Social connections:     Talks on phone: Not on file     Gets together: Not on file     Attends Muslim service: Not on file     Active member of club or organization: Not on file     Attends meetings of clubs or organizations: Not on file     Relationship status: Not on file   Other Topics Concern    Patient feels they ought to cut down on drinking/drug use Not Asked    Patient annoyed by others criticizing their drinking/drug use Not Asked    Patient has felt bad or guilty about drinking/drug use Not Asked    Patient has had a drink/used drugs as an eye opener in the AM Not Asked   Social History Narrative    Not on file     Past Surgical History:   Procedure Laterality Date    BREAST BIOPSY      breast tumor       SECTION      x 2    CHOLECYSTECTOMY      COLONOSCOPY  2014    Dr. Richey, repeat in 5 years    COLONOSCOPY N/A 2014    [facility] ENDO    ESOPHAGOGASTRODUODENOSCOPY (EGD) N/A 2018    [facility]    ESOPHAGOGASTRODUODENOSCOPY (EGD) N/A 2015    [facility] ENDO    HYSTERECTOMY      LYSIS, ADHESIONS, LAPAROSCOPIC N/A 3/22/2019    [facility] OR    TOTAL REDUCTION MAMMOPLASTY Bilateral     UPPER GASTROINTESTINAL ENDOSCOPY  2015    Dr. Padron    XI ROBOTIC SLEEVE GASTRECTOMY N/A 3/22/2019    [facility] at [facility] OR       Review of Systems   Constitutional: Negative.  Negative for chills and diaphoresis.   HENT: Positive for congestion, postnasal drip, rhinorrhea, sinus pressure and sinus pain. Negative for ear pain, hoarse voice, sneezing and sore throat.    Eyes: Negative.    Respiratory: Positive for cough. Negative for shortness of breath.    Cardiovascular: Negative.    Gastrointestinal: Negative.    Endocrine: Negative.     Genitourinary: Negative.    Musculoskeletal: Negative.  Negative for neck pain.   Skin: Negative.    Allergic/Immunologic: Negative.    Neurological: Positive for headaches.   Psychiatric/Behavioral: Negative.        Objective:      Physical Exam   Constitutional: She is oriented to person, place, and time. She appears well-developed and well-nourished.   HENT:   Head: Normocephalic.   Right Ear: Hearing, tympanic membrane, external ear and ear canal normal.   Left Ear: Hearing, tympanic membrane, external ear and ear canal normal.   Nose: Mucosal edema and rhinorrhea present. Right sinus exhibits maxillary sinus tenderness. Right sinus exhibits no frontal sinus tenderness. Left sinus exhibits maxillary sinus tenderness. Left sinus exhibits no frontal sinus tenderness.   Mouth/Throat: Uvula is midline, oropharynx is clear and moist and mucous membranes are normal.   Eyes: Pupils are equal, round, and reactive to light. Conjunctivae are normal.   Neck: Normal range of motion. Neck supple.   Cardiovascular: Normal rate, regular rhythm and normal heart sounds.   Pulmonary/Chest: Effort normal and breath sounds normal.   Abdominal: Soft. Bowel sounds are normal.   Musculoskeletal: Normal range of motion.   Neurological: She is alert and oriented to person, place, and time.   Skin: Skin is warm and dry. Capillary refill takes 2 to 3 seconds.   Psychiatric: She has a normal mood and affect. Her behavior is normal. Judgment and thought content normal.   Nursing note and vitals reviewed.      Assessment:       1. Sinusitis, unspecified chronicity, unspecified location        Plan:       Zora was seen today for cough and nasal congestion.    Diagnoses and all orders for this visit:    Sinusitis, unspecified chronicity, unspecified location  -     dexamethasone injection 8 mg  -     amoxicillin (AMOXIL) 875 MG tablet; Take 1 tablet (875 mg total) by mouth every 12 (twelve) hours. for 10 days  -      promethazine-dextromethorphan (PROMETHAZINE-DM) 6.25-15 mg/5 mL Syrp; Take 5 mLs by mouth 2 (two) times daily as needed.  Report to ER immediately if symptoms worsen

## 2019-08-16 ENCOUNTER — TELEPHONE (OUTPATIENT)
Dept: FAMILY MEDICINE | Facility: CLINIC | Age: 69
End: 2019-08-16

## 2019-08-16 DIAGNOSIS — Z12.31 SCREENING MAMMOGRAM, ENCOUNTER FOR: Primary | ICD-10-CM

## 2019-08-16 NOTE — TELEPHONE ENCOUNTER
----- Message from Indu Lazaro sent at 8/16/2019  7:50 AM CDT -----  Contact: pt  The pt wants to schedule a mammo appt, no orders in the system, the pt can be reached at [phone]///[id number]

## 2019-09-30 ENCOUNTER — PATIENT OUTREACH (OUTPATIENT)
Dept: ADMINISTRATIVE | Facility: HOSPITAL | Age: 69
End: 2019-09-30

## 2019-10-09 ENCOUNTER — HOSPITAL ENCOUNTER (OUTPATIENT)
Dept: RADIOLOGY | Facility: HOSPITAL | Age: 69
Discharge: HOME | End: 2019-10-09
Attending: FAMILY MEDICINE
Payer: MEDICARE

## 2019-10-09 VITALS — WEIGHT: 187 LBS | BODY MASS INDEX: 29.35 KG/M2 | HEIGHT: 67 IN

## 2019-10-09 DIAGNOSIS — Z12.31 SCREENING MAMMOGRAM, ENCOUNTER FOR: ICD-10-CM

## 2019-10-09 PROCEDURE — 77067 MAMMO DIGITAL SCREENING BILAT WITH TOMOSYNTHESIS_CAD: ICD-10-PCS | Mod: 26,,, | Performed by: RADIOLOGY

## 2019-10-09 PROCEDURE — 77067 SCR MAMMO BI INCL CAD: CPT | Mod: TC,PO

## 2019-10-09 PROCEDURE — 77067 SCR MAMMO BI INCL CAD: CPT | Mod: 26,,, | Performed by: RADIOLOGY

## 2019-10-09 PROCEDURE — 77063 MAMMO DIGITAL SCREENING BILAT WITH TOMOSYNTHESIS_CAD: ICD-10-PCS | Mod: 26,,, | Performed by: RADIOLOGY

## 2019-10-09 PROCEDURE — 77063 BREAST TOMOSYNTHESIS BI: CPT | Mod: 26,,, | Performed by: RADIOLOGY

## 2019-10-14 ENCOUNTER — OFFICE VISIT (OUTPATIENT)
Dept: FAMILY MEDICINE | Facility: CLINIC | Age: 69
End: 2019-10-14
Payer: MEDICARE

## 2019-10-14 VITALS
WEIGHT: 179.63 LBS | HEART RATE: 61 BPM | BODY MASS INDEX: 28.19 KG/M2 | SYSTOLIC BLOOD PRESSURE: 137 MMHG | HEIGHT: 67 IN | TEMPERATURE: 98 F | DIASTOLIC BLOOD PRESSURE: 70 MMHG

## 2019-10-14 DIAGNOSIS — Z00.00 ROUTINE CHECK-UP: Primary | ICD-10-CM

## 2019-10-14 PROCEDURE — 99397 PR PREVENTIVE VISIT,EST,65 & OVER: ICD-10-PCS | Mod: S$GLB,,, | Performed by: FAMILY MEDICINE

## 2019-10-14 PROCEDURE — 99397 PER PM REEVAL EST PAT 65+ YR: CPT | Mod: S$GLB,,, | Performed by: FAMILY MEDICINE

## 2019-10-14 PROCEDURE — 99999 PR PBB SHADOW E&M-EST. PATIENT-LVL III: ICD-10-PCS | Mod: PBBFAC,,, | Performed by: FAMILY MEDICINE

## 2019-10-14 PROCEDURE — 3075F PR MOST RECENT SYSTOLIC BLOOD PRESS GE 130-139MM HG: ICD-10-PCS | Mod: S$GLB,,, | Performed by: FAMILY MEDICINE

## 2019-10-14 PROCEDURE — 3078F DIAST BP <80 MM HG: CPT | Mod: S$GLB,,, | Performed by: FAMILY MEDICINE

## 2019-10-14 PROCEDURE — 3075F SYST BP GE 130 - 139MM HG: CPT | Mod: S$GLB,,, | Performed by: FAMILY MEDICINE

## 2019-10-14 PROCEDURE — 99999 PR PBB SHADOW E&M-EST. PATIENT-LVL III: CPT | Mod: PBBFAC,,, | Performed by: FAMILY MEDICINE

## 2019-10-14 PROCEDURE — 3078F PR MOST RECENT DIASTOLIC BLOOD PRESSURE < 80 MM HG: ICD-10-PCS | Mod: S$GLB,,, | Performed by: FAMILY MEDICINE

## 2019-10-14 RX ORDER — FLUTICASONE PROPIONATE 50 MCG
SPRAY, SUSPENSION (ML) NASAL
COMMUNITY
Start: 2016-12-16 | End: 2020-06-23 | Stop reason: SDUPTHER

## 2019-10-14 RX ORDER — ACYCLOVIR 800 MG/1
TABLET ORAL
Refills: 0 | COMMUNITY
Start: 2019-10-12 | End: 2020-01-16

## 2019-10-14 RX ORDER — METHYLPREDNISOLONE 4 MG/1
TABLET ORAL
Qty: 1 PACKAGE | Refills: 0 | Status: SHIPPED | OUTPATIENT
Start: 2019-10-14 | End: 2020-01-16

## 2019-10-14 RX ORDER — CLINDAMYCIN PHOSPHATE 11.9 MG/ML
SOLUTION TOPICAL
COMMUNITY
Start: 2018-05-14 | End: 2021-02-01

## 2019-10-14 NOTE — PROGRESS NOTES
The patient presents today for general health evaluation and counseling    status post robotic sleeve gastrectomy 2019  Past Medical History:  Past Medical History:   Diagnosis Date    Arthritis     Atrial fibrillation     Diverticulosis     Fatty liver     General anesthetics causing adverse effect in therapeutic use     mild a-fib during hysteretomy, not on anti-coag    Hypertension     Liver disease     FLORES (nonalcoholic steatohepatitis)     Nightmares     JOSE DE JESUS on CPAP     Thyroid disease      Past Surgical History:   Procedure Laterality Date    BREAST BIOPSY      breast tumor       SECTION      x 2    CHOLECYSTECTOMY      COLONOSCOPY  2014    Dr. Richey, repeat in 5 years    ESOPHAGOGASTRODUODENOSCOPY N/A 2018    Procedure: ESOPHAGOGASTRODUODENOSCOPY (EGD);  Surgeon: Efrain Steel MD;  Location: [facility] ENDO;  Service: Endoscopy;  Laterality: N/A;    HYSTERECTOMY      LAPAROSCOPIC LYSIS OF ADHESIONS N/A 3/22/2019    Procedure: LYSIS, ADHESIONS, LAPAROSCOPIC;  Surgeon: Cole Armstrong MD;  Location: [facility] OR;  Service: General;  Laterality: N/A;    ROBOT-ASSISTED LAPAROSCOPIC SLEEVE GASTRECTOMY USING DA LARRY XI N/A 3/22/2019    Procedure: XI ROBOTIC SLEEVE GASTRECTOMY;  Surgeon: Cole Armstrong MD;  Location: [facility] OR;  Service: General;  Laterality: N/A;    TOTAL REDUCTION MAMMOPLASTY Bilateral     UPPER GASTROINTESTINAL ENDOSCOPY  2015    Dr. Padron     Review of patient's allergies indicates:  No Known Allergies  Current Outpatient Medications on File Prior to Visit   Medication Sig Dispense Refill    albuterol 90 mcg/actuation inhaler Inhale 2 puffs into the lungs every 6 (six) hours as needed for Wheezing or Shortness of Breath. Rescue 18 g 1    ALPRAZolam (XANAX) 1 MG tablet Take 1 tablet (1 mg total) by mouth 2 (two) times daily as needed for Anxiety. 10 tablet 0    aspirin (ECOTRIN) 81 MG EC tablet Take 81 mg by mouth once daily.      b complex  vitamins tablet Take 1 tablet by mouth once daily.      calcium citrate (CALCITRATE) 200 mg (950 mg) tablet Take 1 tablet by mouth once daily.      calcium-vitamin D3 (CALCIUM 500 + D) 500 mg(1,250mg) -200 unit per tablet Take 1 tablet by mouth 2 (two) times daily with meals.      cyanocobalamin (VITAMIN B-12) 500 MCG tablet Take 500 mcg by mouth once daily.      levothyroxine (SYNTHROID) 50 MCG tablet TAKE 1 TABLET DAILY 90 tablet 1    metoprolol tartrate (LOPRESSOR) 25 MG tablet Take 1 tablet (25 mg total) by mouth nightly. 90 tablet 3    multivitamin-minerals-lutein Tab Take 1 tablet by mouth once daily.      pantoprazole (PROTONIX) 40 MG tablet TAKE 1 TABLET DAILY 90 tablet 4     No current facility-administered medications on file prior to visit.      Social History     Socioeconomic History    Marital status:      Spouse name: Not on file    Number of children: 2    Years of education: Not on file    Highest education level: Not on file   Occupational History    Occupation: retired    Social Needs    Financial resource strain: Not on file    Food insecurity:     Worry: Not on file     Inability: Not on file    Transportation needs:     Medical: Not on file     Non-medical: Not on file   Tobacco Use    Smoking status: Former Smoker     Packs/day: 0.25     Years: 13.00     Pack years: 3.25     Types: Cigarettes     Start date:      Last attempt to quit:      Years since quittin.8    Smokeless tobacco: Never Used   Substance and Sexual Activity    Alcohol use: Yes     Alcohol/week: 1.0 standard drinks     Types: 1 Glasses of wine per week     Comment: socially  No alcohol 72h prior to sx    Drug use: No    Sexual activity: Yes     Partners: Male   Lifestyle    Physical activity:     Days per week: Not on file     Minutes per session: Not on file    Stress: Not on file   Relationships    Social connections:     Talks on phone: Not on file     Gets  together: Not on file     Attends [employer] service: Not on file     Active member of club or organization: Not on file     Attends meetings of clubs or organizations: Not on file     Relationship status: Not on file   Other Topics Concern    Patient feels they ought to cut down on drinking/drug use Not Asked    Patient annoyed by others criticizing their drinking/drug use Not Asked    Patient has felt bad or guilty about drinking/drug use Not Asked    Patient has had a drink/used drugs as an eye opener in the AM Not Asked   Social History Narrative    Not on file     Family History   Problem Relation Age of Onset    Colon cancer Paternal Aunt     COPD Paternal Aunt         colon    Hypertension Father     Crohn's disease Neg Hx     Stomach cancer Neg Hx     Ulcerative colitis Neg Hx     Esophageal cancer Neg Hx          ROS:GENERAL: No fever, chills, fatigability or weight loss.  SKIN: No rashes, itching or changes in color or texture of skin.  HEAD: No headaches or recent head trauma.EYES: Visual acuity fine. No photophobia, ocular pain or diplopia.EARS: Denies ear pain, discharge or vertigo.NOSE: No loss of smell, no epistaxis or postnasal drip.MOUTH & THROAT: No hoarseness or change in voice. No excessive gum bleeding.NODES: Denies swollen glands.  CHEST: Denies DE PAZ, cyanosis, wheezing, cough and sputum production.  CARDIOVASCULAR: Denies chest pain, PND, orthopnea or reduced exercise tolerance.  ABDOMEN: Appetite fine. No weight loss. Denies diarrhea, abdominal pain, hematemesis or blood in stool.  URINARY: No flank pain, dysuria or hematuria.  PERIPHERAL VASCULAR: No claudication or cyanosis.  MUSCULOSKELETAL: See above.  NEUROLOGIC: No history of seizures, paralysis, alteration of gait or coordination.  PE:    HEAD: Normocephalic, atraumatic.EYES: PERRL. EOMI.   EARS: TM's intact. Light reflex normal. No retraction or perforation.   NOSE: Mucosa pink. Airway clear.MOUTH & THROAT: No tonsillar  enlargement. No pharyngeal erythema or exudate. No stridor.  NODES: No cervical, axillary or inguinal lymph node enlargement.  CHEST: Lungs clear to auscultation.  CARDIOVASCULAR: Normal S1, S2. No rubs, murmurs or gallops.  ABDOMEN: Bowel sounds normal. Not distended. Soft. No tenderness or masses.  MUSCULOSKELETAL: No palpable abnormality  NEUROLOGIC: Cranial Nerves: II-XII grossly intact.  Motor: 5/5 strength major flexors/extensors.  DTR's: Knees, Ankles 2+ and equal bilaterally; downgoing toes.  Sensory: Intact to light touch distally.  Gait & Posture: Normal gait and fine motion. No cerebellar signs.     Impression:Routine health check  Plan:Lab eval  Rec diet and ex recs  Rev age appropriate screenings    Health Maintenance Due   Topic Date Due    Colonoscopy  06/09/2019    Pneumococcal Vaccine (65+ Low/Medium Risk) (2 of 2 - PPSV23) 07/24/2019    Lipid Panel  07/24/2019   NOTE: Patient had a normal colonoscopy 2014. No personal or family history of polyps or colon cancer and should remain on a 10 year screening protocol. Next due 2024

## 2019-10-15 ENCOUNTER — TELEPHONE (OUTPATIENT)
Dept: FAMILY MEDICINE | Facility: CLINIC | Age: 69
End: 2019-10-15

## 2019-10-15 NOTE — TELEPHONE ENCOUNTER
Dr Muro, per Ms Sena, we need documentation in patient office note from you that she is not due for colonoscopy for 10 years before she can update health maintenance

## 2019-10-15 NOTE — TELEPHONE ENCOUNTER
"----- Message from Jaida Baron LPN sent at 10/15/2019  7:10 AM CDT -----  Pepper Cruz,  Pt last colonoscopy was performed on 6/9/14 and Dr. Richey recommended, "Repeat colonoscopy in 5 years for surveillance."  If another recommendation is documented I can change in HM or if a note is place by Dr. Muro I can. Please let me know and I'll change it.    Jaida   ----- Message -----  From: Nancy Patiño LPN  Sent: 10/14/2019   3:40 PM CDT  To: Jaida Baron LPN    Per Dr Muro, colonoscopy says due in 5 years but should be 10 years, please update healthgrades, thank you    "

## 2019-12-05 DIAGNOSIS — E03.9 HYPOTHYROIDISM (ACQUIRED): ICD-10-CM

## 2019-12-05 RX ORDER — LEVOTHYROXINE SODIUM 50 UG/1
TABLET ORAL
Qty: 90 TABLET | Refills: 4 | Status: SHIPPED | OUTPATIENT
Start: 2019-12-05 | End: 2021-02-19

## 2019-12-30 ENCOUNTER — OFFICE VISIT (OUTPATIENT)
Dept: SURGERY | Facility: CLINIC | Age: 69
End: 2019-12-30
Payer: MEDICARE

## 2019-12-30 ENCOUNTER — NUTRITION (OUTPATIENT)
Dept: NUTRITION | Facility: CLINIC | Age: 69
End: 2019-12-30
Payer: MEDICARE

## 2019-12-30 VITALS
HEART RATE: 65 BPM | WEIGHT: 179.88 LBS | DIASTOLIC BLOOD PRESSURE: 84 MMHG | TEMPERATURE: 98 F | HEIGHT: 67 IN | SYSTOLIC BLOOD PRESSURE: 164 MMHG | BODY MASS INDEX: 28.23 KG/M2

## 2019-12-30 DIAGNOSIS — E66.3 OVERWEIGHT (BMI 25.0-29.9): ICD-10-CM

## 2019-12-30 DIAGNOSIS — I10 HYPERTENSION, UNSPECIFIED TYPE: ICD-10-CM

## 2019-12-30 DIAGNOSIS — Z98.84 S/P LAPAROSCOPIC SLEEVE GASTRECTOMY: Primary | ICD-10-CM

## 2019-12-30 DIAGNOSIS — E66.01 SEVERE OBESITY (BMI 35.0-39.9) WITH COMORBIDITY: Chronic | ICD-10-CM

## 2019-12-30 DIAGNOSIS — G47.33 OBSTRUCTIVE SLEEP APNEA: ICD-10-CM

## 2019-12-30 DIAGNOSIS — Z71.3 DIETARY COUNSELING: Primary | ICD-10-CM

## 2019-12-30 PROCEDURE — 3077F PR MOST RECENT SYSTOLIC BLOOD PRESSURE >= 140 MM HG: ICD-10-PCS | Mod: S$GLB,,, | Performed by: SURGERY

## 2019-12-30 PROCEDURE — 1125F AMNT PAIN NOTED PAIN PRSNT: CPT | Mod: S$GLB,,, | Performed by: SURGERY

## 2019-12-30 PROCEDURE — 97802 PR MED NUTR THER, 1ST, INDIV, EA 15 MIN: ICD-10-PCS | Mod: ICN,S$GLB,, | Performed by: DIETITIAN, REGISTERED

## 2019-12-30 PROCEDURE — 1101F PT FALLS ASSESS-DOCD LE1/YR: CPT | Mod: S$GLB,,, | Performed by: SURGERY

## 2019-12-30 PROCEDURE — 1159F MED LIST DOCD IN RCRD: CPT | Mod: S$GLB,,, | Performed by: SURGERY

## 2019-12-30 PROCEDURE — 99999 PR PBB SHADOW E&M-EST. PATIENT-LVL III: CPT | Mod: PBBFAC,,, | Performed by: SURGERY

## 2019-12-30 PROCEDURE — 99213 PR OFFICE/OUTPT VISIT, EST, LEVL III, 20-29 MIN: ICD-10-PCS | Mod: S$GLB,,, | Performed by: SURGERY

## 2019-12-30 PROCEDURE — 99999 PR PBB SHADOW E&M-EST. PATIENT-LVL III: ICD-10-PCS | Mod: PBBFAC,,, | Performed by: SURGERY

## 2019-12-30 PROCEDURE — 1125F PR PAIN SEVERITY QUANTIFIED, PAIN PRESENT: ICD-10-PCS | Mod: S$GLB,,, | Performed by: SURGERY

## 2019-12-30 PROCEDURE — 3077F SYST BP >= 140 MM HG: CPT | Mod: S$GLB,,, | Performed by: SURGERY

## 2019-12-30 PROCEDURE — 3079F PR MOST RECENT DIASTOLIC BLOOD PRESSURE 80-89 MM HG: ICD-10-PCS | Mod: S$GLB,,, | Performed by: SURGERY

## 2019-12-30 PROCEDURE — 1101F PR PT FALLS ASSESS DOC 0-1 FALLS W/OUT INJ PAST YR: ICD-10-PCS | Mod: S$GLB,,, | Performed by: SURGERY

## 2019-12-30 PROCEDURE — 99213 OFFICE O/P EST LOW 20 MIN: CPT | Mod: S$GLB,,, | Performed by: SURGERY

## 2019-12-30 PROCEDURE — 3079F DIAST BP 80-89 MM HG: CPT | Mod: S$GLB,,, | Performed by: SURGERY

## 2019-12-30 PROCEDURE — 97802 MEDICAL NUTRITION INDIV IN: CPT | Mod: ICN,S$GLB,, | Performed by: DIETITIAN, REGISTERED

## 2019-12-30 PROCEDURE — 1159F PR MEDICATION LIST DOCUMENTED IN MEDICAL RECORD: ICD-10-PCS | Mod: S$GLB,,, | Performed by: SURGERY

## 2019-12-30 NOTE — PROGRESS NOTES
NUTRITION NOTE    Referring Physician: Dr. Armstrong  Reason for MNT Referral: Follow-up of Gastric Sleeve      PAST MEDICAL HISTORY:  Denies constipation.  Reports doing well.    Past Medical History:   Diagnosis Date    Arthritis     Atrial fibrillation     Diverticulosis     Fatty liver     General anesthetics causing adverse effect in therapeutic use     mild a-fib during hysteretomy, not on anti-coag    Hypertension     Liver disease     FLORES (nonalcoholic steatohepatitis)     Nightmares     JOSE DE JESUS on CPAP     Thyroid disease        CLINICAL DATA:  69 y.o. female.    There were no vitals filed for this visit.    Current Weight: 179 lbs  BMI: 28.18 kg/m2  Total Weight Loss: 58 lbs  Excess Weight Loss: 58 lbs or 68%    LABS:  Reviewed.    CURRENT DIET:  Bariatric Diet.  Diet Recall: 80 grams of protein/day; 32 oz of fluids/day    Diet Includes:   B: Pure protein/premier protein shake  L: Balance break or salad with protein  S: 2 grapes, 1 strawberry, couple pieces of pineapple  D: 4 oz fish, broccoli, 1/2 swee potato   S: deviled eggs, 5-6 cheese whisps    Meal Pattern: 3 meal(s) + 2 snack(s) + 1 protein supplement(s) (makes her constipated)  Adequate protein supplement intake.  Adequate dairy intake.  Adequate vegetable intake.  Adequate fruit intake.  Starchy CHO: excess due to sweet potato  Other: loves low fat cheese, tends to graze    Discussed meal planning to avoid grazing, using MyFitnessPal to track caloric intake, and using [employer] exercises    EXERCISE:  None.    Restrictions to Exercise: None.    VITAMINS / MINERALS:  Multivitamins: Appropriate  B-Complex: Included with multivitamin.  Calcium Citrate + Vitamin D: Appropriate  Vitamin B12: included    ASSESSMENT:  Doing fairly well overall.  Weight at a standstill.  Excess calorie intake.  Adequate protein intake.  Following diet plan inappropriately.  Not exercising.  Adequate vitamins & minerals.    BARIATRIC DIET DISCUSSION:  Instructed and  provided written materials on bariatric diet plan specific for gastric band.  Reinforced post-op nutrition guidelines.    PLAN / RECOMMENDATIONS:  Decrease calorie intake.  Maintain protein intake.  Adjust diet by Meal planning set meals and tracking through MyFitnessPal.  Begin exercise.  Adjust vitamins & minerals by n/a.  Refill water bottle at lunch time.    Return to clinic in 3 months     SESSION TIME: 30 minutes

## 2019-12-31 NOTE — PROGRESS NOTES
History & Physical    SUBJECTIVE:     History of Present Illness:  Patient is a 69 y.o. female status post robotic sleeve gastrectomy 03/22/2019.  Presents for 9 month follow-up she is doing well with no complaints.  She recently began having epigastric discomfort but unsure if related to foods or something else. She normally has constipation but having loose stools lately. She still takes protein shakes daily but does not adhere to calorie counts as strictly. She recently began working again as an  which she has enjoyed.    Height 5 ft 7 in  Weight 213 ->192 ->179lb  BMI 33.49->30 -> 28    Chief Complaint   Patient presents with    Follow-up       Review of patient's allergies indicates:  No Known Allergies    Current Outpatient Medications   Medication Sig Dispense Refill    acyclovir (ZOVIRAX) 800 MG Tab TAKE 1 TABLET BY MOUTH five times a day for 7 days  0    albuterol 90 mcg/actuation inhaler Inhale 2 puffs into the lungs every 6 (six) hours as needed for Wheezing or Shortness of Breath. Rescue 18 g 1    ALPRAZolam (XANAX) 1 MG tablet Take 1 tablet (1 mg total) by mouth 2 (two) times daily as needed for Anxiety. 10 tablet 0    aspirin (ECOTRIN) 81 MG EC tablet Take 81 mg by mouth once daily.      b complex vitamins tablet Take 1 tablet by mouth once daily.      calcium citrate (CALCITRATE) 200 mg (950 mg) tablet Take 1 tablet by mouth once daily.      calcium-vitamin D3 (CALCIUM 500 + D) 500 mg(1,250mg) -200 unit per tablet Take 1 tablet by mouth 2 (two) times daily with meals.      cyanocobalamin (VITAMIN B-12) 500 MCG tablet Take 500 mcg by mouth once daily.      levothyroxine (SYNTHROID) 50 MCG tablet TAKE 1 TABLET DAILY 90 tablet 4    metoprolol tartrate (LOPRESSOR) 25 MG tablet Take 1 tablet (25 mg total) by mouth nightly. 90 tablet 3    multivitamin-minerals-lutein Tab Take 1 tablet by mouth once daily.      clindamycin (CLEOCIN T) 1 % external solution Apply topically.       fluticasone propionate (FLONASE) 50 mcg/actuation nasal spray 1 spray each nostril twice daily      methylPREDNISolone (MEDROL DOSEPACK) 4 mg tablet As directed (Patient not taking: Reported on 2019) 1 Package 0    pantoprazole (PROTONIX) 40 MG tablet TAKE 1 TABLET DAILY (Patient not taking: Reported on 2019) 90 tablet 4     No current facility-administered medications for this visit.        Past Medical History:   Diagnosis Date    Arthritis     Atrial fibrillation     Diverticulosis     Fatty liver     General anesthetics causing adverse effect in therapeutic use     mild a-fib during hysteretomy, not on anti-coag    Hypertension     Liver disease     FLORES (nonalcoholic steatohepatitis)     Nightmares     JOSE DE JESUS on CPAP     Thyroid disease      Past Surgical History:   Procedure Laterality Date    BREAST BIOPSY      breast tumor       SECTION      x 2    CHOLECYSTECTOMY      COLONOSCOPY  2014    Dr. Richey, repeat in 5 years    ESOPHAGOGASTRODUODENOSCOPY N/A 2018    Procedure: ESOPHAGOGASTRODUODENOSCOPY (EGD);  Surgeon: Efrain Steel MD;  Location: [facility];  Service: Endoscopy;  Laterality: N/A;    HYSTERECTOMY      LAPAROSCOPIC LYSIS OF ADHESIONS N/A 3/22/2019    Procedure: LYSIS, ADHESIONS, LAPAROSCOPIC;  Surgeon: Cole Armstrong MD;  Location: [facility] OR;  Service: General;  Laterality: N/A;    ROBOT-ASSISTED LAPAROSCOPIC SLEEVE GASTRECTOMY USING DA LARRY XI N/A 3/22/2019    Procedure: XI ROBOTIC SLEEVE GASTRECTOMY;  Surgeon: Cole Armstrong MD;  Location: [facility] OR;  Service: General;  Laterality: N/A;    TOTAL REDUCTION MAMMOPLASTY Bilateral     UPPER GASTROINTESTINAL ENDOSCOPY  2015    Dr. Padron     Family History   Problem Relation Age of Onset    Colon cancer Paternal Aunt     COPD Paternal Aunt         colon    Hypertension Father     Crohn's disease Neg Hx     Stomach cancer Neg Hx     Ulcerative colitis Neg Hx     Esophageal cancer  "Neg Hx      Social History     Tobacco Use    Smoking status: Former Smoker     Packs/day: 0.25     Years: 13.00     Pack years: 3.25     Types: Cigarettes     Start date:      Last attempt to quit:      Years since quittin.0    Smokeless tobacco: Never Used   Substance Use Topics    Alcohol use: Yes     Alcohol/week: 1.0 standard drinks     Types: 1 Glasses of wine per week     Comment: socially  No alcohol 72h prior to sx    Drug use: No        Review of Systems:  Review of Systems   Constitutional: Negative for activity change, appetite change, chills, diaphoresis, fatigue, fever and unexpected weight change.   HENT: Negative for congestion, dental problem, rhinorrhea and sore throat.    Eyes: Negative for visual disturbance.   Respiratory: Negative for cough, chest tightness, shortness of breath, wheezing and stridor.    Cardiovascular: Negative for chest pain, palpitations and leg swelling.   Gastrointestinal: Negative for abdominal distention, abdominal pain, constipation, diarrhea, nausea and vomiting.   Endocrine: Negative for cold intolerance, heat intolerance, polydipsia, polyphagia and polyuria.   Genitourinary: Negative for difficulty urinating, dysuria, frequency, hematuria and urgency.   Musculoskeletal: Negative for arthralgias, gait problem, myalgias and neck pain.   Skin: Negative for color change, pallor, rash and wound.   Neurological: Negative for dizziness, syncope, weakness, light-headedness, numbness and headaches.   Hematological: Negative for adenopathy. Does not bruise/bleed easily.   Psychiatric/Behavioral: Negative for confusion, decreased concentration and sleep disturbance. The patient is not nervous/anxious.        OBJECTIVE:     Vital Signs (Most Recent)  Temp: 98.2 °F (36.8 °C) (19 1404)  Pulse: 65 (19 1404)  BP: (!) 164/84 (19 1404)  5' 7" (1.702 m)  81.6 kg (179 lb 14.3 oz)     Physical Exam:  Physical Exam   Constitutional: She is oriented to " person, place, and time. She appears well-developed and well-nourished. No distress.   HENT:   Head: Normocephalic and atraumatic.   Right Ear: External ear normal.   Left Ear: External ear normal.   Eyes: Pupils are equal, round, and reactive to light. Conjunctivae and EOM are normal. No scleral icterus.   Neck: Normal range of motion. Neck supple. No tracheal deviation present. No thyromegaly present.   Cardiovascular: Normal rate, regular rhythm, normal heart sounds and intact distal pulses. Exam reveals no gallop and no friction rub.   No murmur heard.  Pulmonary/Chest: Effort normal and breath sounds normal. No respiratory distress. She has no wheezes. She has no rales. She exhibits no tenderness.   Abdominal: Soft. Bowel sounds are normal. She exhibits no distension. There is no tenderness. No hernia.   Incisions well healed   Musculoskeletal: Normal range of motion. She exhibits no edema, tenderness or deformity.   Lymphadenopathy:     She has no cervical adenopathy.   Neurological: She is alert and oriented to person, place, and time.   Skin: Skin is warm and dry. No rash noted. She is not diaphoretic. No erythema. No pallor.   Psychiatric: She has a normal mood and affect. Her behavior is normal. Judgment and thought content normal.   Vitals reviewed.        FINAL PATHOLOGIC DIAGNOSIS  Greater curvature of stomach, partial gastrectomy:  No significant histologic abnormalities.    ASSESSMENT/PLAN:     69-year-old female status post robotic sleeve gastrectomy    PLAN:Plan     Reinforced bariatric diet and exercise  Nutritional labs  Dietitian appointment  Follow-up in 3 months for 1 year follow up and will check nutritional labs at that time  If abdominal pain continues patient will call.

## 2020-01-16 ENCOUNTER — OFFICE VISIT (OUTPATIENT)
Dept: FAMILY MEDICINE | Facility: CLINIC | Age: 70
End: 2020-01-16
Payer: MEDICARE

## 2020-01-16 VITALS
WEIGHT: 179.63 LBS | HEIGHT: 67 IN | OXYGEN SATURATION: 98 % | RESPIRATION RATE: 15 BRPM | HEART RATE: 66 BPM | SYSTOLIC BLOOD PRESSURE: 107 MMHG | DIASTOLIC BLOOD PRESSURE: 58 MMHG | TEMPERATURE: 98 F | BODY MASS INDEX: 28.19 KG/M2

## 2020-01-16 DIAGNOSIS — Z12.11 SCREENING FOR COLON CANCER: ICD-10-CM

## 2020-01-16 DIAGNOSIS — I48.0 PAROXYSMAL ATRIAL FIBRILLATION: Chronic | ICD-10-CM

## 2020-01-16 DIAGNOSIS — Z23 NEED FOR SHINGLES VACCINE: ICD-10-CM

## 2020-01-16 DIAGNOSIS — Z98.84 S/P LAPAROSCOPIC SLEEVE GASTRECTOMY: Chronic | ICD-10-CM

## 2020-01-16 DIAGNOSIS — M85.89 OSTEOPENIA OF MULTIPLE SITES: ICD-10-CM

## 2020-01-16 DIAGNOSIS — Z13.220 SCREENING FOR LIPID DISORDERS: ICD-10-CM

## 2020-01-16 DIAGNOSIS — E66.3 OVERWEIGHT (BMI 25.0-29.9): ICD-10-CM

## 2020-01-16 DIAGNOSIS — Z28.21 INFLUENZA VACCINATION DECLINED: ICD-10-CM

## 2020-01-16 DIAGNOSIS — Z23 NEED FOR VACCINATION FOR STREP PNEUMONIAE: ICD-10-CM

## 2020-01-16 DIAGNOSIS — F17.211 CIGARETTE NICOTINE DEPENDENCE IN REMISSION: ICD-10-CM

## 2020-01-16 DIAGNOSIS — Z78.0 POSTMENOPAUSAL: ICD-10-CM

## 2020-01-16 DIAGNOSIS — E03.9 ADULT HYPOTHYROIDISM: Chronic | ICD-10-CM

## 2020-01-16 DIAGNOSIS — K57.90 DIVERTICULOSIS: ICD-10-CM

## 2020-01-16 DIAGNOSIS — I10 ESSENTIAL HYPERTENSION: Primary | Chronic | ICD-10-CM

## 2020-01-16 PROBLEM — E66.01 MORBID OBESITY: Status: RESOLVED | Noted: 2018-12-17 | Resolved: 2020-01-16

## 2020-01-16 PROCEDURE — 99214 OFFICE O/P EST MOD 30 MIN: CPT | Mod: 25,S$GLB,, | Performed by: INTERNAL MEDICINE

## 2020-01-16 PROCEDURE — G0009 PNEUMOCOCCAL POLYSACCHARIDE VACCINE 23-VALENT =>2YO SQ IM: ICD-10-PCS | Mod: S$GLB,,, | Performed by: INTERNAL MEDICINE

## 2020-01-16 PROCEDURE — 99999 PR PBB SHADOW E&M-EST. PATIENT-LVL III: CPT | Mod: PBBFAC,,, | Performed by: INTERNAL MEDICINE

## 2020-01-16 PROCEDURE — 99999 PR PBB SHADOW E&M-EST. PATIENT-LVL III: ICD-10-PCS | Mod: PBBFAC,,, | Performed by: INTERNAL MEDICINE

## 2020-01-16 PROCEDURE — 90732 PNEUMOCOCCAL POLYSACCHARIDE VACCINE 23-VALENT =>2YO SQ IM: ICD-10-PCS | Mod: S$GLB,,, | Performed by: INTERNAL MEDICINE

## 2020-01-16 PROCEDURE — 90732 PPSV23 VACC 2 YRS+ SUBQ/IM: CPT | Mod: S$GLB,,, | Performed by: INTERNAL MEDICINE

## 2020-01-16 PROCEDURE — G0009 ADMIN PNEUMOCOCCAL VACCINE: HCPCS | Mod: S$GLB,,, | Performed by: INTERNAL MEDICINE

## 2020-01-16 PROCEDURE — 99214 PR OFFICE/OUTPT VISIT, EST, LEVL IV, 30-39 MIN: ICD-10-PCS | Mod: 25,S$GLB,, | Performed by: INTERNAL MEDICINE

## 2020-01-16 RX ORDER — HYDROCHLOROTHIAZIDE 12.5 MG/1
12.5 TABLET ORAL DAILY
COMMUNITY
End: 2020-04-28

## 2020-01-16 NOTE — PROGRESS NOTES
Subjective:      Patient ID: Zora Davis is a 69 y.o. female.    Chief Complaint: Hypothyroidism    HPI     69F here to transition care from Dr. Muro and discuss hypertension and other chronic health conditions.     She is s/p gastric sleeve 3/22/2019 by Dr. Armstrong. She follows with him every 3 months.  Her weight today is 179 lb.  She has been working hard to eat healthy and drink more water.  Her blood pressure notably today is 107/58.  We discussed that we may need to further down titrate her blood pressure medication.  Encourage hydration, which has been a struggle for her.  She is due for colonoscopy.  She would like to have repeat done with her last provider.  Order placed.  Shingles prescription sent to pharmacy.  Pneumovax given today.  She declines flu shot today, but is willing to revisit at her follow-up appointment.  We reviewed her laboratory data from July.  There are few studies that she is missing.  These have been ordered.  Medical history was reviewed and updated.  She has paroxysmal AFib and is on aspirin.  Heart rate is well controlled at 66.  Bone density showed osteopenia.  Discussed that we will repeat this in July 2020.  Mammogram is up-to-date.    She does take bariatric vitamins.  No other complaints today.      Depression Patient Health Questionnaire 1/16/2020   Over the last two weeks how often have you been bothered by little interest or pleasure in doing things 0   Over the last two weeks how often have you been bothered by feeling down, depressed or hopeless 0   PHQ-2 Total Score 0         Review of patient's allergies indicates:  No Known Allergies    Current Outpatient Medications:     albuterol 90 mcg/actuation inhaler, Inhale 2 puffs into the lungs every 6 (six) hours as needed for Wheezing or Shortness of Breath. Rescue, Disp: 18 g, Rfl: 1    aspirin (ECOTRIN) 81 MG EC tablet, Take 81 mg by mouth once daily., Disp: , Rfl:     b complex vitamins tablet, Take 1 tablet by  mouth once daily., Disp: , Rfl:     calcium citrate (CALCITRATE) 200 mg (950 mg) tablet, Take 1 tablet by mouth once daily., Disp: , Rfl:     clindamycin (CLEOCIN T) 1 % external solution, Apply topically as needed. , Disp: , Rfl:     cyanocobalamin (VITAMIN B-12) 500 MCG tablet, Take 500 mcg by mouth once daily., Disp: , Rfl:     hydroCHLOROthiazide (HYDRODIURIL) 12.5 MG Tab, Take 12.5 mg by mouth once daily., Disp: , Rfl:     levothyroxine (SYNTHROID) 50 MCG tablet, TAKE 1 TABLET DAILY, Disp: 90 tablet, Rfl: 4    metoprolol tartrate (LOPRESSOR) 25 MG tablet, Take 1 tablet (25 mg total) by mouth nightly., Disp: 90 tablet, Rfl: 3    multivitamin-minerals-lutein Tab, Take 1 tablet by mouth once daily., Disp: , Rfl:     fluticasone propionate (FLONASE) 50 mcg/actuation nasal spray, 1 spray each nostril twice daily, Disp: , Rfl:     Past Medical History:   Diagnosis Date    Adult hypothyroidism 2010    Diverticulosis     Essential hypertension 2013    Fatty liver     JOSE DE JESUS on CPAP     Osteopenia of multiple sites 2020    Paroxysmal atrial fibrillation 3/22/2019    S/P laparoscopic sleeve gastrectomy 3/22/2019     Past Surgical History:   Procedure Laterality Date    BREAST BIOPSY       SECTION      x 2    CHOLECYSTECTOMY      COLONOSCOPY  2014    Dr. Richey, repeat in 5 years    ESOPHAGOGASTRODUODENOSCOPY N/A 2018    Procedure: ESOPHAGOGASTRODUODENOSCOPY (EGD);  Surgeon: Efrain Steel MD;  Location[facility];  Service: Endoscopy;  Laterality: N/A;    HYSTERECTOMY      LAPAROSCOPIC LYSIS OF ADHESIONS N/A 3/22/2019    Procedure: LYSIS, ADHESIONS, LAPAROSCOPIC;  Surgeon: Cole Armstrong MD;  Location: [facility] OR;  Service: General;  Laterality: N/A;    ROBOT-ASSISTED LAPAROSCOPIC SLEEVE GASTRECTOMY USING DA LARRY XI N/A 3/22/2019    Procedure: XI ROBOTIC SLEEVE GASTRECTOMY;  Surgeon: Cole Armstrong MD;  Location: [facility] OR;  Service: General;  Laterality: N/A;     TOTAL REDUCTION MAMMOPLASTY Bilateral     UPPER GASTROINTESTINAL ENDOSCOPY  2015    Dr. Padron     Family History   Problem Relation Age of Onset    Colon cancer Paternal Aunt     COPD Paternal Aunt         colon    Hypertension Father     Crohn's disease Neg Hx     Stomach cancer Neg Hx     Ulcerative colitis Neg Hx     Esophageal cancer Neg Hx      Social History     Socioeconomic History    Marital status:      Spouse name: Not on file    Number of children: 2    Years of education: Not on file    Highest education level: Not on file   Occupational History    Occupation: retired    Social Needs    Financial resource strain: Not on file    Food insecurity:     Worry: Not on file     Inability: Not on file    Transportation needs:     Medical: Not on file     Non-medical: Not on file   Tobacco Use    Smoking status: Former Smoker     Packs/day: 0.25     Years: 13.00     Pack years: 3.25     Types: Cigarettes     Start date:      Last attempt to quit:      Years since quittin.0    Smokeless tobacco: Never Used   Substance and Sexual Activity    Alcohol use: Yes     Alcohol/week: 1.0 standard drinks     Types: 1 Glasses of wine per week     Frequency: Monthly or less     Drinks per session: 1 or 2     Binge frequency: Never    Drug use: No    Sexual activity: Yes     Partners: Male     Birth control/protection: Surgical   Lifestyle    Physical activity:     Days per week: Not on file     Minutes per session: Not on file    Stress: Not on file   Relationships    Social connections:     Talks on phone: Not on file     Gets together: Not on file     Attends Jehovah's witness service: Not on file     Active member of club or organization: Not on file     Attends meetings of clubs or organizations: Not on file     Relationship status: Not on file   Other Topics Concern    Patient feels they ought to cut down on drinking/drug use Not Asked    Patient annoyed by  "others criticizing their drinking/drug use Not Asked    Patient has felt bad or guilty about drinking/drug use Not Asked    Patient has had a drink/used drugs as an eye opener in the AM Not Asked   Social History Narrative    Not on file      Review of Systems   Constitutional: Negative for chills and fever.   HENT: Negative for congestion.    Eyes: Negative for visual disturbance.   Respiratory: Negative for cough, shortness of breath and wheezing.    Cardiovascular: Negative for chest pain and palpitations.   Gastrointestinal: Negative for abdominal pain and nausea.   Endocrine: Negative for cold intolerance and heat intolerance.   Genitourinary: Negative for dysuria.   Musculoskeletal: Negative for back pain and myalgias.   Skin: Negative for rash.   Allergic/Immunologic: Negative for environmental allergies.   Neurological: Negative for dizziness, syncope and headaches.   Hematological: Does not bruise/bleed easily.   Psychiatric/Behavioral: Negative for dysphoric mood and sleep disturbance.         Objective:     Body mass index is 28.13 kg/m².  BP (!) 107/58   Pulse 66   Temp 97.7 °F (36.5 °C) (Oral)   Resp 15   Ht 5' 7" (1.702 m)   Wt 81.5 kg (179 lb 9.6 oz)   SpO2 98%   BMI 28.13 kg/m²       Physical Exam   Constitutional: She is oriented to person, place, and time. She appears well-developed and well-nourished. No distress.   HENT:   Head: Normocephalic.   Mouth/Throat: Oropharynx is clear and moist.   Eyes: Conjunctivae are normal.   Cardiovascular: Normal rate, regular rhythm, normal heart sounds and intact distal pulses.   No murmur heard.  Pulmonary/Chest: Effort normal and breath sounds normal.   Abdominal: Soft. Bowel sounds are normal.   Musculoskeletal: She exhibits no tenderness.   Lymphadenopathy:     She has no cervical adenopathy.   Neurological: She is alert and oriented to person, place, and time. No sensory deficit.   Skin: Skin is warm and dry. Capillary refill takes 2 to 3 " seconds. She is not diaphoretic.   Psychiatric: She has a normal mood and affect. Her behavior is normal.   Nursing note and vitals reviewed.      No visits with results within 6 Month(s) from this visit.   Latest known visit with results is:   Lab Visit on 07/09/2019   Component Date Value Ref Range Status    Vit D, 25-Hydroxy 07/09/2019 53  30 - 96 ng/mL Final    Comment: Vitamin D deficiency.........<10 ng/mL                              Vitamin D insufficiency......10-29 ng/mL       Vitamin D sufficiency........> or equal to 30 ng/mL  Vitamin D toxicity............>100 ng/mL      Iron 07/09/2019 40  30 - 160 ug/dL Final    Transferrin 07/09/2019 242  200 - 375 mg/dL Final    TIBC 07/09/2019 358  250 - 450 ug/dL Final    Saturated Iron 07/09/2019 11* 20 - 50 % Final    Sodium 07/09/2019 140  136 - 145 mmol/L Final    Potassium 07/09/2019 3.8  3.5 - 5.1 mmol/L Final    Chloride 07/09/2019 105  95 - 110 mmol/L Final    CO2 07/09/2019 26  23 - 29 mmol/L Final    Glucose 07/09/2019 95  70 - 110 mg/dL Final    BUN, Bld 07/09/2019 17  8 - 23 mg/dL Final    Creatinine 07/09/2019 0.8  0.5 - 1.4 mg/dL Final    Calcium 07/09/2019 9.7  8.7 - 10.5 mg/dL Final    Total Protein 07/09/2019 7.6  6.0 - 8.4 g/dL Final    Albumin 07/09/2019 3.5  3.5 - 5.2 g/dL Final    Total Bilirubin 07/09/2019 0.4  0.1 - 1.0 mg/dL Final    Comment: For infants and newborns, interpretation of results should be based  on gestational age, weight and in agreement with clinical  observations.  Premature Infant recommended reference ranges:  Up to 24 hours.............<8.0 mg/dL  Up to 48 hours............<12.0 mg/dL  3-5 days..................<15.0 mg/dL  6-29 days.................<15.0 mg/dL      Alkaline Phosphatase 07/09/2019 50* 55 - 135 U/L Final    AST 07/09/2019 18  10 - 40 U/L Final    ALT 07/09/2019 12  10 - 44 U/L Final    Anion Gap 07/09/2019 9  8 - 16 mmol/L Final    eGFR if African American 07/09/2019 >60.0  >60  mL/min/1.73 m^2 Final    eGFR if non African American 07/09/2019 >60.0  >60 mL/min/1.73 m^2 Final    Comment: Calculation used to obtain the estimated glomerular filtration  rate (eGFR) is the CKD-EPI equation.       WBC 07/09/2019 7.96  3.90 - 12.70 K/uL Final    RBC 07/09/2019 4.64  4.00 - 5.40 M/uL Final    Hemoglobin 07/09/2019 13.2  12.0 - 16.0 g/dL Final    Hematocrit 07/09/2019 41.5  37.0 - 48.5 % Final    Mean Corpuscular Volume 07/09/2019 89  82 - 98 fL Final    Mean Corpuscular Hemoglobin 07/09/2019 28.4  27.0 - 31.0 pg Final    Mean Corpuscular Hemoglobin Conc 07/09/2019 31.8* 32.0 - 36.0 g/dL Final    RDW 07/09/2019 15.0* 11.5 - 14.5 % Final    Platelets 07/09/2019 239  150 - 350 K/uL Final    MPV 07/09/2019 10.9  9.2 - 12.9 fL Final    Immature Granulocytes 07/09/2019 0.1  0.0 - 0.5 % Final    Gran # (ANC) 07/09/2019 4.2  1.8 - 7.7 K/uL Final    Immature Grans (Abs) 07/09/2019 0.01  0.00 - 0.04 K/uL Final    Comment: Mild elevation in immature granulocytes is non specific and   can be seen in a variety of conditions including stress response,   acute inflammation, trauma and pregnancy. Correlation with other   laboratory and clinical findings is essential.      Lymph # 07/09/2019 2.9  1.0 - 4.8 K/uL Final    Mono # 07/09/2019 0.6  0.3 - 1.0 K/uL Final    Eos # 07/09/2019 0.1  0.0 - 0.5 K/uL Final    Baso # 07/09/2019 0.05  0.00 - 0.20 K/uL Final    nRBC 07/09/2019 0  0 /100 WBC Final    Gran% 07/09/2019 53.3  38.0 - 73.0 % Final    Lymph% 07/09/2019 36.6  18.0 - 48.0 % Final    Mono% 07/09/2019 8.0  4.0 - 15.0 % Final    Eosinophil% 07/09/2019 1.4  0.0 - 8.0 % Final    Basophil% 07/09/2019 0.6  0.0 - 1.9 % Final    Differential Method 07/09/2019 Automated   Final    Prealbumin 07/09/2019 14* 20 - 43 mg/dL Final    Hemoglobin A1C 07/09/2019 5.2  4.0 - 5.6 % Final    Comment: ADA Screening Guidelines:  5.7-6.4%  Consistent with prediabetes  >or=6.5%  Consistent with  diabetes  High levels of fetal hemoglobin interfere with the HbA1C  assay. Heterozygous hemoglobin variants (HbS, HgC, etc)do  not significantly interfere with this assay.   However, presence of multiple variants may affect accuracy.      Estimated Avg Glucose 07/09/2019 103  68 - 131 mg/dL Final    Free T4 07/09/2019 1.11  0.71 - 1.51 ng/dL Final    TSH 07/09/2019 2.720  0.400 - 4.000 uIU/mL Final    Thiamine 07/09/2019 63  38 - 122 ug/L Final    Comment: This test was developed and the performance   characteristics determined by [facility].   It has not been cleared or approved by the FDA.   The laboratory is regulated under CLIA as qualified to   perform high-complexity testing. This test is used for   patient testing purposes. It should not be regarded   as investigational or for research.  Test performed at [facility],  [address] , [place]  [address]     [phone]  Bryce Duarte MD  - Medical Director       No results found in the last 24 hours.     Assessment:     Encounter Diagnoses   Name Primary?    Essential hypertension Yes    Need for shingles vaccine     Need for vaccination for Strep pneumoniae     Overweight (BMI 25.0-29.9)     Screening for colon cancer     Diverticulosis     Paroxysmal atrial fibrillation     Adult hypothyroidism     S/P laparoscopic sleeve gastrectomy     Screening for lipid disorders     Postmenopausal     Influenza vaccination declined     Osteopenia of multiple sites     Cigarette nicotine dependence in remission         Plan:     #Osteopenia  #s/p JANEEN  -DEXA 7/26/18: osteopenia  t-1  -Repeat 8/2020  -7/9/19: Vit D 53    #Hepatitic steatosis   -noted on CT a/p    #JOSE DE JESUS, resolved  -resolved with weight loss    #pAfib  -well controlled on metoprolol 25 mg daily + asa 81 mg daily    #Hypothyroidism   -TSH normal on 7/9/19- 2.72  -continue synthroid 50 mcg daily    #Nicotine dependence in remission  -[id number]. 3.25  ppd    #Essential HTN  Well controlled. Today 107/58. Higher at home  -continue hctz 12.5 mg daily  -aim for goal 120/80    #S/p gastric sleeve- Overweight with BMI 28.13  -Sleeve done 3/22/2019  -Labs every 6 months-1 year ordered by Dr. Armstrong (reviewed from 19)  -Follow up in March   -Weight loss: 65 pounds (213 --> today, 179)  -iron, b12, mvi, ca, b complex   -19: ha1c 5.2%, b1 level 63, cbc: rdw 15, cmp normal (gfr >60, cr 0.8), iron sat low 11    #HCM  -Shingrix sent to pharmacy   -Prevnar 18  -Pneumovax today, 20  -Discuss flu and TDAP at next visit   -Mammogram 10/9/19: BIRADS 1. Repeat in 1 year  -Colonoscopy 14: diverticulosis. Repeat in 5 years. Ordered. Requests Dr. Richey.  -Cervical ca screenin14: negative. No further need.   -Hep C screening negative 17    Lipid, vit D, iron/tibc, ferritin, microalbumin/cr ratio ordered.    Has mychart. Return in 6 months.     Chen Davenport M.D.    Orders Placed This Encounter   Procedures    Pneumococcal Polysaccharide Vaccine (23 Valent) (SQ/IM)    Ferritin    Microalbumin/creatinine urine ratio    Lipid panel    Vitamin D      Medications Ordered This Encounter   Medications    varicella-zoster [id number], PF, (SHINGRIX, PF,) 50 mcg/0.5 mL injection     Sig: Inject 0.5 mLs into the muscle once. for 1 dose     Dispense:  0.5 mL     Refill:  0

## 2020-01-17 ENCOUNTER — TELEPHONE (OUTPATIENT)
Dept: ENDOSCOPY | Facility: HOSPITAL | Age: 70
End: 2020-01-17

## 2020-01-17 ENCOUNTER — LAB VISIT (OUTPATIENT)
Dept: LAB | Facility: HOSPITAL | Age: 70
End: 2020-01-17
Attending: INTERNAL MEDICINE
Payer: MEDICARE

## 2020-01-17 DIAGNOSIS — Z13.220 SCREENING FOR LIPID DISORDERS: ICD-10-CM

## 2020-01-17 DIAGNOSIS — Z98.84 S/P LAPAROSCOPIC SLEEVE GASTRECTOMY: Chronic | ICD-10-CM

## 2020-01-17 DIAGNOSIS — Z78.0 POSTMENOPAUSAL: ICD-10-CM

## 2020-01-17 PROBLEM — F17.211 CIGARETTE NICOTINE DEPENDENCE IN REMISSION: Chronic | Status: ACTIVE | Noted: 2020-01-17

## 2020-01-17 PROBLEM — M85.89 OSTEOPENIA OF MULTIPLE SITES: Chronic | Status: ACTIVE | Noted: 2020-01-17

## 2020-01-17 PROBLEM — M85.89 OSTEOPENIA OF MULTIPLE SITES: Status: ACTIVE | Noted: 2020-01-17

## 2020-01-17 PROBLEM — F17.211 CIGARETTE NICOTINE DEPENDENCE IN REMISSION: Status: ACTIVE | Noted: 2020-01-17

## 2020-01-17 LAB
CHOLEST SERPL-MCNC: 189 MG/DL (ref 120–199)
CHOLEST/HDLC SERPL: 3.3 {RATIO} (ref 2–5)
FERRITIN SERPL-MCNC: 199 NG/ML (ref 20–300)
HDLC SERPL-MCNC: 58 MG/DL (ref 40–75)
HDLC SERPL: 30.7 % (ref 20–50)
LDLC SERPL CALC-MCNC: 109.4 MG/DL (ref 63–159)
NONHDLC SERPL-MCNC: 131 MG/DL
TRIGL SERPL-MCNC: 108 MG/DL (ref 30–150)

## 2020-01-17 PROCEDURE — 80061 LIPID PANEL: CPT

## 2020-01-17 PROCEDURE — 82306 VITAMIN D 25 HYDROXY: CPT

## 2020-01-17 PROCEDURE — 36415 COLL VENOUS BLD VENIPUNCTURE: CPT | Mod: PO

## 2020-01-17 PROCEDURE — 82728 ASSAY OF FERRITIN: CPT

## 2020-01-17 RX ORDER — SODIUM, POTASSIUM,MAG SULFATES 17.5-3.13G
1 SOLUTION, RECONSTITUTED, ORAL ORAL DAILY
Qty: 1 KIT | Refills: 0 | Status: SHIPPED | OUTPATIENT
Start: 2020-01-17 | End: 2020-01-19

## 2020-01-17 NOTE — TELEPHONE ENCOUNTER

## 2020-01-18 LAB — 25(OH)D3+25(OH)D2 SERPL-MCNC: 45 NG/ML (ref 30–96)

## 2020-01-27 ENCOUNTER — LAB VISIT (OUTPATIENT)
Dept: LAB | Facility: HOSPITAL | Age: 70
End: 2020-01-27
Attending: INTERNAL MEDICINE
Payer: MEDICARE

## 2020-01-27 ENCOUNTER — OFFICE VISIT (OUTPATIENT)
Dept: FAMILY MEDICINE | Facility: CLINIC | Age: 70
End: 2020-01-27
Payer: MEDICARE

## 2020-01-27 VITALS
SYSTOLIC BLOOD PRESSURE: 137 MMHG | TEMPERATURE: 98 F | BODY MASS INDEX: 28.09 KG/M2 | WEIGHT: 179 LBS | DIASTOLIC BLOOD PRESSURE: 72 MMHG | HEART RATE: 67 BPM | HEIGHT: 67 IN

## 2020-01-27 DIAGNOSIS — Z13.89 SCREENING FOR HEMATURIA OR PROTEINURIA: ICD-10-CM

## 2020-01-27 DIAGNOSIS — M62.830 LUMBAR PARASPINAL MUSCLE SPASM: Primary | ICD-10-CM

## 2020-01-27 DIAGNOSIS — E66.3 OVERWEIGHT (BMI 25.0-29.9): ICD-10-CM

## 2020-01-27 DIAGNOSIS — I10 ESSENTIAL HYPERTENSION: Chronic | ICD-10-CM

## 2020-01-27 LAB
ALBUMIN/CREAT UR: NORMAL UG/MG (ref 0–30)
CREAT UR-MCNC: 52 MG/DL (ref 15–325)
MICROALBUMIN UR DL<=1MG/L-MCNC: <2.5 UG/ML

## 2020-01-27 PROCEDURE — 99999 PR PBB SHADOW E&M-EST. PATIENT-LVL III: ICD-10-PCS | Mod: PBBFAC,,, | Performed by: INTERNAL MEDICINE

## 2020-01-27 PROCEDURE — 99213 PR OFFICE/OUTPT VISIT, EST, LEVL III, 20-29 MIN: ICD-10-PCS | Mod: S$GLB,,, | Performed by: INTERNAL MEDICINE

## 2020-01-27 PROCEDURE — 82043 UR ALBUMIN QUANTITATIVE: CPT

## 2020-01-27 PROCEDURE — 99999 PR PBB SHADOW E&M-EST. PATIENT-LVL III: CPT | Mod: PBBFAC,,, | Performed by: INTERNAL MEDICINE

## 2020-01-27 PROCEDURE — 99213 OFFICE O/P EST LOW 20 MIN: CPT | Mod: S$GLB,,, | Performed by: INTERNAL MEDICINE

## 2020-01-27 RX ORDER — CYCLOBENZAPRINE HCL 10 MG
10 TABLET ORAL 2 TIMES DAILY PRN
Qty: 20 TABLET | Refills: 0 | Status: SHIPPED | OUTPATIENT
Start: 2020-01-27 | End: 2020-02-06

## 2020-01-27 NOTE — PROGRESS NOTES
Subjective:      Patient ID: Zora Davis is a 69 y.o. female.    Chief Complaint: Back Pain    HPI     69F here for mid back pain since Thursday. Radiating toward the right. Taking tylenol arthritis since Friday, which lasts about 4 hours. Improved with sitting upright. Worse with sitting and laying down. She does recall straining while bowel movement 2 days prior. She suffers with constipation since the gastric surgery. No trauma. No lower extremity weakness or sciatica. No new exercise regimen. This has occurred in the past with lifting, which resolved with prednisone. No upper extremity weakness. No skin rashes. Borderline osteopenia on last check. No bowel/bladder incontinence or weakness. No falls.    Discussed need for urine microalbumin that was not collected with last labs.     Review of patient's allergies indicates:  No Known Allergies    Current Outpatient Medications:     albuterol 90 mcg/actuation inhaler, Inhale 2 puffs into the lungs every 6 (six) hours as needed for Wheezing or Shortness of Breath. Rescue, Disp: 18 g, Rfl: 1    aspirin (ECOTRIN) 81 MG EC tablet, Take 81 mg by mouth once daily., Disp: , Rfl:     b complex vitamins tablet, Take 1 tablet by mouth once daily., Disp: , Rfl:     calcium citrate (CALCITRATE) 200 mg (950 mg) tablet, Take 1 tablet by mouth once daily., Disp: , Rfl:     clindamycin (CLEOCIN T) 1 % external solution, Apply topically as needed. , Disp: , Rfl:     cyanocobalamin (VITAMIN B-12) 500 MCG tablet, Take 500 mcg by mouth once daily., Disp: , Rfl:     fluticasone propionate (FLONASE) 50 mcg/actuation nasal spray, 1 spray each nostril twice daily, Disp: , Rfl:     hydroCHLOROthiazide (HYDRODIURIL) 12.5 MG Tab, Take 12.5 mg by mouth once daily., Disp: , Rfl:     levothyroxine (SYNTHROID) 50 MCG tablet, TAKE 1 TABLET DAILY, Disp: 90 tablet, Rfl: 4    metoprolol tartrate (LOPRESSOR) 25 MG tablet, Take 1 tablet (25 mg total) by mouth nightly., Disp: 90  tablet, Rfl: 3    multivitamin-minerals-lutein Tab, Take 1 tablet by mouth once daily., Disp: , Rfl:     cyclobenzaprine (FLEXERIL) 10 MG tablet, Take 1 tablet (10 mg total) by mouth 2 (two) times daily as needed for Muscle spasms (no driving. causes sedation.)., Disp: 20 tablet, Rfl: 0    Past Medical History:   Diagnosis Date    Adult hypothyroidism 2010    Diverticulosis     Essential hypertension 2013    Fatty liver     JOSE DE JESUS on CPAP     Osteopenia of multiple sites 2020    Paroxysmal atrial fibrillation 3/22/2019    S/P laparoscopic sleeve gastrectomy 3/22/2019     Past Surgical History:   Procedure Laterality Date    BREAST BIOPSY       SECTION      x 2    CHOLECYSTECTOMY      COLONOSCOPY  2014    Dr. Richey, repeat in 5 years    ESOPHAGOGASTRODUODENOSCOPY N/A 2018    Procedure: ESOPHAGOGASTRODUODENOSCOPY (EGD);  Surgeon: Efrain Steel MD;  [facility];  Service: Endoscopy;  Laterality: N/A;    HYSTERECTOMY      LAPAROSCOPIC LYSIS OF ADHESIONS N/A 3/22/2019    Procedure: LYSIS, ADHESIONS, LAPAROSCOPIC;  Surgeon: Cole Armstrong MD;  Location: [facility] OR;  Service: General;  Laterality: N/A;    ROBOT-ASSISTED LAPAROSCOPIC SLEEVE GASTRECTOMY USING DA LARRY XI N/A 3/22/2019    Procedure: XI ROBOTIC SLEEVE GASTRECTOMY;  Surgeon: Cole Armstrong MD;  Location: [facility] OR;  Service: General;  Laterality: N/A;    TOTAL REDUCTION MAMMOPLASTY Bilateral     UPPER GASTROINTESTINAL ENDOSCOPY  2015    Dr. Padron     Family History   Problem Relation Age of Onset    Colon cancer Paternal Aunt     COPD Paternal Aunt         colon    Hypertension Father     Crohn's disease Neg Hx     Stomach cancer Neg Hx     Ulcerative colitis Neg Hx     Esophageal cancer Neg Hx      Social History     Socioeconomic History    Marital status:      Spouse name: Not on file    Number of children: 2    Years of education: Not on file    Highest education level: Not  on file   Occupational History    Occupation: retired    Social Needs    Financial resource strain: Not on file    Food insecurity:     Worry: Not on file     Inability: Not on file    Transportation needs:     Medical: Not on file     Non-medical: Not on file   Tobacco Use    Smoking status: Former Smoker     Packs/day: 0.25     Years: 13.00     Pack years: 3.25     Types: Cigarettes     Start date:      Last attempt to quit:      Years since quittin.0    Smokeless tobacco: Never Used   Substance and Sexual Activity    Alcohol use: Yes     Alcohol/week: 1.0 standard drinks     Types: 1 Glasses of wine per week     Frequency: Monthly or less     Drinks per session: 1 or 2     Binge frequency: Never    Drug use: No    Sexual activity: Yes     Partners: Male     Birth control/protection: Surgical   Lifestyle    Physical activity:     Days per week: Not on file     Minutes per session: Not on file    Stress: Not on file   Relationships    Social connections:     Talks on phone: Not on file     Gets together: Not on file     Attends L[employer] day service: Not on file     Active member of club or organization: Not on file     Attends meetings of clubs or organizations: Not on file     Relationship status: Not on file   Other Topics Concern    Patient feels they ought to cut down on drinking/drug use Not Asked    Patient annoyed by others criticizing their drinking/drug use Not Asked    Patient has felt bad or guilty about drinking/drug use Not Asked    Patient has had a drink/used drugs as an eye opener in the AM Not Asked   Social History Narrative    Not on file      Review of Systems   Constitutional: Negative for chills, fatigue and fever.   HENT: Negative for congestion.    Eyes: Negative for visual disturbance.   Respiratory: Negative for cough, shortness of breath and wheezing.    Cardiovascular: Negative for chest pain and palpitations.   Gastrointestinal: Negative for  "abdominal pain, diarrhea, nausea and vomiting.        No incontience   Endocrine: Negative for cold intolerance and heat intolerance.   Genitourinary: Negative for dysuria.        No incontinence    Musculoskeletal: Positive for back pain and myalgias.   Skin: Negative for rash.   Allergic/Immunologic: Negative for environmental allergies.   Neurological: Negative for dizziness, syncope, weakness, numbness and headaches.   Hematological: Negative for adenopathy. Does not bruise/bleed easily.   Psychiatric/Behavioral: Negative for dysphoric mood and sleep disturbance. The patient is not nervous/anxious.          Objective:     Body mass index is 28.04 kg/m².  /72   Pulse 67   Temp 98.2 °F (36.8 °C)   Ht 5' 7" (1.702 m)   Wt 81.2 kg (179 lb)   BMI 28.04 kg/m²       Physical Exam   Constitutional: She is oriented to person, place, and time. She appears well-developed and well-nourished. No distress.   HENT:   Head: Normocephalic and atraumatic.   Mouth/Throat: Oropharynx is clear and moist.   Eyes: Conjunctivae are normal.   Cardiovascular: Normal rate, regular rhythm, normal heart sounds and intact distal pulses.   No murmur heard.  Pulmonary/Chest: Effort normal and breath sounds normal.   Abdominal: Soft. Bowel sounds are normal.   Musculoskeletal: She exhibits tenderness (over lumbar paraspinal muscles on the right. no pain over percussin of midline spinous processes).   Lymphadenopathy:     She has no cervical adenopathy.   Neurological: She is alert and oriented to person, place, and time. She displays normal reflexes. No sensory deficit. She exhibits normal muscle tone.   5/5 strength in BUE and BLE. Reflexes and sensation intact throughout. Pain with lifting leg up on right over paraspinal region. Palpable spasm on right.   Skin: Skin is warm and dry. Capillary refill takes 2 to 3 seconds. No rash noted. She is not diaphoretic.   Psychiatric: She has a normal mood and affect. Her behavior is normal. "   Nursing note and vitals reviewed.      Lab Visit on 01/17/2020   Component Date Value Ref Range Status    Ferritin 01/17/2020 199  20.0 - 300.0 ng/mL Final    Cholesterol 01/17/2020 189  120 - 199 mg/dL Final    Comment: The National Cholesterol Education Program (NCEP) has set the  following guidelines (reference ranges) for Cholesterol:  Optimal.....................<200 mg/dL  Borderline High.............200-239 mg/dL  High........................> or = 240 mg/dL      Triglycerides 01/17/2020 108  30 - 150 mg/dL Final    Comment: The National Cholesterol Education Program (NCEP) has set the  following guidelines (reference values) for triglycerides:  Normal......................<150 mg/dL  Borderline High.............150-199 mg/dL  High........................200-499 mg/dL      HDL 01/17/2020 58  40 - 75 mg/dL Final    Comment: The National Cholesterol Education Program (NCEP) has set the  following guidelines (reference values) for HDL Cholesterol:  Low...............<40 mg/dL  Optimal...........>60 mg/dL      LDL Cholesterol 01/17/2020 109.4  63.0 - 159.0 mg/dL Final    Comment: The National Cholesterol Education Program (NCEP) has set the  following guidelines (reference values) for LDL Cholesterol:  Optimal.......................<130 mg/dL  Borderline High...............130-159 mg/dL  High..........................160-189 mg/dL  Very High.....................>190 mg/dL      Hdl/Cholesterol Ratio 01/17/2020 30.7  20.0 - 50.0 % Final    Total Cholesterol/HDL Ratio 01/17/2020 3.3  2.0 - 5.0 Final    Non-HDL Cholesterol 01/17/2020 131  mg/dL Final    Comment: Risk category and Non-HDL cholesterol goals:  Coronary heart disease (CHD)or equivalent (10-year risk of CHD >20%):  Non-HDL cholesterol goal     <130 mg/dL  Two or more CHD risk factors and 10-year risk of CHD <= 20%:  Non-HDL cholesterol goal     <160 mg/dL  0 to 1 CHD risk factor:  Non-HDL cholesterol goal     <190 mg/dL      Vit D, 25-Hydroxy  01/17/2020 45  30 - 96 ng/mL Final    Comment: Vitamin D deficiency.........<10 ng/mL                              Vitamin D insufficiency......10-29 ng/mL       Vitamin D sufficiency........> or equal to 30 ng/mL  Vitamin D toxicity............>100 ng/mL       No results found in the last 24 hours.     Assessment:     Encounter Diagnoses   Name Primary?    Lumbar paraspinal muscle spasm Yes    Overweight (BMI 25.0-29.9)     Essential hypertension     Screening for hematuria or proteinuria         Plan:     #Right lumbar paraspinal muscle spasm  -continue biofreeze, salon paus recommended  -may continue tylenol, but can rotate temporarily with ibuprofen/aleve (limit with sleeve surgery)  -flexeril 10 mg BID PRN- no driving. Sedation discussed. #20. Cut in 1/2 first.   -no red flags on exam.   -recommended stretching, heating pad.   -no improvement send [employer] message.    #Osteopenia  #s/p JANEEN  -DEXA 7/26/18: osteopenia  t-1  -Repeat 8/2020  -7/9/19: Vit D 53 --> 45 on 1/17/20    #Hepatitic steatosis   -noted on CT a/p    #JOSE DE JESUS, resolved  -resolved with weight loss    #pAfib  -well controlled on metoprolol 25 mg daily + asa 81 mg daily    #Hypothyroidism   -TSH normal on 7/9/19- 2.72  -continue synthroid 50 mcg daily    #Nicotine dependence in remission  -7518-1215. 3.25 ppd    #Essential HTN  Well controlled.   -continue hctz 12.5 mg daily  -aim for goal 120/80  -microalbumin/cr ratio ordered.    #S/p gastric sleeve- Overweight with BMI 28.04  -Sleeve done 3/22/2019  -Labs every 6 months-1 year ordered by Dr. Armstrong (reviewed from 7/9/19)  -Follow up in March   -Weight loss: 65 pounds (213 --> today, 179)  -iron, b12, mvi, ca, b complex   -7/9/19: ha1c 5.2%, b1 level 63, cbc: rdw 15, cmp normal (gfr >60, cr 0.8), iron sat low 11. Ferritin 199 on 1/17/20    #HCM  -Shingrix sent to pharmacy   -Prevnar 7/24/18. Pneumovax 1/16/20  -Discuss flu at next visit.   -Tetanus done 10/3/11?  -Mammogram 10/9/19: BIRADS 1.  Repeat in 1 year  -Colonoscopy 14: diverticulosis. Repeat in 5 years. Ordered. Requests Dr. Richey.  -Cervical ca screenin14: negative. No further need.   -Hep C screening negative 17  -Lipid panel 20: chol 189, tri 108, hdl 58, ldl 109    Has mychart. Return in 6 months.     Chen Davenport M.D.    Orders Placed This Encounter   Procedures    Microalbumin/creatinine urine ratio      Medications Ordered This Encounter   Medications    cyclobenzaprine (FLEXERIL) 10 MG tablet     Sig: Take 1 tablet (10 mg total) by mouth 2 (two) times daily as needed for Muscle spasms (no driving. causes sedation.).     Dispense:  20 tablet     Refill:  0

## 2020-01-30 ENCOUNTER — PATIENT MESSAGE (OUTPATIENT)
Dept: FAMILY MEDICINE | Facility: CLINIC | Age: 70
End: 2020-01-30

## 2020-01-30 DIAGNOSIS — M62.830 LUMBAR PARASPINAL MUSCLE SPASM: Primary | ICD-10-CM

## 2020-01-30 DIAGNOSIS — M54.50 ACUTE BILATERAL LOW BACK PAIN WITHOUT SCIATICA: ICD-10-CM

## 2020-01-31 ENCOUNTER — HOSPITAL ENCOUNTER (OUTPATIENT)
Dept: RADIOLOGY | Facility: HOSPITAL | Age: 70
Discharge: HOME | End: 2020-01-31
Attending: INTERNAL MEDICINE
Payer: MEDICARE

## 2020-01-31 ENCOUNTER — TELEPHONE (OUTPATIENT)
Dept: FAMILY MEDICINE | Facility: CLINIC | Age: 70
End: 2020-01-31

## 2020-01-31 DIAGNOSIS — M62.830 LUMBAR PARASPINAL MUSCLE SPASM: ICD-10-CM

## 2020-01-31 DIAGNOSIS — M54.50 ACUTE BILATERAL LOW BACK PAIN WITHOUT SCIATICA: ICD-10-CM

## 2020-01-31 DIAGNOSIS — M47.816 LUMBAR FACET ARTHROPATHY: Primary | ICD-10-CM

## 2020-01-31 PROCEDURE — 72110 XR LUMBAR SPINE COMPLETE 5 VIEW: ICD-10-PCS | Mod: 26,,, | Performed by: RADIOLOGY

## 2020-01-31 PROCEDURE — 72110 X-RAY EXAM L-2 SPINE 4/>VWS: CPT | Mod: 26,,, | Performed by: RADIOLOGY

## 2020-01-31 PROCEDURE — 72110 X-RAY EXAM L-2 SPINE 4/>VWS: CPT | Mod: TC,PO

## 2020-02-03 ENCOUNTER — PATIENT MESSAGE (OUTPATIENT)
Dept: FAMILY MEDICINE | Facility: CLINIC | Age: 70
End: 2020-02-03

## 2020-02-05 ENCOUNTER — HOSPITAL ENCOUNTER (OUTPATIENT)
Dept: RADIOLOGY | Facility: HOSPITAL | Age: 70
Discharge: HOME | End: 2020-02-05
Attending: NURSE PRACTITIONER
Payer: MEDICARE

## 2020-02-05 ENCOUNTER — OFFICE VISIT (OUTPATIENT)
Dept: FAMILY MEDICINE | Facility: CLINIC | Age: 70
End: 2020-02-05
Payer: MEDICARE

## 2020-02-05 VITALS
WEIGHT: 182 LBS | TEMPERATURE: 98 F | HEART RATE: 65 BPM | SYSTOLIC BLOOD PRESSURE: 107 MMHG | BODY MASS INDEX: 28.56 KG/M2 | DIASTOLIC BLOOD PRESSURE: 62 MMHG | HEIGHT: 67 IN

## 2020-02-05 DIAGNOSIS — R10.30 LOWER ABDOMINAL PAIN: ICD-10-CM

## 2020-02-05 DIAGNOSIS — K59.00 CONSTIPATION, UNSPECIFIED CONSTIPATION TYPE: Primary | ICD-10-CM

## 2020-02-05 DIAGNOSIS — K59.00 CONSTIPATION, UNSPECIFIED CONSTIPATION TYPE: ICD-10-CM

## 2020-02-05 DIAGNOSIS — M54.50 ACUTE RIGHT-SIDED LOW BACK PAIN WITHOUT SCIATICA: ICD-10-CM

## 2020-02-05 LAB
BILIRUB UR QL STRIP: NEGATIVE
CLARITY UR: CLEAR
COLOR UR: YELLOW
GLUCOSE UR QL STRIP: NEGATIVE
HGB UR QL STRIP: NEGATIVE
KETONES UR QL STRIP: NEGATIVE
LEUKOCYTE ESTERASE UR QL STRIP: NEGATIVE
NITRITE UR QL STRIP: NEGATIVE
PH UR STRIP: 7 [PH] (ref 5–8)
PROT UR QL STRIP: NEGATIVE
SP GR UR STRIP: 1.02 (ref 1–1.03)
URN SPEC COLLECT METH UR: NORMAL

## 2020-02-05 PROCEDURE — 3074F SYST BP LT 130 MM HG: CPT | Mod: S$GLB,,, | Performed by: NURSE PRACTITIONER

## 2020-02-05 PROCEDURE — 74019 RADEX ABDOMEN 2 VIEWS: CPT | Mod: 26,,, | Performed by: RADIOLOGY

## 2020-02-05 PROCEDURE — 3074F PR MOST RECENT SYSTOLIC BLOOD PRESSURE < 130 MM HG: ICD-10-PCS | Mod: S$GLB,,, | Performed by: NURSE PRACTITIONER

## 2020-02-05 PROCEDURE — 3078F DIAST BP <80 MM HG: CPT | Mod: S$GLB,,, | Performed by: NURSE PRACTITIONER

## 2020-02-05 PROCEDURE — 1159F MED LIST DOCD IN RCRD: CPT | Mod: S$GLB,,, | Performed by: NURSE PRACTITIONER

## 2020-02-05 PROCEDURE — 1101F PR PT FALLS ASSESS DOC 0-1 FALLS W/OUT INJ PAST YR: ICD-10-PCS | Mod: S$GLB,,, | Performed by: NURSE PRACTITIONER

## 2020-02-05 PROCEDURE — 87086 URINE CULTURE/COLONY COUNT: CPT

## 2020-02-05 PROCEDURE — 99213 OFFICE O/P EST LOW 20 MIN: CPT | Mod: S$GLB,,, | Performed by: NURSE PRACTITIONER

## 2020-02-05 PROCEDURE — 3078F PR MOST RECENT DIASTOLIC BLOOD PRESSURE < 80 MM HG: ICD-10-PCS | Mod: S$GLB,,, | Performed by: NURSE PRACTITIONER

## 2020-02-05 PROCEDURE — 99999 PR PBB SHADOW E&M-EST. PATIENT-LVL IV: ICD-10-PCS | Mod: PBBFAC,,, | Performed by: NURSE PRACTITIONER

## 2020-02-05 PROCEDURE — 99999 PR PBB SHADOW E&M-EST. PATIENT-LVL IV: CPT | Mod: PBBFAC,,, | Performed by: NURSE PRACTITIONER

## 2020-02-05 PROCEDURE — 74019 XR ABDOMEN FLAT AND ERECT: ICD-10-PCS | Mod: 26,,, | Performed by: RADIOLOGY

## 2020-02-05 PROCEDURE — 99213 PR OFFICE/OUTPT VISIT, EST, LEVL III, 20-29 MIN: ICD-10-PCS | Mod: S$GLB,,, | Performed by: NURSE PRACTITIONER

## 2020-02-05 PROCEDURE — 81002 URINALYSIS NONAUTO W/O SCOPE: CPT | Mod: PO

## 2020-02-05 PROCEDURE — 1125F PR PAIN SEVERITY QUANTIFIED, PAIN PRESENT: ICD-10-PCS | Mod: S$GLB,,, | Performed by: NURSE PRACTITIONER

## 2020-02-05 PROCEDURE — 1159F PR MEDICATION LIST DOCUMENTED IN MEDICAL RECORD: ICD-10-PCS | Mod: S$GLB,,, | Performed by: NURSE PRACTITIONER

## 2020-02-05 PROCEDURE — 1101F PT FALLS ASSESS-DOCD LE1/YR: CPT | Mod: S$GLB,,, | Performed by: NURSE PRACTITIONER

## 2020-02-05 PROCEDURE — 1125F AMNT PAIN NOTED PAIN PRSNT: CPT | Mod: S$GLB,,, | Performed by: NURSE PRACTITIONER

## 2020-02-05 PROCEDURE — 74019 RADEX ABDOMEN 2 VIEWS: CPT | Mod: TC,PO

## 2020-02-05 NOTE — PROGRESS NOTES
Subjective:       Patient ID: Zora Davis is a 70 y.o. female.    Chief Complaint: Back Pain    Constipation   This is a recurrent problem. The current episode started 1 to 4 weeks ago. The problem has been gradually improving since onset. Her stool frequency is 2 to 3 times per week. The stool is described as firm. The patient is on a high fiber diet. She does not exercise regularly. There has been adequate water intake. Associated symptoms include back pain (currently taking flexeril, tylenol) and bloating. Pertinent negatives include no abdominal pain, anorexia, diarrhea, difficulty urinating, fecal incontinence, fever, flatus, hematochezia, hemorrhoids, melena, nausea, rectal pain, vomiting or weight loss. She has tried stool softeners for the symptoms. The treatment provided mild relief. Her past medical history is significant for abdominal surgery. There is no history of endocrine disease, inflammatory bowel disease, irritable bowel syndrome, metabolic disease, neurologic disease, neuromuscular disease, psychiatric history or radiation treatment.     Past Medical History:   Diagnosis Date    Adult hypothyroidism 5/11/2010    Diverticulosis     Essential hypertension 6/11/2013    Fatty liver     JOSE DE JESUS on CPAP     Osteopenia of multiple sites 1/17/2020    Paroxysmal atrial fibrillation 3/22/2019    S/P laparoscopic sleeve gastrectomy 3/22/2019     Social History     Socioeconomic History    Marital status:      Spouse name: Not on file    Number of children: 2    Years of education: Not on file    Highest education level: Not on file   Occupational History    Occupation: retired    Social Needs    Financial resource strain: Not on file    Food insecurity:     Worry: Not on file     Inability: Not on file    Transportation needs:     Medical: Not on file     Non-medical: Not on file   Tobacco Use    Smoking status: Former Smoker     Packs/day: 0.25     Years: 13.00      Pack years: 3.25     Types: Cigarettes     Start date:      Last attempt to quit:      Years since quittin.1    Smokeless tobacco: Never Used   Substance and Sexual Activity    Alcohol use: Yes     Alcohol/week: 1.0 standard drinks     Types: 1 Glasses of wine per week     Frequency: Monthly or less     Drinks per session: 1 or 2     Binge frequency: Never    Drug use: No    Sexual activity: Yes     Partners: Male     Birth control/protection: Surgical   Lifestyle    Physical activity:     Days per week: Not on file     Minutes per session: Not on file    Stress: Not on file   Relationships    Social connections:     Talks on phone: Not on file     Gets together: Not on file     Attends [employer] service: Not on file     Active member of club or organization: Not on file     Attends meetings of clubs or organizations: Not on file     Relationship status: Not on file   Other Topics Concern    Patient feels they ought to cut down on drinking/drug use Not Asked    Patient annoyed by others criticizing their drinking/drug use Not Asked    Patient has felt bad or guilty about drinking/drug use Not Asked    Patient has had a drink/used drugs as an eye opener in the AM Not Asked   Social History Narrative    Not on file     Past Surgical History:   Procedure Laterality Date    BREAST BIOPSY       SECTION      x 2    CHOLECYSTECTOMY      COLONOSCOPY  2014    Dr. Richey, repeat in 5 years    ESOPHAGOGASTRODUODENOSCOPY N/A 2018    Procedure: ESOPHAGOGASTRODUODENOSCOPY (EGD);  Surgeon: Efrain Steel MD;  Location: [facility];  Service: Endoscopy;  Laterality: N/A;    HYSTERECTOMY      LAPAROSCOPIC LYSIS OF ADHESIONS N/A 3/22/2019    Procedure: LYSIS, ADHESIONS, LAPAROSCOPIC;  Surgeon: Cole Armstrong MD;  Location: [facility] OR;  Service: General;  Laterality: N/A;    ROBOT-ASSISTED LAPAROSCOPIC SLEEVE GASTRECTOMY USING DA LARRY XI N/A 3/22/2019    Procedure: XI ROBOTIC SLEEVE  GASTRECTOMY;  Surgeon: Cole Armstrong MD;  Location: [facility];  Service: General;  Laterality: N/A;    TOTAL REDUCTION MAMMOPLASTY Bilateral     UPPER GASTROINTESTINAL ENDOSCOPY  08/13/2015    Dr. Padron       Review of Systems   Constitutional: Negative.  Negative for fever and weight loss.   HENT: Negative.    Eyes: Negative.    Respiratory: Negative.    Cardiovascular: Negative.    Gastrointestinal: Positive for bloating and constipation. Negative for abdominal pain, anorexia, diarrhea, flatus, hematochezia, hemorrhoids, melena, nausea, rectal pain and vomiting.   Endocrine: Negative.    Genitourinary: Negative.  Negative for difficulty urinating.   Musculoskeletal: Positive for back pain (currently taking flexeril, tylenol).   Skin: Negative.    Allergic/Immunologic: Negative.    Neurological: Negative.    Psychiatric/Behavioral: Negative.        Objective:      Physical Exam   Constitutional: She is oriented to person, place, and time. She appears well-developed and well-nourished.   HENT:   Head: Normocephalic.   Right Ear: External ear normal.   Left Ear: External ear normal.   Nose: Nose normal.   Mouth/Throat: Oropharynx is clear and moist.   Eyes: Pupils are equal, round, and reactive to light. Conjunctivae are normal.   Neck: Normal range of motion. Neck supple.   Cardiovascular: Normal rate, regular rhythm and normal heart sounds.   Pulmonary/Chest: Effort normal and breath sounds normal.   Abdominal: Soft. Bowel sounds are normal. There is no tenderness.   Musculoskeletal: Normal range of motion.   Neurological: She is alert and oriented to person, place, and time.   Skin: Skin is warm and dry. Capillary refill takes 2 to 3 seconds.   Psychiatric: She has a normal mood and affect. Her behavior is normal. Judgment and thought content normal.   Nursing note and vitals reviewed.      Assessment:       1. Constipation, unspecified constipation type    2. Lower abdominal pain    3. Acute right-sided low  back pain without sciatica        Plan:           Zora was seen today for back pain.    Diagnoses and all orders for this visit:    Constipation, unspecified constipation type  Lower abdominal pain  Acute right-sided low back pain without sciatica  -     X-Ray Abdomen Flat And Erect; Future  -     Urinalysis  -     Urine culture; Future  -     Urine culture  Miralax, stool softener OTC as directed; daily  Increase H20, fiber intake  Report to ER immediately if symptoms worsen

## 2020-02-05 NOTE — PATIENT INSTRUCTIONS
Miralax, stool softener OTC as directed; daily  Increase H20, fiber intake  Report to ER immediately if symptoms worsen

## 2020-02-06 ENCOUNTER — PATIENT MESSAGE (OUTPATIENT)
Dept: FAMILY MEDICINE | Facility: CLINIC | Age: 70
End: 2020-02-06

## 2020-02-06 DIAGNOSIS — M54.50 ACUTE BILATERAL LOW BACK PAIN WITHOUT SCIATICA: ICD-10-CM

## 2020-02-06 DIAGNOSIS — M62.830 LUMBAR PARASPINAL MUSCLE SPASM: ICD-10-CM

## 2020-02-06 DIAGNOSIS — M47.816 LUMBAR FACET ARTHROPATHY: Primary | ICD-10-CM

## 2020-02-06 LAB
BACTERIA UR CULT: NORMAL
BACTERIA UR CULT: NORMAL

## 2020-02-07 ENCOUNTER — PATIENT MESSAGE (OUTPATIENT)
Dept: FAMILY MEDICINE | Facility: CLINIC | Age: 70
End: 2020-02-07

## 2020-02-07 DIAGNOSIS — M47.816 LUMBAR FACET ARTHROPATHY: Primary | ICD-10-CM

## 2020-02-07 DIAGNOSIS — M54.50 ACUTE BILATERAL LOW BACK PAIN WITHOUT SCIATICA: ICD-10-CM

## 2020-02-07 DIAGNOSIS — M54.50 ACUTE RIGHT-SIDED LOW BACK PAIN WITHOUT SCIATICA: ICD-10-CM

## 2020-02-07 DIAGNOSIS — M62.830 LUMBAR PARASPINAL MUSCLE SPASM: ICD-10-CM

## 2020-02-10 ENCOUNTER — OFFICE VISIT (OUTPATIENT)
Dept: FAMILY MEDICINE | Facility: CLINIC | Age: 70
End: 2020-02-10
Payer: MEDICARE

## 2020-02-10 ENCOUNTER — TELEPHONE (OUTPATIENT)
Dept: FAMILY MEDICINE | Facility: CLINIC | Age: 70
End: 2020-02-10

## 2020-02-10 ENCOUNTER — HOSPITAL ENCOUNTER (OUTPATIENT)
Dept: RADIOLOGY | Facility: HOSPITAL | Age: 70
Discharge: HOME | End: 2020-02-10
Attending: INTERNAL MEDICINE
Payer: MEDICARE

## 2020-02-10 VITALS
RESPIRATION RATE: 18 BRPM | SYSTOLIC BLOOD PRESSURE: 128 MMHG | WEIGHT: 182 LBS | HEART RATE: 75 BPM | HEIGHT: 67 IN | TEMPERATURE: 98 F | DIASTOLIC BLOOD PRESSURE: 73 MMHG | BODY MASS INDEX: 28.56 KG/M2 | OXYGEN SATURATION: 99 %

## 2020-02-10 DIAGNOSIS — E66.3 OVERWEIGHT (BMI 25.0-29.9): ICD-10-CM

## 2020-02-10 DIAGNOSIS — T50.2X5A: ICD-10-CM

## 2020-02-10 DIAGNOSIS — M62.838 SPASM OF RIGHT SIDE ABDOMINAL MUSCLES: Primary | ICD-10-CM

## 2020-02-10 DIAGNOSIS — R10.11 RUQ ABDOMINAL PAIN: ICD-10-CM

## 2020-02-10 DIAGNOSIS — M62.838 SPASM OF RIGHT SIDE ABDOMINAL MUSCLES: ICD-10-CM

## 2020-02-10 DIAGNOSIS — Z98.84 S/P LAPAROSCOPIC SLEEVE GASTRECTOMY: Chronic | ICD-10-CM

## 2020-02-10 PROCEDURE — 99214 OFFICE O/P EST MOD 30 MIN: CPT | Mod: S$GLB,,, | Performed by: INTERNAL MEDICINE

## 2020-02-10 PROCEDURE — 99214 PR OFFICE/OUTPT VISIT, EST, LEVL IV, 30-39 MIN: ICD-10-PCS | Mod: S$GLB,,, | Performed by: INTERNAL MEDICINE

## 2020-02-10 PROCEDURE — 71100 X-RAY EXAM RIBS UNI 2 VIEWS: CPT | Mod: TC,PO,RT

## 2020-02-10 PROCEDURE — 99999 PR PBB SHADOW E&M-EST. PATIENT-LVL III: CPT | Mod: PBBFAC,,, | Performed by: INTERNAL MEDICINE

## 2020-02-10 PROCEDURE — 71100 X-RAY EXAM RIBS UNI 2 VIEWS: CPT | Mod: 26,RT,, | Performed by: RADIOLOGY

## 2020-02-10 PROCEDURE — 71100 XR RIBS 2 VIEW RIGHT: ICD-10-PCS | Mod: 26,RT,, | Performed by: RADIOLOGY

## 2020-02-10 PROCEDURE — 99999 PR PBB SHADOW E&M-EST. PATIENT-LVL III: ICD-10-PCS | Mod: PBBFAC,,, | Performed by: INTERNAL MEDICINE

## 2020-02-10 RX ORDER — TIZANIDINE 4 MG/1
4 TABLET ORAL EVERY 8 HOURS
Qty: 20 TABLET | Refills: 0 | Status: SHIPPED | OUTPATIENT
Start: 2020-02-10 | End: 2020-02-19

## 2020-02-10 RX ORDER — CYCLOBENZAPRINE HCL 10 MG
10 TABLET ORAL 3 TIMES DAILY PRN
COMMUNITY
End: 2020-02-10

## 2020-02-10 NOTE — PROGRESS NOTES
Subjective:      Patient ID: Zora Davis is a 70 y.o. female.    Chief Complaint: Back Pain    Back Pain   Pertinent negatives include no abdominal pain, chest pain, dysuria, fever, headaches, numbness or weakness.      70F here for follow up of back pain. Last seen by me for issue on 1/27/20.    At that time had mid back pain radiating toward the right since Thursday prior and had been using tylenol. Worsened with sitting and laying down. Thought it was 2/2 straining with BM due to constipation. Paraspinal muscle tight on exam therefore flexeril given with conservative measure. Three days later she reached out with no improvement. Lumbar spine xray ordered, which showed constipation and DJD, but no fracture. We referred to PT with first appt on Thursday. She saw BISHNU Baron NP on 2/5/20 for eval. Axr ordered, which was unremarkable.     Today is the 1st day without using tylenol. The pain improves with pulling legs into abdomen. She is having spasms over the region with RUQ tenderness. She is s/p cholecystectomy. No sciatica. Minimal relief with flexeril. The pain is worse with laying down at night.      Review of patient's allergies indicates:  No Known Allergies    Current Outpatient Medications:     albuterol 90 mcg/actuation inhaler, Inhale 2 puffs into the lungs every 6 (six) hours as needed for Wheezing or Shortness of Breath. Rescue, Disp: 18 g, Rfl: 1    aspirin (ECOTRIN) 81 MG EC tablet, Take 81 mg by mouth once daily., Disp: , Rfl:     b complex vitamins tablet, Take 1 tablet by mouth once daily., Disp: , Rfl:     calcium citrate (CALCITRATE) 200 mg (950 mg) tablet, Take 1 tablet by mouth once daily., Disp: , Rfl:     clindamycin (CLEOCIN T) 1 % external solution, Apply topically as needed. , Disp: , Rfl:     cyanocobalamin (VITAMIN B-12) 500 MCG tablet, Take 500 mcg by mouth once daily., Disp: , Rfl:     fluticasone propionate (FLONASE) 50 mcg/actuation nasal spray, 1 spray each nostril  twice daily, Disp: , Rfl:     hydroCHLOROthiazide (HYDRODIURIL) 12.5 MG Tab, Take 12.5 mg by mouth once daily., Disp: , Rfl:     levothyroxine (SYNTHROID) 50 MCG tablet, TAKE 1 TABLET DAILY, Disp: 90 tablet, Rfl: 4    metoprolol tartrate (LOPRESSOR) 25 MG tablet, Take 1 tablet (25 mg total) by mouth nightly., Disp: 90 tablet, Rfl: 3    multivitamin-minerals-lutein Tab, Take 1 tablet by mouth once daily., Disp: , Rfl:     tiZANidine (ZANAFLEX) 4 MG tablet, Take 1 tablet (4 mg total) by mouth every 8 (eight) hours. for 10 days, Disp: 20 tablet, Rfl: 0    Past Medical History:   Diagnosis Date    Adult hypothyroidism 2010    Diverticulosis     Essential hypertension 2013    Fatty liver     JOSE DE JESUS on CPAP     Osteopenia of multiple sites 2020    Paroxysmal atrial fibrillation 3/22/2019    S/P laparoscopic sleeve gastrectomy 3/22/2019     Past Surgical History:   Procedure Laterality Date    BREAST BIOPSY       SECTION      x 2    CHOLECYSTECTOMY      COLONOSCOPY  2014    Dr. Richey, repeat in 5 years    ESOPHAGOGASTRODUODENOSCOPY N/A 2018    Procedure: ESOPHAGOGASTRODUODENOSCOPY (EGD);  Surgeon: Efrain Steel MD;  Location: [facility];  Service: Endoscopy;  Laterality: N/A;    HYSTERECTOMY      LAPAROSCOPIC LYSIS OF ADHESIONS N/A 3/22/2019    Procedure: LYSIS, ADHESIONS, LAPAROSCOPIC;  Surgeon: Cole Armstrong MD;  Location: [facility] OR;  Service: General;  Laterality: N/A;    ROBOT-ASSISTED LAPAROSCOPIC SLEEVE GASTRECTOMY USING DA LARRY XI N/A 3/22/2019    Procedure: XI ROBOTIC SLEEVE GASTRECTOMY;  Surgeon: Cole Armstrong MD;  Location: [facility] OR;  Service: General;  Laterality: N/A;    TOTAL REDUCTION MAMMOPLASTY Bilateral     UPPER GASTROINTESTINAL ENDOSCOPY  2015    Dr. Padron     Family History   Problem Relation Age of Onset    Colon cancer Paternal Aunt     COPD Paternal Aunt         colon    Hypertension Father     Crohn's disease Neg Hx     Stomach  cancer Neg Hx     Ulcerative colitis Neg Hx     Esophageal cancer Neg Hx      Social History     Socioeconomic History    Marital status:      Spouse name: Not on file    Number of children: 2    Years of education: Not on file    Highest education level: Not on file   Occupational History    Occupation: retired    Social Needs    Financial resource strain: Not on file    Food insecurity:     Worry: Not on file     Inability: Not on file    Transportation needs:     Medical: Not on file     Non-medical: Not on file   Tobacco Use    Smoking status: Former Smoker     Packs/day: 0.25     Years: 13.00     Pack years: 3.25     Types: Cigarettes     Start date:      Last attempt to quit:      Years since quittin.1    Smokeless tobacco: Never Used   Substance and Sexual Activity    Alcohol use: Yes     Alcohol/week: 1.0 standard drinks     Types: 1 Glasses of wine per week     Frequency: Monthly or less     Drinks per session: 1 or 2     Binge frequency: Never    Drug use: No    Sexual activity: Yes     Partners: Male     Birth control/protection: Surgical   Lifestyle    Physical activity:     Days per week: Not on file     Minutes per session: Not on file    Stress: Not on file   Relationships    Social connections:     Talks on phone: Not on file     Gets together: Not on file     Attends [employer] service: Not on file     Active member of club or organization: Not on file     Attends meetings of clubs or organizations: Not on file     Relationship status: Not on file   Other Topics Concern    Patient feels they ought to cut down on drinking/drug use Not Asked    Patient annoyed by others criticizing their drinking/drug use Not Asked    Patient has felt bad or guilty about drinking/drug use Not Asked    Patient has had a drink/used drugs as an eye opener in the AM Not Asked   Social History Narrative    Not on file      Review of Systems   Constitutional:  "Negative for chills, fatigue and fever.   HENT: Negative for congestion.    Eyes: Negative for visual disturbance.   Respiratory: Negative for cough, shortness of breath and wheezing.    Cardiovascular: Negative for chest pain and palpitations.   Gastrointestinal: Negative for abdominal pain, diarrhea, nausea and vomiting.        No incontience   Endocrine: Negative for cold intolerance and heat intolerance.   Genitourinary: Negative for dysuria.        No incontinence    Musculoskeletal: Positive for back pain and myalgias.   Skin: Negative for rash.   Allergic/Immunologic: Negative for environmental allergies.   Neurological: Negative for dizziness, syncope, weakness, numbness and headaches.   Hematological: Negative for adenopathy. Does not bruise/bleed easily.   Psychiatric/Behavioral: Negative for dysphoric mood and sleep disturbance. The patient is not nervous/anxious.          Objective:     Body mass index is 28.51 kg/m².  /73 (BP Location: Right arm, Patient Position: Sitting, BP Method: Medium (Automatic))   Pulse 75   Temp 97.7 °F (36.5 °C)   Resp 18   Ht 5' 7" (1.702 m)   Wt 82.6 kg (182 lb)   SpO2 99%   BMI 28.51 kg/m²       Physical Exam   Constitutional: She is oriented to person, place, and time. She appears well-developed and well-nourished. No distress.   HENT:   Head: Normocephalic and atraumatic.   Mouth/Throat: Oropharynx is clear and moist.   Eyes: Conjunctivae are normal.   Cardiovascular: Normal rate, regular rhythm, normal heart sounds and intact distal pulses.   No murmur heard.  Pulmonary/Chest: Effort normal and breath sounds normal.   Abdominal: Soft. Bowel sounds are normal.   Tenderness in RUQ under ribcage    Musculoskeletal: She exhibits tenderness (over lumbar paraspinal muscles on the right. no pain over percussin of midline spinous processes).   Lymphadenopathy:     She has no cervical adenopathy.   Neurological: She is alert and oriented to person, place, and time. " She displays normal reflexes. No sensory deficit. She exhibits normal muscle tone.   5/5 strength in BUE and BLE. Reflexes and sensation intact throughout. Pain with lifting leg up on right over paraspinal region. Spasm in lumbar muscle improved   Skin: Skin is warm and dry. Capillary refill takes 2 to 3 seconds. No rash noted. She is not diaphoretic.   Psychiatric: She has a normal mood and affect. Her behavior is normal.   Nursing note and vitals reviewed.      Office Visit on 02/05/2020   Component Date Value Ref Range Status    Specimen UA 02/05/2020 Urine, Clean Catch   Final    Color, UA 02/05/2020 Yellow  Yellow, Straw, Gill Final    Appearance, UA 02/05/2020 Clear  Clear Final    pH, UA 02/05/2020 7.0  5.0 - 8.0 Final    Specific Gravity, UA 02/05/2020 1.020  1.005 - 1.030 Final    Protein, UA 02/05/2020 Negative  Negative Final    Comment: Recommend a 24 hour urine protein or a urine   protein/creatinine ratio if globulin induced proteinuria is  clinically suspected.      Glucose, UA 02/05/2020 Negative  Negative Final    Ketones, UA 02/05/2020 Negative  Negative Final    Bilirubin (UA) 02/05/2020 Negative  Negative Final    Occult Blood UA 02/05/2020 Negative  Negative Final    Nitrite, UA 02/05/2020 Negative  Negative Final    Leukocytes, UA 02/05/2020 Negative  Negative Final    Urine Culture, Routine 02/05/2020 Multiple organisms isolated. None in predominance.  Repeat if   Final    Urine Culture, Routine 02/05/2020 clinically necessary.   Final   Lab Visit on 01/27/2020   Component Date Value Ref Range Status    Microalbum.,U,Random 01/27/2020 <2.5  ug/mL Final    Creatinine, Random Ur 01/27/2020 52.0  15.0 - 325.0 mg/dL Final    Comment: The random urine reference ranges provided were established   for 24 hour urine collections.  No reference ranges exist for  random urine specimens.  Correlate clinically.      Microalb Creat Ratio 01/27/2020 Unable to calculate  0.0 - 30.0 ug/mg  Final   Lab Visit on 01/17/2020   Component Date Value Ref Range Status    Ferritin 01/17/2020 199  20.0 - 300.0 ng/mL Final    Cholesterol 01/17/2020 189  120 - 199 mg/dL Final    Comment: The National Cholesterol Education Program (NCEP) has set the  following guidelines (reference ranges) for Cholesterol:  Optimal.....................<200 mg/dL  Borderline High.............200-239 mg/dL  High........................> or = 240 mg/dL      Triglycerides 01/17/2020 108  30 - 150 mg/dL Final    Comment: The National Cholesterol Education Program (NCEP) has set the  following guidelines (reference values) for triglycerides:  Normal......................<150 mg/dL  Borderline High.............150-199 mg/dL  High........................200-499 mg/dL      HDL 01/17/2020 58  40 - 75 mg/dL Final    Comment: The National Cholesterol Education Program (NCEP) has set the  following guidelines (reference values) for HDL Cholesterol:  Low...............<40 mg/dL  Optimal...........>60 mg/dL      LDL Cholesterol 01/17/2020 109.4  63.0 - 159.0 mg/dL Final    Comment: The National Cholesterol Education Program (NCEP) has set the  following guidelines (reference values) for LDL Cholesterol:  Optimal.......................<130 mg/dL  Borderline High...............130-159 mg/dL  High..........................160-189 mg/dL  Very High.....................>190 mg/dL      Hdl/Cholesterol Ratio 01/17/2020 30.7  20.0 - 50.0 % Final    Total Cholesterol/HDL Ratio 01/17/2020 3.3  2.0 - 5.0 Final    Non-HDL Cholesterol 01/17/2020 131  mg/dL Final    Comment: Risk category and Non-HDL cholesterol goals:  Coronary heart disease (CHD)or equivalent (10-year risk of CHD >20%):  Non-HDL cholesterol goal     <130 mg/dL  Two or more CHD risk factors and 10-year risk of CHD <= 20%:  Non-HDL cholesterol goal     <160 mg/dL  0 to 1 CHD risk factor:  Non-HDL cholesterol goal     <190 mg/dL      Vit D, 25-Hydroxy 01/17/2020 45  30 - 96 ng/mL Final     Comment: Vitamin D deficiency.........<10 ng/mL                              Vitamin D insufficiency......10-29 ng/mL       Vitamin D sufficiency........> or equal to 30 ng/mL  Vitamin D toxicity............>100 ng/mL       No results found in the last 24 hours.     Assessment:     Encounter Diagnoses   Name Primary?    Spasm of right side abdominal muscles Yes    Thiazide diuretics causing adverse effect in therapeutic use     RUQ abdominal pain     Overweight (BMI 25.0-29.9)     S/P laparoscopic sleeve gastrectomy         Plan:     #Right abdominal wall spasm and pain   -negative lspine & axr  -s/p cholecystectomy  -no relationship to food  -PT on Thursday ([facility])  -Stop flexeril. Start tizanidine 4 mg nightly PRN   -no red flags on exam.   -recommended stretching, heating pad.   -right rib xray, limited abd US, and labs to rule electrolytes  _________________________________________________________    #Osteopenia  #s/p JANEEN  -DEXA 7/26/18: osteopenia  t-1  -Repeat 8/2020  -7/9/19: Vit D 53 --> 45 on 1/17/20    #Hepatitic steatosis   -noted on CT a/p    #JOSE DE JESUS, resolved  -resolved with weight loss    #pAfib  -well controlled on metoprolol 25 mg daily + asa 81 mg daily    #Hypothyroidism   -TSH normal on 7/9/19- 2.72  -continue synthroid 50 mcg daily    #Nicotine dependence in remission  -[id number]. 3.25 ppd    #Essential HTN  Well controlled.   -continue hctz 12.5 mg daily  -aim for goal 120/80  -microalbumin/cr ratio ordered.    #S/p gastric sleeve- Overweight with BMI 28.51  -Sleeve done 3/22/2019  -Labs every 6 months-1 year ordered by Dr. Armstrong (reviewed from 7/9/19)  -Follow up in March   -Weight loss: 65 pounds (213 --> today, 179)  -iron, b12, mvi, ca, b complex   -7/9/19: ha1c 5.2%, b1 level 63, cbc: rdw 15, cmp normal (gfr >60, cr 0.8), iron sat low 11. Ferritin 199 on 1/17/20    #HCM  -Shingrix sent to pharmacy   -Prevnar 7/24/18. Pneumovax 1/16/20  -Discuss flu at next visit.   -Tetanus  done 10/3/11?  -Mammogram 10/9/19: BIRADS 1. Repeat in 1 year  -Colonoscopy 14: diverticulosis. Repeat in 5 years. Ordered. Requests Dr. Richey.  -Cervical ca screenin14: negative. No further need.   -Hep C screening negative 17  -Lipid panel 20: chol 189, tri 108, hdl 58, ldl 109    Has mychart. Return in 6 months.     Chen Davenport M.D.    Orders Placed This Encounter   Procedures    US Abdomen Complete    X-Ray Ribs 2 View Right    Vitamin B12    Comprehensive metabolic panel    Magnesium      Medications Ordered This Encounter   Medications    tiZANidine (ZANAFLEX) 4 MG tablet     Sig: Take 1 tablet (4 mg total) by mouth every 8 (eight) hours. for 10 days     Dispense:  20 tablet     Refill:  0

## 2020-02-10 NOTE — TELEPHONE ENCOUNTER
Returned call to Mikki at [facility], informed her that patient was requesting [facility], she was going to contact the patient to see if she is wanting to have PT through them.

## 2020-02-10 NOTE — TELEPHONE ENCOUNTER
----- Message from Darling Brambila sent at 2/10/2020  8:35 AM CST -----  Contact: Mikki with [facility]  Calling to verify referral was faxed to office. Please call [phone]

## 2020-02-13 ENCOUNTER — TELEPHONE (OUTPATIENT)
Dept: FAMILY MEDICINE | Facility: CLINIC | Age: 70
End: 2020-02-13

## 2020-02-13 NOTE — TELEPHONE ENCOUNTER
----- Message from Rebecca Latham sent at 2/13/2020  9:48 AM CST -----  Contact: Levi graves/ [facility]  Caller called in regards to speaking with the staff in regards to speaking with the staff about the pt. Caller can be reached at [phone].

## 2020-02-19 ENCOUNTER — PATIENT MESSAGE (OUTPATIENT)
Dept: FAMILY MEDICINE | Facility: CLINIC | Age: 70
End: 2020-02-19

## 2020-02-19 ENCOUNTER — HOSPITAL ENCOUNTER (OUTPATIENT)
Dept: RADIOLOGY | Facility: HOSPITAL | Age: 70
Discharge: HOME | End: 2020-02-19
Attending: INTERNAL MEDICINE
Payer: MEDICARE

## 2020-02-19 ENCOUNTER — OFFICE VISIT (OUTPATIENT)
Dept: FAMILY MEDICINE | Facility: CLINIC | Age: 70
End: 2020-02-19
Payer: MEDICARE

## 2020-02-19 VITALS
RESPIRATION RATE: 16 BRPM | OXYGEN SATURATION: 99 % | HEART RATE: 74 BPM | BODY MASS INDEX: 28.41 KG/M2 | HEIGHT: 67 IN | DIASTOLIC BLOOD PRESSURE: 68 MMHG | WEIGHT: 181 LBS | SYSTOLIC BLOOD PRESSURE: 137 MMHG

## 2020-02-19 DIAGNOSIS — R10.11 RUQ PAIN: ICD-10-CM

## 2020-02-19 DIAGNOSIS — R10.9 ABDOMINAL PAIN, RIGHT LATERAL: ICD-10-CM

## 2020-02-19 DIAGNOSIS — R10.9 ABDOMINAL PAIN, RIGHT LATERAL: Primary | ICD-10-CM

## 2020-02-19 PROCEDURE — 99214 PR OFFICE/OUTPT VISIT, EST, LEVL IV, 30-39 MIN: ICD-10-PCS | Mod: S$GLB,,, | Performed by: INTERNAL MEDICINE

## 2020-02-19 PROCEDURE — 72070 XR THORACIC SPINE AP LATERAL: ICD-10-PCS | Mod: 26,,, | Performed by: RADIOLOGY

## 2020-02-19 PROCEDURE — 99999 PR PBB SHADOW E&M-EST. PATIENT-LVL IV: ICD-10-PCS | Mod: PBBFAC,,, | Performed by: INTERNAL MEDICINE

## 2020-02-19 PROCEDURE — 72070 X-RAY EXAM THORAC SPINE 2VWS: CPT | Mod: TC,PO

## 2020-02-19 PROCEDURE — 72070 X-RAY EXAM THORAC SPINE 2VWS: CPT | Mod: 26,,, | Performed by: RADIOLOGY

## 2020-02-19 PROCEDURE — 99214 OFFICE O/P EST MOD 30 MIN: CPT | Mod: S$GLB,,, | Performed by: INTERNAL MEDICINE

## 2020-02-19 PROCEDURE — 99999 PR PBB SHADOW E&M-EST. PATIENT-LVL IV: CPT | Mod: PBBFAC,,, | Performed by: INTERNAL MEDICINE

## 2020-02-19 RX ORDER — METHYLPREDNISOLONE 4 MG/1
TABLET ORAL
Qty: 21 TABLET | Refills: 0 | Status: ON HOLD | OUTPATIENT
Start: 2020-02-20 | End: 2020-06-01 | Stop reason: CLARIF

## 2020-02-19 RX ORDER — GABAPENTIN 300 MG/1
300 CAPSULE ORAL NIGHTLY
Qty: 30 CAPSULE | Refills: 0 | Status: SHIPPED | OUTPATIENT
Start: 2020-02-19 | End: 2020-07-17 | Stop reason: SDUPTHER

## 2020-02-19 NOTE — PROGRESS NOTES
Subjective:      Patient ID: Zora Davis is a 70 y.o. female.    Chief Complaint: Back Pain and Flank Pain    Back Pain   Pertinent negatives include no abdominal pain, chest pain, dysuria, fever, headaches, numbness or weakness.   Flank Pain   Pertinent negatives include no abdominal pain, chest pain, dysuria, fever, headaches, numbness or weakness.      70F here for follow up of back pain. Last seen by me on 02/10/2020.    She has completed 2 sessions of physical therapy.  Friday was very difficult as immediately after dry needling she experienced significant pain and required bracing to set up by the therapist.  She returned on Monday and again underwent dry needling and therapy with some improvement.  She continues to have the pain, which was worse today.  Again in the same distribution of underneath her ribs on the right with radiation in a dermatomal pattern toward her back.  No weakness of upper extremities or lower extremities.  Again no bowel habits changed.  We discussed obtaining a thoracic x-ray to definitively rule out a fracture as well as obtaining a CT abdomen with contrast in place of the scheduled ultrasound next week as her pain has gotten worse.  We discussed discontinuing the muscle relaxant and starting gabapentin nightly to see if neuropathic medication would help as well as Medrol Dosepak.  We have been avoiding NSAIDs considering her sleeve and current aspirin use. No change in symptoms from prior.        Review of patient's allergies indicates:  No Known Allergies    Current Outpatient Medications:     albuterol 90 mcg/actuation inhaler, Inhale 2 puffs into the lungs every 6 (six) hours as needed for Wheezing or Shortness of Breath. Rescue, Disp: 18 g, Rfl: 1    aspirin (ECOTRIN) 81 MG EC tablet, Take 81 mg by mouth once daily., Disp: , Rfl:     b complex vitamins tablet, Take 1 tablet by mouth once daily., Disp: , Rfl:     calcium citrate (CALCITRATE) 200 mg (950 mg) tablet,  Take 1 tablet by mouth once daily., Disp: , Rfl:     clindamycin (CLEOCIN T) 1 % external solution, Apply topically as needed. , Disp: , Rfl:     cyanocobalamin (VITAMIN B-12) 500 MCG tablet, Take 500 mcg by mouth once daily., Disp: , Rfl:     fluticasone propionate (FLONASE) 50 mcg/actuation nasal spray, 1 spray each nostril twice daily, Disp: , Rfl:     hydroCHLOROthiazide (HYDRODIURIL) 12.5 MG Tab, Take 12.5 mg by mouth once daily., Disp: , Rfl:     levothyroxine (SYNTHROID) 50 MCG tablet, TAKE 1 TABLET DAILY, Disp: 90 tablet, Rfl: 4    metoprolol tartrate (LOPRESSOR) 25 MG tablet, Take 1 tablet (25 mg total) by mouth nightly., Disp: 90 tablet, Rfl: 3    multivitamin-minerals-lutein Tab, Take 1 tablet by mouth once daily., Disp: , Rfl:     gabapentin (NEURONTIN) 300 MG capsule, Take 1 capsule (300 mg total) by mouth every evening., Disp: 30 capsule, Rfl: 0    [START ON 2020] methylPREDNISolone (MEDROL DOSEPACK) 4 mg tablet, follow package directions, Disp: 21 tablet, Rfl: 0    Past Medical History:   Diagnosis Date    Adult hypothyroidism 2010    Diverticulosis     Essential hypertension 2013    Fatty liver     JOSE DE JESUS on CPAP     Osteopenia of multiple sites 2020    Paroxysmal atrial fibrillation 3/22/2019    S/P laparoscopic sleeve gastrectomy 3/22/2019     Past Surgical History:   Procedure Laterality Date    BREAST BIOPSY       SECTION      x 2    CHOLECYSTECTOMY      COLONOSCOPY  2014    Dr. Richey, repeat in 5 years    ESOPHAGOGASTRODUODENOSCOPY N/A 2018    Procedure: ESOPHAGOGASTRODUODENOSCOPY (EGD);  Surgeon: Efrain Steel MD;  Location[facility] ENDO;  Service: Endoscopy;  Laterality: N/A;    HYSTERECTOMY      LAPAROSCOPIC LYSIS OF ADHESIONS N/A 3/22/2019    Procedure: LYSIS, ADHESIONS, LAPAROSCOPIC;  Surgeon: Cole Armstrong MD;  Location: [facility] OR;  Service: General;  Laterality: N/A;    ROBOT-ASSISTED LAPAROSCOPIC SLEEVE GASTRECTOMY USING DA  LARRY ZALDIVAR N/A 3/22/2019    Procedure: XI ROBOTIC SLEEVE GASTRECTOMY;  Surgeon: Cole Armstrong MD;  Location: [facility];  Service: General;  Laterality: N/A;    TOTAL REDUCTION MAMMOPLASTY Bilateral     UPPER GASTROINTESTINAL ENDOSCOPY  2015    Dr. Padron     Family History   Problem Relation Age of Onset    Colon cancer Paternal Aunt     COPD Paternal Aunt         colon    Hypertension Father     Crohn's disease Neg Hx     Stomach cancer Neg Hx     Ulcerative colitis Neg Hx     Esophageal cancer Neg Hx      Social History     Socioeconomic History    Marital status:      Spouse name: Not on file    Number of children: 2    Years of education: Not on file    Highest education level: Not on file   Occupational History    Occupation: retired    Social Needs    Financial resource strain: Not on file    Food insecurity:     Worry: Not on file     Inability: Not on file    Transportation needs:     Medical: Not on file     Non-medical: Not on file   Tobacco Use    Smoking status: Former Smoker     Packs/day: 0.25     Years: 13.00     Pack years: 3.25     Types: Cigarettes     Start date:      Last attempt to quit:      Years since quittin.1    Smokeless tobacco: Never Used   Substance and Sexual Activity    Alcohol use: Yes     Alcohol/week: 1.0 standard drinks     Types: 1 Glasses of wine per week     Frequency: Monthly or less     Drinks per session: 1 or 2     Binge frequency: Never    Drug use: No    Sexual activity: Yes     Partners: Male     Birth control/protection: Surgical   Lifestyle    Physical activity:     Days per week: Not on file     Minutes per session: Not on file    Stress: Not on file   Relationships    Social connections:     Talks on phone: Not on file     Gets together: Not on file     Attends Mandaen service: Not on file     Active member of club or organization: Not on file     Attends meetings of clubs or organizations: Not on  "file     Relationship status: Not on file   Other Topics Concern    Patient feels they ought to cut down on drinking/drug use Not Asked    Patient annoyed by others criticizing their drinking/drug use Not Asked    Patient has felt bad or guilty about drinking/drug use Not Asked    Patient has had a drink/used drugs as an eye opener in the AM Not Asked   Social History Narrative    Not on file      Review of Systems   Constitutional: Negative for chills, fatigue and fever.   HENT: Negative for congestion.    Eyes: Negative for visual disturbance.   Respiratory: Negative for cough, shortness of breath and wheezing.    Cardiovascular: Negative for chest pain and palpitations.   Gastrointestinal: Negative for abdominal pain, diarrhea, nausea and vomiting.        No incontience   Endocrine: Negative for cold intolerance and heat intolerance.   Genitourinary: Positive for flank pain. Negative for dysuria.        No incontinence    Musculoskeletal: Positive for back pain and myalgias.   Skin: Negative for rash.   Allergic/Immunologic: Negative for environmental allergies.   Neurological: Negative for dizziness, syncope, weakness, numbness and headaches.   Hematological: Negative for adenopathy. Does not bruise/bleed easily.   Psychiatric/Behavioral: Negative for dysphoric mood and sleep disturbance. The patient is not nervous/anxious.          Objective:     Body mass index is 28.35 kg/m².  /68 (BP Location: Right arm, Patient Position: Sitting, BP Method: Medium (Automatic))   Pulse 74   Resp 16   Ht 5' 7" (1.702 m)   Wt 82.1 kg (181 lb)   SpO2 99%   BMI 28.35 kg/m²       Physical Exam   Constitutional: She is oriented to person, place, and time. She appears well-developed and well-nourished. No distress.   HENT:   Head: Normocephalic and atraumatic.   Mouth/Throat: Oropharynx is clear and moist.   Eyes: Conjunctivae are normal.   Cardiovascular: Normal rate, regular rhythm, normal heart sounds and intact " distal pulses.   No murmur heard.  Pulmonary/Chest: Effort normal and breath sounds normal.   Abdominal: Soft. Bowel sounds are normal.   Tenderness in RUQ under ribcage    Musculoskeletal: She exhibits tenderness (over lumbar paraspinal muscles on the right. no pain over percussin of midline spinous processes).   Lymphadenopathy:     She has no cervical adenopathy.   Neurological: She is alert and oriented to person, place, and time. She displays normal reflexes. No sensory deficit. She exhibits normal muscle tone.   5/5 strength in BUE and BLE. Reflexes and sensation intact throughout. Pain with lifting leg up on right over paraspinal region. Spasm in lumbar muscle improved   Skin: Skin is warm and dry. Capillary refill takes 2 to 3 seconds. No rash noted. She is not diaphoretic.   Psychiatric: She has a normal mood and affect. Her behavior is normal.   Nursing note and vitals reviewed.      Lab Visit on 02/10/2020   Component Date Value Ref Range Status    Vitamin B-12 02/10/2020 >2000* 210 - 950 pg/mL Final    Sodium 02/10/2020 139  136 - 145 mmol/L Final    Potassium 02/10/2020 3.3* 3.5 - 5.1 mmol/L Final    Chloride 02/10/2020 101  95 - 110 mmol/L Final    CO2 02/10/2020 29  23 - 29 mmol/L Final    Glucose 02/10/2020 86  70 - 110 mg/dL Final    BUN, Bld 02/10/2020 14  8 - 23 mg/dL Final    Creatinine 02/10/2020 0.8  0.5 - 1.4 mg/dL Final    Calcium 02/10/2020 9.6  8.7 - 10.5 mg/dL Final    Total Protein 02/10/2020 8.3  6.0 - 8.4 g/dL Final    Albumin 02/10/2020 3.7  3.5 - 5.2 g/dL Final    Total Bilirubin 02/10/2020 0.2  0.1 - 1.0 mg/dL Final    Comment: For infants and newborns, interpretation of results should be based  on gestational age, weight and in agreement with clinical  observations.  Premature Infant recommended reference ranges:  Up to 24 hours.............<8.0 mg/dL  Up to 48 hours............<12.0 mg/dL  3-5 days..................<15.0 mg/dL  6-29 days.................<15.0 mg/dL       Alkaline Phosphatase 02/10/2020 77  55 - 135 U/L Final    AST 02/10/2020 19  10 - 40 U/L Final    ALT 02/10/2020 16  10 - 44 U/L Final    Anion Gap 02/10/2020 9  8 - 16 mmol/L Final    eGFR if African American 02/10/2020 >60.0  >60 mL/min/1.73 m^2 Final    eGFR if non African American 02/10/2020 >60.0  >60 mL/min/1.73 m^2 Final    Comment: Calculation used to obtain the estimated glomerular filtration  rate (eGFR) is the CKD-EPI equation.       Magnesium 02/10/2020 2.0  1.6 - 2.6 mg/dL Final   Office Visit on 02/05/2020   Component Date Value Ref Range Status    Specimen UA 02/05/2020 Urine, Clean Catch   Final    Color, UA 02/05/2020 Yellow  Yellow, Straw, Gill Final    Appearance, UA 02/05/2020 Clear  Clear Final    pH, UA 02/05/2020 7.0  5.0 - 8.0 Final    Specific Gravity, UA 02/05/2020 1.020  1.005 - 1.030 Final    Protein, UA 02/05/2020 Negative  Negative Final    Comment: Recommend a 24 hour urine protein or a urine   protein/creatinine ratio if globulin induced proteinuria is  clinically suspected.      Glucose, UA 02/05/2020 Negative  Negative Final    Ketones, UA 02/05/2020 Negative  Negative Final    Bilirubin (UA) 02/05/2020 Negative  Negative Final    Occult Blood UA 02/05/2020 Negative  Negative Final    Nitrite, UA 02/05/2020 Negative  Negative Final    Leukocytes, UA 02/05/2020 Negative  Negative Final    Urine Culture, Routine 02/05/2020 Multiple organisms isolated. None in predominance.  Repeat if   Final    Urine Culture, Routine 02/05/2020 clinically necessary.   Final   Lab Visit on 01/27/2020   Component Date Value Ref Range Status    Microalbum.,U,Random 01/27/2020 <2.5  ug/mL Final    Creatinine, Random Ur 01/27/2020 52.0  15.0 - 325.0 mg/dL Final    Comment: The random urine reference ranges provided were established   for 24 hour urine collections.  No reference ranges exist for  random urine specimens.  Correlate clinically.      Microalb Creat Ratio 01/27/2020  Unable to calculate  0.0 - 30.0 ug/mg Final   Lab Visit on 01/17/2020   Component Date Value Ref Range Status    Ferritin 01/17/2020 199  20.0 - 300.0 ng/mL Final    Cholesterol 01/17/2020 189  120 - 199 mg/dL Final    Comment: The National Cholesterol Education Program (NCEP) has set the  following guidelines (reference ranges) for Cholesterol:  Optimal.....................<200 mg/dL  Borderline High.............200-239 mg/dL  High........................> or = 240 mg/dL      Triglycerides 01/17/2020 108  30 - 150 mg/dL Final    Comment: The National Cholesterol Education Program (NCEP) has set the  following guidelines (reference values) for triglycerides:  Normal......................<150 mg/dL  Borderline High.............150-199 mg/dL  High........................200-499 mg/dL      HDL 01/17/2020 58  40 - 75 mg/dL Final    Comment: The National Cholesterol Education Program (NCEP) has set the  following guidelines (reference values) for HDL Cholesterol:  Low...............<40 mg/dL  Optimal...........>60 mg/dL      LDL Cholesterol 01/17/2020 109.4  63.0 - 159.0 mg/dL Final    Comment: The National Cholesterol Education Program (NCEP) has set the  following guidelines (reference values) for LDL Cholesterol:  Optimal.......................<130 mg/dL  Borderline High...............130-159 mg/dL  High..........................160-189 mg/dL  Very High.....................>190 mg/dL      Hdl/Cholesterol Ratio 01/17/2020 30.7  20.0 - 50.0 % Final    Total Cholesterol/HDL Ratio 01/17/2020 3.3  2.0 - 5.0 Final    Non-HDL Cholesterol 01/17/2020 131  mg/dL Final    Comment: Risk category and Non-HDL cholesterol goals:  Coronary heart disease (CHD)or equivalent (10-year risk of CHD >20%):  Non-HDL cholesterol goal     <130 mg/dL  Two or more CHD risk factors and 10-year risk of CHD <= 20%:  Non-HDL cholesterol goal     <160 mg/dL  0 to 1 CHD risk factor:  Non-HDL cholesterol goal     <190 mg/dL      Vit D,  25-Hydroxy 01/17/2020 45  30 - 96 ng/mL Final    Comment: Vitamin D deficiency.........<10 ng/mL                              Vitamin D insufficiency......10-29 ng/mL       Vitamin D sufficiency........> or equal to 30 ng/mL  Vitamin D toxicity............>100 ng/mL       No results found in the last 24 hours.     Assessment:     Encounter Diagnoses   Name Primary?    Abdominal pain, right lateral Yes    RUQ pain         Plan:     #Right abdominal wall spasm and pain   -negative lspine, right rib, & axr. Labs unrevealing  -s/p cholecystectomy  -no relationship to food  -Continue [facility]  -No improvement with flexeril or tizanidine  -no red flags on exam.   -recommended stretching, heating pad.   -tspine xray to rule out fracture  -trial of gabapentin nightly & medrol dose pack.   -avoiding nsaids due to sleeve and asa use  -cancel scheduled abdominal us considering worsening of pain and schedule ct abd with contrast with attention to lateral abdominal wall as well.  _________________________________________________________    #Osteopenia  #s/p JANEEN  -DEXA 7/26/18: osteopenia  t-1  -Repeat 8/2020  -7/9/19: Vit D 53 --> 45 on 1/17/20    #Hepatitic steatosis   -noted on CT a/p    #JOSE DE JESUS, resolved  -resolved with weight loss    #pAfib  -well controlled on metoprolol 25 mg daily + asa 81 mg daily    #Hypothyroidism   -TSH normal on 7/9/19- 2.72  -continue synthroid 50 mcg daily    #Nicotine dependence in remission  -[id number]. 3.25 ppd    #Essential HTN  Well controlled.   -continue hctz 12.5 mg daily    #S/p gastric sleeve- Overweight with BMI 28.35  -Sleeve done 3/22/2019  -Labs every 6 months-1 year ordered by Dr. Armstrong (reviewed from 7/9/19)  -Follow up in March   -Weight loss: 65 pounds (213 --> today, 179)  -iron, b12, mvi, ca, b complex   -7/9/19: ha1c 5.2%, b1 level 63, cbc: rdw 15, cmp normal (gfr >60, cr 0.8), iron sat low 11. Ferritin 199 on 1/17/20    #HCM  -Shingrix sent to pharmacy   -Prevnar 7/24/18.  Pneumovax 20  -Discuss flu at next visit.   -Tetanus done 10/3/11?  -Mammogram 10/9/19: BIRADS 1. Repeat in 1 year  -Colonoscopy 14: diverticulosis. Repeat in 5 years. Ordered. Requests Dr. Richey.  -Cervical ca screenin14: negative. No further need.   -Hep C screening negative 17  -Lipid panel 20: chol 189, tri 108, hdl 58, ldl 109    Has mychart. Return in 6 months.     Chen Davenport M.D.    Orders Placed This Encounter   Procedures    X-Ray Thoracic Spine AP Lateral    CT Abdomen With Contrast      Medications Ordered This Encounter   Medications    gabapentin (NEURONTIN) 300 MG capsule     Sig: Take 1 capsule (300 mg total) by mouth every evening.     Dispense:  30 capsule     Refill:  0    methylPREDNISolone (MEDROL DOSEPACK) 4 mg tablet     Sig: follow package directions     Dispense:  21 tablet     Refill:  0

## 2020-02-24 ENCOUNTER — HOSPITAL ENCOUNTER (OUTPATIENT)
Dept: RADIOLOGY | Facility: HOSPITAL | Age: 70
Discharge: HOME | End: 2020-02-24
Attending: INTERNAL MEDICINE
Payer: MEDICARE

## 2020-02-24 DIAGNOSIS — R10.11 RUQ PAIN: ICD-10-CM

## 2020-02-24 PROCEDURE — 25500020 PHARM REV CODE 255: Mod: PO | Performed by: INTERNAL MEDICINE

## 2020-02-24 PROCEDURE — 74160 CT ABDOMEN WITH CONTRAST: ICD-10-PCS | Mod: 26,,, | Performed by: RADIOLOGY

## 2020-02-24 PROCEDURE — 74160 CT ABDOMEN W/CONTRAST: CPT | Mod: TC,PO

## 2020-02-24 PROCEDURE — 74160 CT ABDOMEN W/CONTRAST: CPT | Mod: 26,,, | Performed by: RADIOLOGY

## 2020-02-24 RX ADMIN — IOHEXOL 75 ML: 350 INJECTION, SOLUTION INTRAVENOUS at 01:02

## 2020-02-25 ENCOUNTER — TELEPHONE (OUTPATIENT)
Dept: ENDOSCOPY | Facility: HOSPITAL | Age: 70
End: 2020-02-25

## 2020-02-25 NOTE — TELEPHONE ENCOUNTER
Attempted to schedule procedure with patient, no answer.  Left message to call office, number provided.

## 2020-02-25 NOTE — TELEPHONE ENCOUNTER
Pt returned the call as it relates to scheduling a colonoscopy, however, she wanted [facility]. Number provided. dw

## 2020-02-27 ENCOUNTER — PATIENT MESSAGE (OUTPATIENT)
Dept: FAMILY MEDICINE | Facility: CLINIC | Age: 70
End: 2020-02-27

## 2020-03-03 ENCOUNTER — TELEPHONE (OUTPATIENT)
Dept: GASTROENTEROLOGY | Facility: CLINIC | Age: 70
End: 2020-03-03

## 2020-03-03 NOTE — TELEPHONE ENCOUNTER
----- Message from Andre Irwin sent at 3/3/2020  3:24 PM CST -----  Type: Needs Medical Advice    Who Called:  Patient    Best Call Back Number: [phone]  Additional Information: Patient states that she would a callback regarding scheduling a colonoscopy.    Patient states that she has been established in [place]

## 2020-03-31 RX ORDER — HYDROCHLOROTHIAZIDE 25 MG/1
TABLET ORAL
Qty: 90 TABLET | Refills: 3 | Status: SHIPPED | OUTPATIENT
Start: 2020-03-31 | End: 2021-03-18

## 2020-04-13 ENCOUNTER — TELEPHONE (OUTPATIENT)
Dept: FAMILY MEDICINE | Facility: CLINIC | Age: 70
End: 2020-04-13

## 2020-04-13 NOTE — TELEPHONE ENCOUNTER
----- Message from Suzan Gonzalez sent at 4/13/2020 10:05 AM CDT -----  Contact: HILARY CORRALES - Ms Rodriguez  Stated she has sent over pt discharge and exam notes to be signed and faxed back [phone], she can be reached at [phone]

## 2020-04-13 NOTE — TELEPHONE ENCOUNTER
Documents left for Dr. Davenport to sign when she returns. [facility] notified, confirmed there is no urgency and is ok to wait.

## 2020-04-28 ENCOUNTER — OFFICE VISIT (OUTPATIENT)
Dept: CARDIOLOGY | Facility: CLINIC | Age: 70
End: 2020-04-28
Payer: MEDICARE

## 2020-04-28 ENCOUNTER — TELEPHONE (OUTPATIENT)
Dept: CARDIOLOGY | Facility: CLINIC | Age: 70
End: 2020-04-28

## 2020-04-28 ENCOUNTER — PATIENT OUTREACH (OUTPATIENT)
Dept: ADMINISTRATIVE | Facility: OTHER | Age: 70
End: 2020-04-28

## 2020-04-28 ENCOUNTER — LAB VISIT (OUTPATIENT)
Dept: LAB | Facility: HOSPITAL | Age: 70
End: 2020-04-28
Attending: INTERNAL MEDICINE
Payer: MEDICARE

## 2020-04-28 ENCOUNTER — OFFICE VISIT (OUTPATIENT)
Dept: FAMILY MEDICINE | Facility: CLINIC | Age: 70
End: 2020-04-28
Payer: MEDICARE

## 2020-04-28 VITALS
WEIGHT: 186.63 LBS | HEIGHT: 67 IN | BODY MASS INDEX: 29.29 KG/M2 | DIASTOLIC BLOOD PRESSURE: 74 MMHG | SYSTOLIC BLOOD PRESSURE: 142 MMHG | HEART RATE: 67 BPM

## 2020-04-28 VITALS
OXYGEN SATURATION: 98 % | HEART RATE: 66 BPM | HEIGHT: 67 IN | DIASTOLIC BLOOD PRESSURE: 80 MMHG | SYSTOLIC BLOOD PRESSURE: 162 MMHG | BODY MASS INDEX: 29.27 KG/M2 | WEIGHT: 186.5 LBS

## 2020-04-28 DIAGNOSIS — R10.13 EPIGASTRIC PAIN: ICD-10-CM

## 2020-04-28 DIAGNOSIS — I49.3 PVC (PREMATURE VENTRICULAR CONTRACTION): ICD-10-CM

## 2020-04-28 DIAGNOSIS — I10 ESSENTIAL HYPERTENSION: Primary | Chronic | ICD-10-CM

## 2020-04-28 DIAGNOSIS — R06.02 SOB (SHORTNESS OF BREATH): ICD-10-CM

## 2020-04-28 DIAGNOSIS — R07.9 CHEST PAIN, UNSPECIFIED TYPE: Primary | ICD-10-CM

## 2020-04-28 DIAGNOSIS — I49.9 IRREGULAR HEART RHYTHM: ICD-10-CM

## 2020-04-28 DIAGNOSIS — I10 ESSENTIAL HYPERTENSION: Chronic | ICD-10-CM

## 2020-04-28 LAB
ALBUMIN SERPL BCP-MCNC: 3.7 G/DL (ref 3.5–5.2)
ALP SERPL-CCNC: 70 U/L (ref 55–135)
ALT SERPL W/O P-5'-P-CCNC: 13 U/L (ref 10–44)
AMYLASE SERPL-CCNC: 62 U/L (ref 20–110)
ANION GAP SERPL CALC-SCNC: 7 MMOL/L (ref 8–16)
AST SERPL-CCNC: 16 U/L (ref 10–40)
BILIRUB SERPL-MCNC: 0.6 MG/DL (ref 0.1–1)
BUN SERPL-MCNC: 12 MG/DL (ref 8–23)
CALCIUM SERPL-MCNC: 9.9 MG/DL (ref 8.7–10.5)
CHLORIDE SERPL-SCNC: 100 MMOL/L (ref 95–110)
CO2 SERPL-SCNC: 32 MMOL/L (ref 23–29)
CREAT SERPL-MCNC: 0.8 MG/DL (ref 0.5–1.4)
EST. GFR  (AFRICAN AMERICAN): >60 ML/MIN/1.73 M^2
EST. GFR  (NON AFRICAN AMERICAN): >60 ML/MIN/1.73 M^2
GLUCOSE SERPL-MCNC: 97 MG/DL (ref 70–110)
LIPASE SERPL-CCNC: 21 U/L (ref 4–60)
MAGNESIUM SERPL-MCNC: 1.8 MG/DL (ref 1.6–2.6)
POTASSIUM SERPL-SCNC: 3.6 MMOL/L (ref 3.5–5.1)
PROT SERPL-MCNC: 7.8 G/DL (ref 6–8.4)
SODIUM SERPL-SCNC: 139 MMOL/L (ref 136–145)

## 2020-04-28 PROCEDURE — 3077F SYST BP >= 140 MM HG: CPT | Mod: S$GLB,,, | Performed by: NURSE PRACTITIONER

## 2020-04-28 PROCEDURE — 99999 PR PBB SHADOW E&M-EST. PATIENT-LVL V: ICD-10-PCS | Mod: PBBFAC,,, | Performed by: NURSE PRACTITIONER

## 2020-04-28 PROCEDURE — 99204 OFFICE O/P NEW MOD 45 MIN: CPT | Mod: S$GLB,,, | Performed by: INTERNAL MEDICINE

## 2020-04-28 PROCEDURE — 83735 ASSAY OF MAGNESIUM: CPT

## 2020-04-28 PROCEDURE — 99204 PR OFFICE/OUTPT VISIT, NEW, LEVL IV, 45-59 MIN: ICD-10-PCS | Mod: S$GLB,,, | Performed by: INTERNAL MEDICINE

## 2020-04-28 PROCEDURE — 1159F PR MEDICATION LIST DOCUMENTED IN MEDICAL RECORD: ICD-10-PCS | Mod: S$GLB,,, | Performed by: INTERNAL MEDICINE

## 2020-04-28 PROCEDURE — 1159F MED LIST DOCD IN RCRD: CPT | Mod: S$GLB,,, | Performed by: NURSE PRACTITIONER

## 2020-04-28 PROCEDURE — 83690 ASSAY OF LIPASE: CPT

## 2020-04-28 PROCEDURE — 93005 EKG 12-LEAD: ICD-10-PCS | Mod: S$GLB,,, | Performed by: NURSE PRACTITIONER

## 2020-04-28 PROCEDURE — 3078F DIAST BP <80 MM HG: CPT | Mod: S$GLB,,, | Performed by: NURSE PRACTITIONER

## 2020-04-28 PROCEDURE — 82150 ASSAY OF AMYLASE: CPT

## 2020-04-28 PROCEDURE — 36415 COLL VENOUS BLD VENIPUNCTURE: CPT | Mod: PO

## 2020-04-28 PROCEDURE — 1101F PT FALLS ASSESS-DOCD LE1/YR: CPT | Mod: S$GLB,,, | Performed by: NURSE PRACTITIONER

## 2020-04-28 PROCEDURE — 80053 COMPREHEN METABOLIC PANEL: CPT

## 2020-04-28 PROCEDURE — 1101F PT FALLS ASSESS-DOCD LE1/YR: CPT | Mod: S$GLB,,, | Performed by: INTERNAL MEDICINE

## 2020-04-28 PROCEDURE — 93010 EKG 12-LEAD: ICD-10-PCS | Mod: S$GLB,,, | Performed by: INTERNAL MEDICINE

## 2020-04-28 PROCEDURE — 1159F MED LIST DOCD IN RCRD: CPT | Mod: S$GLB,,, | Performed by: INTERNAL MEDICINE

## 2020-04-28 PROCEDURE — 99999 PR PBB SHADOW E&M-EST. PATIENT-LVL III: ICD-10-PCS | Mod: PBBFAC,,, | Performed by: INTERNAL MEDICINE

## 2020-04-28 PROCEDURE — 1159F PR MEDICATION LIST DOCUMENTED IN MEDICAL RECORD: ICD-10-PCS | Mod: S$GLB,,, | Performed by: NURSE PRACTITIONER

## 2020-04-28 PROCEDURE — 99999 PR PBB SHADOW E&M-EST. PATIENT-LVL V: CPT | Mod: PBBFAC,,, | Performed by: NURSE PRACTITIONER

## 2020-04-28 PROCEDURE — 93005 ELECTROCARDIOGRAM TRACING: CPT | Mod: S$GLB,,, | Performed by: NURSE PRACTITIONER

## 2020-04-28 PROCEDURE — 3079F DIAST BP 80-89 MM HG: CPT | Mod: S$GLB,,, | Performed by: INTERNAL MEDICINE

## 2020-04-28 PROCEDURE — 99213 PR OFFICE/OUTPT VISIT, EST, LEVL III, 20-29 MIN: ICD-10-PCS | Mod: S$GLB,,, | Performed by: NURSE PRACTITIONER

## 2020-04-28 PROCEDURE — 1126F AMNT PAIN NOTED NONE PRSNT: CPT | Mod: S$GLB,,, | Performed by: NURSE PRACTITIONER

## 2020-04-28 PROCEDURE — 3077F PR MOST RECENT SYSTOLIC BLOOD PRESSURE >= 140 MM HG: ICD-10-PCS | Mod: S$GLB,,, | Performed by: NURSE PRACTITIONER

## 2020-04-28 PROCEDURE — 99213 OFFICE O/P EST LOW 20 MIN: CPT | Mod: S$GLB,,, | Performed by: NURSE PRACTITIONER

## 2020-04-28 PROCEDURE — 3079F PR MOST RECENT DIASTOLIC BLOOD PRESSURE 80-89 MM HG: ICD-10-PCS | Mod: S$GLB,,, | Performed by: INTERNAL MEDICINE

## 2020-04-28 PROCEDURE — 3078F PR MOST RECENT DIASTOLIC BLOOD PRESSURE < 80 MM HG: ICD-10-PCS | Mod: S$GLB,,, | Performed by: NURSE PRACTITIONER

## 2020-04-28 PROCEDURE — 1126F PR PAIN SEVERITY QUANTIFIED, NO PAIN PRESENT: ICD-10-PCS | Mod: S$GLB,,, | Performed by: NURSE PRACTITIONER

## 2020-04-28 PROCEDURE — 3077F PR MOST RECENT SYSTOLIC BLOOD PRESSURE >= 140 MM HG: ICD-10-PCS | Mod: S$GLB,,, | Performed by: INTERNAL MEDICINE

## 2020-04-28 PROCEDURE — 93010 ELECTROCARDIOGRAM REPORT: CPT | Mod: S$GLB,,, | Performed by: INTERNAL MEDICINE

## 2020-04-28 PROCEDURE — 1101F PR PT FALLS ASSESS DOC 0-1 FALLS W/OUT INJ PAST YR: ICD-10-PCS | Mod: S$GLB,,, | Performed by: NURSE PRACTITIONER

## 2020-04-28 PROCEDURE — 3077F SYST BP >= 140 MM HG: CPT | Mod: S$GLB,,, | Performed by: INTERNAL MEDICINE

## 2020-04-28 PROCEDURE — 1101F PR PT FALLS ASSESS DOC 0-1 FALLS W/OUT INJ PAST YR: ICD-10-PCS | Mod: S$GLB,,, | Performed by: INTERNAL MEDICINE

## 2020-04-28 PROCEDURE — 99999 PR PBB SHADOW E&M-EST. PATIENT-LVL III: CPT | Mod: PBBFAC,,, | Performed by: INTERNAL MEDICINE

## 2020-04-28 NOTE — PROGRESS NOTES
Subjective:       Patient ID: Zora Davis is a 70 y.o. female.    Chief Complaint: Chest Pain    Chest Pain    This is a new problem. The current episode started in the past 7 days. The onset quality is sudden. The problem occurs daily. The problem has been unchanged. The pain is present in the substernal region. The pain is moderate. The quality of the pain is described as tightness, sharp and pressure. The pain radiates to the right shoulder. Associated symptoms include shortness of breath. Pertinent negatives include no abdominal pain, back pain, claudication, cough, diaphoresis, dizziness, exertional chest pressure, fever, headaches, hemoptysis, irregular heartbeat, leg pain, lower extremity edema, malaise/fatigue, nausea, near-syncope, numbness, orthopnea, palpitations, PND, sputum production, syncope, vomiting or weakness. The pain is aggravated by nothing. She has tried nothing for the symptoms. The treatment provided no relief. Risk factors include obesity and post-menopausal.   Her past medical history is significant for arrhythmia, hypertension, sleep apnea and thyroid problem.   Pertinent negatives for past medical history include no aneurysm, no anxiety/panic attacks, no aortic aneurysm, no aortic dissection, no bicuspid aortic valve, no CAD, no cancer, no congenital heart disease, no connective tissue disease, no COPD, no CHF, no diabetes, no DVT, no hyperhomocysteinemia, no hyperlipidemia, no Kawasaki disease, no Marfan's syndrome, no MI, no mitral valve prolapse, no pacemaker, no PE, no PVD, no recent injury, no rheumatic fever, no seizures, no sickle cell disease, no spontaneous pneumothorax, no stimulant use, no strokes, no TIA, Pat syndrome and no valve disorder.   Her family medical history is significant for hypertension.   Pertinent negatives for family medical history include: no aortic dissection, no CAD, no connective tissue disease, no diabetes, no heart disease, no hyperlipidemia,  no Marfan's syndrome, no early MI, no PE, no PVD, no sickle cell disease, no stroke, no sudden death and no TIA. Prior diagnostic workup includes echocardiogram.     Past Medical History:   Diagnosis Date    Adult hypothyroidism 2010    Diverticulosis     Essential hypertension 2013    Fatty liver     JOSE DE JESUS on CPAP     Osteopenia of multiple sites 2020    Paroxysmal atrial fibrillation 3/22/2019    S/P laparoscopic sleeve gastrectomy 3/22/2019     Social History     Socioeconomic History    Marital status:      Spouse name: Not on file    Number of children: 2    Years of education: Not on file    Highest education level: Not on file   Occupational History    Occupation: retired    Social Needs    Financial resource strain: Not on file    Food insecurity:     Worry: Not on file     Inability: Not on file    Transportation needs:     Medical: Not on file     Non-medical: Not on file   Tobacco Use    Smoking status: Former Smoker     Packs/day: 0.25     Years: 13.00     Pack years: 3.25     Types: Cigarettes     Start date:      Last attempt to quit:      Years since quittin.3    Smokeless tobacco: Never Used   Substance and Sexual Activity    Alcohol use: Yes     Alcohol/week: 1.0 standard drinks     Types: 1 Glasses of wine per week     Frequency: Monthly or less     Drinks per session: 1 or 2     Binge frequency: Never    Drug use: No    Sexual activity: Yes     Partners: Male     Birth control/protection: Surgical   Lifestyle    Physical activity:     Days per week: Not on file     Minutes per session: Not on file    Stress: Not on file   Relationships    Social connections:     Talks on phone: Not on file     Gets together: Not on file     Attends Yarsani service: Not on file     Active member of club or organization: Not on file     Attends meetings of clubs or organizations: Not on file     Relationship status: Not on file   Other  Topics Concern    Patient feels they ought to cut down on drinking/drug use Not Asked    Patient annoyed by others criticizing their drinking/drug use Not Asked    Patient has felt bad or guilty about drinking/drug use Not Asked    Patient has had a drink/used drugs as an eye opener in the AM Not Asked   Social History Narrative    Not on file     Past Surgical History:   Procedure Laterality Date    BREAST BIOPSY       SECTION      x 2    CHOLECYSTECTOMY      COLONOSCOPY  2014    Dr. Richey, repeat in 5 years    ESOPHAGOGASTRODUODENOSCOPY N/A 2018    Procedure: ESOPHAGOGASTRODUODENOSCOPY (EGD);  Surgeon: Efrain Steel MD;  Location: [facility];  Service: Endoscopy;  Laterality: N/A;    HYSTERECTOMY      LAPAROSCOPIC LYSIS OF ADHESIONS N/A 3/22/2019    Procedure: LYSIS, ADHESIONS, LAPAROSCOPIC;  Surgeon: Cole Armstrong MD;  Location: [facility] OR;  Service: General;  Laterality: N/A;    ROBOT-ASSISTED LAPAROSCOPIC SLEEVE GASTRECTOMY USING DA LARRY XI N/A 3/22/2019    Procedure: XI ROBOTIC SLEEVE GASTRECTOMY;  Surgeon: Cole Armstrong MD;  Location: [facility] OR;  Service: General;  Laterality: N/A;    TOTAL REDUCTION MAMMOPLASTY Bilateral     UPPER GASTROINTESTINAL ENDOSCOPY  2015    Dr. Padron       Review of Systems   Constitutional: Negative.  Negative for activity change, diaphoresis, fever, malaise/fatigue and unexpected weight change.   HENT: Negative.  Negative for hearing loss, rhinorrhea and trouble swallowing.    Eyes: Negative.  Negative for discharge and visual disturbance.   Respiratory: Positive for chest tightness and shortness of breath. Negative for cough, hemoptysis, sputum production and wheezing.    Cardiovascular: Positive for chest pain. Negative for palpitations, orthopnea, claudication, syncope, PND and near-syncope.   Gastrointestinal: Negative.  Negative for abdominal pain, blood in stool, constipation, diarrhea, nausea and vomiting.   Endocrine: Negative.   Negative for polydipsia and polyuria.   Genitourinary: Negative.  Negative for difficulty urinating, dysuria, hematuria and menstrual problem.   Musculoskeletal: Negative.  Negative for arthralgias, back pain, joint swelling and neck pain.   Skin: Negative.    Allergic/Immunologic: Negative.    Neurological: Negative.  Negative for dizziness, seizures, weakness, numbness and headaches.   Psychiatric/Behavioral: Negative.  Negative for confusion and dysphoric mood.       Objective:      Physical Exam   Constitutional: She is oriented to person, place, and time. She appears well-developed and well-nourished.   HENT:   Head: Normocephalic.   Right Ear: External ear normal.   Left Ear: External ear normal.   Nose: Nose normal.   Mouth/Throat: Oropharynx is clear and moist.   Eyes: Pupils are equal, round, and reactive to light. Conjunctivae are normal.   Neck: Normal range of motion. Neck supple.   Cardiovascular: An irregularly irregular rhythm present.   Pulmonary/Chest: Effort normal and breath sounds normal.   Abdominal: Soft. Bowel sounds are normal.   Musculoskeletal: Normal range of motion.   Neurological: She is alert and oriented to person, place, and time.   Skin: Skin is warm and dry. Capillary refill takes 2 to 3 seconds.   Psychiatric: She has a normal mood and affect. Her behavior is normal. Judgment and thought content normal.   Nursing note and vitals reviewed.      Assessment:       1. Chest pain, unspecified type    2. SOB (shortness of breath)    3. Irregular heart rhythm        Plan:           Zora was seen today for chest pain.    Diagnoses and all orders for this visit:    Chest pain, unspecified type  SOB (shortness of breath)  Irregular heart rhythm  -     IN OFFICE EKG 12-LEAD (to [place])  -     Ambulatory referral/consult to Cardiology; Future  If unable to see cardiology today, report to ER immediately

## 2020-04-28 NOTE — PROGRESS NOTES
Subjective:    Patient ID:  Zora Davis is a 70 y.o. female who presents for evaluation of Chest Pain      HPI70 yo WF who has been having epigastric pain for 48 hours. Pain was made worse by eating and felt better if she sat up. No worsening with exertion but had some SOB. EKG shows PVC's which she stated she has had for years otherwise normal.    Review of Systems   Constitution: Negative for decreased appetite, fever, malaise/fatigue, weight gain and weight loss.   HENT: Negative for hearing loss and nosebleeds.    Eyes: Negative for visual disturbance.   Cardiovascular: Positive for chest pain, dyspnea on exertion and irregular heartbeat. Negative for claudication, cyanosis, leg swelling, near-syncope, orthopnea, palpitations, paroxysmal nocturnal dyspnea and syncope.   Respiratory: Negative for cough, hemoptysis, shortness of breath, sleep disturbances due to breathing, snoring and wheezing.    Endocrine: Negative for cold intolerance, heat intolerance, polydipsia and polyuria.   Hematologic/Lymphatic: Negative for adenopathy and bleeding problem. Does not bruise/bleed easily.   Skin: Negative for color change, itching, poor wound healing, rash and suspicious lesions.   Musculoskeletal: Negative for arthritis, back pain, falls, joint pain, joint swelling, muscle cramps, muscle weakness and myalgias.   Gastrointestinal: Positive for abdominal pain. Negative for bloating, change in bowel habit, constipation, flatus, heartburn, hematemesis, hematochezia, hemorrhoids, jaundice, melena, nausea and vomiting.   Genitourinary: Negative for bladder incontinence, decreased libido, frequency, hematuria, hesitancy and urgency.   Neurological: Negative for brief paralysis, difficulty with concentration, excessive daytime sleepiness, dizziness, focal weakness, headaches, light-headedness, loss of balance, numbness, vertigo and weakness.   Psychiatric/Behavioral: Negative for altered mental status, depression and  "memory loss. The patient does not have insomnia and is not nervous/anxious.    Allergic/Immunologic: Negative for environmental allergies, hives and persistent infections.        Objective:    Physical Exam   Constitutional: She is oriented to person, place, and time. She appears well-developed and well-nourished.   BP (!) 162/80   Pulse 66   Ht 5' 7" (1.702 m)   Wt 84.6 kg (186 lb 8.2 oz)   SpO2 98%   BMI 29.21 kg/m²      HENT:   Head: Normocephalic and atraumatic.   Right Ear: External ear normal.   Left Ear: External ear normal.   Nose: Nose normal.   Mouth/Throat: Oropharynx is clear and moist.   Eyes: Pupils are equal, round, and reactive to light. Conjunctivae, EOM and lids are normal. Right eye exhibits no discharge. Left eye exhibits no discharge. Right conjunctiva has no hemorrhage. No scleral icterus.   Neck: Normal range of motion. Neck supple. No JVD present. No tracheal deviation present. No thyromegaly present.   Cardiovascular: Normal rate, normal heart sounds and intact distal pulses. A regularly irregular rhythm present. Frequent extrasystoles are present. Exam reveals no gallop and no friction rub.   No murmur heard.  Pulmonary/Chest: Effort normal and breath sounds normal. No respiratory distress. She has no wheezes. She has no rales. She exhibits no tenderness. Breasts are symmetrical.   Abdominal: Soft. Bowel sounds are normal. She exhibits no distension and no mass. There is no hepatosplenomegaly or hepatomegaly. There is no tenderness. There is no rebound and no guarding.   Musculoskeletal: Normal range of motion. She exhibits no edema or tenderness.   Lymphadenopathy:     She has no cervical adenopathy.   Neurological: She is alert and oriented to person, place, and time. She displays normal reflexes. No cranial nerve deficit. Coordination normal.   Skin: Skin is warm and dry. No rash noted. No erythema. No pallor.   Psychiatric: She has a normal mood and affect. Her behavior is normal. " Judgment and thought content normal.   Nursing note and vitals reviewed.        Assessment:       1. Essential hypertension    2. Epigastric pain    3. PVC (premature ventricular contraction)         Plan:    Atypical chest pain   BP up today but normally controlled.   No acute changes on EKG     Orders Placed This Encounter   Procedures    US Abdominal Aorta    Amylase    LIPASE    Magnesium    Comprehensive metabolic panel    Exercise Stress - EKG     Follow up in about 6 months (around 10/28/2020).                right

## 2020-04-28 NOTE — LETTER
April 28, 2020      [place]  [id number] [facility]  [place] [address]           [facility]  [address] [facility]  [place] [address]  Phone: [phone]  Fax: [phone]          Patient: Zora Davis   MR Number: [id number]   YOB: 1950   Date of Visit: 4/28/2020       Dear Dara Stephens:    Thank you for referring Zora Davis to me for evaluation. Attached you will find relevant portions of my assessment and plan of care.    If you have questions, please do not hesitate to call me. I look forward to following Zora Davis along with you.    Sincerely,    Myles Granado Jr., MD    Enclosure  CC:  No Recipients    If you would like to receive this communication electronically, please contact [email or web address] or [phone] to request more information on [employer] access.    For providers and/or their staff who would like to refer a patient to [facility], please contact us through our one-stop-shop provider referral line, [facility], at [phone].    If you feel you have received this communication in error or would no longer like to receive these types of communications, please e-mail [email or web address]

## 2020-04-28 NOTE — TELEPHONE ENCOUNTER
Called patient, verified .   Informed patient that blood work was ok.   Patient had no questions and stated understanding

## 2020-04-29 ENCOUNTER — HOSPITAL ENCOUNTER (OUTPATIENT)
Dept: RADIOLOGY | Facility: HOSPITAL | Age: 70
Discharge: HOME | End: 2020-04-29
Attending: INTERNAL MEDICINE
Payer: MEDICARE

## 2020-04-29 DIAGNOSIS — R10.13 EPIGASTRIC PAIN: ICD-10-CM

## 2020-04-29 DIAGNOSIS — I49.3 PVC (PREMATURE VENTRICULAR CONTRACTION): ICD-10-CM

## 2020-04-29 DIAGNOSIS — I10 ESSENTIAL HYPERTENSION: ICD-10-CM

## 2020-04-29 PROCEDURE — 76775 US EXAM ABDO BACK WALL LIM: CPT | Mod: TC,PO

## 2020-04-29 PROCEDURE — 76775 US EXAM ABDO BACK WALL LIM: CPT | Mod: 26,,, | Performed by: RADIOLOGY

## 2020-04-29 PROCEDURE — 76775 US ABDOMINAL AORTA: ICD-10-PCS | Mod: 26,,, | Performed by: RADIOLOGY

## 2020-04-30 ENCOUNTER — TELEPHONE (OUTPATIENT)
Dept: CARDIOLOGY | Facility: CLINIC | Age: 70
End: 2020-04-30

## 2020-04-30 ENCOUNTER — TELEPHONE (OUTPATIENT)
Dept: FAMILY MEDICINE | Facility: CLINIC | Age: 70
End: 2020-04-30

## 2020-04-30 ENCOUNTER — PATIENT MESSAGE (OUTPATIENT)
Dept: FAMILY MEDICINE | Facility: CLINIC | Age: 70
End: 2020-04-30

## 2020-04-30 DIAGNOSIS — R07.89 CHEST DISCOMFORT: ICD-10-CM

## 2020-04-30 DIAGNOSIS — K21.9 GASTROESOPHAGEAL REFLUX DISEASE, ESOPHAGITIS PRESENCE NOT SPECIFIED: Primary | ICD-10-CM

## 2020-04-30 NOTE — TELEPHONE ENCOUNTER
----- Message from Cuate Geller MA sent at 4/30/2020 11:38 AM CDT -----  Contact: Patient  Type: Needs Medical Advice    Who Called:  Patient  Patient called in regards to receiving her 4/28/2020 US report results. Please advise and review. Thank you in advance.     Best Call Back Number: [phone]

## 2020-04-30 NOTE — TELEPHONE ENCOUNTER
Results given. Still having discomfort when eating. No exertional symptoms. Asked her to contact primary to see if she needs further gi evaluation.

## 2020-05-04 ENCOUNTER — PATIENT OUTREACH (OUTPATIENT)
Dept: ADMINISTRATIVE | Facility: OTHER | Age: 70
End: 2020-05-04

## 2020-05-04 NOTE — PROGRESS NOTES
Chart reviewed.   Immunizations: updated  Orders placed: n/a  Upcoming appts to satisfy GEOFF topics: n/a  Open case request for Colonoscopy.

## 2020-05-05 ENCOUNTER — TELEPHONE (OUTPATIENT)
Dept: GASTROENTEROLOGY | Facility: CLINIC | Age: 70
End: 2020-05-05

## 2020-05-05 ENCOUNTER — OFFICE VISIT (OUTPATIENT)
Dept: GASTROENTEROLOGY | Facility: CLINIC | Age: 70
End: 2020-05-05
Payer: MEDICARE

## 2020-05-05 ENCOUNTER — DOCUMENTATION ONLY (OUTPATIENT)
Dept: GASTROENTEROLOGY | Facility: CLINIC | Age: 70
End: 2020-05-05

## 2020-05-05 DIAGNOSIS — R13.19 ESOPHAGEAL DYSPHAGIA: ICD-10-CM

## 2020-05-05 DIAGNOSIS — K21.9 GASTROESOPHAGEAL REFLUX DISEASE, ESOPHAGITIS PRESENCE NOT SPECIFIED: ICD-10-CM

## 2020-05-05 DIAGNOSIS — Z01.812 PRE-PROCEDURE LAB EXAM: Primary | ICD-10-CM

## 2020-05-05 DIAGNOSIS — R07.89 ATYPICAL CHEST PAIN: Primary | ICD-10-CM

## 2020-05-05 DIAGNOSIS — Z80.0 FAMILY HISTORY OF COLON CANCER: ICD-10-CM

## 2020-05-05 DIAGNOSIS — K22.4 ESOPHAGEAL SPASM: ICD-10-CM

## 2020-05-05 PROCEDURE — 99214 PR OFFICE/OUTPT VISIT, EST, LEVL IV, 30-39 MIN: ICD-10-PCS | Mod: 95,,, | Performed by: NURSE PRACTITIONER

## 2020-05-05 PROCEDURE — 1159F PR MEDICATION LIST DOCUMENTED IN MEDICAL RECORD: ICD-10-PCS | Mod: ,,, | Performed by: NURSE PRACTITIONER

## 2020-05-05 PROCEDURE — 1101F PT FALLS ASSESS-DOCD LE1/YR: CPT | Mod: ,,, | Performed by: NURSE PRACTITIONER

## 2020-05-05 PROCEDURE — 1159F MED LIST DOCD IN RCRD: CPT | Mod: ,,, | Performed by: NURSE PRACTITIONER

## 2020-05-05 PROCEDURE — 99214 OFFICE O/P EST MOD 30 MIN: CPT | Mod: 95,,, | Performed by: NURSE PRACTITIONER

## 2020-05-05 PROCEDURE — 1101F PR PT FALLS ASSESS DOC 0-1 FALLS W/OUT INJ PAST YR: ICD-10-PCS | Mod: ,,, | Performed by: NURSE PRACTITIONER

## 2020-05-05 RX ORDER — PANTOPRAZOLE SODIUM 40 MG/1
40 TABLET, DELAYED RELEASE ORAL DAILY
COMMUNITY
End: 2020-05-05 | Stop reason: SDUPTHER

## 2020-05-05 RX ORDER — PANTOPRAZOLE SODIUM 40 MG/1
40 TABLET, DELAYED RELEASE ORAL 2 TIMES DAILY
Qty: 60 TABLET | Refills: 1 | Status: SHIPPED | OUTPATIENT
Start: 2020-05-05 | End: 2020-07-10

## 2020-05-05 NOTE — PROGRESS NOTES
"Subjective:       Patient ID: Zora Davis is a 70 y.o. female, There is no height or weight on file to calculate BMI.    Chief Complaint: Chest Pain      Patient is new to me. Established patient of Dr. Steel.  Referred by Dara Stephens for GERD and chest discomfort.    The patient location is: Home ([place])  The chief complaint leading to consultation is: Chest pain  Visit type: Audiovisual  Total time spent with patient: 15 minutes  Each patient to whom he or she provides medical services by telemedicine is:  (1) informed of the relationship between the physician and patient and the respective role of any other health care provider with respect to management of the patient; and (2) notified that he or she may decline to receive medical services by telemedicine and may withdraw from such care at any time.    Gastroesophageal Reflux   She complains of belching, chest pain (mid sternum to left chest wall and shoulder; saw cardiology and was told her heart is good), dysphagia (dysphagia to solids, especially meats, bread, and rice; denies trouble swallowing liquid; esophageal dilation in the past improved symptoms) and heartburn. She reports no abdominal pain, no choking, no coughing, no early satiety, no globus sensation, no hoarse voice, no nausea or no sore throat. This is a new problem. The current episode started 1 to 4 weeks ago (Started ~ 1.5 weeks ago). The problem occurs occasionally. The problem has been gradually improving. The heartburn is located in the substernum and left chest (describes as an intermittent "spasm"; lasts for seconds; typically occurs after eating; dull pain lingers). The heartburn is of moderate intensity. The heartburn does not wake her from sleep. The heartburn does not limit her activity. The heartburn doesn't change with position. Exacerbated by: eating in general. Pertinent negatives include no anemia, melena or weight loss. Risk factors include caffeine use, " ETOH use and smoking/tobacco exposure (Former smoker). She has tried a PPI (Currently: Tylenol- somewhat effective; Recently started on Protonix and Tums OTC- ineffective) for the symptoms. Past procedures include an abdominal ultrasound and an EGD.     Review of Systems   Constitutional: Negative for appetite change, fever, unexpected weight change and weight loss.   HENT: Negative for hoarse voice, sore throat and trouble swallowing.    Respiratory: Negative for cough, choking and shortness of breath.    Cardiovascular: Positive for chest pain (mid sternum to left chest wall and shoulder; saw cardiology and was told her heart is good).   Gastrointestinal: Positive for dysphagia (dysphagia to solids, especially meats, bread, and rice; denies trouble swallowing liquid; esophageal dilation in the past improved symptoms) and heartburn. Negative for abdominal pain, blood in stool, constipation, diarrhea, melena, nausea and vomiting.   Genitourinary: Negative for difficulty urinating and dysuria.   Musculoskeletal: Negative for gait problem.   Skin: Negative for rash.   Neurological: Negative for speech difficulty.   Psychiatric/Behavioral: Negative for confusion.       Past Medical History:   Diagnosis Date    Adult hypothyroidism 2010    Diverticulosis     Essential hypertension 2013    Fatty liver     GERD (gastroesophageal reflux disease)     JOSE DE JESUS on CPAP     Osteopenia of multiple sites 2020    Paroxysmal atrial fibrillation 3/22/2019    S/P laparoscopic sleeve gastrectomy 3/22/2019      Past Surgical History:   Procedure Laterality Date    BREAST BIOPSY       SECTION      x 2    CHOLECYSTECTOMY      COLONOSCOPY  2014    Dr. Richey; diverticulosis; repeat in 5 years    ESOPHAGOGASTRODUODENOSCOPY N/A 2018    Dr. Steel; gastritis; gastric polyps    HYSTERECTOMY      LAPAROSCOPIC LYSIS OF ADHESIONS N/A 3/22/2019    Procedure: LYSIS, ADHESIONS, LAPAROSCOPIC;  Surgeon:  Cole Armstrong MD;  Location: [facility] OR;  Service: General;  Laterality: N/A;    ROBOT-ASSISTED LAPAROSCOPIC SLEEVE GASTRECTOMY USING DA LARRY XI N/A 3/22/2019    Procedure: XI ROBOTIC SLEEVE GASTRECTOMY;  Surgeon: Cole Armstrong MD;  Location: [facility] OR;  Service: General;  Laterality: N/A;    TOTAL REDUCTION MAMMOPLASTY Bilateral     UPPER GASTROINTESTINAL ENDOSCOPY  08/13/2015    Dr. Padron      Family History   Problem Relation Age of Onset    Colon cancer Paternal Aunt     COPD Paternal Aunt         colon    Hypertension Father     Crohn's disease Neg Hx     Stomach cancer Neg Hx     Ulcerative colitis Neg Hx     Esophageal cancer Neg Hx       Wt Readings from Last 10 Encounters:   04/28/20 84.6 kg (186 lb 8.2 oz)   04/28/20 84.6 kg (186 lb 9.6 oz)   02/19/20 82.1 kg (181 lb)   02/10/20 82.6 kg (182 lb)   02/05/20 82.6 kg (182 lb)   01/27/20 81.2 kg (179 lb)   01/16/20 81.5 kg (179 lb 9.6 oz)   12/30/19 81.6 kg (179 lb 14.3 oz)   10/14/19 81.5 kg (179 lb 9.6 oz)   10/09/19 84.8 kg (187 lb)     Lab Results   Component Value Date    WBC 7.96 07/09/2019    HGB 13.2 07/09/2019    HCT 41.5 07/09/2019    MCV 89 07/09/2019     07/09/2019     CMP  Sodium   Date Value Ref Range Status   04/28/2020 139 136 - 145 mmol/L Final     Potassium   Date Value Ref Range Status   04/28/2020 3.6 3.5 - 5.1 mmol/L Final     Chloride   Date Value Ref Range Status   04/28/2020 100 95 - 110 mmol/L Final     CO2   Date Value Ref Range Status   04/28/2020 32 (H) 23 - 29 mmol/L Final     Glucose   Date Value Ref Range Status   04/28/2020 97 70 - 110 mg/dL Final     BUN, Bld   Date Value Ref Range Status   04/28/2020 12 8 - 23 mg/dL Final     Creatinine   Date Value Ref Range Status   04/28/2020 0.8 0.5 - 1.4 mg/dL Final     Calcium   Date Value Ref Range Status   04/28/2020 9.9 8.7 - 10.5 mg/dL Final     Total Protein   Date Value Ref Range Status   04/28/2020 7.8 6.0 - 8.4 g/dL Final     Albumin   Date Value Ref Range Status    04/28/2020 3.7 3.5 - 5.2 g/dL Final     Total Bilirubin   Date Value Ref Range Status   04/28/2020 0.6 0.1 - 1.0 mg/dL Final     Comment:     For infants and newborns, interpretation of results should be based  on gestational age, weight and in agreement with clinical  observations.  Premature Infant recommended reference ranges:  Up to 24 hours.............<8.0 mg/dL  Up to 48 hours............<12.0 mg/dL  3-5 days..................<15.0 mg/dL  6-29 days.................<15.0 mg/dL       Alkaline Phosphatase   Date Value Ref Range Status   04/28/2020 70 55 - 135 U/L Final     AST   Date Value Ref Range Status   04/28/2020 16 10 - 40 U/L Final     ALT   Date Value Ref Range Status   04/28/2020 13 10 - 44 U/L Final     Anion Gap   Date Value Ref Range Status   04/28/2020 7 (L) 8 - 16 mmol/L Final     eGFR if    Date Value Ref Range Status   04/28/2020 >60.0 >60 mL/min/1.73 m^2 Final     eGFR if non    Date Value Ref Range Status   04/28/2020 >60.0 >60 mL/min/1.73 m^2 Final     Comment:     Calculation used to obtain the estimated glomerular filtration  rate (eGFR) is the CKD-EPI equation.        Lab Results   Component Value Date    AMYLASE 62 04/28/2020     Lab Results   Component Value Date    LIPASE 21 04/28/2020              Reviewed prior medical records including radiology report of CT of abdomen and pelvis 2/24/2020 and US abdomen 4/29/2020 & endoscopy history (see surgical history).     Objective:      Physical Exam   Constitutional: She is oriented to person, place, and time. She appears well-developed and well-nourished. She is cooperative. No distress.   Limited physical examination due to virtual visit   Pulmonary/Chest: Effort normal. No respiratory distress.   Neurological: She is alert and oriented to person, place, and time.   Psychiatric: She has a normal mood and affect. Her speech is normal.         Assessment:       1. Atypical chest pain    2. Esophageal spasm     3. Gastroesophageal reflux disease, esophagitis presence not specified    4. Esophageal dysphagia    5. Family history of colon cancer           Plan:   All diagnoses and orders for this visit:    Atypical chest pain  - Increase Protonix: pantoprazole (PROTONIX) 40 MG tablet; Take 1 tablet (40 mg total) by mouth 2 (two) times daily.  Dispense: 60 tablet; Refill: 1  - Schedule EGD, discussed procedure with patient, including risks and benefits, patient verbalized understanding  - Recommend follow-up with PCP/cardiology for continued evaluation and management    Esophageal spasm  - Increase Protonix: pantoprazole (PROTONIX) 40 MG tablet; Take 1 tablet (40 mg total) by mouth 2 (two) times daily.  Dispense: 60 tablet; Refill: 1  - Schedule EGD, discussed procedure with patient, including risks and benefits, patient verbalized understanding  - Consider trial of Bentyl if symptoms persist    Gastroesophageal reflux disease, esophagitis presence not specified  - Increase Protonix: pantoprazole (PROTONIX) 40 MG tablet; Take 1 tablet (40 mg total) by mouth 2 (two) times daily.  Dispense: 60 tablet; Refill: 1  - Schedule EGD, discussed procedure with patient, including risks and benefits, patient verbalized understanding  - Take PPI in the morning 30 minutes before breakfast and dinner  - Recommend to avoid large meals, avoid eating within 3 hours of bedtime, elevate head of bed if nocturnal symptoms are present, smoking cessation (if current smoker), & weight loss (if overweight).   - Recommend minimize/avoid high-fat foods, chocolate, caffeine, citrus, alcohol, & tomato products.  - Advised to avoid/limit use of NSAID's, since they can cause GI upset, bleeding, and/or ulcers. If needed, take with food.     Esophageal dysphagia   - Schedule EGD, discussed procedure with patient and possible esophageal dilation may be performed during procedure if indicated, patient verbalized understanding   - Educated patient to eat  smaller more frequent meals and to eat slowly and advised to eat a soft diet.   - Possible UGI/esophagram/esophageal manometry if symptoms persist    Family history of colon cancer   - Schedule Colonoscopy, discussed procedure with the patient, including risks and benefits, patient verbalized understanding    If no improvement in symptoms or symptoms worsen, call/follow-up at clinic or go to ER

## 2020-05-05 NOTE — PATIENT INSTRUCTIONS

## 2020-05-05 NOTE — LETTER
May 5, 2020      [place]  [address] [address] [place] [address]           [facility]  [address] [place] [address]  Phone: [phone]          Patient: Zora Davis   MR Number: [id number]   YOB: 1950   Date of Visit: 5/5/2020       Dear Dara Stephens:    Thank you for referring Zora Davis to me for evaluation. Attached you will find relevant portions of my assessment and plan of care.    If you have questions, please do not hesitate to call me. I look forward to following Zora Davis along with you.    Sincerely,    Christy Deras, DANIEL    Enclosure  CC:  No Recipients    If you would like to receive this communication electronically, please contact [email or web address] or [phone] to request more information on [employer] Link access.    For providers and/or their staff who would like to refer a patient to [facility], please contact us through our one-stop-shop provider referral line, [facility], at [phone].    If you feel you have received this communication in error or would no longer like to receive these types of communications, please e-mail [email or web address]

## 2020-05-06 ENCOUNTER — PATIENT MESSAGE (OUTPATIENT)
Dept: FAMILY MEDICINE | Facility: CLINIC | Age: 70
End: 2020-05-06

## 2020-05-18 ENCOUNTER — TELEPHONE (OUTPATIENT)
Dept: GASTROENTEROLOGY | Facility: CLINIC | Age: 70
End: 2020-05-18

## 2020-05-18 DIAGNOSIS — Z71.89 ADVICE GIVEN ABOUT 2019 NOVEL CORONAVIRUS BY TELEPHONE: Primary | ICD-10-CM

## 2020-05-18 NOTE — TELEPHONE ENCOUNTER
----- Message from Florencio Olivares sent at 5/18/2020  9:06 AM CDT -----  Contact: pt  Type:  Patient Returning Call    Who Called:  pt  Does the patient know what this is regarding?:  Pt would like office to return call to reschedule procedure and COVID 19 testing. Pt stated that COVID testing is needed two days prior to procedure. Please follow up  Best Call Back Number:    Additional Information:  Thank you

## 2020-05-18 NOTE — TELEPHONE ENCOUNTER
Spoke with pt. Rescheduled EGD & covid test as pt will not have transportation on date scheduled.  Also scheduled covid test for c-scope at end of June. Pt verbalized understanding to all.

## 2020-05-30 ENCOUNTER — LAB VISIT (OUTPATIENT)
Dept: FAMILY MEDICINE | Facility: CLINIC | Age: 70
End: 2020-05-30
Attending: INTERNAL MEDICINE
Payer: MEDICARE

## 2020-05-30 DIAGNOSIS — Z01.812 PRE-PROCEDURE LAB EXAM: ICD-10-CM

## 2020-05-30 PROCEDURE — U0003 INFECTIOUS AGENT DETECTION BY NUCLEIC ACID (DNA OR RNA); SEVERE ACUTE RESPIRATORY SYNDROME CORONAVIRUS 2 (SARS-COV-2) (CORONAVIRUS DISEASE [COVID-19]), AMPLIFIED PROBE TECHNIQUE, MAKING USE OF HIGH THROUGHPUT TECHNOLOGIES AS DESCRIBED BY CMS-2020-01-R: HCPCS

## 2020-05-31 LAB — SARS-COV-2 RNA RESP QL NAA+PROBE: NOT DETECTED

## 2020-06-01 ENCOUNTER — HOSPITAL ENCOUNTER (OUTPATIENT)
Facility: HOSPITAL | Age: 70
Discharge: HOME | End: 2020-06-01
Attending: INTERNAL MEDICINE | Admitting: INTERNAL MEDICINE
Payer: MEDICARE

## 2020-06-01 ENCOUNTER — ANESTHESIA EVENT (OUTPATIENT)
Dept: ENDOSCOPY | Facility: HOSPITAL | Age: 70
End: 2020-06-01
Payer: MEDICARE

## 2020-06-01 ENCOUNTER — ANESTHESIA (OUTPATIENT)
Dept: ENDOSCOPY | Facility: HOSPITAL | Age: 70
End: 2020-06-01
Payer: MEDICARE

## 2020-06-01 DIAGNOSIS — R13.10 DYSPHAGIA: ICD-10-CM

## 2020-06-01 PROCEDURE — D9220A PRA ANESTHESIA: Mod: CRNA,,, | Performed by: NURSE ANESTHETIST, CERTIFIED REGISTERED

## 2020-06-01 PROCEDURE — D9220A PRA ANESTHESIA: ICD-10-PCS | Mod: CRNA,,, | Performed by: NURSE ANESTHETIST, CERTIFIED REGISTERED

## 2020-06-01 PROCEDURE — 37000009 HC ANESTHESIA EA ADD 15 MINS: Mod: PO | Performed by: INTERNAL MEDICINE

## 2020-06-01 PROCEDURE — 43248 EGD GUIDE WIRE INSERTION: CPT | Mod: PO | Performed by: INTERNAL MEDICINE

## 2020-06-01 PROCEDURE — 88305 TISSUE EXAM BY PATHOLOGIST: CPT | Mod: 59 | Performed by: PATHOLOGY

## 2020-06-01 PROCEDURE — 43248 PR EGD, FLEX, W/DILATION OVER GUIDEWIRE: ICD-10-PCS | Mod: ,,, | Performed by: INTERNAL MEDICINE

## 2020-06-01 PROCEDURE — 25000003 PHARM REV CODE 250: Mod: PO | Performed by: ANESTHESIOLOGY

## 2020-06-01 PROCEDURE — 27201012 HC FORCEPS, HOT/COLD, DISP: Mod: PO | Performed by: INTERNAL MEDICINE

## 2020-06-01 PROCEDURE — D9220A PRA ANESTHESIA: Mod: ANES,,, | Performed by: ANESTHESIOLOGY

## 2020-06-01 PROCEDURE — 43248 EGD GUIDE WIRE INSERTION: CPT | Mod: ,,, | Performed by: INTERNAL MEDICINE

## 2020-06-01 PROCEDURE — 37000008 HC ANESTHESIA 1ST 15 MINUTES: Mod: PO | Performed by: INTERNAL MEDICINE

## 2020-06-01 PROCEDURE — 63600175 PHARM REV CODE 636 W HCPCS: Mod: PO | Performed by: INTERNAL MEDICINE

## 2020-06-01 PROCEDURE — 63600175 PHARM REV CODE 636 W HCPCS: Mod: PO | Performed by: NURSE ANESTHETIST, CERTIFIED REGISTERED

## 2020-06-01 PROCEDURE — 88305 TISSUE EXAM BY PATHOLOGIST: CPT | Mod: 26,,, | Performed by: PATHOLOGY

## 2020-06-01 PROCEDURE — D9220A PRA ANESTHESIA: ICD-10-PCS | Mod: ANES,,, | Performed by: ANESTHESIOLOGY

## 2020-06-01 PROCEDURE — 43239 EGD BIOPSY SINGLE/MULTIPLE: CPT | Mod: PO | Performed by: INTERNAL MEDICINE

## 2020-06-01 PROCEDURE — 25000003 PHARM REV CODE 250: Mod: PO | Performed by: NURSE ANESTHETIST, CERTIFIED REGISTERED

## 2020-06-01 PROCEDURE — 88305 TISSUE EXAM BY PATHOLOGIST: ICD-10-PCS | Mod: 26,,, | Performed by: PATHOLOGY

## 2020-06-01 RX ORDER — PROPOFOL 10 MG/ML
VIAL (ML) INTRAVENOUS
Status: DISCONTINUED | OUTPATIENT
Start: 2020-06-01 | End: 2020-06-01

## 2020-06-01 RX ORDER — LIDOCAINE HYDROCHLORIDE 10 MG/ML
1 INJECTION INFILTRATION; PERINEURAL ONCE
Status: COMPLETED | OUTPATIENT
Start: 2020-06-01 | End: 2020-06-01

## 2020-06-01 RX ORDER — FAMOTIDINE 40 MG/1
40 TABLET, FILM COATED ORAL DAILY
Qty: 30 TABLET | Refills: 2 | Status: SHIPPED | OUTPATIENT
Start: 2020-06-01 | End: 2020-11-02 | Stop reason: SDUPTHER

## 2020-06-01 RX ORDER — LIDOCAINE HCL/PF 100 MG/5ML
SYRINGE (ML) INTRAVENOUS
Status: DISCONTINUED | OUTPATIENT
Start: 2020-06-01 | End: 2020-06-01

## 2020-06-01 RX ORDER — SODIUM CHLORIDE, SODIUM LACTATE, POTASSIUM CHLORIDE, CALCIUM CHLORIDE 600; 310; 30; 20 MG/100ML; MG/100ML; MG/100ML; MG/100ML
INJECTION, SOLUTION INTRAVENOUS CONTINUOUS
Status: DISCONTINUED | OUTPATIENT
Start: 2020-06-01 | End: 2020-06-01 | Stop reason: HOSPADM

## 2020-06-01 RX ORDER — SODIUM CHLORIDE 0.9 % (FLUSH) 0.9 %
10 SYRINGE (ML) INJECTION
Status: DISCONTINUED | OUTPATIENT
Start: 2020-06-01 | End: 2020-06-01 | Stop reason: HOSPADM

## 2020-06-01 RX ADMIN — LIDOCAINE HYDROCHLORIDE 1 ML: 10 INJECTION, SOLUTION EPIDURAL; INFILTRATION; INTRACAUDAL; PERINEURAL at 11:06

## 2020-06-01 RX ADMIN — SODIUM CHLORIDE, SODIUM LACTATE, POTASSIUM CHLORIDE, AND CALCIUM CHLORIDE: .6; .31; .03; .02 INJECTION, SOLUTION INTRAVENOUS at 11:06

## 2020-06-01 RX ADMIN — PROPOFOL 50 MG: 10 INJECTION, EMULSION INTRAVENOUS at 11:06

## 2020-06-01 RX ADMIN — LIDOCAINE HYDROCHLORIDE 100 MG: 20 INJECTION, SOLUTION INTRAVENOUS at 11:06

## 2020-06-01 RX ADMIN — PROPOFOL 100 MG: 10 INJECTION, EMULSION INTRAVENOUS at 11:06

## 2020-06-01 RX ADMIN — PROPOFOL 25 MG: 10 INJECTION, EMULSION INTRAVENOUS at 11:06

## 2020-06-01 NOTE — ANESTHESIA PREPROCEDURE EVALUATION
06/01/2020  Zora Davis is a 70 y.o., female.    Anesthesia Evaluation    I have reviewed the Patient Summary Reports.    I have reviewed the Nursing Notes.    I have reviewed the Medications.     Review of Systems  Anesthesia Hx:  No problems with previous Anesthesia    Social:  Former Smoker    Cardiovascular:   Hypertension, well controlled Dysrhythmias atrial fibrillation    Pulmonary:   Sleep Apnea    Education provided regarding risk of obstructive sleep apnea     Renal/:  Renal/ Normal     Hepatic/GI:   GERD, well controlled Liver Disease,    Neurological:  Neurology Normal    Endocrine:   Hypothyroidism        Physical Exam  General:  Well nourished    Airway/Jaw/Neck:  Airway Findings: Mouth Opening: Normal Tongue: Normal  General Airway Assessment: Adult  Oropharynx Findings:  Mallampati: II  Jaw/Neck Findings:  Neck ROM: Normal ROM     Eyes/Ears/Nose:  Eyes/Ears/Nose Findings:    Dental:  Dental Findings:   Chest/Lungs:  Chest/Lungs Findings: Normal Respiratory Rate     Heart/Vascular:  Heart Findings: Rate: Normal  Rhythm: Regular Rhythm        Mental Status:  Mental Status Findings:  Cooperative, Alert and Oriented         Anesthesia Plan  Type of Anesthesia, risks & benefits discussed:  Anesthesia Type:  general  Patient's Preference:   Intra-op Monitoring Plan: standard ASA monitors  Intra-op Monitoring Plan Comments:   Post Op Pain Control Plan: multimodal analgesia  Post Op Pain Control Plan Comments:   Induction:   IV  Beta Blocker:  Patient is on a Beta-Blocker and has received one dose within the past 24 hours (No further documentation required).       Informed Consent: Patient understands risks and agrees with Anesthesia plan.  Questions answered. Anesthesia consent signed with patient.  ASA Score: 3     Day of Surgery Review of History & Physical:            Ready For Surgery  From Anesthesia Perspective.

## 2020-06-01 NOTE — H&P
History & Physical - Short Stay  Gastroenterology      SUBJECTIVE:     Procedure: EGD    Chief Complaint/Indication for Procedure: Dysphagia and Reflux    PTA Medications   Medication Sig    aspirin (ECOTRIN) 81 MG EC tablet Take 81 mg by mouth once daily.    b complex vitamins tablet Take 1 tablet by mouth once daily.    calcium citrate (CALCITRATE) 200 mg (950 mg) tablet Take 1 tablet by mouth once daily.    cyanocobalamin (VITAMIN B-12) 500 MCG tablet Take 500 mcg by mouth once daily.    gabapentin (NEURONTIN) 300 MG capsule Take 1 capsule (300 mg total) by mouth every evening.    hydroCHLOROthiazide (HYDRODIURIL) 25 MG tablet TAKE 1 TABLET DAILY    levothyroxine (SYNTHROID) 50 MCG tablet TAKE 1 TABLET DAILY    metoprolol tartrate (LOPRESSOR) 25 MG tablet Take 1 tablet (25 mg total) by mouth nightly.    multivitamin-minerals-lutein Tab Take 1 tablet by mouth once daily.    pantoprazole (PROTONIX) 40 MG tablet Take 1 tablet (40 mg total) by mouth 2 (two) times daily.    TURMERIC ORAL Take by mouth.    clindamycin (CLEOCIN T) 1 % external solution Apply topically as needed.     fluticasone propionate (FLONASE) 50 mcg/actuation nasal spray 1 spray each nostril twice daily       Review of patient's allergies indicates:  No Known Allergies     Past Medical History:   Diagnosis Date    Adult hypothyroidism 2010    Diverticulosis     Essential hypertension 2013    Fatty liver     GERD (gastroesophageal reflux disease)     JOSE DE JESUS on CPAP     Osteopenia of multiple sites 2020    Paroxysmal atrial fibrillation 3/22/2019    S/P laparoscopic sleeve gastrectomy 3/22/2019     Past Surgical History:   Procedure Laterality Date    BREAST BIOPSY       SECTION      x 2    CHOLECYSTECTOMY      COLONOSCOPY  2014    Dr. Richey; diverticulosis; repeat in 5 years    ESOPHAGOGASTRODUODENOSCOPY N/A 2018    Dr. Steel; gastritis; gastric polyps    HYSTERECTOMY      LAPAROSCOPIC  LYSIS OF ADHESIONS N/A 3/22/2019    Procedure: LYSIS, ADHESIONS, LAPAROSCOPIC;  Surgeon: Cole Armstrong MD;  Location: [facility] OR;  Service: General;  Laterality: N/A;    ROBOT-ASSISTED LAPAROSCOPIC SLEEVE GASTRECTOMY USING DA LARRY XI N/A 3/22/2019    Procedure: XI ROBOTIC SLEEVE GASTRECTOMY;  Surgeon: Cole Armstrong MD;  Location: [facility] OR;  Service: General;  Laterality: N/A;    TOTAL REDUCTION MAMMOPLASTY Bilateral     UPPER GASTROINTESTINAL ENDOSCOPY  2015    Dr. Padron     Family History   Problem Relation Age of Onset    Colon cancer Paternal Aunt     COPD Paternal Aunt         colon    Hypertension Father     Crohn's disease Neg Hx     Stomach cancer Neg Hx     Ulcerative colitis Neg Hx     Esophageal cancer Neg Hx      Social History     Tobacco Use    Smoking status: Former Smoker     Packs/day: 0.25     Years: 13.00     Pack years: 3.25     Types: Cigarettes     Start date:      Last attempt to quit:      Years since quittin.4    Smokeless tobacco: Never Used   Substance Use Topics    Alcohol use: Yes     Alcohol/week: 1.0 standard drinks     Types: 1 Glasses of wine per week     Frequency: Monthly or less     Drinks per session: 1 or 2     Binge frequency: Never    Drug use: No         OBJECTIVE:     Vital Signs (Most Recent)       Physical Exam:                                                       GENERAL:  Comfortable, in no acute distress.                                 HEENT EXAM:  Nonicteric.  No adenopathy.  Oropharynx is clear.               NECK:  Supple.                                                               LUNGS:  Clear.                                                               CARDIAC:  Regular rate and rhythm.  S1, S2.  No murmur.                      ABDOMEN:  Soft, positive bowel sounds, nontender.  No hepatosplenomegaly or masses.  No rebound or guarding.                                             EXTREMITIES:  No edema.     MENTAL STATUS:   Normal, alert and oriented.      ASSESSMENT/PLAN:     Assessment: Dysphagia and Reflux    Plan: EGD    Anesthesia Plan: General    ASA Grade: ASA 2 - Patient with mild systemic disease with no functional limitations    MALLAMPATI SCORE:  I (soft palate, uvula, fauces, and tonsillar pillars visible)    In my medical opinion and judgment, this medical or surgical procedure was not able to be safely postponed in accordance with [place] Department [facility], Healthcare Facility Notice #[id number].

## 2020-06-01 NOTE — DISCHARGE SUMMARY
Discharge Note  Short Stay      SUMMARY     Admit Date: 6/1/2020    Attending Physician: Efrain Steel MD     Discharge Physician: Efrain Steel MD    Discharge Date: 6/1/2020 11:48 AM    Final Diagnosis: Dysphagia, unspecified type [R13.10]    Disposition: HOME OR SELF CARE    Patient Instructions:   Current Discharge Medication List      START taking these medications    Details   famotidine (PEPCID) 40 MG tablet Take 1 tablet (40 mg total) by mouth once daily.  Qty: 30 tablet, Refills: 2         CONTINUE these medications which have NOT CHANGED    Details   aspirin (ECOTRIN) 81 MG EC tablet Take 81 mg by mouth once daily.      b complex vitamins tablet Take 1 tablet by mouth once daily.      calcium citrate (CALCITRATE) 200 mg (950 mg) tablet Take 1 tablet by mouth once daily.      cyanocobalamin (VITAMIN B-12) 500 MCG tablet Take 500 mcg by mouth once daily.      gabapentin (NEURONTIN) 300 MG capsule Take 1 capsule (300 mg total) by mouth every evening.  Qty: 30 capsule, Refills: 0      hydroCHLOROthiazide (HYDRODIURIL) 25 MG tablet TAKE 1 TABLET DAILY  Qty: 90 tablet, Refills: 3      levothyroxine (SYNTHROID) 50 MCG tablet TAKE 1 TABLET DAILY  Qty: 90 tablet, Refills: 4    Associated Diagnoses: Hypothyroidism (acquired)      metoprolol tartrate (LOPRESSOR) 25 MG tablet Take 1 tablet (25 mg total) by mouth nightly.  Qty: 90 tablet, Refills: 3    Associated Diagnoses: Essential hypertension      multivitamin-minerals-lutein Tab Take 1 tablet by mouth once daily.      pantoprazole (PROTONIX) 40 MG tablet Take 1 tablet (40 mg total) by mouth 2 (two) times daily.  Qty: 60 tablet, Refills: 1    Associated Diagnoses: Atypical chest pain; Esophageal spasm; Gastroesophageal reflux disease, esophagitis presence not specified      TURMERIC ORAL Take by mouth.      clindamycin (CLEOCIN T) 1 % external solution Apply topically as needed.       fluticasone propionate (FLONASE) 50 mcg/actuation nasal spray 1 spray  each nostril twice daily             Discharge Procedure Orders (must include Diet, Follow-up, Activity)    Follow Up:  Follow up with PCP as previously scheduled  Resume routine diet.  Activity as tolerated.    No driving day of procedure.

## 2020-06-01 NOTE — TRANSFER OF CARE
"Anesthesia Transfer of Care Note    Patient: Zora Davis    Procedure(s) Performed: Procedure(s) (LRB):  EGD (ESOPHAGOGASTRODUODENOSCOPY) (N/A)    Patient location: PACU    Anesthesia Type: general    Transport from OR: Transported from OR on room air with adequate spontaneous ventilation    Post pain: adequate analgesia    Post assessment: no apparent anesthetic complications and tolerated procedure well    Post vital signs: stable    Level of consciousness: sedated    Nausea/Vomiting: no nausea/vomiting    Complications: none    Transfer of care protocol was followed      Last vitals:   Visit Vitals  BP (!) 173/74 (BP Location: Right arm, Patient Position: Lying)   Pulse 69   Temp 36.6 °C (97.9 °F) (Skin)   Resp 16   Ht 5' 7" (1.702 m)   Wt 80.3 kg (177 lb)   SpO2 100%   Breastfeeding? No   BMI 27.72 kg/m²     "

## 2020-06-01 NOTE — ANESTHESIA POSTPROCEDURE EVALUATION
Anesthesia Post Evaluation    Patient: Zora Davis    Procedure(s) Performed: Procedure(s) (LRB):  EGD (ESOPHAGOGASTRODUODENOSCOPY) (N/A)    Final Anesthesia Type: general    Patient location during evaluation: PACU  Patient participation: Yes- Able to Participate  Level of consciousness: awake and alert and oriented  Post-procedure vital signs: reviewed and stable  Pain management: adequate  Airway patency: patent    PONV status at discharge: No PONV  Anesthetic complications: no      Cardiovascular status: blood pressure returned to baseline and stable  Respiratory status: unassisted and spontaneous ventilation  Hydration status: euvolemic  Follow-up not needed.          Vitals Value Taken Time   /72 6/1/2020 12:02 PM   Temp 36.5 °C (97.7 °F) 6/1/2020 11:47 AM   Pulse 66 6/1/2020 12:02 PM   Resp 16 6/1/2020 12:02 PM   SpO2 98 % 6/1/2020 12:02 PM         No case tracking events are documented in the log.      Pain/Andrew Score: Andrew Score: 6 (6/1/2020 11:47 AM)

## 2020-06-01 NOTE — [employer] PATIENT INSTRUCTIONS
Discharge Summary/Instructions after an Endoscopic Procedure  Patient Name: Zora Davis  Patient MRN: [id number]  Patient YOB: 1950  Monday, June 1, 2020  Efrain Steel MD  RESTRICTIONS:  During your procedure today, you received medications for sedation.  These   medications may affect your judgment, balance and coordination.  Therefore,   for 24 hours, you have the following restrictions:   - DO NOT drive a car, operate machinery, make legal/financial decisions,   sign important papers or drink alcohol.    ACTIVITY:  Today: no heavy lifting, straining or running due to procedural   sedation/anesthesia.  The following day: return to full activity including work.  DIET:  Eat and drink normally unless instructed otherwise.     TREATMENT FOR COMMON SIDE EFFECTS:  - Mild abdominal pain, nausea, belching, bloating or excessive gas:  rest,   eat lightly and use a heating pad.  - Sore Throat: treat with throat lozenges and/or gargle with warm salt   water.  - Because air was used during the procedure, expelling large amounts of air   from your rectum or belching is normal.  - If a bowel prep was taken, you may not have a bowel movement for 1-3 days.    This is normal.  SYMPTOMS TO WATCH FOR AND REPORT TO YOUR PHYSICIAN:  1. Abdominal pain or bloating, other than gas cramps.  2. Chest pain.  3. Back pain.  4. Signs of infection such as: chills or fever occurring within 24 hours   after the procedure.  5. Rectal bleeding, which would show as bright red, maroon, or black stools.   (A tablespoon of blood from the rectum is not serious, especially if   hemorrhoids are present.)  6. Vomiting.  7. Weakness or dizziness.  GO DIRECTLY TO THE NEAREST EMERGENCY ROOM IF YOU HAVE ANY OF THE FOLLOWING:      Difficulty breathing              Chills and/or fever over 101 F   Persistent vomiting and/or vomiting blood   Severe abdominal pain   Severe chest pain   Black, tarry stools   Bleeding- more than one  tablespoon   Any other symptom or condition that you feel may need urgent attention  Your doctor recommends these additional instructions:  If any biopsies were taken, your doctors clinic will contact you in 1 to 2   weeks with any results.  We are waiting for your pathology results.   Continue your present medications.   You are being discharged to home.  For questions, problems or results please call your physician - Efrain Steel MD at Work:  [phone].  EMERGENCY PHONE NUMBER: [phone], LAB RESULTS: [phone]  IF A COMPLICATION OR EMERGENCY SITUATION ARISES AND YOU ARE UNABLE TO REACH   YOUR PHYSICIAN - GO DIRECTLY TO THE EMERGENCY ROOM.  ___________________________________________  Nurse Signature  ___________________________________________  Patient/Designated Responsible Party Signature  Efrain Steel MD  6/1/2020 11:46:11 AM  This report has been verified and signed electronically.  [employer]

## 2020-06-01 NOTE — DISCHARGE INSTRUCTIONS
Esophageal Dilation     A balloon dilator may be used to widen a stricture in the esophagus.   An esophageal dilation is a procedure used to widen a narrowed section of your esophagus. This is the tube that leads from your throat to your stomach. Narrowing (stricture) of the esophagus can cause problems. These include trouble swallowing. This sheet explains what to expect with esophageal dilation.  Why esophageal dilation is needed  Several problems can be treated with esophageal dilation. They include:  · Peptic stricture. This is caused by reflux esophagitis. With this problem, the esophagus is irritated by acid reflux (heartburn). This occurs when acid from your stomach flows back up into the esophagus. Stomach acid damages the lining of the esophagus. This leads to a buildup of scar tissue. As a result, the esophagus is narrowed.  · Schatzkis ring. This is an abnormal ring of tissue. It forms where the esophagus meets the stomach. It can cause trouble swallowing. It can also cause food to get stuck in the esophagus. The cause of this condition is not known.  · Achalasia. This condition stops food and liquids from moving into your stomach from the esophagus. It affects the lower esophageal sphincter (LES). The LES is a muscular ring that opens (relaxes) when you swallow. With achalasia, the LES does not relax. This condition can also cause problems with peristalsis. This is the normal muscular action of the esophagus that moves food into the stomach.  · Eosinophilic esophagitis. This is a redness and swelling (inflammation) in the esophagus. It is caused by an environmental trigger such as a food allergy. It can lead to pain, trouble swallowing, and strictures.  · Less common causes of stricture. Other causes of stricture include radiation treatment and cancer.  Before you have esophageal dilation  · Tell your provider about any medicines you take. This includes prescription medicines, over-the-counter  medicines, herbs, vitamins, and other supplements. Be sure to mention aspirin or any blood thinners youre taking.  · Let your provider know if you need to take antibiotics before dental procedures. You may need to take them before esophageal dilation as well.  · Tell your provider about any health conditions you have, such as heart or lung disease. Also mention any allergies to medicines.  · Youll need to have an empty stomach for the procedure. Follow your providers instructions for not eating and drinking before the procedure.  · Arrange to have a family member or friend drive you home after the procedure.  During the procedure  · You may be given local anesthesia to numb your throat. Youll also likely be given medicine to relax you. The procedure takes about 15 minutes. It does not cause trouble breathing.  · A tube called an endoscope (scope) is used. This is a narrow tube with a tiny light and camera at the end. The scope is inserted through your mouth and into your esophagus. It lets your provider see inside your esophagus. To help guide your provider, an imaging method called fluoroscopy may also be used. This creates a moving X-ray image on a computer screen.   · Next, special tiny tools are carefully guided through your mouth and down into the esophagus. They widen the stricture and are then removed. Different types of instruments are used. The type used depends on the size and cause of the stricture. Types include:  ¨ Balloon dilator. A tiny empty balloon is put into the stricture using an endoscope. The balloon is slowly filled with air. The air is removed from the balloon when the stricture is widened to the right size. Balloon dilators are used to treat many types of strictures.  ¨ Guided wire dilator. A thin wire is eased down the esophagus. A small tube thats wider on one end is guided down the wire. It is put into the stricture to stretch it. These dilators are used to treat all kinds of  strictures.  ¨ Bougies. These are weighted, cone-shaped tubes. Starting with smaller cones, your provider uses increasingly larger cones until the stricture is stretched the right amount. Bougies are often used to treat strictures that are simple (short, straight, and not very narrow).  After the procedure  · Youll be watched closely until your provider says youre ready to go home. Youll need to have a friend or family member drive you home.  · You may have a sore throat for the rest of the day.  · You may have pain behind your breastbone for a short time afterwards.  · You can start drinking fluids again after the numbness in your throat goes away. You can resume eating the same day or the next day.  Risks and possible complications  Risks and possible complications for esophageal dilation include:  · Infection  · A tear or hole in the esophagus lining, causing bleeding and possibly needing surgery to fix  · Risks of anesthesia  Follow-up  You may need to have the procedure repeated one or more times. This depends on the cause and extent of the narrowing. Repeat procedures can allow the dilation to take place more slowly. This reduces the risks of the procedure.  If your stricture was caused by reflux esophagitis, youll likely need to take medicine to treat that condition. Your provider will tell you more.  When to call your provider  Call your healthcare provider right away if you have any of the following after the procedure:  · Fever of 100.4°F (38.0°C)  · Chest pain  · Trouble swallowing  · Vomiting blood or material that looks like coffee grounds  · Bleeding  · Black, tarry, or bloody stools   Date Last Reviewed: 7/1/2016 © 2000-2017 [employer]. [address] [place] [address], [place] [address]. All rights reserved. This information is not intended as a substitute for professional medical care. Always follow your healthcare professional's instructions.

## 2020-06-02 ENCOUNTER — TELEPHONE (OUTPATIENT)
Dept: ENDOSCOPY | Facility: HOSPITAL | Age: 70
End: 2020-06-02

## 2020-06-02 VITALS
SYSTOLIC BLOOD PRESSURE: 158 MMHG | HEART RATE: 66 BPM | OXYGEN SATURATION: 98 % | WEIGHT: 177 LBS | DIASTOLIC BLOOD PRESSURE: 72 MMHG | TEMPERATURE: 98 F | RESPIRATION RATE: 16 BRPM | HEIGHT: 67 IN | BODY MASS INDEX: 27.78 KG/M2

## 2020-06-04 LAB
FINAL PATHOLOGIC DIAGNOSIS: NORMAL
GROSS: NORMAL

## 2020-06-14 ENCOUNTER — NURSE TRIAGE (OUTPATIENT)
Dept: ADMINISTRATIVE | Facility: CLINIC | Age: 70
End: 2020-06-14

## 2020-06-16 ENCOUNTER — TELEPHONE (OUTPATIENT)
Dept: GASTROENTEROLOGY | Facility: CLINIC | Age: 70
End: 2020-06-16

## 2020-06-16 NOTE — TELEPHONE ENCOUNTER
----- Message from Florencio Olivares sent at 6/16/2020 11:32 AM CDT -----  Regarding: Cancel colooscopy  Contact: pt  Type:  Patient Returning Call    Who Called:  pt  Does the patient know what this is regarding?:  pt would like to cancel colonoscopy  Best Call Back Number:    Additional Information:  Please follow up. Thank you

## 2020-06-16 NOTE — TELEPHONE ENCOUNTER
Spoke with pt. Pt having back problems at the moment & canceled procedure. Pt will call back later to reschedule.

## 2020-06-23 ENCOUNTER — OFFICE VISIT (OUTPATIENT)
Dept: FAMILY MEDICINE | Facility: CLINIC | Age: 70
End: 2020-06-23
Payer: MEDICARE

## 2020-06-23 DIAGNOSIS — Z91.09 ENVIRONMENTAL ALLERGIES: Primary | ICD-10-CM

## 2020-06-23 PROCEDURE — 99213 OFFICE O/P EST LOW 20 MIN: CPT | Mod: 95,,, | Performed by: INTERNAL MEDICINE

## 2020-06-23 PROCEDURE — 99213 PR OFFICE/OUTPT VISIT, EST, LEVL III, 20-29 MIN: ICD-10-PCS | Mod: 95,,, | Performed by: INTERNAL MEDICINE

## 2020-06-23 RX ORDER — LEVOCETIRIZINE DIHYDROCHLORIDE 5 MG/1
5 TABLET, FILM COATED ORAL NIGHTLY
Qty: 90 TABLET | Refills: 1 | Status: SHIPPED | OUTPATIENT
Start: 2020-06-23 | End: 2021-02-01

## 2020-06-23 RX ORDER — FLUTICASONE PROPIONATE 50 MCG
1 SPRAY, SUSPENSION (ML) NASAL 2 TIMES DAILY
Qty: 48 G | Refills: 1 | Status: SHIPPED | OUTPATIENT
Start: 2020-06-23 | End: 2021-02-01

## 2020-06-23 RX ORDER — AZELASTINE 1 MG/ML
2 SPRAY, METERED NASAL 2 TIMES DAILY
Qty: 30 ML | Refills: 2 | Status: SHIPPED | OUTPATIENT
Start: 2020-06-23 | End: 2021-05-19 | Stop reason: ALTCHOICE

## 2020-06-23 NOTE — LETTER
Pt Name: Zora Davis  YOB: 1950           Doctors comments: Please advise status of patients ability to [id number].  Employee may return to work with no limitations/restrictions      Additional comments:  -seen today via virtual visit.  Ms. Davis is one of my primary care patients and is well known to me.  She is well-appearing with no current endorsement of symptoms that would suggest infectious etiology.  Advised to continue symptomatic treatment for environmental allergies and follow CDC guidelines of mask usage, hand washing, and monitoring of temperature and being cognizant of COVID symptoms.          Provider Signature/Printed Name:Chen Davenport MD Date:06/23/2020     [facility]  [address] [facility] [id number]

## 2020-06-23 NOTE — PROGRESS NOTES
Subjective:      Patient ID: Zora Davis is a 70 y.o. female.    Chief Complaint: Sore Throat    06/23/2020    [facility]     Primary Care Telemedicine Note    The patient location is:  Patient Home   The chief complaint leading to consultation is: headache and sore throat.  Total time spent with patient: 15    Visit type: Virtual visit with synchronous audio only and video  Each patient to whom he or she provides medical services by telemedicine is:  (1) informed of the relationship between the physician and patient and the respective role of any other health care provider with respect to management of the patient; and (2) notified that he or she may decline to receive medical services by telemedicine and may withdraw from such care at any time.    The patient's last visit with me was on 2/19/2020.    She started with scratchy throat yesterday and mild headache since yesterday. She has no cough, SOB, wheezing. No fevers/chills (temp 97 x2).  No sick contacts. She did go to function this weekend. She has allergies normally that flare twice a year. She has been using tylenol. She hasn't used any other medications. She is having sinus pressure. She has not started the flonase.        Review of patient's allergies indicates:  No Known Allergies    Current Outpatient Medications:     aspirin (ECOTRIN) 81 MG EC tablet, Take 81 mg by mouth once daily., Disp: , Rfl:     b complex vitamins tablet, Take 1 tablet by mouth once daily., Disp: , Rfl:     calcium citrate (CALCITRATE) 200 mg (950 mg) tablet, Take 1 tablet by mouth once daily., Disp: , Rfl:     clindamycin (CLEOCIN T) 1 % external solution, Apply topically as needed. , Disp: , Rfl:     cyanocobalamin (VITAMIN B-12) 500 MCG tablet, Take 500 mcg by mouth once daily., Disp: , Rfl:     famotidine (PEPCID) 40 MG tablet, Take 1 tablet (40 mg total) by mouth once daily., Disp: 30 tablet, Rfl: 2    fluticasone propionate (FLONASE) 50 mcg/actuation nasal spray, 1  spray (50 mcg total) by Each Nostril route 2 (two) times daily., Disp: 48 g, Rfl: 1    gabapentin (NEURONTIN) 300 MG capsule, Take 1 capsule (300 mg total) by mouth every evening., Disp: 30 capsule, Rfl: 0    hydroCHLOROthiazide (HYDRODIURIL) 25 MG tablet, TAKE 1 TABLET DAILY, Disp: 90 tablet, Rfl: 3    levothyroxine (SYNTHROID) 50 MCG tablet, TAKE 1 TABLET DAILY, Disp: 90 tablet, Rfl: 4    metoprolol tartrate (LOPRESSOR) 25 MG tablet, TAKE 1 TABLET NIGHTLY, Disp: 90 tablet, Rfl: 1    multivitamin-minerals-lutein Tab, Take 1 tablet by mouth once daily., Disp: , Rfl:     pantoprazole (PROTONIX) 40 MG tablet, Take 1 tablet (40 mg total) by mouth 2 (two) times daily., Disp: 60 tablet, Rfl: 1    TURMERIC ORAL, Take by mouth., Disp: , Rfl:     azelastine (ASTELIN) 137 mcg (0.1 %) nasal spray, 2 sprays (274 mcg total) by Nasal route 2 (two) times daily., Disp: 30 mL, Rfl: 2    levocetirizine (XYZAL) 5 MG tablet, Take 1 tablet (5 mg total) by mouth every evening., Disp: 90 tablet, Rfl: 1    Past Medical History:   Diagnosis Date    Adult hypothyroidism 2010    Diverticulosis     Essential hypertension 2013    Fatty liver     GERD (gastroesophageal reflux disease)     JOSE DE JESUS on CPAP     Osteopenia of multiple sites 2020    Paroxysmal atrial fibrillation 3/22/2019    S/P laparoscopic sleeve gastrectomy 3/22/2019     Past Surgical History:   Procedure Laterality Date    BREAST BIOPSY       SECTION      x 2    CHOLECYSTECTOMY      COLONOSCOPY  2014    Dr. Richey; diverticulosis; repeat in 5 years    ESOPHAGOGASTRODUODENOSCOPY N/A 2018    Dr. Steel; gastritis; gastric polyps    ESOPHAGOGASTRODUODENOSCOPY N/A 2020    Procedure: EGD (ESOPHAGOGASTRODUODENOSCOPY);  Surgeon: Efrain Steel MD;  Location[facility];  Service: Endoscopy;  Laterality: N/A;    HYSTERECTOMY      LAPAROSCOPIC LYSIS OF ADHESIONS N/A 3/22/2019    Procedure: LYSIS, ADHESIONS, LAPAROSCOPIC;   Surgeon: Cole Armstrong MD;  Location: [facility] OR;  Service: General;  Laterality: N/A;    ROBOT-ASSISTED LAPAROSCOPIC SLEEVE GASTRECTOMY USING DA LARRY XI N/A 3/22/2019    Procedure: XI ROBOTIC SLEEVE GASTRECTOMY;  Surgeon: Cole Armstrong MD;  Location: [facility] OR;  Service: General;  Laterality: N/A;    TOTAL REDUCTION MAMMOPLASTY Bilateral     UPPER GASTROINTESTINAL ENDOSCOPY  2015    Dr. Padron     Family History   Problem Relation Age of Onset    Colon cancer Paternal Aunt     COPD Paternal Aunt         colon    Hypertension Father     Crohn's disease Neg Hx     Stomach cancer Neg Hx     Ulcerative colitis Neg Hx     Esophageal cancer Neg Hx      Social History     Socioeconomic History    Marital status:      Spouse name: Not on file    Number of children: 2    Years of education: Not on file    Highest education level: Not on file   Occupational History    Occupation: retired    Social Needs    Financial resource strain: Not on file    Food insecurity     Worry: Not on file     Inability: Not on file    Transportation needs     Medical: Not on file     Non-medical: Not on file   Tobacco Use    Smoking status: Former Smoker     Packs/day: 0.25     Years: 13.00     Pack years: 3.25     Types: Cigarettes     Start date:      Quit date:      Years since quittin.5    Smokeless tobacco: Never Used   Substance and Sexual Activity    Alcohol use: Yes     Alcohol/week: 1.0 standard drinks     Types: 1 Glasses of wine per week     Frequency: Monthly or less     Drinks per session: 1 or 2     Binge frequency: Never    Drug use: No    Sexual activity: Yes     Partners: Male     Birth control/protection: Surgical   Lifestyle    Physical activity     Days per week: Not on file     Minutes per session: Not on file    Stress: Not on file   Relationships    Social connections     Talks on phone: Not on file     Gets together: Not on file     Attends Jew  service: Not on file     Active member of club or organization: Not on file     Attends meetings of clubs or organizations: Not on file     Relationship status: Not on file   Other Topics Concern    Patient feels they ought to cut down on drinking/drug use Not Asked    Patient annoyed by others criticizing their drinking/drug use Not Asked    Patient has felt bad or guilty about drinking/drug use Not Asked    Patient has had a drink/used drugs as an eye opener in the AM Not Asked   Social History Narrative    Not on file      Review of Systems   Constitutional: Negative for activity change, appetite change, chills, fatigue and fever.   HENT: Positive for congestion, sinus pressure, sinus pain and sore throat. Negative for ear pain, hearing loss, postnasal drip, rhinorrhea, sneezing and tinnitus.         No change in taste or smell   Eyes: Negative for visual disturbance.   Respiratory: Negative for wheezing.    Cardiovascular: Negative for palpitations.   Gastrointestinal: Negative for abdominal distention, diarrhea and nausea.   Endocrine: Negative for cold intolerance and heat intolerance.   Genitourinary:        No incontinence    Musculoskeletal: Negative for myalgias and neck pain.   Skin: Negative for rash.   Allergic/Immunologic: Positive for environmental allergies.   Neurological: Negative for dizziness and syncope.   Hematological: Negative for adenopathy. Does not bruise/bleed easily.   Psychiatric/Behavioral: Negative for dysphoric mood and sleep disturbance. The patient is not nervous/anxious.          Objective:     There is no height or weight on file to calculate BMI.  There were no vitals taken for this visit.      Physical Exam  Vitals signs reviewed.   Constitutional:       General: She is not in acute distress.     Appearance: Normal appearance. She is well-developed. She is not ill-appearing, toxic-appearing or diaphoretic.   HENT:      Head: Normocephalic and atraumatic.      Comments:  "Frontal and maxillary sinus pressure     Nose: Nose normal. No rhinorrhea.      Mouth/Throat:      Mouth: Mucous membranes are moist.   Eyes:      General: No scleral icterus.        Right eye: No discharge.         Left eye: No discharge.      Conjunctiva/sclera: Conjunctivae normal.   Pulmonary:      Comments: No cough, no wheezing  Lymphadenopathy:      Cervical: No cervical adenopathy (non-tender).   Skin:     Findings: No rash.   Neurological:      Mental Status: She is alert and oriented to person, place, and time.   Psychiatric:         Behavior: Behavior normal.         Thought Content: Thought content normal.         Judgment: Judgment normal.         Admission on 06/01/2020, Discharged on 06/01/2020   Component Date Value Ref Range Status    Final Pathologic Diagnosis 06/01/2020    Final                    Value:1.  Distal Esophagus (biopsy):  - Benign squamous mucosa, no histologic abnormality  - Zero intraepithelial eosinophils  2.  Mid Esophagus (biopsy):  - Benign squamous mucosa, no histologic abnormality  - Zero intraepithelial eosinophils      Interpreted by: Lito Cisneros M.D., Signed on 06/04/2020 at 17:50    Gross 06/01/2020    Final                    Value:Patient ID/ Pathology ID:  [id number]  Received in formalin in 2 parts.  Part 1:  Received in formalin, labeled "distal esophagus," is a 7 x 3 x 2 mm aggregate  of tan-pink tissue fragments. The specimen is submitted entirely in cassette  [id number].  Part 2:  Received in formalin, labeled "mid esophagus," is a 7 x 5 x 1 mm aggregate of  tan-pink tissue fragments. The specimen is submitted entirely in cassette  [id number].  Belkys: MB     Lab Visit on 05/30/2020   Component Date Value Ref Range Status    SARS-CoV2 (COVID-19) Qualitative P* 05/30/2020 Not Detected  Not Detected Final    Comment: This test utilizes a real-time reverse transcription  polymerase chain reaction procedure to amplify and   detect the SARS-CoV-2 RdRp and N " genes.    The analytical sensitivity (limit of detection) of   this assay is 100 copies/mL.   A Detected result is considered positive for COVID-19.  This patient is considered infected with the   SARS-CoV-2 virus and is presumed to be contagious.    A Not Detected result means that SARS-CoV-2 RNA is not  present above the limit of detection. It does not rule  out the possibility of COVID-19 and should not be the  sole basis for treatment decisions.  If COVID-19 is   strongly suspected based on clinical and exposure   history,re-testing should be considered.    This test is only for use under Food and Drug   Administration s Emergency Use Authorization (EUA).   Commercial reagents are provided by [employer].  Performance characteristics of the EUA have been   independently verified by [facility]   Department of Pathology and L                           aboratory Medicine.       Lab Visit on 04/28/2020   Component Date Value Ref Range Status    Amylase 04/28/2020 62  20 - 110 U/L Final    Lipase 04/28/2020 21  4 - 60 U/L Final    Magnesium 04/28/2020 1.8  1.6 - 2.6 mg/dL Final    Sodium 04/28/2020 139  136 - 145 mmol/L Final    Potassium 04/28/2020 3.6  3.5 - 5.1 mmol/L Final    Chloride 04/28/2020 100  95 - 110 mmol/L Final    CO2 04/28/2020 32* 23 - 29 mmol/L Final    Glucose 04/28/2020 97  70 - 110 mg/dL Final    BUN, Bld 04/28/2020 12  8 - 23 mg/dL Final    Creatinine 04/28/2020 0.8  0.5 - 1.4 mg/dL Final    Calcium 04/28/2020 9.9  8.7 - 10.5 mg/dL Final    Total Protein 04/28/2020 7.8  6.0 - 8.4 g/dL Final    Albumin 04/28/2020 3.7  3.5 - 5.2 g/dL Final    Total Bilirubin 04/28/2020 0.6  0.1 - 1.0 mg/dL Final    Comment: For infants and newborns, interpretation of results should be based  on gestational age, weight and in agreement with clinical  observations.  Premature Infant recommended reference ranges:  Up to 24 hours.............<8.0 mg/dL  Up to 48  hours............<12.0 mg/dL  3-5 days..................<15.0 mg/dL  6-29 days.................<15.0 mg/dL      Alkaline Phosphatase 04/28/2020 70  55 - 135 U/L Final    AST 04/28/2020 16  10 - 40 U/L Final    ALT 04/28/2020 13  10 - 44 U/L Final    Anion Gap 04/28/2020 7* 8 - 16 mmol/L Final    eGFR if African American 04/28/2020 >60.0  >60 mL/min/1.73 m^2 Final    eGFR if non African American 04/28/2020 >60.0  >60 mL/min/1.73 m^2 Final    Comment: Calculation used to obtain the estimated glomerular filtration  rate (eGFR) is the CKD-EPI equation.      Lab Visit on 02/10/2020   Component Date Value Ref Range Status    Vitamin B-12 02/10/2020 >2000* 210 - 950 pg/mL Final    Sodium 02/10/2020 139  136 - 145 mmol/L Final    Potassium 02/10/2020 3.3* 3.5 - 5.1 mmol/L Final    Chloride 02/10/2020 101  95 - 110 mmol/L Final    CO2 02/10/2020 29  23 - 29 mmol/L Final    Glucose 02/10/2020 86  70 - 110 mg/dL Final    BUN, Bld 02/10/2020 14  8 - 23 mg/dL Final    Creatinine 02/10/2020 0.8  0.5 - 1.4 mg/dL Final    Calcium 02/10/2020 9.6  8.7 - 10.5 mg/dL Final    Total Protein 02/10/2020 8.3  6.0 - 8.4 g/dL Final    Albumin 02/10/2020 3.7  3.5 - 5.2 g/dL Final    Total Bilirubin 02/10/2020 0.2  0.1 - 1.0 mg/dL Final    Comment: For infants and newborns, interpretation of results should be based  on gestational age, weight and in agreement with clinical  observations.  Premature Infant recommended reference ranges:  Up to 24 hours.............<8.0 mg/dL  Up to 48 hours............<12.0 mg/dL  3-5 days..................<15.0 mg/dL  6-29 days.................<15.0 mg/dL      Alkaline Phosphatase 02/10/2020 77  55 - 135 U/L Final    AST 02/10/2020 19  10 - 40 U/L Final    ALT 02/10/2020 16  10 - 44 U/L Final    Anion Gap 02/10/2020 9  8 - 16 mmol/L Final    eGFR if African American 02/10/2020 >60.0  >60 mL/min/1.73 m^2 Final    eGFR if non African American 02/10/2020 >60.0  >60 mL/min/1.73 m^2 Final     Comment: Calculation used to obtain the estimated glomerular filtration  rate (eGFR) is the CKD-EPI equation.       Magnesium 02/10/2020 2.0  1.6 - 2.6 mg/dL Final   Office Visit on 02/05/2020   Component Date Value Ref Range Status    Specimen UA 02/05/2020 Urine, Clean Catch   Final    Color, UA 02/05/2020 Yellow  Yellow, Straw, Gill Final    Appearance, UA 02/05/2020 Clear  Clear Final    pH, UA 02/05/2020 7.0  5.0 - 8.0 Final    Specific Gravity, UA 02/05/2020 1.020  1.005 - 1.030 Final    Protein, UA 02/05/2020 Negative  Negative Final    Comment: Recommend a 24 hour urine protein or a urine   protein/creatinine ratio if globulin induced proteinuria is  clinically suspected.      Glucose, UA 02/05/2020 Negative  Negative Final    Ketones, UA 02/05/2020 Negative  Negative Final    Bilirubin (UA) 02/05/2020 Negative  Negative Final    Occult Blood UA 02/05/2020 Negative  Negative Final    Nitrite, UA 02/05/2020 Negative  Negative Final    Leukocytes, UA 02/05/2020 Negative  Negative Final    Urine Culture, Routine 02/05/2020 Multiple organisms isolated. None in predominance.  Repeat if   Final    Urine Culture, Routine 02/05/2020 clinically necessary.   Final   Lab Visit on 01/27/2020   Component Date Value Ref Range Status    Microalbum.,U,Random 01/27/2020 <2.5  ug/mL Final    Creatinine, Random Ur 01/27/2020 52.0  15.0 - 325.0 mg/dL Final    Comment: The random urine reference ranges provided were established   for 24 hour urine collections.  No reference ranges exist for  random urine specimens.  Correlate clinically.      Microalb Creat Ratio 01/27/2020 Unable to calculate  0.0 - 30.0 ug/mg Final   Lab Visit on 01/17/2020   Component Date Value Ref Range Status    Ferritin 01/17/2020 199  20.0 - 300.0 ng/mL Final    Cholesterol 01/17/2020 189  120 - 199 mg/dL Final    Comment: The National Cholesterol Education Program (NCEP) has set the  following guidelines (reference ranges) for  Cholesterol:  Optimal.....................<200 mg/dL  Borderline High.............200-239 mg/dL  High........................> or = 240 mg/dL      Triglycerides 01/17/2020 108  30 - 150 mg/dL Final    Comment: The National Cholesterol Education Program (NCEP) has set the  following guidelines (reference values) for triglycerides:  Normal......................<150 mg/dL  Borderline High.............150-199 mg/dL  High........................200-499 mg/dL      HDL 01/17/2020 58  40 - 75 mg/dL Final    Comment: The National Cholesterol Education Program (NCEP) has set the  following guidelines (reference values) for HDL Cholesterol:  Low...............<40 mg/dL  Optimal...........>60 mg/dL      LDL Cholesterol 01/17/2020 109.4  63.0 - 159.0 mg/dL Final    Comment: The National Cholesterol Education Program (NCEP) has set the  following guidelines (reference values) for LDL Cholesterol:  Optimal.......................<130 mg/dL  Borderline High...............130-159 mg/dL  High..........................160-189 mg/dL  Very High.....................>190 mg/dL      Hdl/Cholesterol Ratio 01/17/2020 30.7  20.0 - 50.0 % Final    Total Cholesterol/HDL Ratio 01/17/2020 3.3  2.0 - 5.0 Final    Non-HDL Cholesterol 01/17/2020 131  mg/dL Final    Comment: Risk category and Non-HDL cholesterol goals:  Coronary heart disease (CHD)or equivalent (10-year risk of CHD >20%):  Non-HDL cholesterol goal     <130 mg/dL  Two or more CHD risk factors and 10-year risk of CHD <= 20%:  Non-HDL cholesterol goal     <160 mg/dL  0 to 1 CHD risk factor:  Non-HDL cholesterol goal     <190 mg/dL      Vit D, 25-Hydroxy 01/17/2020 45  30 - 96 ng/mL Final    Comment: Vitamin D deficiency.........<10 ng/mL                              Vitamin D insufficiency......10-29 ng/mL       Vitamin D sufficiency........> or equal to 30 ng/mL  Vitamin D toxicity............>100 ng/mL       No results found in the last 24 hours.     Assessment:     Encounter  Diagnoses   Name Primary?    Environmental allergies Yes        Plan:     #Environmental allergies   -no s/s infection.  Very comfortable appearing.  Has seasonal allergies that flare during this time.  She has not been using her antihistamine or nasal sprays.  Discussed restarting the Flonase as well as the addition of Astelin an alternating.  She has no fevers or chills and no other side effects.  We discussed the hesitation of steroid use with COVID and she is in agreement.  Advised to rest stay hydrated and continue to use her mask while in public and follow CDC guidelines.  Reach out to me if needed.  I will send letter to provide to her employer.    -reach out if symptoms change.   _____________________________________________________________    #Osteopenia  #s/p JANEEN  -DEXA 18: osteopenia  t-1  -Repeat 2020  -19: Vit D 53 --> 45 on 20    #Hepatitic steatosis   -noted on CT a/p    #JOSE DE JESUS, resolved  -resolved with weight loss    #pAfib  -well controlled on metoprolol 25 mg daily + asa 81 mg daily    #Hypothyroidism   -TSH normal on 19- 2.72  -continue synthroid 50 mcg daily    #Nicotine dependence in remission  -[id number]. 3.25 ppd    #Essential HTN  Well controlled.   -continue hctz 12.5 mg daily    #S/p gastric sleeve- Overweight with BMI 28.35  -Sleeve done 3/22/2019  -Labs every 6 months-1 year ordered by Dr. Armstrong (reviewed from 19)  -Follow up in March   -Weight loss: 65 pounds (213 --> today, 179)  -iron, b12, mvi, ca, b complex   -19: ha1c 5.2%, b1 level 63, cbc: rdw 15, cmp normal (gfr >60, cr 0.8), iron sat low 11. Ferritin 199 on 20    #HCM  -Shingrix sent to pharmacy   -Prevnar 18. Pneumovax 20  -Discuss flu at next visit.   -Tetanus done 10/3/11?  -Mammogram 10/9/19: BIRADS 1. Repeat in 1 year  -Colonoscopy 14: diverticulosis. Repeat in 5 years. Ordered. Requests Dr. Richey.  -Cervical ca screenin14: negative. No further need.   -Hep C screening  negative 17  -Lipid panel 20: chol 189, tri 108, hdl 58, ldl 109    Has mychart. Return in 6 months.     Chen Davenport M.D.    No orders of the defined types were placed in this encounter.     Medications Ordered This Encounter   Medications    azelastine (ASTELIN) 137 mcg (0.1 %) nasal spray     Si sprays (274 mcg total) by Nasal route 2 (two) times daily.     Dispense:  30 mL     Refill:  2    fluticasone propionate (FLONASE) 50 mcg/actuation nasal spray     Si spray (50 mcg total) by Each Nostril route 2 (two) times daily.     Dispense:  48 g     Refill:  1    levocetirizine (XYZAL) 5 MG tablet     Sig: Take 1 tablet (5 mg total) by mouth every evening.     Dispense:  90 tablet     Refill:  1

## 2020-07-06 ENCOUNTER — TELEPHONE (OUTPATIENT)
Dept: GASTROENTEROLOGY | Facility: CLINIC | Age: 70
End: 2020-07-06

## 2020-07-06 NOTE — TELEPHONE ENCOUNTER
----- Message from Sammie Alexis MA sent at 7/6/2020 12:07 PM CDT -----  Type: Needs Medical Advice  Who Called:  Lana Boudreaux  Best Call Back Number:   Additional Information: pharm states that pepcid is not available.  They would like to know if there is another you would like to call in.  Please call to advise

## 2020-07-06 NOTE — TELEPHONE ENCOUNTER
Spoke with pt. Informed to get pepcid 20 mg OTC. Pt rescheduled c-scope & covid test while on phone. Prep instructions sent to pt's mychart. Pt verbalized understanding to all.

## 2020-07-08 ENCOUNTER — TELEPHONE (OUTPATIENT)
Dept: GASTROENTEROLOGY | Facility: CLINIC | Age: 70
End: 2020-07-08

## 2020-07-08 NOTE — TELEPHONE ENCOUNTER
----- Message from Andre Irwin sent at 7/8/2020 11:06 AM CDT -----  Type:  Pharmacy Calling to Clarify an RX    Name of Caller:  [place]  Pharmacy Name:    [facility] - [address] [facility] [place] [address]  Phone: [phone] Fax: [phone]        Prescription Name:  famotidine (PEPCID) 40 MG tablet      What do they need to clarify?:  Not available-seeking alternative  Best Call Back Number:  [phone]  Additional Information:

## 2020-07-09 DIAGNOSIS — K22.4 ESOPHAGEAL SPASM: ICD-10-CM

## 2020-07-09 DIAGNOSIS — K21.9 GASTROESOPHAGEAL REFLUX DISEASE, ESOPHAGITIS PRESENCE NOT SPECIFIED: ICD-10-CM

## 2020-07-09 DIAGNOSIS — R07.89 ATYPICAL CHEST PAIN: ICD-10-CM

## 2020-07-10 RX ORDER — PANTOPRAZOLE SODIUM 40 MG/1
TABLET, DELAYED RELEASE ORAL
Qty: 90 TABLET | Refills: 0 | Status: SHIPPED | OUTPATIENT
Start: 2020-07-10 | End: 2020-09-22 | Stop reason: SDUPTHER

## 2020-07-17 RX ORDER — GABAPENTIN 300 MG/1
300 CAPSULE ORAL NIGHTLY
Qty: 90 CAPSULE | Refills: 0 | Status: SHIPPED | OUTPATIENT
Start: 2020-07-17 | End: 2021-02-01

## 2020-08-13 DIAGNOSIS — Z01.812 PRE-PROCEDURE LAB EXAM: ICD-10-CM

## 2020-08-17 ENCOUNTER — TELEPHONE (OUTPATIENT)
Dept: GASTROENTEROLOGY | Facility: CLINIC | Age: 70
End: 2020-08-17

## 2020-08-17 NOTE — TELEPHONE ENCOUNTER
----- Message from Georgina Melvin sent at 8/17/2020 10:23 AM CDT -----  Regarding: surgery  Contact: pt  Surgery    Patient called to cancel her Colonoscopy. She will call back to reschedule.  Her  is having surgery this is the reason.    Call  back [phone]

## 2020-09-21 NOTE — PROGRESS NOTES
Notify patient of results and plan.  Send copy to PCP.    Pathology shows:  Age growth    Plan: reassurance Subjective:       Patient ID: Zora Davis is a 68 y.o. female Body mass index is 37.74 kg/m².    Chief Complaint: Abdominal Pain    This patient is new to me.  Referring Provider: BISHNU Ceja NP for epigastric pain  Established patient of Dr. Richey.    Abdominal Pain   This is a recurrent (similar to symptoms she had with gastritis) problem. Episode onset: started 2 weeks ago with back pain, took 2 OTC aleve and the next day got epigastric pain. Duration: lasts a few hours. The problem has been gradually improving. The pain is located in the epigastric region. The pain is at a severity of 0/10 (currently). The quality of the pain is burning (piercing). The abdominal pain radiates to the back. Associated symptoms include belching (frequent) and nausea (at first, denies currently). Pertinent negatives include no anorexia, constipation, diarrhea, dysuria, fever, flatus, frequency, hematochezia, melena, vomiting or weight loss. The pain is aggravated by NSAIDs (wakes her up while sleeping, spicy food). The pain is relieved by eating. Treatments tried: carafate 1 gram qid started a few days ago, bentyl 20 mg BID PRN (has not taken recently), OTC prilosec. Prior diagnostic workup includes CT scan. Her past medical history is significant for abdominal surgery and GERD. There is no history of Crohn's disease, pancreatitis, PUD or ulcerative colitis.     Review of Systems   Constitutional: Negative for appetite change, chills, fatigue, fever, unexpected weight change and weight loss.   HENT: Positive for trouble swallowing (occasional with food and large pills; denies problems with liquids). Negative for sore throat.    Respiratory: Negative for cough, choking and shortness of breath.    Cardiovascular: Negative for chest pain.   Gastrointestinal: Positive for abdominal pain and nausea (at first, denies currently). Negative for anal bleeding, anorexia, blood in stool, constipation, diarrhea, flatus, hematochezia,  melena, rectal pain and vomiting.   Genitourinary: Negative for difficulty urinating, dysuria, flank pain and frequency.   Skin:        Patient had went to dermatologist and was prescribed doxycycline for rash but it caused upset stomach and she was advised to stop it so she did   Neurological: Negative for weakness.       Past Medical History:   Diagnosis Date    Atrial fibrillation     Diverticulosis     Fatty liver     Hypertension     Liver disease     FLORES (nonalcoholic steatohepatitis)     Thyroid disease      Past Surgical History:   Procedure Laterality Date    BREAST SURGERY      breast tumor       SECTION      CHOLECYSTECTOMY      COLONOSCOPY  2014    Dr. Richey, repeat in 5 years    HYSTERECTOMY      UPPER GASTROINTESTINAL ENDOSCOPY  2015    Dr. Padron     Family History   Problem Relation Age of Onset    Colon cancer Paternal Aunt     Hypertension Father     Crohn's disease Neg Hx     Stomach cancer Neg Hx     Ulcerative colitis Neg Hx     Esophageal cancer Neg Hx      Wt Readings from Last 10 Encounters:   18 109.3 kg (240 lb 15.4 oz)   18 109.3 kg (241 lb)   18 111.7 kg (246 lb 4.1 oz)   17 107.5 kg (236 lb 15.9 oz)   17 (!) 151 kg (332 lb 14.3 oz)   17 104.6 kg (230 lb 9.6 oz)   10/03/16 101 kg (222 lb 10.6 oz)   16 97 kg (213 lb 13.5 oz)   05/10/16 99.8 kg (220 lb)   16 102.4 kg (225 lb 12 oz)     Lab Results   Component Value Date    WBC 6.59 2018    HGB 13.0 2017    HCT 38.9 2017    MCV 82 2017     2017     CMP  Sodium   Date Value Ref Range Status   2017 142 136 - 145 mmol/L Final     Potassium   Date Value Ref Range Status   2017 3.9 3.5 - 5.1 mmol/L Final     Chloride   Date Value Ref Range Status   2017 107 95 - 110 mmol/L Final     CO2   Date Value Ref Range Status   2017 25 23 - 29 mmol/L Final     Glucose   Date Value Ref Range Status    04/07/2017 91 70 - 110 mg/dL Final     BUN, Bld   Date Value Ref Range Status   04/07/2017 15 8 - 23 mg/dL Final     Creatinine   Date Value Ref Range Status   04/07/2017 0.8 0.5 - 1.4 mg/dL Final     Calcium   Date Value Ref Range Status   04/07/2017 8.6 (L) 8.7 - 10.5 mg/dL Final     Total Protein   Date Value Ref Range Status   05/03/2018 7.8 6.0 - 8.4 g/dL Final     Albumin   Date Value Ref Range Status   05/03/2018 3.5 3.5 - 5.2 g/dL Final     Total Bilirubin   Date Value Ref Range Status   05/03/2018 0.7 0.1 - 1.0 mg/dL Final     Comment:     For infants and newborns, interpretation of results should be based  on gestational age, weight and in agreement with clinical  observations.  Premature Infant recommended reference ranges:  Up to 24 hours.............<8.0 mg/dL  Up to 48 hours............<12.0 mg/dL  3-5 days..................<15.0 mg/dL  6-29 days.................<15.0 mg/dL       Alkaline Phosphatase   Date Value Ref Range Status   05/03/2018 77 55 - 135 U/L Final     AST   Date Value Ref Range Status   05/03/2018 26 10 - 40 U/L Final     ALT   Date Value Ref Range Status   05/03/2018 38 10 - 44 U/L Final     Anion Gap   Date Value Ref Range Status   04/07/2017 10 8 - 16 mmol/L Final     eGFR if    Date Value Ref Range Status   04/07/2017 >60.0 >60 mL/min/1.73 m^2 Final     eGFR if non    Date Value Ref Range Status   04/07/2017 >60.0 >60 mL/min/1.73 m^2 Final     Comment:     Calculation used to obtain the estimated glomerular filtration  rate (eGFR) is the CKD-EPI equation. Since race is unknown   in our information system, the eGFR values for   -American and Non--American patients are given   for each creatinine result.       Lab Results   Component Value Date    AMYLASE 49 05/03/2018     Lab Results   Component Value Date    LIPASE 13 05/03/2018     Lab Results   Component Value Date    TSH 2.625 04/07/2017     Reviewed prior medical records including  "radiology report of 5/3/18 ct abdomen pelvis; 1/10/17 limited abdominal ultrasound; & endoscopy history (see surgical history).    8/13/15 EGD was reviewed and procedure report states:   " Findings:       The cricopharyngeus, upper third of the esophagus, middle third of        the esophagus, lower third of the esophagus and lower esophageal        sphincter were normal.       The Z-line was regular and was found 40 cm from the incisors.       Localized mildly erythematous mucosa without bleeding was found in        the gastric antrum. Biopsies were taken with a cold forceps for        histology. Biopsies were taken with a cold forceps for Helicobacter        pylori testing.       A single 1 mm sessile polyp with no bleeding and no stigmata of        recent bleeding was found on the greater curvature of the stomach.        The polyp was removed with a cold biopsy forceps. Resection and        retrieval were complete. Estimated blood loss was minimal.       The gastroesophageal junction, cardia, gastric fundus and gastric        antrum were normal.       The first part of the duodenum and 2nd part of the duodenum were        normal.  Impression:           - Normal cricopharyngeus, upper third of esophagus,                         middle third of esophagus, lower third of esophagus                         and lower esophageal sphincter.                        - Z-line regular, 40 cm from the incisors.                        - Erythematous mucosa in the antrum. Biopsied.                        - A single gastric polyp. Resected and retrieved.                        - Normal gastroesophageal junction, cardia, gastric                         fundus and antrum.                        - Normal first part of the duodenum and 2nd part of                         the duodenum.  Recommendation:       - Patient has a contact number available for                         emergencies. The signs and symptoms of potential            " "             delayed complications were discussed with the                         patient. Return to normal activities tomorrow.                         Written discharge instructions were provided to the                         patient.                        - [place] indefinitely.                        - The patient should eat a bland diet and avoid                         caffeine, carbonation, alcohol, nicotine, aspirin                         and NSAID's including ibuprofen and advil.                        - The patient will be observed post-procedure,                         until all discharge criteria are met. ".  Biopsy results:   "Supplemental Diagnosis  Immunohistochemical staining for Helicobacter on block #1 is negative (satisfactory control material).  [facility]  (Electronically Signed: 2015-08-19 08:19:00 )  Diagnosed by: Benjamin Coburn M.D.  FINAL PATHOLOGIC DIAGNOSIS  1. Stomach, antrum, biopsy:  Mild, focally moderate chronic gastritis.  Immunohistochemical stain for Helicobacter is pending and the results will be issued in a supplemental report.  2. Gastric body polyp, biopsy:  Changes suggestive of a fundic gland polyp.  Negative for dysplasia."    6/9/14 Colonoscopy was reviewed and procedure report states:   " Findings:       The perianal and digital rectal examinations were normal. Pertinent        negatives include normal sphincter tone.       The terminal ileum appeared normal.       A few small-mouthed diverticula were found in the sigmoid colon.        There was no evidence of diverticular bleeding.       The exam was otherwise without abnormality on direct and        retroflexion views.       There is no endoscopic evidence of inflammation, polyps,        ulcerations, mucosal abnormalities or tumor in the entire colon.  Impression:           - The examined portion of the ileum was normal.                        - Mild diverticulosis in the sigmoid colon. There                         was no " "evidence of diverticular bleeding.                        - The examination was otherwise normal on direct                         and retroflexion views.  Recommendation:       - Patient has a contact number available for                         emergencies. The signs and symptoms of potential                         delayed complications were discussed with the                         patient. Return to normal activities tomorrow.                         Written discharge instructions were provided to the                         patient.                        - High fiber diet daily.                        - Continue present medications.                        - Resume aspirin at prior dose today.                        - The patient will be observed post-procedure,                         until all discharge criteria are met.                        - Discharge patient to home (ambulatory).                        - Repeat colonoscopy in 5 years for surveillance.                        - Return to referring physician as previously                         scheduled.                        - Use [facility] at 1 tab PO BID daily. ".    Objective:      Physical Exam   Constitutional: She is oriented to person, place, and time. She appears well-developed and well-nourished. No distress.   HENT:   Mouth/Throat: Oropharynx is clear and moist and mucous membranes are normal. No oral lesions. No oropharyngeal exudate.   Eyes: Conjunctivae are normal. Pupils are equal, round, and reactive to light. No scleral icterus.   Cardiovascular: Normal rate.    Pulmonary/Chest: Effort normal and breath sounds normal. No respiratory distress. She has no wheezes.   Abdominal: Soft. Normal appearance and bowel sounds are normal. She exhibits no distension, no abdominal bruit and no mass. There is no hepatosplenomegaly. There is no tenderness. There is no rigidity, no rebound, no guarding, no tenderness at McBurney's point and " negative Gutiérrez's sign. No hernia.   Neurological: She is alert and oriented to person, place, and time.   Skin: Skin is warm and dry. No rash noted. She is not diaphoretic. No erythema. No pallor.   Non-jaundiced   Psychiatric: She has a normal mood and affect. Her behavior is normal. Judgment and thought content normal.   Nursing note and vitals reviewed.      Assessment:       1. Epigastric pain    2. Heartburn    3. Pharyngoesophageal dysphagia        Plan:       Epigastric pain  - START    pantoprazole (PROTONIX) 40 MG tablet; Take 1 tablet (40 mg total) by mouth once daily. Before breakfast  Dispense: 30 tablet; Refill: 2  - schedule EGD, discussed procedure with patient, patient verbalized understanding  - CONTINUE BENTYL AS DIRECTED PRN    Heartburn  - DISCONTINUE PRILOSEC DUE TO ALTERNATE THERAPY  - START    pantoprazole (PROTONIX) 40 MG tablet; Take 1 tablet (40 mg total) by mouth once daily. Before breakfast  Dispense: 30 tablet; Refill: 2, - take in the morning 30-60 minutes before breakfast, discussed about possible long term use of medication (prefer to use lowest effective dose or discontinuing if possible) and discussed the risks & benefits with taking a reflux medication long term, and to take OTC calcium and vitamin d supplements as directed (such as Citracal +D), pt verbalized understanding  -discussed about the different types of medications used to treat reflux and how to use them, antacids can be used PRN for breakthrough heartburn symptoms by reducing stomach acid that is already produced, H2 blockers (zantac) work by limiting the amount acid production, & PPI's work to block acid production and are taken daily, patient verbalized understanding.  -Educated patient on lifestyle modifications to help control/reduce reflux/abdominal pain including: avoid large meals, avoid eating within 2-3 hours of bedtime (avoid late night eating & lying down soon after eating), elevate head of bed if nocturnal  symptoms are present, smoking cessation (if current smoker), & weight loss (if overweight).   -Educated to avoid known foods which trigger reflux symptoms & to minimize/avoid high-fat foods, chocolate, caffeine, citrus, alcohol, & tomato products.  -Advised to avoid/limit use of NSAID's, since they can cause GI upset, bleeding, and/or ulcers. If needed, take with food.   - schedule EGD, discussed procedure with patient, patient verbalized understanding    Pharyngoesophageal dysphagia  - schedule EGD, discussed procedure with patient and possible esophageal dilation may be performed during procedure if indicated, patient verbalized understanding  - educated patient to eat smaller more frequent meals and to eat slowly and advised to eat a soft diet.  - possible UGI/esophagram/esophageal manometry if symptoms persist    Follow-up in about 1 month (around 6/11/2018), or if symptoms worsen or fail to improve.      If no improvement in symptoms or symptoms worsen, call/follow-up at clinic or go to ER.

## 2020-09-22 DIAGNOSIS — K21.9 GASTROESOPHAGEAL REFLUX DISEASE, UNSPECIFIED WHETHER ESOPHAGITIS PRESENT: ICD-10-CM

## 2020-09-22 DIAGNOSIS — R07.89 ATYPICAL CHEST PAIN: ICD-10-CM

## 2020-09-22 DIAGNOSIS — K22.4 ESOPHAGEAL SPASM: ICD-10-CM

## 2020-09-22 NOTE — TELEPHONE ENCOUNTER
I can not reply to send message.     Mammogram, dexa, and fasting labs needed for next month (please see date of last mammo).     Please route back to refill and pend mammo and dexa please. I will order labs. Appointment with me 1 week later after all done.

## 2020-09-23 ENCOUNTER — TELEPHONE (OUTPATIENT)
Dept: FAMILY MEDICINE | Facility: CLINIC | Age: 70
End: 2020-09-23

## 2020-09-23 DIAGNOSIS — Z79.82 ASPIRIN LONG-TERM USE: Chronic | ICD-10-CM

## 2020-09-23 DIAGNOSIS — Z13.820 SCREENING FOR OSTEOPOROSIS: ICD-10-CM

## 2020-09-23 DIAGNOSIS — Z13.21 ENCOUNTER FOR VITAMIN DEFICIENCY SCREENING: ICD-10-CM

## 2020-09-23 DIAGNOSIS — Z13.0 SCREENING FOR DEFICIENCY ANEMIA: ICD-10-CM

## 2020-09-23 DIAGNOSIS — I10 ESSENTIAL HYPERTENSION: Chronic | ICD-10-CM

## 2020-09-23 DIAGNOSIS — Z79.899 LONG TERM CURRENT USE OF DIURETIC: Chronic | ICD-10-CM

## 2020-09-23 DIAGNOSIS — E03.9 ADULT HYPOTHYROIDISM: Primary | Chronic | ICD-10-CM

## 2020-09-23 DIAGNOSIS — Z13.89 SCREENING FOR HEMATURIA OR PROTEINURIA: ICD-10-CM

## 2020-09-23 DIAGNOSIS — Z78.0 POSTMENOPAUSAL: ICD-10-CM

## 2020-09-23 DIAGNOSIS — Z12.31 ENCOUNTER FOR SCREENING MAMMOGRAM FOR MALIGNANT NEOPLASM OF BREAST: ICD-10-CM

## 2020-09-23 DIAGNOSIS — M85.89 OSTEOPENIA OF MULTIPLE SITES: Chronic | ICD-10-CM

## 2020-09-23 DIAGNOSIS — I48.0 PAROXYSMAL ATRIAL FIBRILLATION: Chronic | ICD-10-CM

## 2020-09-23 DIAGNOSIS — Z98.84 S/P LAPAROSCOPIC SLEEVE GASTRECTOMY: Chronic | ICD-10-CM

## 2020-09-23 DIAGNOSIS — Z13.1 SCREENING FOR DIABETES MELLITUS (DM): ICD-10-CM

## 2020-09-25 ENCOUNTER — LAB VISIT (OUTPATIENT)
Dept: LAB | Facility: HOSPITAL | Age: 70
End: 2020-09-25
Attending: INTERNAL MEDICINE
Payer: MEDICARE

## 2020-09-25 DIAGNOSIS — Z78.0 POSTMENOPAUSAL: ICD-10-CM

## 2020-09-25 DIAGNOSIS — Z13.21 ENCOUNTER FOR VITAMIN DEFICIENCY SCREENING: ICD-10-CM

## 2020-09-25 DIAGNOSIS — Z13.89 SCREENING FOR HEMATURIA OR PROTEINURIA: ICD-10-CM

## 2020-09-25 DIAGNOSIS — E03.9 ADULT HYPOTHYROIDISM: Chronic | ICD-10-CM

## 2020-09-25 DIAGNOSIS — Z13.820 SCREENING FOR OSTEOPOROSIS: ICD-10-CM

## 2020-09-25 DIAGNOSIS — Z98.84 S/P LAPAROSCOPIC SLEEVE GASTRECTOMY: Chronic | ICD-10-CM

## 2020-09-25 DIAGNOSIS — Z79.899 LONG TERM CURRENT USE OF DIURETIC: Chronic | ICD-10-CM

## 2020-09-25 DIAGNOSIS — Z13.1 SCREENING FOR DIABETES MELLITUS (DM): ICD-10-CM

## 2020-09-25 DIAGNOSIS — Z12.31 ENCOUNTER FOR SCREENING MAMMOGRAM FOR MALIGNANT NEOPLASM OF BREAST: ICD-10-CM

## 2020-09-25 DIAGNOSIS — I10 ESSENTIAL HYPERTENSION: Chronic | ICD-10-CM

## 2020-09-25 DIAGNOSIS — Z79.82 ASPIRIN LONG-TERM USE: Chronic | ICD-10-CM

## 2020-09-25 DIAGNOSIS — Z13.0 SCREENING FOR DEFICIENCY ANEMIA: ICD-10-CM

## 2020-09-25 DIAGNOSIS — M85.89 OSTEOPENIA OF MULTIPLE SITES: Chronic | ICD-10-CM

## 2020-09-25 DIAGNOSIS — I48.0 PAROXYSMAL ATRIAL FIBRILLATION: Chronic | ICD-10-CM

## 2020-09-25 LAB
25(OH)D3+25(OH)D2 SERPL-MCNC: 60 NG/ML (ref 30–96)
ALBUMIN SERPL BCP-MCNC: 3.5 G/DL (ref 3.5–5.2)
ALP SERPL-CCNC: 57 U/L (ref 55–135)
ALT SERPL W/O P-5'-P-CCNC: 12 U/L (ref 10–44)
ANION GAP SERPL CALC-SCNC: 8 MMOL/L (ref 8–16)
AST SERPL-CCNC: 16 U/L (ref 10–40)
BASOPHILS # BLD AUTO: 0.07 K/UL (ref 0–0.2)
BASOPHILS NFR BLD: 1.3 % (ref 0–1.9)
BILIRUB SERPL-MCNC: 0.5 MG/DL (ref 0.1–1)
BUN SERPL-MCNC: 17 MG/DL (ref 8–23)
CALCIUM SERPL-MCNC: 9 MG/DL (ref 8.7–10.5)
CHLORIDE SERPL-SCNC: 103 MMOL/L (ref 95–110)
CHOLEST SERPL-MCNC: 203 MG/DL (ref 120–199)
CHOLEST/HDLC SERPL: 3.8 {RATIO} (ref 2–5)
CO2 SERPL-SCNC: 31 MMOL/L (ref 23–29)
CREAT SERPL-MCNC: 0.7 MG/DL (ref 0.5–1.4)
DIFFERENTIAL METHOD: ABNORMAL
EOSINOPHIL # BLD AUTO: 0.1 K/UL (ref 0–0.5)
EOSINOPHIL NFR BLD: 1.8 % (ref 0–8)
ERYTHROCYTE [DISTWIDTH] IN BLOOD BY AUTOMATED COUNT: 13.6 % (ref 11.5–14.5)
EST. GFR  (AFRICAN AMERICAN): >60 ML/MIN/1.73 M^2
EST. GFR  (NON AFRICAN AMERICAN): >60 ML/MIN/1.73 M^2
ESTIMATED AVG GLUCOSE: 103 MG/DL (ref 68–131)
FERRITIN SERPL-MCNC: 218 NG/ML (ref 20–300)
GLUCOSE SERPL-MCNC: 87 MG/DL (ref 70–110)
HBA1C MFR BLD HPLC: 5.2 % (ref 4–5.6)
HCT VFR BLD AUTO: 40.9 % (ref 37–48.5)
HDLC SERPL-MCNC: 54 MG/DL (ref 40–75)
HDLC SERPL: 26.6 % (ref 20–50)
HGB BLD-MCNC: 13 G/DL (ref 12–16)
IMM GRANULOCYTES # BLD AUTO: 0.02 K/UL (ref 0–0.04)
IMM GRANULOCYTES NFR BLD AUTO: 0.4 % (ref 0–0.5)
IRON SERPL-MCNC: 72 UG/DL (ref 30–160)
LDLC SERPL CALC-MCNC: 125.8 MG/DL (ref 63–159)
LYMPHOCYTES # BLD AUTO: 2.3 K/UL (ref 1–4.8)
LYMPHOCYTES NFR BLD: 42 % (ref 18–48)
MAGNESIUM SERPL-MCNC: 1.8 MG/DL (ref 1.6–2.6)
MCH RBC QN AUTO: 28.7 PG (ref 27–31)
MCHC RBC AUTO-ENTMCNC: 31.8 G/DL (ref 32–36)
MCV RBC AUTO: 90 FL (ref 82–98)
MONOCYTES # BLD AUTO: 0.5 K/UL (ref 0.3–1)
MONOCYTES NFR BLD: 8.8 % (ref 4–15)
NEUTROPHILS # BLD AUTO: 2.5 K/UL (ref 1.8–7.7)
NEUTROPHILS NFR BLD: 45.7 % (ref 38–73)
NONHDLC SERPL-MCNC: 149 MG/DL
NRBC BLD-RTO: 0 /100 WBC
PLATELET # BLD AUTO: 249 K/UL (ref 150–350)
PMV BLD AUTO: 11 FL (ref 9.2–12.9)
POTASSIUM SERPL-SCNC: 3.8 MMOL/L (ref 3.5–5.1)
PROT SERPL-MCNC: 7.3 G/DL (ref 6–8.4)
RBC # BLD AUTO: 4.53 M/UL (ref 4–5.4)
SATURATED IRON: 20 % (ref 20–50)
SODIUM SERPL-SCNC: 142 MMOL/L (ref 136–145)
TOTAL IRON BINDING CAPACITY: 363 UG/DL (ref 250–450)
TRANSFERRIN SERPL-MCNC: 245 MG/DL (ref 200–375)
TRIGL SERPL-MCNC: 116 MG/DL (ref 30–150)
TSH SERPL DL<=0.005 MIU/L-ACNC: 1.16 UIU/ML (ref 0.4–4)
WBC # BLD AUTO: 5.47 K/UL (ref 3.9–12.7)

## 2020-09-25 PROCEDURE — 84443 ASSAY THYROID STIM HORMONE: CPT

## 2020-09-25 PROCEDURE — 80061 LIPID PANEL: CPT

## 2020-09-25 PROCEDURE — 80053 COMPREHEN METABOLIC PANEL: CPT

## 2020-09-25 PROCEDURE — 85025 COMPLETE CBC W/AUTO DIFF WBC: CPT

## 2020-09-25 PROCEDURE — 82728 ASSAY OF FERRITIN: CPT

## 2020-09-25 PROCEDURE — 82306 VITAMIN D 25 HYDROXY: CPT

## 2020-09-25 PROCEDURE — 36415 COLL VENOUS BLD VENIPUNCTURE: CPT | Mod: PO

## 2020-09-25 PROCEDURE — 83540 ASSAY OF IRON: CPT

## 2020-09-25 PROCEDURE — 83036 HEMOGLOBIN GLYCOSYLATED A1C: CPT

## 2020-09-25 PROCEDURE — 83735 ASSAY OF MAGNESIUM: CPT

## 2020-10-06 RX ORDER — PANTOPRAZOLE SODIUM 40 MG/1
40 TABLET, DELAYED RELEASE ORAL DAILY
Qty: 90 TABLET | Refills: 0 | Status: SHIPPED | OUTPATIENT
Start: 2020-10-06 | End: 2020-11-02

## 2020-10-09 ENCOUNTER — PATIENT OUTREACH (OUTPATIENT)
Dept: ADMINISTRATIVE | Facility: HOSPITAL | Age: 70
End: 2020-10-09

## 2020-10-09 ENCOUNTER — TELEPHONE (OUTPATIENT)
Dept: ADMINISTRATIVE | Facility: HOSPITAL | Age: 70
End: 2020-10-09

## 2020-10-09 NOTE — PROGRESS NOTES
HTN REPORT: I spoke with pt that stated she will take her BP this weekend and call me back and leave it on my voicemail so that I don't have to call her when at work. Pt verbalized understanding of call.

## 2020-10-12 ENCOUNTER — HOSPITAL ENCOUNTER (OUTPATIENT)
Dept: RADIOLOGY | Facility: HOSPITAL | Age: 70
Discharge: HOME | End: 2020-10-12
Attending: INTERNAL MEDICINE
Payer: MEDICARE

## 2020-10-12 VITALS — WEIGHT: 177 LBS | HEIGHT: 67 IN | BODY MASS INDEX: 27.78 KG/M2

## 2020-10-12 DIAGNOSIS — Z13.1 SCREENING FOR DIABETES MELLITUS (DM): ICD-10-CM

## 2020-10-12 DIAGNOSIS — Z13.89 SCREENING FOR HEMATURIA OR PROTEINURIA: ICD-10-CM

## 2020-10-12 DIAGNOSIS — Z79.82 ASPIRIN LONG-TERM USE: ICD-10-CM

## 2020-10-12 DIAGNOSIS — E03.9 ADULT HYPOTHYROIDISM: ICD-10-CM

## 2020-10-12 DIAGNOSIS — I48.0 PAROXYSMAL ATRIAL FIBRILLATION: ICD-10-CM

## 2020-10-12 DIAGNOSIS — Z12.31 ENCOUNTER FOR SCREENING MAMMOGRAM FOR MALIGNANT NEOPLASM OF BREAST: ICD-10-CM

## 2020-10-12 DIAGNOSIS — M85.89 OSTEOPENIA OF MULTIPLE SITES: ICD-10-CM

## 2020-10-12 DIAGNOSIS — Z98.84 S/P LAPAROSCOPIC SLEEVE GASTRECTOMY: ICD-10-CM

## 2020-10-12 DIAGNOSIS — Z13.820 SCREENING FOR OSTEOPOROSIS: ICD-10-CM

## 2020-10-12 DIAGNOSIS — Z13.0 SCREENING FOR DEFICIENCY ANEMIA: ICD-10-CM

## 2020-10-12 DIAGNOSIS — Z13.21 ENCOUNTER FOR VITAMIN DEFICIENCY SCREENING: ICD-10-CM

## 2020-10-12 DIAGNOSIS — Z79.899 LONG TERM CURRENT USE OF DIURETIC: ICD-10-CM

## 2020-10-12 DIAGNOSIS — I10 ESSENTIAL HYPERTENSION: ICD-10-CM

## 2020-10-12 DIAGNOSIS — Z78.0 POSTMENOPAUSAL: ICD-10-CM

## 2020-10-12 PROCEDURE — 77063 MAMMO DIGITAL SCREENING BILAT WITH TOMO: ICD-10-PCS | Mod: 26,,, | Performed by: RADIOLOGY

## 2020-10-12 PROCEDURE — 77080 DXA BONE DENSITY AXIAL: CPT | Mod: 26,,, | Performed by: RADIOLOGY

## 2020-10-12 PROCEDURE — 77067 SCR MAMMO BI INCL CAD: CPT | Mod: TC,PO

## 2020-10-12 PROCEDURE — 77067 SCR MAMMO BI INCL CAD: CPT | Mod: 26,,, | Performed by: RADIOLOGY

## 2020-10-12 PROCEDURE — 77067 MAMMO DIGITAL SCREENING BILAT WITH TOMO: ICD-10-PCS | Mod: 26,,, | Performed by: RADIOLOGY

## 2020-10-12 PROCEDURE — 77063 BREAST TOMOSYNTHESIS BI: CPT | Mod: 26,,, | Performed by: RADIOLOGY

## 2020-10-12 PROCEDURE — 77080 DEXA BONE DENSITY SPINE HIP: ICD-10-PCS | Mod: 26,,, | Performed by: RADIOLOGY

## 2020-10-12 PROCEDURE — 77080 DXA BONE DENSITY AXIAL: CPT | Mod: TC,PO

## 2020-10-27 ENCOUNTER — PATIENT MESSAGE (OUTPATIENT)
Dept: FAMILY MEDICINE | Facility: CLINIC | Age: 70
End: 2020-10-27

## 2020-10-27 DIAGNOSIS — U07.1 COVID-19: Primary | ICD-10-CM

## 2020-10-28 ENCOUNTER — TELEPHONE (OUTPATIENT)
Dept: FAMILY MEDICINE | Facility: CLINIC | Age: 70
End: 2020-10-28

## 2020-10-28 NOTE — TELEPHONE ENCOUNTER
----- Message from Valarie Ho sent at 10/28/2020  7:20 AM CDT -----  patient needs covid test in order to return to work, has cough (no fever)..[phone]

## 2020-10-30 ENCOUNTER — PATIENT MESSAGE (OUTPATIENT)
Dept: FAMILY MEDICINE | Facility: CLINIC | Age: 70
End: 2020-10-30

## 2020-11-02 ENCOUNTER — OFFICE VISIT (OUTPATIENT)
Dept: FAMILY MEDICINE | Facility: CLINIC | Age: 70
End: 2020-11-02
Payer: MEDICARE

## 2020-11-02 DIAGNOSIS — F41.9 ANXIETY: ICD-10-CM

## 2020-11-02 DIAGNOSIS — K21.9 GASTROESOPHAGEAL REFLUX DISEASE, UNSPECIFIED WHETHER ESOPHAGITIS PRESENT: ICD-10-CM

## 2020-11-02 DIAGNOSIS — G47.00 INSOMNIA, UNSPECIFIED TYPE: Primary | ICD-10-CM

## 2020-11-02 DIAGNOSIS — Z01.89 ENCOUNTER FOR LABORATORY EXAMINATION: ICD-10-CM

## 2020-11-02 PROCEDURE — 99214 PR OFFICE/OUTPT VISIT, EST, LEVL IV, 30-39 MIN: ICD-10-PCS | Mod: 95,,, | Performed by: NURSE PRACTITIONER

## 2020-11-02 PROCEDURE — 99214 OFFICE O/P EST MOD 30 MIN: CPT | Mod: 95,,, | Performed by: NURSE PRACTITIONER

## 2020-11-02 PROCEDURE — 1159F MED LIST DOCD IN RCRD: CPT | Mod: ,,, | Performed by: NURSE PRACTITIONER

## 2020-11-02 PROCEDURE — 1101F PT FALLS ASSESS-DOCD LE1/YR: CPT | Mod: ,,, | Performed by: NURSE PRACTITIONER

## 2020-11-02 PROCEDURE — 1101F PR PT FALLS ASSESS DOC 0-1 FALLS W/OUT INJ PAST YR: ICD-10-PCS | Mod: ,,, | Performed by: NURSE PRACTITIONER

## 2020-11-02 PROCEDURE — 1159F PR MEDICATION LIST DOCUMENTED IN MEDICAL RECORD: ICD-10-PCS | Mod: ,,, | Performed by: NURSE PRACTITIONER

## 2020-11-02 RX ORDER — HYDROXYZINE HYDROCHLORIDE 50 MG/1
50 TABLET, FILM COATED ORAL NIGHTLY PRN
Qty: 30 TABLET | Refills: 0 | Status: SHIPPED | OUTPATIENT
Start: 2020-11-02 | End: 2021-01-30 | Stop reason: SDUPTHER

## 2020-11-02 RX ORDER — FAMOTIDINE 40 MG/1
40 TABLET, FILM COATED ORAL DAILY
Qty: 90 TABLET | Refills: 3 | Status: SHIPPED | OUTPATIENT
Start: 2020-11-02 | End: 2021-12-30

## 2020-11-02 NOTE — PROGRESS NOTES
"Subjective:       Patient ID: Zora Davis is a 70 y.o. female.    Chief Complaint: No chief complaint on file.  The patient location is: [place]  The chief complaint leading to consultation is: Insomnia, Lab review, GERD    Visit type: audiovisual    Face to Face time with patient: 20 min  minutes of total time spent on the encounter, which includes face to face time and non-face to face time preparing to see the patient (eg, review of tests), Obtaining and/or reviewing separately obtained history, Documenting clinical information in the electronic or other health record, Independently interpreting results (not separately reported) and communicating results to the patient/family/caregiver, or Care coordination (not separately reported).   Each patient to whom he or she provides medical services by telemedicine is:  (1) informed of the relationship between the physician and patient and the respective role of any other health care provider with respect to management of the patient; and (2) notified that he or she may decline to receive medical services by telemedicine and may withdraw from such care at any time.    Notes:   Pt scheduled visit today for lab review; states previously had appt with PCP to discuss, however PCP out of clinic until January. Labs, recommendations relayed to pt; verbalized understanding. Pt states has not been taking protonix x 4 d for GERD; states has been out of the medication; states has had no GERD symptoms since stopping medication. Pt states has been taking protonix > 1 y; advised to begin pepcid for GERD due to potential effects of extended PPI use; pt agreed to begin Pepcid. Pt also states has been experiencing insomnia, anxiety since COVID-19 diagnosis last week; states has had anxiety insomnia in the past. Denies SI/HI. Pt states currently using albuterol inhaler, cough as needed for COVID-19; states case mild; denies fever, SOB, body aches; states, "I just had a mild " "cough, runny nose." Pt advised to report to ER immediately if her symptoms worsen. Pt has no other complaints today.  Past Medical History:   Diagnosis Date    Adult hypothyroidism 2010    Essential hypertension 2013    Fatty liver     GERD (gastroesophageal reflux disease)     JOSE DE JESUS on CPAP     Osteopenia of multiple sites 2020    Paroxysmal atrial fibrillation 3/22/2019    S/P laparoscopic sleeve gastrectomy 3/22/2019    Sigmoid diverticulosis      Social History     Socioeconomic History    Marital status:      Spouse name: Not on file    Number of children: 2    Years of education: Not on file    Highest education level: Not on file   Occupational History    Occupation: retired    Social Needs    Financial resource strain: Not on file    Food insecurity     Worry: Not on file     Inability: Not on file    Transportation needs     Medical: Not on file     Non-medical: Not on file   Tobacco Use    Smoking status: Former Smoker     Packs/day: 0.25     Years: 13.00     Pack years: 3.25     Types: Cigarettes     Start date:      Quit date:      Years since quittin.8    Smokeless tobacco: Never Used   Substance and Sexual Activity    Alcohol use: Yes     Alcohol/week: 1.0 standard drinks     Types: 1 Glasses of wine per week     Frequency: Monthly or less     Drinks per session: 1 or 2     Binge frequency: Never    Drug use: No    Sexual activity: Yes     Partners: Male     Birth control/protection: Surgical   Lifestyle    Physical activity     Days per week: Not on file     Minutes per session: Not on file    Stress: Not on file   Relationships    Social connections     Talks on phone: Not on file     Gets together: Not on file     Attends [employer] service: Not on file     Active member of club or organization: Not on file     Attends meetings of clubs or organizations: Not on file     Relationship status: Not on file   Other Topics Concern "    Patient feels they ought to cut down on drinking/drug use Not Asked    Patient annoyed by others criticizing their drinking/drug use Not Asked    Patient has felt bad or guilty about drinking/drug use Not Asked    Patient has had a drink/used drugs as an eye opener in the AM Not Asked   Social History Narrative    Not on file     Past Surgical History:   Procedure Laterality Date    BREAST BIOPSY       SECTION      x 2    CHOLECYSTECTOMY      COLONOSCOPY  2014    Dr. Richey; diverticulosis; repeat in 5 years    ESOPHAGOGASTRODUODENOSCOPY N/A 2018    Dr. Steel; gastritis; gastric polyps    ESOPHAGOGASTRODUODENOSCOPY N/A 2020    Procedure: EGD (ESOPHAGOGASTRODUODENOSCOPY);  Surgeon: Efrain Steel MD;  Location: [facility];  Service: Endoscopy;  Laterality: N/A;    HYSTERECTOMY      LAPAROSCOPIC LYSIS OF ADHESIONS N/A 3/22/2019    Procedure: LYSIS, ADHESIONS, LAPAROSCOPIC;  Surgeon: Cole Armstrong MD;  Location: [facility] OR;  Service: General;  Laterality: N/A;    ROBOT-ASSISTED LAPAROSCOPIC SLEEVE GASTRECTOMY USING DA LARRY XI N/A 3/22/2019    Procedure: XI ROBOTIC SLEEVE GASTRECTOMY;  Surgeon: Cole Armstrong MD;  Location: [facility] OR;  Service: General;  Laterality: N/A;    TOTAL REDUCTION MAMMOPLASTY Bilateral     UPPER GASTROINTESTINAL ENDOSCOPY  2015    Dr. Padron       HPI  Review of Systems   Constitutional: Negative.  Negative for activity change and unexpected weight change.   HENT: Negative.  Negative for hearing loss, rhinorrhea and trouble swallowing.    Eyes: Negative.  Negative for discharge and visual disturbance.   Respiratory: Negative.  Negative for wheezing.    Cardiovascular: Negative.  Negative for chest pain and palpitations.   Gastrointestinal: Negative.  Negative for blood in stool, constipation and vomiting.   Endocrine: Negative.  Negative for polydipsia and polyuria.   Genitourinary: Negative.  Negative for difficulty urinating, dysuria,  hematuria and menstrual problem.   Musculoskeletal: Negative.  Negative for joint swelling and neck pain.   Integumentary:  Negative.   Allergic/Immunologic: Negative.    Neurological: Negative.  Negative for headaches.   Psychiatric/Behavioral: Positive for sleep disturbance. Negative for confusion.         Objective:      Physical Exam  Constitutional:       Appearance: Normal appearance.   Neurological:      Mental Status: She is alert.         Assessment:       1. Insomnia, unspecified type    2. Anxiety   3. Gastroesophageal reflux disease, unspecified whether esophagitis present    4. Encounter for laboratory examination        Plan:           Diagnoses and all orders for this visit:    Insomnia, unspecified type  Anxiety  -     hydrOXYzine (ATARAX) 50 MG tablet; Take 1 tablet (50 mg total) by mouth nightly as needed.  Handout r/t atarax uses, potential side effects/interactions given    Gastroesophageal reflux disease, unspecified whether esophagitis present  -     famotidine (PEPCID) 40 MG tablet; Take 1 tablet (40 mg total) by mouth once daily.  Stop protonix  Avoid GERD inducing foods, beverages    Encounter for laboratory examination  Comments:  Lab review  Continue current medications, plan of care  F/U with PCP

## 2020-11-02 NOTE — PATIENT INSTRUCTIONS
"Hydrate well  Stop protonix; begin pepcid as prescribed  Continue current medications, plan of care  F/U with PCP   Report to ER immediately if symptoms worsen    Hydroxyzine: Patient drug information   Access [employer] here.   Copyright 7905-9498 [employer] All rights reserved.   (For additional information see "Hydroxyzine: Drug information" and see "Hydroxyzine: Pediatric drug information")  Brand Names: [place]   Vistaril  Brand Names: Thomas   Atarax;   BRISA-Hydroxyzin;   PMS-HydrOXYzine HCl;   PMS-HydrOXYzine [DSC]  What is this drug used for?    It is used to treat itching.    It is used to treat anxiety.    It is used to put you to sleep for surgery.    It is used to treat mood problems.    It is used to treat upset stomach and throwing up.    It may be given to you for other reasons. Talk with the doctor.  What do I need to tell my doctor BEFORE I take this drug?    If you are allergic to this drug; any part of this drug; or any other drugs, foods, or substances. Tell your doctor about the allergy and what signs you had.    If you have ever had a long QT on ECG.    If you are in early pregnancy. Do not take this drug during early pregnancy.    If you are breast-feeding. Do not breast-feed while you take this drug.   This is not a list of all drugs or health problems that interact with this drug.   Tell your doctor and pharmacist about all of your drugs (prescription or OTC, natural products, vitamins) and health problems. You must check to make sure that it is safe for you to take this drug with all of your drugs and health problems. Do not start, stop, or change the dose of any drug without checking with your doctor.  What are some things I need to know or do while I take this drug?   All products:    Tell all of your health care providers that you take this drug. This includes your doctors, nurses, pharmacists, and dentists.    Avoid driving and doing other tasks or actions that call for " you to be alert until you see how this drug affects you.    Talk with your doctor before you use alcohol, marijuana or other forms of cannabis, or prescription or OTC drugs that may slow your actions.    An unsafe heartbeat that is not normal (long QT on ECG) has happened with this drug. This may raise the chance of sudden death. Talk with the doctor.    If you are 65 or older, use this drug with care. You could have more side effects.    Tell your doctor if you are pregnant or plan on getting pregnant. You will need to talk about the benefits and risks of using this drug while you are pregnant.   Injection:    Unsafe heart problems and sometimes death have rarely happened when this drug was given with alcohol or certain other drugs that may slow your actions. Talk with your doctor.  What are some side effects that I need to call my doctor about right away?   WARNING/CAUTION: Even though it may be rare, some people may have very bad and sometimes deadly side effects when taking a drug. Tell your doctor or get medical help right away if you have any of the following signs or symptoms that may be related to a very bad side effect:   All products:    Signs of an allergic reaction, like rash; hives; itching; red, swollen, blistered, or peeling skin with or without fever; wheezing; tightness in the chest or throat; trouble breathing, swallowing, or talking; unusual hoarseness; or swelling of the mouth, face, lips, tongue, or throat.    Fast or abnormal heartbeat.    Very bad dizziness or passing out.    Trouble controlling body movements.    Feeling confused.    Rarely, a very bad skin reaction has happened with this drug. Signs include fever and many small skin spots within large areas of redness and swelling. Call your doctor right away if you have a rash or any of these signs.   Injection:    Tissue damage has happened with this drug. Sometimes, this has led to surgery. Tell your nurse if you have any  burning, color changes, pain, skin breakdown, or swelling where the shot was given.  What are some other side effects of this drug?   All drugs may cause side effects. However, many people have no side effects or only have minor side effects. Call your doctor or get medical help if any of these side effects or any other side effects bother you or do not go away:    Dry mouth.    Feeling sleepy.   These are not all of the side effects that may occur. If you have questions about side effects, call your doctor. Call your doctor for medical advice about side effects.   You may report side effects to your national health agency.  How is this drug best taken?   Use this drug as ordered by your doctor. Read all information given to you. Follow all instructions closely.   All oral products:    Take with or without food. Take with food if it causes an upset stomach.   Liquid:    Measure liquid doses carefully. Use the measuring device that comes with this drug. If there is none, ask the pharmacist for a device to measure this drug.   Injection:    It is given as a shot into a muscle.  What do I do if I miss a dose?   All oral products:    If you take this drug on a regular basis, take a missed dose as soon as you think about it.    If it is close to the time for your next dose, skip the missed dose and go back to your normal time.    Do not take 2 doses at the same time or extra doses.    Many times this drug is taken on an as needed basis. Do not take more often than told by the doctor.   Injection:    Call your doctor to find out what to do.  How do I store and/or throw out this drug?   All oral products:    Store at room temperature protected from light. Store in a dry place. Do not store in a bathroom.   Injection:    If you need to store this drug at home, talk with your doctor, nurse, or pharmacist about how to store it.   All products:    Keep all drugs in a safe place. Keep all drugs out of the reach of  children and pets.    Throw away unused or  drugs. Do not flush down a toilet or pour down a drain unless you are told to do so. Check with your pharmacist if you have questions about the best way to throw out drugs. There may be drug take-back programs in your area.  General drug facts    If your symptoms or health problems do not get better or if they become worse, call your doctor.    Do not share your drugs with others and do not take anyone else's drugs.    Some drugs may have another patient information leaflet. If you have any questions about this drug, please talk with your doctor, nurse, pharmacist, or other health care provider.    If you think there has been an overdose, call your poison control center or get medical care right away. Be ready to tell or show what was taken, how much, and when it happened.

## 2020-11-08 ENCOUNTER — NURSE TRIAGE (OUTPATIENT)
Dept: ADMINISTRATIVE | Facility: CLINIC | Age: 70
End: 2020-11-08

## 2020-11-08 NOTE — TELEPHONE ENCOUNTER
Pt called and said that she is on day 13 and she finds that her cough has increased and she is now a little short winded at times she is using her inhaler that was given to her in  and she finds its helping but she feels she should be getting over this. Pt denies running any fever at this time. Pt had stopped taking mucinex and will start taking that again. I will route the message to pcp to have F/U. If any worsening of SOB seek emergency care she said that she lives 5 min from hospital. Please forward any further directives to pt     Reason for Disposition   MILD difficulty breathing (e.g., minimal/no SOB at rest, SOB with walking, pulse <100)    Additional Information   Negative: Severe difficulty breathing (e.g., struggling for each breath, speaks in single words)   Negative: Difficult to awaken or acting confused (e.g., disoriented, slurred speech)   Negative: Bluish (or gray) lips or face now   Negative: Shock suspected (e.g., cold/pale/clammy skin, too weak to stand, low BP, rapid pulse)   Negative: Sounds like a life-threatening emergency to the triager   Negative: [1] COVID-19 suspected (e.g., cough, fever, shortness of breath) AND [2] mild symptoms AND [3] public health department recommends testing   Negative: [1] COVID-19 exposure AND [2] no symptoms   Negative: COVID-19 and Breastfeeding, questions about   Negative: SEVERE or constant chest pain (Exception: mild central chest pain, present only when coughing)   Negative: MODERATE difficulty breathing (e.g., speaks in phrases, SOB even at rest, pulse 100-120)   Negative: Patient sounds very sick or weak to the triager    Protocols used: CORONAVIRUS (COVID-19) - DIAGNOSED OR [id number]

## 2020-11-09 ENCOUNTER — PATIENT MESSAGE (OUTPATIENT)
Dept: FAMILY MEDICINE | Facility: CLINIC | Age: 70
End: 2020-11-09

## 2020-11-12 ENCOUNTER — OFFICE VISIT (OUTPATIENT)
Dept: FAMILY MEDICINE | Facility: CLINIC | Age: 70
End: 2020-11-12
Payer: MEDICARE

## 2020-11-12 DIAGNOSIS — U07.1 COVID-19: Primary | ICD-10-CM

## 2020-11-12 DIAGNOSIS — R43.9 PROBLEMS WITH SMELL AND TASTE: ICD-10-CM

## 2020-11-12 DIAGNOSIS — R06.02 SHORTNESS OF BREATH: ICD-10-CM

## 2020-11-12 DIAGNOSIS — R05.9 COUGH: ICD-10-CM

## 2020-11-12 PROCEDURE — 99213 PR OFFICE/OUTPT VISIT, EST, LEVL III, 20-29 MIN: ICD-10-PCS | Mod: 95,,, | Performed by: FAMILY MEDICINE

## 2020-11-12 PROCEDURE — 99213 OFFICE O/P EST LOW 20 MIN: CPT | Mod: 95,,, | Performed by: FAMILY MEDICINE

## 2020-11-12 PROCEDURE — 1159F PR MEDICATION LIST DOCUMENTED IN MEDICAL RECORD: ICD-10-PCS | Mod: ,,, | Performed by: FAMILY MEDICINE

## 2020-11-12 PROCEDURE — 1159F MED LIST DOCD IN RCRD: CPT | Mod: ,,, | Performed by: FAMILY MEDICINE

## 2020-11-12 RX ORDER — MONTELUKAST SODIUM 10 MG/1
10 TABLET ORAL NIGHTLY
Qty: 30 TABLET | Refills: 0 | Status: SHIPPED | OUTPATIENT
Start: 2020-11-12 | End: 2020-12-12

## 2020-11-12 NOTE — PROGRESS NOTES
The patient location is: Home  The chief complaint leading to consultation is:Upper rsp congestion   Visit type: Virtual visit with synchronous audio and video  Total time spent with patient: 10 minutes  Each patient to whom he or she provides medical services by telemedicine is:  (1) informed of the relationship between the physician and patient and the respective role of any other health care provider with respect to management of the patient; and (2) notified that he or she may decline to receive medical services by telemedicine and may withdraw from such care at any time.    Notes:   Zora Davis presents with moderate upper respiratory congestion,rhinnorhea,moderate cough past 2-3 days. No dyspnea Denies nausea,vomiting,diarrhea or significant fever. Pos covid 15 days ago but symptoms mild until 1 week ago. 5 d ago ER started doxy/pred 20. Tolerated albuterol 40mg pred at same time gen palpitations but not 20 bid    Past Medical History:   Diagnosis Date    Adult hypothyroidism 2010    Essential hypertension 2013    Fatty liver     GERD (gastroesophageal reflux disease)     JOSE DE JESUS on CPAP     Osteopenia of multiple sites 2020    Paroxysmal atrial fibrillation 3/22/2019    S/P laparoscopic sleeve gastrectomy 3/22/2019    Sigmoid diverticulosis      Past Surgical History:   Procedure Laterality Date    BREAST BIOPSY       SECTION      x 2    CHOLECYSTECTOMY      COLONOSCOPY  2014    Dr. Richey; diverticulosis; repeat in 5 years    ESOPHAGOGASTRODUODENOSCOPY N/A 2018    Dr. Steel; gastritis; gastric polyps    ESOPHAGOGASTRODUODENOSCOPY N/A 2020    Procedure: EGD (ESOPHAGOGASTRODUODENOSCOPY);  Surgeon: Efrain Steel MD;  Location: [facility];  Service: Endoscopy;  Laterality: N/A;    HYSTERECTOMY      LAPAROSCOPIC LYSIS OF ADHESIONS N/A 3/22/2019    Procedure: LYSIS, ADHESIONS, LAPAROSCOPIC;  Surgeon: Cole Armstrong MD;  Location: [facility];  Service:  General;  Laterality: N/A;    ROBOT-ASSISTED LAPAROSCOPIC SLEEVE GASTRECTOMY USING DA LARRY XI N/A 3/22/2019    Procedure: XI ROBOTIC SLEEVE GASTRECTOMY;  Surgeon: Cole Armstrong MD;  Location: [facility];  Service: General;  Laterality: N/A;    TOTAL REDUCTION MAMMOPLASTY Bilateral     UPPER GASTROINTESTINAL ENDOSCOPY  08/13/2015    Dr. Padron     Review of patient's allergies indicates:  No Known Allergies  Current Outpatient Medications on File Prior to Visit   Medication Sig Dispense Refill    aspirin (ECOTRIN) 81 MG EC tablet Take 81 mg by mouth once daily.      azelastine (ASTELIN) 137 mcg (0.1 %) nasal spray 2 sprays (274 mcg total) by Nasal route 2 (two) times daily. 30 mL 2    b complex vitamins tablet Take 1 tablet by mouth once daily.      calcium citrate (CALCITRATE) 200 mg (950 mg) tablet Take 1 tablet by mouth once daily.      clindamycin (CLEOCIN T) 1 % external solution Apply topically as needed.       cyanocobalamin (VITAMIN B-12) 500 MCG tablet Take 500 mcg by mouth once daily.      famotidine (PEPCID) 40 MG tablet Take 1 tablet (40 mg total) by mouth once daily. 90 tablet 3    fluticasone propionate (FLONASE) 50 mcg/actuation nasal spray 1 spray (50 mcg total) by Each Nostril route 2 (two) times daily. 48 g 1    gabapentin (NEURONTIN) 300 MG capsule Take 1 capsule (300 mg total) by mouth every evening. 90 capsule 0    hydroCHLOROthiazide (HYDRODIURIL) 25 MG tablet TAKE 1 TABLET DAILY 90 tablet 3    hydrOXYzine (ATARAX) 50 MG tablet Take 1 tablet (50 mg total) by mouth nightly as needed. 30 tablet 0    levocetirizine (XYZAL) 5 MG tablet Take 1 tablet (5 mg total) by mouth every evening. 90 tablet 1    levothyroxine (SYNTHROID) 50 MCG tablet TAKE 1 TABLET DAILY 90 tablet 4    metoprolol tartrate (LOPRESSOR) 25 MG tablet TAKE 1 TABLET NIGHTLY 90 tablet 1    multivitamin-minerals-lutein Tab Take 1 tablet by mouth once daily.      TURMERIC ORAL Take by mouth.       No current  facility-administered medications on file prior to visit.      Social History     Socioeconomic History    Marital status:      Spouse name: Not on file    Number of children: 2    Years of education: Not on file    Highest education level: Not on file   Occupational History    Occupation: retired    Social Needs    Financial resource strain: Not on file    Food insecurity     Worry: Not on file     Inability: Not on file    Transportation needs     Medical: Not on file     Non-medical: Not on file   Tobacco Use    Smoking status: Former Smoker     Packs/day: 0.25     Years: 13.00     Pack years: 3.25     Types: Cigarettes     Start date:      Quit date:      Years since quittin.8    Smokeless tobacco: Never Used   Substance and Sexual Activity    Alcohol use: Yes     Alcohol/week: 1.0 standard drinks     Types: 1 Glasses of wine per week     Frequency: Monthly or less     Drinks per session: 1 or 2     Binge frequency: Never    Drug use: No    Sexual activity: Yes     Partners: Male     Birth control/protection: Surgical   Lifestyle    Physical activity     Days per week: Not on file     Minutes per session: Not on file    Stress: Not on file   Relationships    Social connections     Talks on phone: Not on file     Gets together: Not on file     Attends Nondenominational service: Not on file     Active member of club or organization: Not on file     Attends meetings of clubs or organizations: Not on file     Relationship status: Not on file   Other Topics Concern    Patient feels they ought to cut down on drinking/drug use Not Asked    Patient annoyed by others criticizing their drinking/drug use Not Asked    Patient has felt bad or guilty about drinking/drug use Not Asked    Patient has had a drink/used drugs as an eye opener in the AM Not Asked   Social History Narrative    Not on file     Family History   Problem Relation Age of Onset    Colon cancer Paternal  Aunt     COPD Paternal Aunt         colon    Hypertension Father     Crohn's disease Neg Hx     Stomach cancer Neg Hx     Ulcerative colitis Neg Hx     Esophageal cancer Neg Hx          ROS:  SKIN: No rashes, itching or changes in color or texture of skin.  EYES: Visual acuity fine. No photophobia, ocular pain or diplopia.EARS: Denies ear pain, discharge or vertigo.NOSE: No loss of smell, no epistaxis some postnasal drip.MOUTH & THROAT: No hoarseness or change in voice. No excessive gum bleeding.CHEST: Denies DE PAZ, cyanosis, wheezing  CARDIOVASCULAR: Denies chest pain, PND, orthopnea or reduced exercise tolerance.  ABDOMEN:  No weight loss.No abdominal pain, no hematemesis or blood in stool.  URINARY: No flank pain, dysuria or hematuria.  PERIPHERAL VASCULAR: No claudication or cyanosis.  MUSCULOSKELETAL: Negative   NEUROLOGIC: No history of seizures, paralysis, alteration of gait or coordination.    PE: Heent:Normocephalic with no recent cranial trauma,PERRLA,EOMI,conjunctiva clear,Nasal mucosa slightly red and edematous.Posterior pharynx slightly red but without exudate.  Chest:No tachypnea. No wheezing, rhonchi on forced expiration  Abdomen:Soft, non tender to patient palpation  Diagnoses and all orders for this visit:    COVID-19  -     budesonide (PULMICORT FLEXHALER) 90 mcg/actuation AePB; Inhale 2 puffs (180 mcg total) into the lungs 2 (two) times a day. Controller  -     montelukast (SINGULAIR) 10 mg tablet; Take 1 tablet (10 mg total) by mouth every evening.  -     COVID-19 Home Symptom Monitoring  - Duration (days): 14        Answers for HPI/ROS submitted by the patient on 11/12/2020   Cough  Onset: 1 to 4 weeks ago  Progression since onset: gradually worsening  Frequency: every few minutes  Cough characteristics: non-productive  chest pain: No  chills: No  ear congestion: No  ear pain: No  fever: No  headaches: No  heartburn: No  hemoptysis: No  myalgias: No  nasal congestion: No  postnasal drip:  No  rash: No  rhinorrhea: No  shortness of breath: Yes  sore throat: No  sweats: No  weight loss: No  wheezing: Yes  Aggravated by: nothing  asthma: No  bronchiectasis: No  bronchitis: Yes  COPD: No  emphysema: No  environmental allergies: No  pneumonia: No  Treatments tried: a beta-agonist inhaler  Improvement on treatment: mild

## 2020-11-23 ENCOUNTER — PES CALL (OUTPATIENT)
Dept: ADMINISTRATIVE | Facility: CLINIC | Age: 70
End: 2020-11-23

## 2021-02-01 PROBLEM — U07.1 COVID-19: Status: ACTIVE | Noted: 2020-10-31

## 2021-02-01 RX ORDER — ALBUTEROL SULFATE 90 UG/1
AEROSOL, METERED RESPIRATORY (INHALATION)
COMMUNITY
Start: 2020-10-28 | End: 2021-05-28 | Stop reason: ALTCHOICE

## 2021-02-01 RX ORDER — HYDROXYZINE HYDROCHLORIDE 50 MG/1
50 TABLET, FILM COATED ORAL NIGHTLY PRN
Qty: 30 TABLET | Refills: 0 | Status: SHIPPED | OUTPATIENT
Start: 2021-02-01 | End: 2021-05-28

## 2021-05-19 ENCOUNTER — OFFICE VISIT (OUTPATIENT)
Dept: FAMILY MEDICINE | Facility: CLINIC | Age: 71
End: 2021-05-19
Payer: MEDICARE

## 2021-05-19 VITALS
TEMPERATURE: 98 F | SYSTOLIC BLOOD PRESSURE: 139 MMHG | BODY MASS INDEX: 31.18 KG/M2 | WEIGHT: 198.63 LBS | HEART RATE: 65 BPM | DIASTOLIC BLOOD PRESSURE: 58 MMHG | RESPIRATION RATE: 17 BRPM | HEIGHT: 67 IN

## 2021-05-19 DIAGNOSIS — Z13.21 ENCOUNTER FOR VITAMIN DEFICIENCY SCREENING: ICD-10-CM

## 2021-05-19 DIAGNOSIS — Z79.82 ASPIRIN LONG-TERM USE: ICD-10-CM

## 2021-05-19 DIAGNOSIS — K76.0 FATTY LIVER: ICD-10-CM

## 2021-05-19 DIAGNOSIS — Z13.1 SCREENING FOR DIABETES MELLITUS (DM): ICD-10-CM

## 2021-05-19 DIAGNOSIS — E66.09 CLASS 1 OBESITY DUE TO EXCESS CALORIES WITH SERIOUS COMORBIDITY AND BODY MASS INDEX (BMI) OF 31.0 TO 31.9 IN ADULT: ICD-10-CM

## 2021-05-19 DIAGNOSIS — Z13.220 SCREENING FOR LIPID DISORDERS: ICD-10-CM

## 2021-05-19 DIAGNOSIS — Z98.84 S/P LAPAROSCOPIC SLEEVE GASTRECTOMY: ICD-10-CM

## 2021-05-19 DIAGNOSIS — I48.0 PAROXYSMAL ATRIAL FIBRILLATION: ICD-10-CM

## 2021-05-19 DIAGNOSIS — Z13.0 SCREENING FOR DEFICIENCY ANEMIA: ICD-10-CM

## 2021-05-19 DIAGNOSIS — Z13.89 SCREENING FOR HEMATURIA OR PROTEINURIA: ICD-10-CM

## 2021-05-19 DIAGNOSIS — E03.9 PRIMARY HYPOTHYROIDISM: ICD-10-CM

## 2021-05-19 DIAGNOSIS — E78.2 MIXED HYPERLIPIDEMIA: Chronic | ICD-10-CM

## 2021-05-19 DIAGNOSIS — Z86.16 HISTORY OF COVID-19: Chronic | ICD-10-CM

## 2021-05-19 DIAGNOSIS — I10 ESSENTIAL HYPERTENSION: ICD-10-CM

## 2021-05-19 DIAGNOSIS — Z79.899 LONG TERM CURRENT USE OF DIURETIC: ICD-10-CM

## 2021-05-19 DIAGNOSIS — K21.9 GASTROESOPHAGEAL REFLUX DISEASE, UNSPECIFIED WHETHER ESOPHAGITIS PRESENT: ICD-10-CM

## 2021-05-19 DIAGNOSIS — Z00.00 WELLNESS EXAMINATION: Primary | ICD-10-CM

## 2021-05-19 DIAGNOSIS — Z91.89 FRAMINGHAM CARDIAC RISK 10-20% IN NEXT 10 YEARS: Chronic | ICD-10-CM

## 2021-05-19 DIAGNOSIS — K57.30 SIGMOID DIVERTICULOSIS: ICD-10-CM

## 2021-05-19 DIAGNOSIS — F17.211 CIGARETTE NICOTINE DEPENDENCE IN REMISSION: Chronic | ICD-10-CM

## 2021-05-19 DIAGNOSIS — Z12.11 SCREENING FOR COLON CANCER: ICD-10-CM

## 2021-05-19 PROCEDURE — 3008F PR BODY MASS INDEX (BMI) DOCUMENTED: ICD-10-PCS | Mod: S$GLB,,, | Performed by: INTERNAL MEDICINE

## 2021-05-19 PROCEDURE — 1101F PR PT FALLS ASSESS DOC 0-1 FALLS W/OUT INJ PAST YR: ICD-10-PCS | Mod: S$GLB,,, | Performed by: INTERNAL MEDICINE

## 2021-05-19 PROCEDURE — 1101F PT FALLS ASSESS-DOCD LE1/YR: CPT | Mod: S$GLB,,, | Performed by: INTERNAL MEDICINE

## 2021-05-19 PROCEDURE — 3288F PR FALLS RISK ASSESSMENT DOCUMENTED: ICD-10-PCS | Mod: S$GLB,,, | Performed by: INTERNAL MEDICINE

## 2021-05-19 PROCEDURE — 99999 PR PBB SHADOW E&M-EST. PATIENT-LVL IV: CPT | Mod: PBBFAC,,, | Performed by: INTERNAL MEDICINE

## 2021-05-19 PROCEDURE — 1126F AMNT PAIN NOTED NONE PRSNT: CPT | Mod: S$GLB,,, | Performed by: INTERNAL MEDICINE

## 2021-05-19 PROCEDURE — 99214 OFFICE O/P EST MOD 30 MIN: CPT | Mod: S$GLB,,, | Performed by: INTERNAL MEDICINE

## 2021-05-19 PROCEDURE — 3008F BODY MASS INDEX DOCD: CPT | Mod: S$GLB,,, | Performed by: INTERNAL MEDICINE

## 2021-05-19 PROCEDURE — 99999 PR PBB SHADOW E&M-EST. PATIENT-LVL IV: ICD-10-PCS | Mod: PBBFAC,,, | Performed by: INTERNAL MEDICINE

## 2021-05-19 PROCEDURE — 1126F PR PAIN SEVERITY QUANTIFIED, NO PAIN PRESENT: ICD-10-PCS | Mod: S$GLB,,, | Performed by: INTERNAL MEDICINE

## 2021-05-19 PROCEDURE — 3288F FALL RISK ASSESSMENT DOCD: CPT | Mod: S$GLB,,, | Performed by: INTERNAL MEDICINE

## 2021-05-19 PROCEDURE — 99214 PR OFFICE/OUTPT VISIT, EST, LEVL IV, 30-39 MIN: ICD-10-PCS | Mod: S$GLB,,, | Performed by: INTERNAL MEDICINE

## 2021-05-19 RX ORDER — UBIDECARENONE 30 MG
30 CAPSULE ORAL DAILY
COMMUNITY

## 2021-05-19 RX ORDER — GLUCOSAMINE/CHONDRO SU A 500-400 MG
1 TABLET ORAL DAILY
COMMUNITY
End: 2021-12-30

## 2021-05-20 PROBLEM — M47.816 LUMBAR FACET ARTHROPATHY: Chronic | Status: ACTIVE | Noted: 2020-01-31

## 2021-05-20 PROBLEM — E66.811 CLASS 1 OBESITY DUE TO EXCESS CALORIES WITH SERIOUS COMORBIDITY AND BODY MASS INDEX (BMI) OF 31.0 TO 31.9 IN ADULT: Status: ACTIVE | Noted: 2020-01-16

## 2021-05-20 PROBLEM — E66.09 CLASS 1 OBESITY DUE TO EXCESS CALORIES WITH SERIOUS COMORBIDITY AND BODY MASS INDEX (BMI) OF 31.0 TO 31.9 IN ADULT: Status: ACTIVE | Noted: 2020-01-16

## 2021-05-20 PROBLEM — Z91.89 FRAMINGHAM CARDIAC RISK 10-20% IN NEXT 10 YEARS: Status: ACTIVE | Noted: 2021-05-20

## 2021-05-20 PROBLEM — Z91.89 FRAMINGHAM CARDIAC RISK 10-20% IN NEXT 10 YEARS: Chronic | Status: ACTIVE | Noted: 2021-05-20

## 2021-05-20 PROBLEM — E78.2 MIXED HYPERLIPIDEMIA: Chronic | Status: ACTIVE | Noted: 2021-05-20

## 2021-05-20 PROBLEM — Z86.16 HISTORY OF COVID-19: Chronic | Status: ACTIVE | Noted: 2020-10-31

## 2021-05-20 RX ORDER — EZETIMIBE 10 MG/1
10 TABLET ORAL DAILY
Qty: 90 TABLET | Refills: 1 | Status: SHIPPED | OUTPATIENT
Start: 2021-05-20 | End: 2021-12-30

## 2021-05-28 ENCOUNTER — PATIENT MESSAGE (OUTPATIENT)
Dept: FAMILY MEDICINE | Facility: CLINIC | Age: 71
End: 2021-05-28

## 2021-05-28 RX ORDER — HYDROCHLOROTHIAZIDE 25 MG/1
25 TABLET ORAL DAILY
Qty: 90 TABLET | Refills: 1 | Status: SHIPPED | OUTPATIENT
Start: 2021-05-28 | End: 2021-12-09 | Stop reason: SDUPTHER

## 2021-05-28 RX ORDER — METOPROLOL TARTRATE 25 MG/1
25 TABLET, FILM COATED ORAL DAILY
Qty: 90 TABLET | Refills: 1 | Status: SHIPPED | OUTPATIENT
Start: 2021-05-28 | End: 2021-12-09 | Stop reason: SDUPTHER

## 2021-08-10 ENCOUNTER — OFFICE VISIT (OUTPATIENT)
Dept: FAMILY MEDICINE | Facility: CLINIC | Age: 71
End: 2021-08-10
Payer: MEDICARE

## 2021-08-10 DIAGNOSIS — I10 ESSENTIAL HYPERTENSION: Primary | ICD-10-CM

## 2021-08-10 DIAGNOSIS — T14.8XXA MUSCLE STRAIN: ICD-10-CM

## 2021-08-10 PROCEDURE — 1160F RVW MEDS BY RX/DR IN RCRD: CPT | Mod: ,,, | Performed by: NURSE PRACTITIONER

## 2021-08-10 PROCEDURE — 3044F HG A1C LEVEL LT 7.0%: CPT | Mod: ,,, | Performed by: NURSE PRACTITIONER

## 2021-08-10 PROCEDURE — 1159F MED LIST DOCD IN RCRD: CPT | Mod: ,,, | Performed by: NURSE PRACTITIONER

## 2021-08-10 PROCEDURE — 3044F PR MOST RECENT HEMOGLOBIN A1C LEVEL <7.0%: ICD-10-PCS | Mod: ,,, | Performed by: NURSE PRACTITIONER

## 2021-08-10 PROCEDURE — 99213 PR OFFICE/OUTPT VISIT, EST, LEVL III, 20-29 MIN: ICD-10-PCS | Mod: 95,,, | Performed by: NURSE PRACTITIONER

## 2021-08-10 PROCEDURE — 99213 OFFICE O/P EST LOW 20 MIN: CPT | Mod: 95,,, | Performed by: NURSE PRACTITIONER

## 2021-08-10 PROCEDURE — 1160F PR REVIEW ALL MEDS BY PRESCRIBER/CLIN PHARMACIST DOCUMENTED: ICD-10-PCS | Mod: ,,, | Performed by: NURSE PRACTITIONER

## 2021-08-10 PROCEDURE — 1159F PR MEDICATION LIST DOCUMENTED IN MEDICAL RECORD: ICD-10-PCS | Mod: ,,, | Performed by: NURSE PRACTITIONER

## 2021-08-10 RX ORDER — LOSARTAN POTASSIUM 25 MG/1
25 TABLET ORAL DAILY
Qty: 90 TABLET | Refills: 0 | Status: SHIPPED | OUTPATIENT
Start: 2021-08-10 | End: 2021-11-08 | Stop reason: SDUPTHER

## 2021-08-10 RX ORDER — BACLOFEN 10 MG/1
10 TABLET ORAL 2 TIMES DAILY PRN
Qty: 10 TABLET | Refills: 0 | Status: SHIPPED | OUTPATIENT
Start: 2021-08-10 | End: 2022-07-12 | Stop reason: SDUPTHER

## 2021-08-12 ENCOUNTER — PATIENT MESSAGE (OUTPATIENT)
Dept: FAMILY MEDICINE | Facility: CLINIC | Age: 71
End: 2021-08-12

## 2021-08-24 ENCOUNTER — PATIENT MESSAGE (OUTPATIENT)
Dept: FAMILY MEDICINE | Facility: CLINIC | Age: 71
End: 2021-08-24

## 2021-08-24 DIAGNOSIS — Z20.822 SUSPECTED COVID-19 VIRUS INFECTION: Primary | ICD-10-CM

## 2021-08-26 ENCOUNTER — CLINICAL SUPPORT (OUTPATIENT)
Dept: FAMILY MEDICINE | Facility: CLINIC | Age: 71
End: 2021-08-26
Payer: MEDICARE

## 2021-08-26 DIAGNOSIS — Z20.822 SUSPECTED COVID-19 VIRUS INFECTION: ICD-10-CM

## 2021-08-26 PROCEDURE — U0003 INFECTIOUS AGENT DETECTION BY NUCLEIC ACID (DNA OR RNA); SEVERE ACUTE RESPIRATORY SYNDROME CORONAVIRUS 2 (SARS-COV-2) (CORONAVIRUS DISEASE [COVID-19]), AMPLIFIED PROBE TECHNIQUE, MAKING USE OF HIGH THROUGHPUT TECHNOLOGIES AS DESCRIBED BY CMS-2020-01-R: HCPCS | Performed by: FAMILY MEDICINE

## 2021-08-26 PROCEDURE — U0005 INFEC AGEN DETEC AMPLI PROBE: HCPCS | Performed by: FAMILY MEDICINE

## 2021-08-27 LAB
SARS-COV-2 RNA RESP QL NAA+PROBE: NOT DETECTED
SARS-COV-2- CYCLE NUMBER: NORMAL

## 2021-12-08 ENCOUNTER — TELEPHONE (OUTPATIENT)
Dept: FAMILY MEDICINE | Facility: CLINIC | Age: 71
End: 2021-12-08
Payer: MEDICARE

## 2021-12-08 DIAGNOSIS — Z79.899 ENCOUNTER FOR LONG-TERM (CURRENT) USE OF MEDICATIONS: Primary | ICD-10-CM

## 2021-12-08 DIAGNOSIS — I10 ESSENTIAL HYPERTENSION: ICD-10-CM

## 2021-12-09 RX ORDER — HYDROCHLOROTHIAZIDE 25 MG/1
25 TABLET ORAL DAILY
Qty: 90 TABLET | Refills: 1 | Status: SHIPPED | OUTPATIENT
Start: 2021-12-09 | End: 2021-12-09

## 2021-12-09 RX ORDER — METOPROLOL TARTRATE 25 MG/1
25 TABLET, FILM COATED ORAL DAILY
Qty: 90 TABLET | Refills: 1 | Status: SHIPPED | OUTPATIENT
Start: 2021-12-09 | End: 2021-12-09

## 2021-12-09 RX ORDER — HYDROCHLOROTHIAZIDE 25 MG/1
25 TABLET ORAL DAILY
Qty: 90 TABLET | Refills: 1 | Status: SHIPPED | OUTPATIENT
Start: 2021-12-09 | End: 2021-12-15 | Stop reason: SDUPTHER

## 2021-12-09 RX ORDER — METOPROLOL TARTRATE 25 MG/1
25 TABLET, FILM COATED ORAL DAILY
Qty: 90 TABLET | Refills: 1 | Status: SHIPPED | OUTPATIENT
Start: 2021-12-09 | End: 2021-12-15 | Stop reason: SDUPTHER

## 2021-12-15 DIAGNOSIS — Z79.899 ENCOUNTER FOR LONG-TERM (CURRENT) USE OF MEDICATIONS: ICD-10-CM

## 2021-12-15 DIAGNOSIS — I10 ESSENTIAL HYPERTENSION: ICD-10-CM

## 2021-12-15 RX ORDER — HYDROCHLOROTHIAZIDE 25 MG/1
25 TABLET ORAL DAILY
Qty: 90 TABLET | Refills: 1 | Status: SHIPPED | OUTPATIENT
Start: 2021-12-15 | End: 2022-03-16 | Stop reason: SDUPTHER

## 2021-12-15 RX ORDER — METOPROLOL TARTRATE 25 MG/1
25 TABLET, FILM COATED ORAL DAILY
Qty: 90 TABLET | Refills: 1 | Status: SHIPPED | OUTPATIENT
Start: 2021-12-15 | End: 2022-03-07 | Stop reason: SDUPTHER

## 2021-12-29 ENCOUNTER — TELEPHONE (OUTPATIENT)
Dept: FAMILY MEDICINE | Facility: CLINIC | Age: 71
End: 2021-12-29
Payer: MEDICARE

## 2021-12-30 ENCOUNTER — OFFICE VISIT (OUTPATIENT)
Dept: FAMILY MEDICINE | Facility: CLINIC | Age: 71
End: 2021-12-30
Payer: MEDICARE

## 2021-12-30 ENCOUNTER — PATIENT MESSAGE (OUTPATIENT)
Dept: FAMILY MEDICINE | Facility: CLINIC | Age: 71
End: 2021-12-30

## 2021-12-30 DIAGNOSIS — J40 BRONCHITIS: Primary | ICD-10-CM

## 2021-12-30 DIAGNOSIS — R05.9 COUGH: ICD-10-CM

## 2021-12-30 PROCEDURE — 3066F NEPHROPATHY DOC TX: CPT | Mod: CPTII,95,, | Performed by: FAMILY MEDICINE

## 2021-12-30 PROCEDURE — 1159F MED LIST DOCD IN RCRD: CPT | Mod: CPTII,95,, | Performed by: FAMILY MEDICINE

## 2021-12-30 PROCEDURE — 3044F HG A1C LEVEL LT 7.0%: CPT | Mod: CPTII,95,, | Performed by: FAMILY MEDICINE

## 2021-12-30 PROCEDURE — 3061F PR NEG MICROALBUMINURIA RESULT DOCUMENTED/REVIEW: ICD-10-PCS | Mod: CPTII,95,, | Performed by: FAMILY MEDICINE

## 2021-12-30 PROCEDURE — 3066F PR DOCUMENTATION OF TREATMENT FOR NEPHROPATHY: ICD-10-PCS | Mod: CPTII,95,, | Performed by: FAMILY MEDICINE

## 2021-12-30 PROCEDURE — 1160F PR REVIEW ALL MEDS BY PRESCRIBER/CLIN PHARMACIST DOCUMENTED: ICD-10-PCS | Mod: CPTII,95,, | Performed by: FAMILY MEDICINE

## 2021-12-30 PROCEDURE — 4010F PR ACE/ARB THEARPY RXD/TAKEN: ICD-10-PCS | Mod: CPTII,95,, | Performed by: FAMILY MEDICINE

## 2021-12-30 PROCEDURE — 1160F RVW MEDS BY RX/DR IN RCRD: CPT | Mod: CPTII,95,, | Performed by: FAMILY MEDICINE

## 2021-12-30 PROCEDURE — 3044F PR MOST RECENT HEMOGLOBIN A1C LEVEL <7.0%: ICD-10-PCS | Mod: CPTII,95,, | Performed by: FAMILY MEDICINE

## 2021-12-30 PROCEDURE — 1159F PR MEDICATION LIST DOCUMENTED IN MEDICAL RECORD: ICD-10-PCS | Mod: CPTII,95,, | Performed by: FAMILY MEDICINE

## 2021-12-30 PROCEDURE — 99213 PR OFFICE/OUTPT VISIT, EST, LEVL III, 20-29 MIN: ICD-10-PCS | Mod: 95,,, | Performed by: FAMILY MEDICINE

## 2021-12-30 PROCEDURE — U0005 INFEC AGEN DETEC AMPLI PROBE: HCPCS | Performed by: FAMILY MEDICINE

## 2021-12-30 PROCEDURE — 4010F ACE/ARB THERAPY RXD/TAKEN: CPT | Mod: CPTII,95,, | Performed by: FAMILY MEDICINE

## 2021-12-30 PROCEDURE — 3061F NEG MICROALBUMINURIA REV: CPT | Mod: CPTII,95,, | Performed by: FAMILY MEDICINE

## 2021-12-30 PROCEDURE — 99213 OFFICE O/P EST LOW 20 MIN: CPT | Mod: 95,,, | Performed by: FAMILY MEDICINE

## 2021-12-30 PROCEDURE — U0003 INFECTIOUS AGENT DETECTION BY NUCLEIC ACID (DNA OR RNA); SEVERE ACUTE RESPIRATORY SYNDROME CORONAVIRUS 2 (SARS-COV-2) (CORONAVIRUS DISEASE [COVID-19]), AMPLIFIED PROBE TECHNIQUE, MAKING USE OF HIGH THROUGHPUT TECHNOLOGIES AS DESCRIBED BY CMS-2020-01-R: HCPCS | Performed by: FAMILY MEDICINE

## 2021-12-30 RX ORDER — ALBUTEROL SULFATE 90 UG/1
2 AEROSOL, METERED RESPIRATORY (INHALATION) EVERY 6 HOURS PRN
Qty: 18 G | Refills: 5 | Status: SHIPPED | OUTPATIENT
Start: 2021-12-30

## 2021-12-30 RX ORDER — METHYLPREDNISOLONE 4 MG/1
TABLET ORAL
Qty: 1 EACH | Refills: 0 | Status: SHIPPED | OUTPATIENT
Start: 2021-12-30 | End: 2022-01-20

## 2022-01-01 ENCOUNTER — PATIENT MESSAGE (OUTPATIENT)
Dept: FAMILY MEDICINE | Facility: CLINIC | Age: 72
End: 2022-01-01
Payer: MEDICARE

## 2022-01-01 LAB
SARS-COV-2 RNA RESP QL NAA+PROBE: NOT DETECTED
SARS-COV-2- CYCLE NUMBER: NORMAL

## 2022-01-03 RX ORDER — MONTELUKAST SODIUM 10 MG/1
10 TABLET ORAL NIGHTLY
Qty: 30 TABLET | Refills: 0 | Status: SHIPPED | OUTPATIENT
Start: 2022-01-03 | End: 2022-02-02

## 2022-01-31 ENCOUNTER — PATIENT OUTREACH (OUTPATIENT)
Dept: ADMINISTRATIVE | Facility: HOSPITAL | Age: 72
End: 2022-01-31
Payer: MEDICARE

## 2022-02-15 ENCOUNTER — OFFICE VISIT (OUTPATIENT)
Dept: FAMILY MEDICINE | Facility: CLINIC | Age: 72
End: 2022-02-15
Payer: MEDICARE

## 2022-02-15 VITALS
OXYGEN SATURATION: 98 % | DIASTOLIC BLOOD PRESSURE: 68 MMHG | BODY MASS INDEX: 32.18 KG/M2 | HEART RATE: 63 BPM | HEIGHT: 67 IN | TEMPERATURE: 98 F | SYSTOLIC BLOOD PRESSURE: 150 MMHG | WEIGHT: 205 LBS

## 2022-02-15 DIAGNOSIS — Z00.00 ENCOUNTER FOR MEDICAL EXAMINATION TO ESTABLISH CARE: Primary | ICD-10-CM

## 2022-02-15 DIAGNOSIS — Z87.09 HISTORY OF ASTHMA: ICD-10-CM

## 2022-02-15 DIAGNOSIS — Z79.899 ENCOUNTER FOR LONG-TERM (CURRENT) USE OF MEDICATIONS: ICD-10-CM

## 2022-02-15 DIAGNOSIS — E03.9 PRIMARY HYPOTHYROIDISM: ICD-10-CM

## 2022-02-15 DIAGNOSIS — I48.0 PAROXYSMAL ATRIAL FIBRILLATION: ICD-10-CM

## 2022-02-15 DIAGNOSIS — G47.00 INSOMNIA, UNSPECIFIED TYPE: ICD-10-CM

## 2022-02-15 DIAGNOSIS — I10 ESSENTIAL HYPERTENSION: ICD-10-CM

## 2022-02-15 DIAGNOSIS — Z13.6 ENCOUNTER FOR LIPID SCREENING FOR CARDIOVASCULAR DISEASE: ICD-10-CM

## 2022-02-15 DIAGNOSIS — Z13.220 ENCOUNTER FOR LIPID SCREENING FOR CARDIOVASCULAR DISEASE: ICD-10-CM

## 2022-02-15 PROCEDURE — 1159F MED LIST DOCD IN RCRD: CPT | Mod: CPTII,S$GLB,, | Performed by: FAMILY MEDICINE

## 2022-02-15 PROCEDURE — 4010F ACE/ARB THERAPY RXD/TAKEN: CPT | Mod: CPTII,S$GLB,, | Performed by: FAMILY MEDICINE

## 2022-02-15 PROCEDURE — 99999 PR PBB SHADOW E&M-EST. PATIENT-LVL V: ICD-10-PCS | Mod: PBBFAC,,, | Performed by: FAMILY MEDICINE

## 2022-02-15 PROCEDURE — 3288F PR FALLS RISK ASSESSMENT DOCUMENTED: ICD-10-PCS | Mod: CPTII,S$GLB,, | Performed by: FAMILY MEDICINE

## 2022-02-15 PROCEDURE — 3288F FALL RISK ASSESSMENT DOCD: CPT | Mod: CPTII,S$GLB,, | Performed by: FAMILY MEDICINE

## 2022-02-15 PROCEDURE — 99215 OFFICE O/P EST HI 40 MIN: CPT | Mod: S$GLB,,, | Performed by: FAMILY MEDICINE

## 2022-02-15 PROCEDURE — 99999 PR PBB SHADOW E&M-EST. PATIENT-LVL V: CPT | Mod: PBBFAC,,, | Performed by: FAMILY MEDICINE

## 2022-02-15 PROCEDURE — 3077F SYST BP >= 140 MM HG: CPT | Mod: CPTII,S$GLB,, | Performed by: FAMILY MEDICINE

## 2022-02-15 PROCEDURE — 1160F PR REVIEW ALL MEDS BY PRESCRIBER/CLIN PHARMACIST DOCUMENTED: ICD-10-PCS | Mod: CPTII,S$GLB,, | Performed by: FAMILY MEDICINE

## 2022-02-15 PROCEDURE — 1159F PR MEDICATION LIST DOCUMENTED IN MEDICAL RECORD: ICD-10-PCS | Mod: CPTII,S$GLB,, | Performed by: FAMILY MEDICINE

## 2022-02-15 PROCEDURE — 99215 PR OFFICE/OUTPT VISIT, EST, LEVL V, 40-54 MIN: ICD-10-PCS | Mod: S$GLB,,, | Performed by: FAMILY MEDICINE

## 2022-02-15 PROCEDURE — 1126F PR PAIN SEVERITY QUANTIFIED, NO PAIN PRESENT: ICD-10-PCS | Mod: CPTII,S$GLB,, | Performed by: FAMILY MEDICINE

## 2022-02-15 PROCEDURE — 1126F AMNT PAIN NOTED NONE PRSNT: CPT | Mod: CPTII,S$GLB,, | Performed by: FAMILY MEDICINE

## 2022-02-15 PROCEDURE — 3078F DIAST BP <80 MM HG: CPT | Mod: CPTII,S$GLB,, | Performed by: FAMILY MEDICINE

## 2022-02-15 PROCEDURE — 1160F RVW MEDS BY RX/DR IN RCRD: CPT | Mod: CPTII,S$GLB,, | Performed by: FAMILY MEDICINE

## 2022-02-15 PROCEDURE — 1101F PT FALLS ASSESS-DOCD LE1/YR: CPT | Mod: CPTII,S$GLB,, | Performed by: FAMILY MEDICINE

## 2022-02-15 PROCEDURE — 1101F PR PT FALLS ASSESS DOC 0-1 FALLS W/OUT INJ PAST YR: ICD-10-PCS | Mod: CPTII,S$GLB,, | Performed by: FAMILY MEDICINE

## 2022-02-15 PROCEDURE — 4010F PR ACE/ARB THEARPY RXD/TAKEN: ICD-10-PCS | Mod: CPTII,S$GLB,, | Performed by: FAMILY MEDICINE

## 2022-02-15 PROCEDURE — 3078F PR MOST RECENT DIASTOLIC BLOOD PRESSURE < 80 MM HG: ICD-10-PCS | Mod: CPTII,S$GLB,, | Performed by: FAMILY MEDICINE

## 2022-02-15 PROCEDURE — 3008F PR BODY MASS INDEX (BMI) DOCUMENTED: ICD-10-PCS | Mod: CPTII,S$GLB,, | Performed by: FAMILY MEDICINE

## 2022-02-15 PROCEDURE — 3077F PR MOST RECENT SYSTOLIC BLOOD PRESSURE >= 140 MM HG: ICD-10-PCS | Mod: CPTII,S$GLB,, | Performed by: FAMILY MEDICINE

## 2022-02-15 PROCEDURE — 3008F BODY MASS INDEX DOCD: CPT | Mod: CPTII,S$GLB,, | Performed by: FAMILY MEDICINE

## 2022-02-15 RX ORDER — HYDROXYZINE HYDROCHLORIDE 50 MG/1
50 TABLET, FILM COATED ORAL NIGHTLY PRN
Qty: 90 TABLET | Refills: 4 | Status: SHIPPED | OUTPATIENT
Start: 2022-02-15

## 2022-02-15 RX ORDER — LOSARTAN POTASSIUM 50 MG/1
50 TABLET ORAL DAILY
Qty: 90 TABLET | Refills: 4 | Status: SHIPPED | OUTPATIENT
Start: 2022-02-15 | End: 2023-05-04

## 2022-02-15 RX ORDER — ASPIRIN 81 MG/1
81 TABLET ORAL DAILY
Qty: 90 TABLET | Refills: 4 | Status: SHIPPED | OUTPATIENT
Start: 2022-02-15 | End: 2023-10-24

## 2022-02-15 NOTE — PATIENT INSTRUCTIONS
Follow up in about 1 year (around 2/15/2023), or if symptoms worsen or fail to improve, for Annual Wellness Exam.   Patient Education       Hydroxyzine (james spivey)   Brand Names: [place] Vistaril   Brand Names: [place] Atarax; BRISA-Hydroxyzin; PMS-HydrOXYzine HCl; PMS-HydrOXYzine [DSC]   What is this drug used for?   · It is used to treat itching.  · It is used to treat anxiety.  · It is used to put you to sleep for surgery.  · It is used to treat mood problems.  · It is used to treat upset stomach and throwing up.  · It may be given to you for other reasons. Talk with the doctor.    What do I need to tell my doctor BEFORE I take this drug?   · If you are allergic to this drug; any part of this drug; or any other drugs, foods, or substances. Tell your doctor about the allergy and what signs you had.  · If you have ever had a long QT on ECG.  · If you are in early pregnancy. Do not take this drug during early pregnancy.  · If you are breast-feeding. Do not breast-feed while you take this drug.  This is not a list of all drugs or health problems that interact with this drug.  Tell your doctor and pharmacist about all of your drugs (prescription or OTC, natural products, vitamins) and health problems. You must check to make sure that it is safe for you to take this drug with all of your drugs and health problems. Do not start, stop, or change the dose of any drug without checking with your doctor.  What are some things I need to know or do while I take this drug?   All products:   · Tell all of your health care providers that you take this drug. This includes your doctors, nurses, pharmacists, and dentists.  · Avoid driving and doing other tasks or actions that call for you to be alert until you see how this drug affects you.  · Talk with your doctor before you use alcohol, marijuana or other forms of cannabis, or prescription or OTC drugs that may slow your actions.  · An unsafe heartbeat that is not normal (long QT  on ECG) has happened with this drug. This may raise the chance of sudden death. Talk with the doctor.  · If you are 65 or older, use this drug with care. You could have more side effects.  · Tell your doctor if you are pregnant or plan on getting pregnant. You will need to talk about the benefits and risks of using this drug while you are pregnant.  Injection:   · Unsafe heart problems and sometimes death have rarely happened when this drug was given with alcohol or certain other drugs that may slow your actions. Talk with your doctor.  What are some side effects that I need to call my doctor about right away?   WARNING/CAUTION: Even though it may be rare, some people may have very bad and sometimes deadly side effects when taking a drug. Tell your doctor or get medical help right away if you have any of the following signs or symptoms that may be related to a very bad side effect:  All products:   · Signs of an allergic reaction, like rash; hives; itching; red, swollen, blistered, or peeling skin with or without fever; wheezing; tightness in the chest or throat; trouble breathing, swallowing, or talking; unusual hoarseness; or swelling of the mouth, face, lips, tongue, or throat.  · Fast or abnormal heartbeat.  · Very bad dizziness or passing out.  · Trouble controlling body movements.  · Feeling confused.  · Rarely, a very bad skin reaction has happened with this drug. Signs include fever and many small skin spots within large areas of redness and swelling. Call your doctor right away if you have a rash or any of these signs.  Injection:   · Tissue damage has happened with this drug. Sometimes, this has led to surgery. Tell your nurse if you have any burning, color changes, pain, skin breakdown, or swelling where the shot was given.  What are some other side effects of this drug?   All drugs may cause side effects. However, many people have no side effects or only have minor side effects. Call your doctor or get  medical help if any of these side effects or any other side effects bother you or do not go away:  · Dry mouth.  · Feeling sleepy.  These are not all of the side effects that may occur. If you have questions about side effects, call your doctor. Call your doctor for medical advice about side effects.  You may report side effects to your national health agency.  You may report side effects to the FDA at 1-831.499.4599. You may also report side effects at https://www.fda.gov/medwatch.  How is this drug best taken?   Use this drug as ordered by your doctor. Read all information given to you. Follow all instructions closely.  All oral products:   · Take with or without food. Take with food if it causes an upset stomach.  Liquid:   · Measure liquid doses carefully. Use the measuring device that comes with this drug. If there is none, ask the pharmacist for a device to measure this drug.  Injection:   · It is given as a shot into a muscle.  What do I do if I miss a dose?   All oral products:   · If you take this drug on a regular basis, take a missed dose as soon as you think about it.  · If it is close to the time for your next dose, skip the missed dose and go back to your normal time.  · Do not take 2 doses at the same time or extra doses.  · Many times this drug is taken on an as needed basis. Do not take more often than told by the doctor.  Injection:   · Call your doctor to find out what to do.  How do I store and/or throw out this drug?   All oral products:   · Store at room temperature protected from light. Store in a dry place. Do not store in a bathroom.  Injection:   · If you need to store this drug at home, talk with your doctor, nurse, or pharmacist about how to store it.  All products:   · Keep all drugs in a safe place. Keep all drugs out of the reach of children and pets.  · Throw away unused or  drugs. Do not flush down a toilet or pour down a drain unless you are told to do so. Check with your  pharmacist if you have questions about the best way to throw out drugs. There may be drug take-back programs in your area.  General drug facts   · If your symptoms or health problems do not get better or if they become worse, call your doctor.  · Do not share your drugs with others and do not take anyone else's drugs.  · Some drugs may have another patient information leaflet. If you have any questions about this drug, please talk with your doctor, nurse, pharmacist, or other health care provider.  · Some drugs may have another patient information leaflet. Check with your pharmacist. If you have any questions about this drug, please talk with your doctor, nurse, pharmacist, or other health care provider.  · If you think there has been an overdose, call your poison control center or get medical care right away. Be ready to tell or show what was taken, how much, and when it happened.    Consumer Information Use and Disclaimer   This generalized information is a limited summary of diagnosis, treatment, and/or medication information. It is not meant to be comprehensive and should be used as a tool to help the user understand and/or assess potential diagnostic and treatment options. It does NOT include all information about conditions, treatments, medications, side effects, or risks that may apply to a specific patient. It is not intended to be medical advice or a substitute for the medical advice, diagnosis, or treatment of a health care provider based on the health care provider's examination and assessment of a patient's specific and unique circumstances. Patients must speak with a health care provider for complete information about their health, medical questions, and treatment options, including any risks or benefits regarding use of medications. This information does not endorse any treatments or medications as safe, effective, or approved for treating a specific patient. UpToDate, Inc. and its affiliates disclaim  "any warranty or liability relating to this information or the use thereof. The use of this information is governed by the Terms of Use, available at https://www.Life in Hi[employer]ier.com/en/solutions/lexicomp/about/lora.  Last Reviewed Date   2020-08-04  Copyright   © 2021 UpToDate, Inc. and its affiliates and/or licensors. All rights reserved.  Patient Education       Atrial Fibrillation   The Basics   Written by the doctors and editors at [employer]   What is atrial fibrillation? -- Atrial fibrillation is the most common heart rhythm problem (figure 1). The condition puts you at risk of stroke and other problems as well as death. Another term for atrial fibrillation is "A-fib."  In people with A-fib, the electrical signals that control the heartbeat are abnormal. The top 2 chambers (there are 4 chambers) of the heart stop pumping blood as strongly as normal. When this happens, the blood can start to form clots. These clots can travel to the brain through the blood vessels and cause strokes.  In some people, A-fib never goes away. In others, A-fib can come and go, even with treatment. If you had A-fib in the past, but have a normal heart rhythm now, ask your doctor what you can do to keep A-fib from coming back. Some people can reduce their chances of having A-fib again by:  · Controlling their blood pressure  · Avoiding or limiting alcohol  · Cutting down on caffeine  · Getting treatment for an overactive thyroid gland  · Getting regular exercise  · Losing weight (if they are overweight)  · Reducing stress  What are the symptoms of atrial fibrillation? -- Some people with A-fib have no symptoms. When symptoms do occur, they can include:  · Feeling as though your heart is racing, skipping beats, or beating out of sync  · Mild chest "tightness" or pain  · Feeling lightheaded, dizzy, or like you might pass out  · Having trouble breathing, especially with exercise  Is there a test for atrial fibrillation? -- Yes. If your doctor " "or nurse suspects you have A-fib, he or she will probably do a test called an electrocardiogram. This test, also known as an "ECG," measures the electrical activity in your heart.  How is atrial fibrillation treated? -- In some cases, A-fib goes away on its own, even without treatment. But many people do need treatment.  Treatment can include 1 or more of these:  · Medicines to control the speed or rhythm of the heartbeat  · Medicines to keep clots from forming  · A treatment called "cardioversion," which involves applying a mild electrical current to the heart to make the rhythm regular again  · Treatments called "ablation," which use heat ("radiofrequency ablation") or cold ("cryoablation") to destroy the small part of the heart that is sending abnormal electrical signals  · A device called a pacemaker that is implanted in your body and sends electrical signals to the heart to control the heartbeat  What will my life be like? -- Most people with A-fib are able to live normal lives. Still, it is important that you take the medicines your doctor prescribes every day. Taking your medicines as directed can help reduce the chances that your A-fib will cause a stroke. It's also a good idea to learn what the signs and symptoms of a stroke are (figure 2).  All topics are updated as new evidence becomes available and our peer review process is complete.  This topic retrieved from [employer] on: Sep 21, 2021.  Topic 98060 Version 18.0  Release: 29.4.2 - C29.263  © 2021 UpToDate, Inc. and/or its affiliates. All rights reserved.  figure 1: Atrial fibrillation     This drawing shows where the heart is located in the chest. In atrial fibrillation, the electrical signals that control the heartbeat are abnormal. As a result, the top two chambers of the heart (see arrows) stop pumping effectively, and blood that should move out of these chambers gets left behind.  Graphic 83456 Version 3.0    figure 2: Signs of stroke     The " "letters in the word "FAST" help you remember the signs of stroke. Some experts use "BE-FAST." This adds Balance (trouble standing or walking) and Eyes (problems with vision) to the list above.  If a person shows any of these signs, call for an ambulance right away. In the [place] and [place], dial 9-1-1.    Graphic 22988 Version 5.0    Consumer Information Use and Disclaimer   This information is not specific medical advice and does not replace information you receive from your health care provider. This is only a brief summary of general information. It does NOT include all information about conditions, illnesses, injuries, tests, procedures, treatments, therapies, discharge instructions or life-style choices that may apply to you. You must talk with your health care provider for complete information about your health and treatment options. This information should not be used to decide whether or not to accept your health care provider's advice, instructions or recommendations. Only your health care provider has the knowledge and training to provide advice that is right for you. The use of this information is governed by the [employer] End User License Agreement, available at https://www.Cvgram.me/en/solutions/Prime Grid/about/lroa.The use of [employer] content is governed by the [employer] Terms of Use. ©2021 UpToDate, Inc. All rights reserved.  Copyright   © 2021 UpToDate, Inc. and/or its affiliates. All rights reserved.  Patient Education       Sleep Insufficiency   The Basics   Written by the doctors and editors at [facility]   What is sleep insufficiency? -- This is the term doctors use when a person does not get enough restful sleep.  Sleep insufficiency is different from insomnia, which is when a person has trouble falling or staying asleep. In both cases, the person might sleep less than they should, and have trouble staying alert during the day. But in general, people with sleep insufficiency would be able to " sleep if they had the chance. Usually, there are things outside their control keeping them from getting restful sleep.  Why is sleep important? -- You need sleep in order to feel awake during the day, to be alert enough to do your normal activities, and to keep your body healthy. If you do not get enough sleep, or do not get restful sleep, it can lead to problems.  Lots of different things can get in the way of sleeping. For example, you might work long hours, care for family members, or have health problems that make it hard to get enough sleep.   How much sleep do I need? -- It is different for every person. Most adults need about 7 to 9 hours of sleep each night in order to feel awake enough during the day. But some people feel rested after a shorter sleep, and others need more sleep to feel alert the next day.  What are the symptoms of not getting enough sleep? -- If you are not getting enough sleep, you might:  · Have trouble staying awake to do your normal activities  · Have trouble thinking clearly or focusing  · Feel sad, anxious, or irritable  · Fall asleep at inappropriate times, such as at work or while driving  Can not getting enough sleep lead to problems? -- Yes. If you do not get enough sleep, or if you do not feel rested after sleeping, this can lead to problems like:  · Accidents - If you fall asleep while driving, you could be seriously hurt or killed. You could also accidentally hurt or kill another person. Accidents can also happen if you are too tired or fall asleep while you are caring for a baby or child.  · Work problems - If you are too tired at work, you can make mistakes. As a result, you could get into trouble or lose your job. Depending on the work you do, it could also be dangerous for you or for others.  · Health problems - Not getting enough restful sleep over time can affect your health. Your immune system might have trouble doing its job to protect your body from infections. You  "might also be at higher risk for obesity and heart disease.  · Stress - If you feel tired all the time, you might stop doing activities you used to enjoy, like spending time with friends or your partner. It can also be stressful and embarrassing to fall asleep at inappropriate times.  Should I see a doctor or nurse? -- Yes. If you are not able to get enough sleep, or if you feel tired every day even after sleeping, talk to your doctor or nurse. They can help you figure out ways to improve your sleeping.  Will I need tests? -- Probably not. Your doctor or nurse will probably be able to tell if you have sleep insufficiency just by talking to you. They might also ask you to keep a daily log for 1 to 2 weeks, where you keep track of how you sleep each night.  If your doctor wants to learn more about your sleeping patterns, they might suggest something called "actigraphy." This involves recording activity and movement with a monitor, usually worn on your wrist. The test is done at home, over 1 to 2 weeks. It will record how much you actually sleep and when.  Are there treatments that can help? -- The main treatment is to improve your habits to try to get more and better sleep. Doctors call this following good "sleep hygiene." That means that you:   · Go to bed and get up at the same time every day  · Have coffee, tea, and other foods that have caffeine only in the morning  · Avoid alcohol in the late afternoon, evening, and bedtime  · Avoid smoking, especially in the evening  · Keep your bedroom dark, cool, quiet, and free of reminders of work or other things that cause you stress  · Try to solve problems you have before you go to bed  · Exercise several days a week, but not right before bed  · Avoid looking at phones or reading devices ("e-books") that give off light before bed. This can make it harder to fall asleep.  · Avoid long naps if you have trouble sleeping at night, especially in the late afternoon. Short naps " (about 20 minutes) can be helpful, especially if your work schedule changes day to day and you need to be alert at different times.  Other things that can improve sleep include:  · Relaxation therapy, in which you focus on relaxing all the muscles in your body 1 at a time  · Working with a counselor or psychologist to deal with the problems that might be causing you to not get enough sleep  All topics are updated as new evidence becomes available and our peer review process is complete.  This topic retrieved from [employer] on: Sep 21, 2021.  Topic 744523 Version 2.0  Release: 29.4.2 - C29.263  © 2021 UpToDate, Inc. and/or its affiliates. All rights reserved.  Consumer Information Use and Disclaimer   This information is not specific medical advice and does not replace information you receive from your health care provider. This is only a brief summary of general information. It does NOT include all information about conditions, illnesses, injuries, tests, procedures, treatments, therapies, discharge instructions or life-style choices that may apply to you. You must talk with your health care provider for complete information about your health and treatment options. This information should not be used to decide whether or not to accept your health care provider's advice, instructions or recommendations. Only your health care provider has the knowledge and training to provide advice that is right for you. The use of this information is governed by the [employer] End User License Agreement, available at https://www.Aceable.[employer]/en/solutions/TruantToday/about/lora.The use of [employer] content is governed by the [employer] Terms of Use. ©2021 UpToDate, Inc. All rights reserved.  Copyright   © 2021 UpToDate, Inc. and/or its affiliates. All rights reserved.  Dear patient,   As a result of recent federal legislation (The Federal Cures Act), you may receive lab or pathology results from your visit in your [facility] account  before your physician is able to contact you. Your physician or their representative will relay the results to you with their recommendations at their soonest availability.     If no improvement in symptoms or symptoms worsen, please be advised to call MD, follow-up at clinic and/or go to ER if becomes severe.    Carlos Pichardo M.D.        We Offer TELEHEALTH & Same Day Appointments!   Book your Telehealth appointment with me through my nurse or   Clinic appointments on Hang w/!    [address] [place] [address]    Office: [phone]   FAX: [phone]    Check out my Facebook Page and Follow Me at: [email or web address]    Check out my website at [facility] by clicking on: [email or web address]    To Schedule appointments online, go to "[facility]hart: [email or web address]

## 2022-02-15 NOTE — PROGRESS NOTES
This note is specifically for wellness visit performed today.   WELLNESS EXAM      Patient ID: Zora Davis is a 72 y.o. female with  has a past medical history of COVID-19 (11/2020), Essential hypertension (6/11/2013), Fatty liver, GERD (gastroesophageal reflux disease), Lumbar facet arthropathy (1/31/2020), Mixed hyperlipidemia (5/20/2021), JOSE DE JESUS on CPAP, Osteopenia of multiple sites (1/17/2020), Paroxysmal atrial fibrillation (3/22/2019), Primary hypothyroidism (5/11/2010), PVC (premature ventricular contraction) (4/28/2020), and Sigmoid diverticulosis.   Chief Complaint:  Encounter for wellness exam    Well Adult Physical: Patient here for a comprehensive physical exam.The patient reports Chronic problems.  The patient's last visit with me was on Visit date not found.  New patient.  [facility]  Cardiology:Myles Granado Jr., MD- has stress test years ago.  Questionable atrial fibrillation on metoprolol.  Not on any anticoagulation.  GI Dr. Hilton - colonoscopy recall in 10 years     Patient is complaining of insomnia.  She reports that she was diagnosed with possible sleep apnea but she does not treat this.  She has been trying melatonin 10 milligrams with no relief.  She has tried hydroxyzine in the past with success.  She has hypertension that is borderline.  Patient was on losartan 50 milligrams previously but lost weight and was able to come down the dose.  Patient has again gained several pounds.    CHRONIC. STABLE. BP Reviewed.  Compliant with BP medications. No SE reported.   (-) CP, SOB, palpitations, dizziness, lightheadedness, HA, arm numbness, tingling or weakness, syncope.  Creatinine   Date Value Ref Range Status   05/19/2021 0.8 0.5 - 1.4 mg/dL Final     History of asthma:  Flares up with weather changes and bronchitis.  She takes inhalers as needed.    Hypothyroidism:  Chronic.  Stable.  Patient has been on levothyroxine 50 micrograms for a long  time.  Lab Results   Component Value Date    TSH 1.378 2021    FREET4 1.11 2019           Do you take any herbs or supplements that were not prescribed by a doctor? no   Are you taking calcium supplements? no    History:  No issues currently  LMP: No LMP recorded. Patient has had a hysterectomy.   MMG:  No relevant family history has been documented.     PAP: No result found not indicated  Colon cancer screening: Last Colonoscopy completed on 2014   Had recent colonoscopy with Dr. Hilton for history of diverticulosis with diverticulitis from ER visit in 2021. She reports that the recall was 10 years.  Requesting records.    Health Maintenance Topics with due status: Not Due       Topic Last Completion Date    DEXA SCAN 10/12/2020    Lipid Panel 2021    Mammogram 2021    Colorectal Cancer Screening 2022      ==============================================  History reviewed.   Health Maintenance Due   Topic Date Due    Shingles Vaccine (1 of 2) Never done       Past Medical History:  Past Medical History:   Diagnosis Date    COVID-19 2020    Essential hypertension 2013    Fatty liver     GERD (gastroesophageal reflux disease)     Lumbar facet arthropathy 2020    Mixed hyperlipidemia 2021    JOSE DE JESUS on CPAP     Osteopenia of multiple sites 2020    Paroxysmal atrial fibrillation 3/22/2019    Primary hypothyroidism 2010    PVC (premature ventricular contraction) 2020    Sigmoid diverticulosis      Past Surgical History:   Procedure Laterality Date    BREAST BIOPSY      BREAST SURGERY       SECTION      x 2    CHOLECYSTECTOMY      COLONOSCOPY  2014    Dr. Richey; diverticulosis; repeat in 5 years    ESOPHAGOGASTRODUODENOSCOPY N/A 2018    Dr. Steel; gastritis; gastric polyps    ESOPHAGOGASTRODUODENOSCOPY N/A 2020    Procedure: EGD (ESOPHAGOGASTRODUODENOSCOPY);  Surgeon: Efrain Steel MD;  Location:  [facility] ENDO;  Service: Endoscopy;  Laterality: N/A;    HYSTERECTOMY      LAPAROSCOPIC LYSIS OF ADHESIONS N/A 3/22/2019    Procedure: LYSIS, ADHESIONS, LAPAROSCOPIC;  Surgeon: Cole Armstrong MD;  Location: [facility] OR;  Service: General;  Laterality: N/A;    ROBOT-ASSISTED LAPAROSCOPIC SLEEVE GASTRECTOMY USING DA LARRY XI N/A 3/22/2019    Procedure: XI ROBOTIC SLEEVE GASTRECTOMY;  Surgeon: Cole Armstrong MD;  Location: [facility] OR;  Service: General;  Laterality: N/A;    TOTAL REDUCTION MAMMOPLASTY Bilateral     UPPER GASTROINTESTINAL ENDOSCOPY  08/13/2015    Dr. Padron     Review of patient's allergies indicates:  No Known Allergies  Current Outpatient Medications on File Prior to Visit   Medication Sig Dispense Refill    albuterol (PROVENTIL HFA) 90 mcg/actuation inhaler Inhale 2 puffs into the lungs every 6 (six) hours as needed for Wheezing. Rescue 18 g 5    baclofen (LIORESAL) 10 MG tablet Take 1 tablet (10 mg total) by mouth 2 (two) times daily as needed. 10 tablet 0    budesonide (PULMICORT FLEXHALER) 90 mcg/actuation AePB Inhale 2 puffs (180 mcg total) into the lungs 2 (two) times a day. Controller 1 each 5    co-enzyme Q-10 30 mg capsule Take 30 mg by mouth once daily.      hydroCHLOROthiazide (HYDRODIURIL) 25 MG tablet Take 1 tablet (25 mg total) by mouth once daily. 90 tablet 1    ipratropium (ATROVENT HFA) 17 mcg/actuation inhaler Inhale 2 puffs into the lungs every 6 (six) hours. Rescue 12.9 g 0    levothyroxine (SYNTHROID) 50 MCG tablet Take 1 tablet (50 mcg total) by mouth before breakfast. 90 tablet 1    metoprolol tartrate (LOPRESSOR) 25 MG tablet Take 1 tablet (25 mg total) by mouth once daily. 90 tablet 1    [DISCONTINUED] losartan (COZAAR) 25 MG tablet TAKE 1 TABLET BY MOUTH EVERY DAY 90 tablet 0     No current facility-administered medications on file prior to visit.     Social History     Socioeconomic History    Marital status:     Number of children: 2   Occupational History     Occupation: retired    Tobacco Use    Smoking status: Former Smoker     Packs/day: 0.25     Years: 13.00     Pack years: 3.25     Types: Cigarettes     Start date:      Quit date:      Years since quittin.1    Smokeless tobacco: Never Used   Substance and Sexual Activity    Alcohol use: Yes     Alcohol/week: 1.0 standard drink     Types: 1 Glasses of wine per week    Drug use: No    Sexual activity: Yes     Partners: Male     Birth control/protection: None     Family History   Problem Relation Age of Onset    Colon cancer Paternal Aunt     COPD Paternal Aunt         colon    Hypertension Father     Arthritis Father     Arthritis Mother     Asthma Mother     Depression Mother     Hypertension Mother     Crohn's disease Neg Hx     Stomach cancer Neg Hx     Ulcerative colitis Neg Hx     Esophageal cancer Neg Hx        Review of Systems   Constitutional: Negative for chills, fatigue, fever and unexpected weight change.   HENT: Negative for ear pain and sore throat.    Eyes: Negative for redness and visual disturbance.   Respiratory: Negative for cough and shortness of breath.    Cardiovascular: Negative for chest pain and palpitations.   Gastrointestinal: Negative for nausea and vomiting.   Genitourinary: Negative for difficulty urinating and hematuria.   Musculoskeletal: Negative for arthralgias and myalgias.   Skin: Negative for rash and wound.   Neurological: Negative for weakness and headaches.   Psychiatric/Behavioral: Positive for sleep disturbance. The patient is not nervous/anxious.       Objective:    Nursing note and vitals reviewed.  Vitals:    02/15/22 1043   BP: (!) 150/68   Pulse: 63   Temp: 98.2 °F (36.8 °C)     Body mass index is 32.11 kg/m².   Physical Exam  Vitals and nursing note reviewed.   Constitutional:       General: She is not in acute distress.     Appearance: She is well-developed and well-nourished. She is not ill-appearing, toxic-appearing,  sickly-appearing or diaphoretic.   HENT:      Head: Normocephalic and atraumatic.      Right Ear: Hearing and external ear normal.      Left Ear: Hearing and external ear normal.      Nose: Nose normal. No rhinorrhea.   Eyes:      General: Lids are normal.      Extraocular Movements: Extraocular movements intact and EOM normal.      Conjunctiva/sclera: Conjunctivae normal.      Pupils: Pupils are equal, round, and reactive to light.   Cardiovascular:      Rate and Rhythm: Normal rate.      Pulses: Normal pulses.   Pulmonary:      Effort: Pulmonary effort is normal. No respiratory distress.      Breath sounds: Normal breath sounds.   Musculoskeletal:         General: Normal range of motion.      Cervical back: Normal range of motion and neck supple.   Skin:     General: Skin is warm and dry.      Capillary Refill: Capillary refill takes less than 2 seconds.      Coloration: Skin is not pale.   Neurological:      General: No focal deficit present.      Mental Status: She is alert and oriented to person, place, and time. She is not disoriented.   Psychiatric:         Attention and Perception: She is attentive.         Mood and Affect: Mood and affect and mood normal. Mood is not anxious or depressed.         Speech: Speech is not rapid and pressured or slurred.         Behavior: Behavior normal. Behavior is not agitated, aggressive or hyperactive. Behavior is cooperative.         Thought Content: Thought content normal. Thought content is not paranoid or delusional. Thought content does not include homicidal or suicidal ideation. Thought content does not include homicidal or suicidal plan.         Cognition and Memory: Cognition and memory normal. Memory is not impaired.         Judgment: Judgment normal.       Office Visit on 12/30/2021   Component Date Value Ref Range Status    SARS-CoV2 (COVID-19) Qualitative P* 12/30/2021 Not Detected  Not Detected Final    Comment: This test utilizes a real-time reverse  transcription  polymerase chain reaction procedure to amplify and   detect the SARS-CoV-2 RdRp and N genes.    The analytical sensitivity (limit of detection) of   this assay is 100 copies/mL.     A Detected result is considered positive for COVID-19.  This patient is considered infected with the   SARS-CoV-2 virus and is presumed to be contagious.    A Not Detected result means that SARS-CoV-2 RNA is not  present above the limit of detection. It does not rule  out the possibility of COVID-19 and should not be the  sole basis for treatment decisions.  If COVID-19 is   strongly suspected based on clinical and exposure   history,re-testing should be considered.      This test is only for use under Food and Drug   Administration s Emergency Use Authorization (EUA).   Commercial reagents are provided by [employer].  Performance characteristics of the EUA have been   independently verified by [facility]   Department of Pathology a                           nd Laboratory Medicine.        SARS-COV-2- Cycle Number 12/30/2021 N/A   Final    Comment: CT (Cycle Threshold) values are surrogate markers of   nucleic acid concentration in a sample. They are non-standard  measurements and should only be interpreted by those familiar   with both PCR technology and the patient's clinical presentation.        Assessment / Plan:      1.  ANNUAL WELLNESS EXAM -patient here for annual wellness exam.  Labs ordered.  Health maintenance was reviewed and ordered.  Medications were reviewed and reconciled.   Anticipatory guidance: Don't smoke.  Healthy diet and regular exercise recommended. Vaccine recommendations discussed.  See orders.  Reviewed Anticipatory guidance, risk factor reduction interventions and counseling, Complete history , physical was completed today.  Complete and thorough medication reconciliation was performed.  Discussed risks and benefits of medications.  Advised patient on orders and health  maintenance.  We discussed old records and old labs if available.  Will request any records not available through epic.  Continue current medications listed on your summary sheet.  Insomnia:  Restart hydroxyzine as needed for insomnia.  Discussed insomnia condition course.  Advised of first-line medications for this condition.  Also discussed sleep hygiene.  Information was given below.  Good sleep habits (sometimes referred to as sleep hygiene) can help you get a good nights sleep.    Some habits that can improve your sleep health:  -Be consistent. Go to bed at the same time each night and get up at the same time each morning, including on the weekends  -Make sure your bedroom is quiet, dark, relaxing, and at a comfortable temperature  -Remove electronic devices, such as TVs, computers, and smart phones, from the bedroom  -Avoid large meals, caffeine, and alcohol before bedtime  -Get some exercise. Being physically active during the day can help you fall asleep more easily at night.  Atrial fibrillation:  Referral to Cardiology for further evaluation treatment.  Start baby aspirin.  Continue metoprolol.  Atrial fibrillation precautions.  Asthma:  Continue inhalers.  Asthma action plan.  ER precautions.  All questions were answered. Patient had no further concerns. Advised of Wellness plan. Follow up in 1 year for ANNUAL WELLNESS EXAM  Please be advised of hypothyroidism disease course.  We will plan to monitor thyroid labs at routine intervals.  Please be advised of risks and benefits of medication use, see medication insert for complete details.  ER precautions.    Orders Placed This Encounter   Procedures    Hemoglobin     Standing Status:   Standing     Number of Occurrences:   99     Standing Expiration Date:   3/13/2041    Comprehensive Metabolic Panel     Standing Status:   Standing     Number of Occurrences:   99     Standing Expiration Date:   3/13/2041    Lipid Panel     Standing Status:   Standing      Number of Occurrences:   99     Standing Expiration Date:   3/13/2041    Hemoglobin A1C     Standing Status:   Standing     Number of Occurrences:   99     Standing Expiration Date:   3/13/2041    TSH     Standing Status:   Future     Standing Expiration Date:   4/16/2023    Ambulatory referral/consult to Cardiology     Standing Status:   Future     Standing Expiration Date:   3/15/2023     Referral Priority:   Routine     Referral Type:   Consultation     Referral Reason:   Specialty Services Required     Requested Specialty:   Cardiology     Number of Visits Requested:   1     Medications Ordered This Encounter   Medications    aspirin (ECOTRIN) 81 MG EC tablet     Sig: Take 1 tablet (81 mg total) by mouth once daily.     Dispense:  90 tablet     Refill:  4    hydrOXYzine (ATARAX) 50 MG tablet     Sig: Take 1 tablet (50 mg total) by mouth nightly as needed (insomnia).     Dispense:  90 tablet     Refill:  4    losartan (COZAAR) 50 MG tablet     Sig: Take 1 tablet (50 mg total) by mouth once daily.     Dispense:  90 tablet     Refill:  4     .      Future Appointments     Date Provider Specialty Appt Notes    2/16/2022 Tay Alcantar MD Cardiology Paroxysmal atrial fibrillation [I48.0]    8/15/2022  Lab     2/16/2023 Carlos Pichardo MD Family Medicine 1YR F/U          Carlos Pichardo MD

## 2022-02-15 NOTE — Clinical Note
Please set a reminder to follow-up with the patient a few days for home blood pressure reading.  Blood pressure medication was increased today.

## 2022-02-16 ENCOUNTER — HOSPITAL ENCOUNTER (OUTPATIENT)
Dept: CARDIOLOGY | Facility: HOSPITAL | Age: 72
Discharge: HOME | End: 2022-02-16
Attending: INTERNAL MEDICINE
Payer: MEDICARE

## 2022-02-16 ENCOUNTER — OFFICE VISIT (OUTPATIENT)
Dept: CARDIOLOGY | Facility: CLINIC | Age: 72
End: 2022-02-16
Payer: MEDICARE

## 2022-02-16 VITALS
HEIGHT: 67 IN | DIASTOLIC BLOOD PRESSURE: 78 MMHG | BODY MASS INDEX: 32.15 KG/M2 | HEART RATE: 71 BPM | SYSTOLIC BLOOD PRESSURE: 146 MMHG | WEIGHT: 204.81 LBS | OXYGEN SATURATION: 98 %

## 2022-02-16 DIAGNOSIS — F17.211 CIGARETTE NICOTINE DEPENDENCE IN REMISSION: Chronic | ICD-10-CM

## 2022-02-16 DIAGNOSIS — Z86.79 HISTORY OF ATRIAL FIBRILLATION: ICD-10-CM

## 2022-02-16 DIAGNOSIS — Z91.89 FRAMINGHAM CARDIAC RISK 10-20% IN NEXT 10 YEARS: Chronic | ICD-10-CM

## 2022-02-16 DIAGNOSIS — R00.2 PALPITATIONS: ICD-10-CM

## 2022-02-16 DIAGNOSIS — Z86.16 HISTORY OF COVID-19: Chronic | ICD-10-CM

## 2022-02-16 DIAGNOSIS — I48.0 PAROXYSMAL ATRIAL FIBRILLATION: ICD-10-CM

## 2022-02-16 DIAGNOSIS — I10 ESSENTIAL HYPERTENSION: Primary | Chronic | ICD-10-CM

## 2022-02-16 DIAGNOSIS — I20.89 ANGINAL EQUIVALENT: ICD-10-CM

## 2022-02-16 DIAGNOSIS — E78.2 MIXED HYPERLIPIDEMIA: Chronic | ICD-10-CM

## 2022-02-16 PROCEDURE — 93010 ELECTROCARDIOGRAM REPORT: CPT | Mod: ,,, | Performed by: INTERNAL MEDICINE

## 2022-02-16 PROCEDURE — 4010F PR ACE/ARB THEARPY RXD/TAKEN: ICD-10-PCS | Mod: CPTII,S$GLB,, | Performed by: INTERNAL MEDICINE

## 2022-02-16 PROCEDURE — 3008F BODY MASS INDEX DOCD: CPT | Mod: CPTII,S$GLB,, | Performed by: INTERNAL MEDICINE

## 2022-02-16 PROCEDURE — 1160F PR REVIEW ALL MEDS BY PRESCRIBER/CLIN PHARMACIST DOCUMENTED: ICD-10-PCS | Mod: CPTII,S$GLB,, | Performed by: INTERNAL MEDICINE

## 2022-02-16 PROCEDURE — 3288F FALL RISK ASSESSMENT DOCD: CPT | Mod: CPTII,S$GLB,, | Performed by: INTERNAL MEDICINE

## 2022-02-16 PROCEDURE — 1159F PR MEDICATION LIST DOCUMENTED IN MEDICAL RECORD: ICD-10-PCS | Mod: CPTII,S$GLB,, | Performed by: INTERNAL MEDICINE

## 2022-02-16 PROCEDURE — 1101F PT FALLS ASSESS-DOCD LE1/YR: CPT | Mod: CPTII,S$GLB,, | Performed by: INTERNAL MEDICINE

## 2022-02-16 PROCEDURE — 99205 PR OFFICE/OUTPT VISIT, NEW, LEVL V, 60-74 MIN: ICD-10-PCS | Mod: S$GLB,,, | Performed by: INTERNAL MEDICINE

## 2022-02-16 PROCEDURE — 3008F PR BODY MASS INDEX (BMI) DOCUMENTED: ICD-10-PCS | Mod: CPTII,S$GLB,, | Performed by: INTERNAL MEDICINE

## 2022-02-16 PROCEDURE — 4010F ACE/ARB THERAPY RXD/TAKEN: CPT | Mod: CPTII,S$GLB,, | Performed by: INTERNAL MEDICINE

## 2022-02-16 PROCEDURE — 99999 PR PBB SHADOW E&M-EST. PATIENT-LVL IV: CPT | Mod: PBBFAC,,, | Performed by: INTERNAL MEDICINE

## 2022-02-16 PROCEDURE — 1160F RVW MEDS BY RX/DR IN RCRD: CPT | Mod: CPTII,S$GLB,, | Performed by: INTERNAL MEDICINE

## 2022-02-16 PROCEDURE — 93010 EKG 12-LEAD: ICD-10-PCS | Mod: ,,, | Performed by: INTERNAL MEDICINE

## 2022-02-16 PROCEDURE — 1126F AMNT PAIN NOTED NONE PRSNT: CPT | Mod: CPTII,S$GLB,, | Performed by: INTERNAL MEDICINE

## 2022-02-16 PROCEDURE — 3078F PR MOST RECENT DIASTOLIC BLOOD PRESSURE < 80 MM HG: ICD-10-PCS | Mod: CPTII,S$GLB,, | Performed by: INTERNAL MEDICINE

## 2022-02-16 PROCEDURE — 1101F PR PT FALLS ASSESS DOC 0-1 FALLS W/OUT INJ PAST YR: ICD-10-PCS | Mod: CPTII,S$GLB,, | Performed by: INTERNAL MEDICINE

## 2022-02-16 PROCEDURE — 1159F MED LIST DOCD IN RCRD: CPT | Mod: CPTII,S$GLB,, | Performed by: INTERNAL MEDICINE

## 2022-02-16 PROCEDURE — 99999 PR PBB SHADOW E&M-EST. PATIENT-LVL IV: ICD-10-PCS | Mod: PBBFAC,,, | Performed by: INTERNAL MEDICINE

## 2022-02-16 PROCEDURE — 3078F DIAST BP <80 MM HG: CPT | Mod: CPTII,S$GLB,, | Performed by: INTERNAL MEDICINE

## 2022-02-16 PROCEDURE — 3077F SYST BP >= 140 MM HG: CPT | Mod: CPTII,S$GLB,, | Performed by: INTERNAL MEDICINE

## 2022-02-16 PROCEDURE — 99205 OFFICE O/P NEW HI 60 MIN: CPT | Mod: S$GLB,,, | Performed by: INTERNAL MEDICINE

## 2022-02-16 PROCEDURE — 93005 ELECTROCARDIOGRAM TRACING: CPT | Mod: PO

## 2022-02-16 PROCEDURE — 3288F PR FALLS RISK ASSESSMENT DOCUMENTED: ICD-10-PCS | Mod: CPTII,S$GLB,, | Performed by: INTERNAL MEDICINE

## 2022-02-16 PROCEDURE — 3077F PR MOST RECENT SYSTOLIC BLOOD PRESSURE >= 140 MM HG: ICD-10-PCS | Mod: CPTII,S$GLB,, | Performed by: INTERNAL MEDICINE

## 2022-02-16 PROCEDURE — 1126F PR PAIN SEVERITY QUANTIFIED, NO PAIN PRESENT: ICD-10-PCS | Mod: CPTII,S$GLB,, | Performed by: INTERNAL MEDICINE

## 2022-02-16 NOTE — PROGRESS NOTES
Subjective:   Patient ID:  Zora Davis is a 72 y.o. female who presents for follow-up of [facility]  Pt with recurrent palpitations. Sx occasional, 3-4 x month. Sx last a few seconds relieved with coughing.  Pt denies CP. Pt with DE PAZ moderate or more.Pt hx of Covid x2 , last 12-21.  EKG- NSR, poor r wave progression    CRF-htn, hlp    Palpitations   This is a new problem. The current episode started 1 to 4 weeks ago. The problem occurs intermittently. The problem has been waxing and waning. Nothing aggravates the symptoms. Pertinent negatives include no chest pain, dizziness or shortness of breath. She has tried bed rest for the symptoms. The treatment provided mild relief.   Shortness of Breath  This is a new problem. The current episode started 1 to 4 weeks ago. The problem occurs intermittently. The problem has been waxing and waning. Pertinent negatives include no chest pain or leg swelling. The symptoms are aggravated by any activity. She has tried rest for the symptoms. The treatment provided mild relief.   Hypertension  This is a chronic problem. The current episode started more than 1 year ago. The problem has been gradually improving since onset. The problem is controlled. Pertinent negatives include no chest pain, palpitations or shortness of breath. Past treatments include beta blockers and diuretics. The current treatment provides moderate improvement. There are no compliance problems.        Review of Systems   Constitutional: Negative. Negative for weight gain.   HENT: Negative.    Eyes: Negative.    Cardiovascular: Negative.  Negative for chest pain, leg swelling and palpitations.   Respiratory: Negative.  Negative for shortness of breath.    Endocrine: Negative.    Hematologic/Lymphatic: Negative.    Skin: Negative.    Musculoskeletal: Negative for muscle weakness.   Gastrointestinal: Negative.    Genitourinary: Negative.    Neurological: Negative.  Negative for dizziness.    Psychiatric/Behavioral: Negative.    Allergic/Immunologic: Negative.      Family History   Problem Relation Age of Onset    Colon cancer Paternal Aunt     COPD Paternal Aunt         colon    Hypertension Father     Arthritis Father     Arthritis Mother     Asthma Mother     Depression Mother     Hypertension Mother     Crohn's disease Neg Hx     Stomach cancer Neg Hx     Ulcerative colitis Neg Hx     Esophageal cancer Neg Hx      Past Medical History:   Diagnosis Date    COVID-19 2020    Essential hypertension 2013    Fatty liver     GERD (gastroesophageal reflux disease)     Lumbar facet arthropathy 2020    Mixed hyperlipidemia 2021    JOSE DE JESUS on CPAP     Osteopenia of multiple sites 2020    Paroxysmal atrial fibrillation 3/22/2019    Primary hypothyroidism 2010    PVC (premature ventricular contraction) 2020    Sigmoid diverticulosis      Social History     Socioeconomic History    Marital status:     Number of children: 2   Occupational History    Occupation: retired    Tobacco Use    Smoking status: Former Smoker     Packs/day: 0.25     Years: 13.00     Pack years: 3.25     Types: Cigarettes     Start date:      Quit date:      Years since quittin.1    Smokeless tobacco: Never Used   Substance and Sexual Activity    Alcohol use: Yes     Alcohol/week: 1.0 standard drink     Types: 1 Glasses of wine per week     Comment: 3 times a week    Drug use: No    Sexual activity: Yes     Partners: Male     Birth control/protection: None     Current Outpatient Medications on File Prior to Visit   Medication Sig Dispense Refill    albuterol (PROVENTIL HFA) 90 mcg/actuation inhaler Inhale 2 puffs into the lungs every 6 (six) hours as needed for Wheezing. Rescue 18 g 5    aspirin (ECOTRIN) 81 MG EC tablet Take 1 tablet (81 mg total) by mouth once daily. 90 tablet 4    baclofen (LIORESAL) 10 MG tablet Take 1 tablet (10 mg  total) by mouth 2 (two) times daily as needed. 10 tablet 0    budesonide (PULMICORT FLEXHALER) 90 mcg/actuation AePB Inhale 2 puffs (180 mcg total) into the lungs 2 (two) times a day. Controller 1 each 5    co-enzyme Q-10 30 mg capsule Take 30 mg by mouth once daily.      hydroCHLOROthiazide (HYDRODIURIL) 25 MG tablet Take 1 tablet (25 mg total) by mouth once daily. 90 tablet 1    hydrOXYzine (ATARAX) 50 MG tablet Take 1 tablet (50 mg total) by mouth nightly as needed (insomnia). 90 tablet 4    ipratropium (ATROVENT HFA) 17 mcg/actuation inhaler Inhale 2 puffs into the lungs every 6 (six) hours. Rescue 12.9 g 0    levothyroxine (SYNTHROID) 50 MCG tablet Take 1 tablet (50 mcg total) by mouth before breakfast. 90 tablet 1    losartan (COZAAR) 50 MG tablet Take 1 tablet (50 mg total) by mouth once daily. 90 tablet 4    metoprolol tartrate (LOPRESSOR) 25 MG tablet Take 1 tablet (25 mg total) by mouth once daily. 90 tablet 1     No current facility-administered medications on file prior to visit.     Review of patient's allergies indicates:  No Known Allergies    Objective:     Physical Exam  Vitals and nursing note reviewed.   Constitutional:       Appearance: She is well-developed.   HENT:      Head: Normocephalic and atraumatic.   Eyes:      Conjunctiva/sclera: Conjunctivae normal.      Pupils: Pupils are equal, round, and reactive to light.   Cardiovascular:      Rate and Rhythm: Normal rate and regular rhythm.      Pulses: Intact distal pulses.      Heart sounds: Normal heart sounds.   Pulmonary:      Effort: Pulmonary effort is normal.      Breath sounds: Normal breath sounds.   Abdominal:      General: Bowel sounds are normal.      Palpations: Abdomen is soft.   Musculoskeletal:         General: Normal range of motion.      Cervical back: Normal range of motion and neck supple.   Skin:     General: Skin is warm and dry.   Neurological:      Mental Status: She is alert and oriented to person, place, and  time.         Assessment:     1. Essential hypertension    2. Paroxysmal atrial fibrillation    3. Mixed hyperlipidemia    4. [place] cardiac risk 10-20% in next 10 years    5. Cigarette nicotine dependence in remission    6. History of COVID-19    7. History of atrial fibrillation    8. Palpitations    9 anginal equivalent    Plan:     Essential hypertension    Paroxysmal atrial fibrillation  -     Ambulatory referral/consult to Cardiology    Mixed hyperlipidemia    [place] cardiac risk 10-20% in next 10 years    Cigarette nicotine dependence in remission    History of COVID-19    History of atrial fibrillation    Palpitations      Event monitor, stress echo    Continue losartan, metoprolol, hctz-htn  Check lipids

## 2022-02-22 ENCOUNTER — HOSPITAL ENCOUNTER (OUTPATIENT)
Dept: CARDIOLOGY | Facility: HOSPITAL | Age: 72
Discharge: HOME | End: 2022-02-22
Attending: INTERNAL MEDICINE
Payer: MEDICARE

## 2022-02-22 DIAGNOSIS — Z86.79 HISTORY OF ATRIAL FIBRILLATION: ICD-10-CM

## 2022-02-22 DIAGNOSIS — I20.89 ANGINAL EQUIVALENT: ICD-10-CM

## 2022-02-22 DIAGNOSIS — R00.2 PALPITATIONS: ICD-10-CM

## 2022-02-22 PROCEDURE — 93248 EXT ECG>7D<15D REV&INTERPJ: CPT | Mod: ,,, | Performed by: INTERNAL MEDICINE

## 2022-02-22 PROCEDURE — 93247 EXT ECG>7D<15D SCAN A/R: CPT | Mod: PO

## 2022-02-22 PROCEDURE — 93246 EXT ECG>7D<15D RECORDING: CPT | Mod: PO

## 2022-02-22 PROCEDURE — 93248 CV CARDIAC MONITOR - 3-15 DAY ADULT (CUPID ONLY): ICD-10-PCS | Mod: ,,, | Performed by: INTERNAL MEDICINE

## 2022-03-03 ENCOUNTER — TELEPHONE (OUTPATIENT)
Dept: FAMILY MEDICINE | Facility: CLINIC | Age: 72
End: 2022-03-03
Payer: MEDICARE

## 2022-03-03 ENCOUNTER — PATIENT OUTREACH (OUTPATIENT)
Dept: ADMINISTRATIVE | Facility: HOSPITAL | Age: 72
End: 2022-03-03
Payer: MEDICARE

## 2022-03-03 NOTE — PROGRESS NOTES
HTN Report: Patient notified to obtain a home BP reading. Patient states her BP yesterday was 114/64. Remote BP will be entered.

## 2022-03-04 ENCOUNTER — TELEPHONE (OUTPATIENT)
Dept: CARDIOLOGY | Facility: CLINIC | Age: 72
End: 2022-03-04
Payer: MEDICARE

## 2022-03-04 NOTE — TELEPHONE ENCOUNTER
----- Message from Matilde Fang LPN sent at 3/4/2022 10:04 AM CST -----  Regarding: Questions about Monitor  Contact: pt    ----- Message -----  From: Indu Lazaro  Sent: 3/4/2022   9:38 AM CST  To: [facility]    The pt has questions concerning her Holter, no additional info given and can be reached at [phone]///[id number]

## 2022-03-04 NOTE — TELEPHONE ENCOUNTER
Followed up with patient, patient stated that her bardy monitor was lifting up for the skin. Instructed patient on was to get the monitor to stay on longer. Patient verbalized understanding.

## 2022-03-07 ENCOUNTER — PATIENT MESSAGE (OUTPATIENT)
Dept: FAMILY MEDICINE | Facility: CLINIC | Age: 72
End: 2022-03-07
Payer: MEDICARE

## 2022-03-07 DIAGNOSIS — G47.00 INSOMNIA, UNSPECIFIED TYPE: Primary | ICD-10-CM

## 2022-03-07 DIAGNOSIS — I10 ESSENTIAL HYPERTENSION: ICD-10-CM

## 2022-03-07 DIAGNOSIS — Z79.899 ENCOUNTER FOR LONG-TERM (CURRENT) USE OF MEDICATIONS: ICD-10-CM

## 2022-03-07 DIAGNOSIS — E03.9 HYPOTHYROIDISM (ACQUIRED): ICD-10-CM

## 2022-03-07 RX ORDER — LEVOTHYROXINE SODIUM 50 UG/1
50 TABLET ORAL
Qty: 90 TABLET | Refills: 4 | Status: SHIPPED | OUTPATIENT
Start: 2022-03-07 | End: 2023-05-04 | Stop reason: SDUPTHER

## 2022-03-07 RX ORDER — METOPROLOL TARTRATE 25 MG/1
25 TABLET, FILM COATED ORAL DAILY
Qty: 90 TABLET | Refills: 4 | Status: SHIPPED | OUTPATIENT
Start: 2022-03-07 | End: 2022-05-11

## 2022-03-07 RX ORDER — TRAZODONE HYDROCHLORIDE 50 MG/1
50 TABLET ORAL NIGHTLY
Qty: 30 TABLET | Refills: 11 | Status: SHIPPED | OUTPATIENT
Start: 2022-03-07 | End: 2023-03-07

## 2022-03-07 NOTE — TELEPHONE ENCOUNTER
I received your message which was reviewed along with the the medication list and allergies that we have below.  Please review it for accuracy to make sure that we have the most recent records on your history.     Based on this, the following orders were placed AND/OR medicines were sent in.     No orders of the defined types were placed in this encounter.      Medications written and sent at this time include:  Medications Ordered This Encounter   Medications    traZODone (DESYREL) 50 MG tablet     Sig: Take 1 tablet (50 mg total) by mouth every evening.     Dispense:  30 tablet     Refill:  11       Your pharmacy(ies) of choice at this time on record include the list below and any medications would have been sent to the one at the top.    [facility] - [address] [address]  Phone: [phone] Fax: [phone]    [facility] - [address] [address]  Phone: [phone] Fax: [phone]      Thank you for choosing us as your healthcare provider!  Dr. Carlos Pichardo    ALLERGY LIST  Review of patient's allergies indicates:  No Known Allergies    MEDICATION LIST  Current Outpatient Medications on File Prior to Visit   Medication Sig Dispense Refill    albuterol (PROVENTIL HFA) 90 mcg/actuation inhaler Inhale 2 puffs into the lungs every 6 (six) hours as needed for Wheezing. Rescue 18 g 5    aspirin (ECOTRIN) 81 MG EC tablet Take 1 tablet (81 mg total) by mouth once daily. 90 tablet 4    baclofen (LIORESAL) 10 MG tablet Take 1 tablet (10 mg total) by mouth 2 (two) times daily as needed. 10 tablet 0    budesonide (PULMICORT FLEXHALER) 90 mcg/actuation AePB Inhale 2 puffs (180 mcg total) into the lungs 2 (two) times a day. Controller 1 each 5    co-enzyme Q-10 30 mg capsule Take 30 mg by mouth once daily.      hydroCHLOROthiazide (HYDRODIURIL) 25 MG tablet Take 1 tablet  (25 mg total) by mouth once daily. 90 tablet 1    hydrOXYzine (ATARAX) 50 MG tablet Take 1 tablet (50 mg total) by mouth nightly as needed (insomnia). 90 tablet 4    ipratropium (ATROVENT HFA) 17 mcg/actuation inhaler Inhale 2 puffs into the lungs every 6 (six) hours. Rescue 12.9 g 0    levothyroxine (SYNTHROID) 50 MCG tablet Take 1 tablet (50 mcg total) by mouth before breakfast. 90 tablet 4    losartan (COZAAR) 50 MG tablet Take 1 tablet (50 mg total) by mouth once daily. 90 tablet 4    metoprolol tartrate (LOPRESSOR) 25 MG tablet Take 1 tablet (25 mg total) by mouth once daily. 90 tablet 4    [DISCONTINUED] levothyroxine (SYNTHROID) 50 MCG tablet Take 1 tablet (50 mcg total) by mouth before breakfast. 90 tablet 1    [DISCONTINUED] metoprolol tartrate (LOPRESSOR) 25 MG tablet Take 1 tablet (25 mg total) by mouth once daily. 90 tablet 1     No current facility-administered medications on file prior to visit.       HEALTH MAINTENANCE THAT IS OVERDUE OR NEEDS TO BE UPDATED ON OUR CHART IS LISTED BELOW.  IF YOU HAVE HAD IT DONE ELSEWHERE, PLEASE SEND US DATES AND RECORDS IF YOU HAVE THEM TO MAKE YOUR CHART ACCURATE.  IF YOU HAVE NOT HAD THESE DONE AND ARE READY FOR US TO SCHEDULE THEM, PLEASE SEND US A MESSAGE.  Health Maintenance Due   Topic Date Due    Shingles Vaccine (1 of 2) Never done       DISCLAIMER: This note was compiled by using a speech recognition dictation system and therefore please be aware that typographical / speech recognition errors can and do occur.  Please contact me if you see any errors specifically.    Carlos Pichardo MD  We Offer Telehealth & Same Day Appointments!   Book your Telehealth appointment with me through my nurse or   Clinic appointments on [employer]!  [phone]     Check out my Facebook Page and Follow Me at: CLICK HERE    Check out my website at [employer] by clicking on: CLICK HERE    To Schedule appointments online, go to [employer]: CLICK HERE     Location:  [email or web address]    [address] [facility]  [place] [address]    FAX: [phone]

## 2022-03-07 NOTE — TELEPHONE ENCOUNTER
No new care gaps identified.  Powered by [employer]. Reference number: [id number].   3/07/2022 11:17:13 AM CST

## 2022-03-07 NOTE — TELEPHONE ENCOUNTER
Care Due:                  Date            Visit Type   Department     Provider  --------------------------------------------------------------------------------                                EP -                              PRIMARY      [facility] FAM[facility]  Last Visit: 02-      CARE ([facility])   MEDICINE       Carlos Pichardo                              EP -                              PRIMARY      [facility] FAM[facility]  Next Visit: 02-      CARE ([facility])   MEDICINE       Carlos Pichardo                                                            Last  Test          Frequency    Reason                     Performed    Due Date  --------------------------------------------------------------------------------    CMP.........  12 months..  hydroCHLOROthiazide,       05-   05-                             losartan.................    Powered by [employer]. Reference number: [id number].   3/07/2022 10:56:50 AM CST

## 2022-03-08 ENCOUNTER — HOSPITAL ENCOUNTER (OUTPATIENT)
Dept: CARDIOLOGY | Facility: HOSPITAL | Age: 72
Discharge: HOME | End: 2022-03-08
Attending: INTERNAL MEDICINE
Payer: MEDICARE

## 2022-03-08 VITALS
SYSTOLIC BLOOD PRESSURE: 164 MMHG | BODY MASS INDEX: 32.02 KG/M2 | HEIGHT: 67 IN | WEIGHT: 204 LBS | DIASTOLIC BLOOD PRESSURE: 84 MMHG | HEART RATE: 74 BPM

## 2022-03-08 DIAGNOSIS — Z86.79 HISTORY OF ATRIAL FIBRILLATION: ICD-10-CM

## 2022-03-08 DIAGNOSIS — I20.89 ANGINAL EQUIVALENT: ICD-10-CM

## 2022-03-08 DIAGNOSIS — R00.2 PALPITATIONS: ICD-10-CM

## 2022-03-08 PROCEDURE — 93351 STRESS ECHO (CUPID ONLY): ICD-10-PCS | Mod: 26,,, | Performed by: STUDENT IN AN ORGANIZED HEALTH CARE EDUCATION/TRAINING PROGRAM

## 2022-03-08 PROCEDURE — 93351 STRESS TTE COMPLETE: CPT | Mod: PO

## 2022-03-08 PROCEDURE — 93351 STRESS TTE COMPLETE: CPT | Mod: 26,,, | Performed by: STUDENT IN AN ORGANIZED HEALTH CARE EDUCATION/TRAINING PROGRAM

## 2022-03-09 LAB
AORTIC ROOT ANNULUS: 2.37 CM
AV INDEX (PROSTH): 0.79
AV MEAN GRADIENT: 3 MMHG
AV PEAK GRADIENT: 5 MMHG
AV VALVE AREA: 2.8 CM2
AV VELOCITY RATIO: 0.71
BSA FOR ECHO PROCEDURE: 2.09 M2
CV ECHO LV RWT: 0.48 CM
CV STRESS BASE HR: 74 BPM
DIASTOLIC BLOOD PRESSURE: 84 MMHG
DOP CALC AO PEAK VEL: 1.12 M/S
DOP CALC AO VTI: 32 CM
DOP CALC LVOT AREA: 3.6 CM2
DOP CALC LVOT DIAMETER: 2.13 CM
DOP CALC LVOT PEAK VEL: 0.79 M/S
DOP CALC LVOT STROKE VOLUME: 89.75 CM3
DOP CALC RVOT PEAK VEL: 0.44 M/S
DOP CALC RVOT VTI: 8.5 CM
DOP CALCLVOT PEAK VEL VTI: 25.2 CM
E WAVE DECELERATION TIME: 171.71 MSEC
E/A RATIO: 1.47
E/E' RATIO: 11.44 M/S
ECHO EF ESTIMATED: 67 %
ECHO LV POSTERIOR WALL: 1.11 CM (ref 0.6–1.1)
EJECTION FRACTION: 65 %
FRACTIONAL SHORTENING: 37 % (ref 28–44)
INTERVENTRICULAR SEPTUM: 1.14 CM (ref 0.6–1.1)
IVRT: 114.18 MSEC
LA MAJOR: 5.86 CM
LA MINOR: 5.27 CM
LA WIDTH: 3.33 CM
LEFT ATRIUM SIZE: 3.89 CM
LEFT ATRIUM VOLUME INDEX MOD: 27.1 ML/M2
LEFT ATRIUM VOLUME INDEX: 30 ML/M2
LEFT ATRIUM VOLUME MOD: 55.34 CM3
LEFT ATRIUM VOLUME: 61.1 CM3
LEFT INTERNAL DIMENSION IN SYSTOLE: 2.89 CM (ref 2.1–4)
LEFT VENTRICLE DIASTOLIC VOLUME INDEX: 47.85 ML/M2
LEFT VENTRICLE DIASTOLIC VOLUME: 97.62 ML
LEFT VENTRICLE MASS INDEX: 92 G/M2
LEFT VENTRICLE SYSTOLIC VOLUME INDEX: 15.7 ML/M2
LEFT VENTRICLE SYSTOLIC VOLUME: 31.96 ML
LEFT VENTRICULAR INTERNAL DIMENSION IN DIASTOLE: 4.61 CM (ref 3.5–6)
LEFT VENTRICULAR MASS: 187.68 G
LV LATERAL E/E' RATIO: 11.44 M/S
LV SEPTAL E/E' RATIO: 11.44 M/S
LVOT MG: 1.56 MMHG
LVOT MV: 0.6 CM/S
MV PEAK A VEL: 0.7 M/S
MV PEAK E VEL: 1.03 M/S
MV STENOSIS PRESSURE HALF TIME: 49.8 MS
MV VALVE AREA P 1/2 METHOD: 4.42 CM2
OHS CV CPX 1 MINUTE RECOVERY HEART RATE: 109 BPM
OHS CV CPX 85 PERCENT MAX PREDICTED HEART RATE MALE: 122
OHS CV CPX ESTIMATED METS: 5
OHS CV CPX MAX PREDICTED HEART RATE: 144
OHS CV CPX PATIENT IS FEMALE: 1
OHS CV CPX PATIENT IS MALE: 0
OHS CV CPX PEAK DIASTOLIC BLOOD PRESSURE: 62 MMHG
OHS CV CPX PEAK HEAR RATE: 133 BPM
OHS CV CPX PEAK RATE PRESSURE PRODUCT: ABNORMAL
OHS CV CPX PEAK SYSTOLIC BLOOD PRESSURE: 196 MMHG
OHS CV CPX PERCENT MAX PREDICTED HEART RATE ACHIEVED: 93
OHS CV CPX RATE PRESSURE PRODUCT PRESENTING: ABNORMAL
PULM VEIN S/D RATIO: 0.96
PV MEAN GRADIENT: 0.51 MMHG
PV PEAK D VEL: 0.72 M/S
PV PEAK S VEL: 0.69 M/S
PV PEAK VELOCITY: 0.62 CM/S
RA MAJOR: 5.06 CM
RA PRESSURE: 3 MMHG
RA WIDTH: 3.24 CM
RIGHT VENTRICULAR END-DIASTOLIC DIMENSION: 1.66 CM
SINUS: 2.69 CM
STJ: 2.84 CM
STRESS ANGINA INDEX: 0
STRESS ECHO POST EXERCISE DUR MIN: 1 MINUTES
STRESS ECHO POST EXERCISE DUR SEC: 52 SECONDS
SYSTOLIC BLOOD PRESSURE: 164 MMHG
TDI LATERAL: 0.09 M/S
TDI SEPTAL: 0.09 M/S
TDI: 0.09 M/S
TRICUSPID ANNULAR PLANE SYSTOLIC EXCURSION: 2.27 CM

## 2022-03-11 ENCOUNTER — TELEPHONE (OUTPATIENT)
Dept: CARDIOLOGY | Facility: CLINIC | Age: 72
End: 2022-03-11
Payer: MEDICARE

## 2022-03-11 NOTE — TELEPHONE ENCOUNTER
The patient has been notified of this information and all questions answered.  Stress echo is negative. Patient verbalized understanding.

## 2022-03-11 NOTE — TELEPHONE ENCOUNTER
----- Message from Tay Alcantar MD sent at 3/10/2022 11:13 AM CST -----  Please tell pt:  Stress echo is negative

## 2022-03-16 ENCOUNTER — TELEPHONE (OUTPATIENT)
Dept: CARDIOLOGY | Facility: CLINIC | Age: 72
End: 2022-03-16
Payer: MEDICARE

## 2022-03-16 DIAGNOSIS — I10 ESSENTIAL HYPERTENSION: ICD-10-CM

## 2022-03-16 DIAGNOSIS — Z79.899 ENCOUNTER FOR LONG-TERM (CURRENT) USE OF MEDICATIONS: ICD-10-CM

## 2022-03-16 RX ORDER — HYDROCHLOROTHIAZIDE 25 MG/1
25 TABLET ORAL DAILY
Qty: 90 TABLET | Refills: 4 | Status: SHIPPED | OUTPATIENT
Start: 2022-03-16 | End: 2023-05-29

## 2022-03-16 NOTE — TELEPHONE ENCOUNTER
Care Due:                  Date            Visit Type   Department     Provider  --------------------------------------------------------------------------------                                EP -                              PRIMARY      [facility] FAMILY  Last Visit: 02-      CARE ([facility])   MEDICINE       Carlos Pichardo                               -                              PRIMARY      [facility] FAMILY  Next Visit: 02-      CARE ([facility])   [facility]       Carlos Pichardo                                                            Last  Test          Frequency    Reason                     Performed    Due Date  --------------------------------------------------------------------------------    TSH.........  12 months..  levothyroxine............  05-   05-    Powered by [employer]. Reference number: [id number].   3/16/2022 3:19:21 PM CDT

## 2022-03-16 NOTE — TELEPHONE ENCOUNTER
The patient has been notified of this information and all questions answered.  Event monitor shows atrial tachycardia  Ci\continue toprol. Patient verbalized understanding.

## 2022-03-16 NOTE — TELEPHONE ENCOUNTER
----- Message from Tay Alcantar MD sent at 3/16/2022  5:08 AM CDT -----  Disregard last message  Event monitor shows atrial tachycardia  Ci\continue toprol

## 2022-04-18 NOTE — TELEPHONE ENCOUNTER
----- Message from MILAGRO RAUSCH sent at 8/30/2018 11:04 AM CDT -----  Pt submitted online seminar form for bariatrics. Can you please contact pt and schedule consult with MD?  Thanks   Simponi Counseling:  I discussed with the patient the risks of golimumab including but not limited to myelosuppression, immunosuppression, autoimmune hepatitis, demyelinating diseases, lymphoma, and serious infections.  The patient understands that monitoring is required including a PPD at baseline and must alert us or the primary physician if symptoms of infection or other concerning signs are noted.

## 2022-05-11 ENCOUNTER — OFFICE VISIT (OUTPATIENT)
Dept: CARDIOLOGY | Facility: CLINIC | Age: 72
End: 2022-05-11
Payer: MEDICARE

## 2022-05-11 VITALS
OXYGEN SATURATION: 98 % | SYSTOLIC BLOOD PRESSURE: 136 MMHG | HEART RATE: 63 BPM | WEIGHT: 206.81 LBS | HEIGHT: 67 IN | DIASTOLIC BLOOD PRESSURE: 80 MMHG | BODY MASS INDEX: 32.46 KG/M2

## 2022-05-11 DIAGNOSIS — E78.2 MIXED HYPERLIPIDEMIA: Chronic | ICD-10-CM

## 2022-05-11 DIAGNOSIS — Z86.79 HISTORY OF ATRIAL FIBRILLATION: Primary | ICD-10-CM

## 2022-05-11 DIAGNOSIS — Z91.89 FRAMINGHAM CARDIAC RISK 10-20% IN NEXT 10 YEARS: Chronic | ICD-10-CM

## 2022-05-11 DIAGNOSIS — Z86.16 HISTORY OF COVID-19: Chronic | ICD-10-CM

## 2022-05-11 DIAGNOSIS — Z79.899 ENCOUNTER FOR LONG-TERM (CURRENT) USE OF MEDICATIONS: ICD-10-CM

## 2022-05-11 DIAGNOSIS — F17.211 CIGARETTE NICOTINE DEPENDENCE IN REMISSION: Chronic | ICD-10-CM

## 2022-05-11 DIAGNOSIS — I10 ESSENTIAL HYPERTENSION: Chronic | ICD-10-CM

## 2022-05-11 DIAGNOSIS — R00.2 PALPITATIONS: ICD-10-CM

## 2022-05-11 PROCEDURE — 1126F PR PAIN SEVERITY QUANTIFIED, NO PAIN PRESENT: ICD-10-PCS | Mod: CPTII,S$GLB,, | Performed by: INTERNAL MEDICINE

## 2022-05-11 PROCEDURE — 1101F PR PT FALLS ASSESS DOC 0-1 FALLS W/OUT INJ PAST YR: ICD-10-PCS | Mod: CPTII,S$GLB,, | Performed by: INTERNAL MEDICINE

## 2022-05-11 PROCEDURE — 1159F MED LIST DOCD IN RCRD: CPT | Mod: CPTII,S$GLB,, | Performed by: INTERNAL MEDICINE

## 2022-05-11 PROCEDURE — 3288F PR FALLS RISK ASSESSMENT DOCUMENTED: ICD-10-PCS | Mod: CPTII,S$GLB,, | Performed by: INTERNAL MEDICINE

## 2022-05-11 PROCEDURE — 3008F BODY MASS INDEX DOCD: CPT | Mod: CPTII,S$GLB,, | Performed by: INTERNAL MEDICINE

## 2022-05-11 PROCEDURE — 99214 PR OFFICE/OUTPT VISIT, EST, LEVL IV, 30-39 MIN: ICD-10-PCS | Mod: S$GLB,,, | Performed by: INTERNAL MEDICINE

## 2022-05-11 PROCEDURE — 3075F PR MOST RECENT SYSTOLIC BLOOD PRESS GE 130-139MM HG: ICD-10-PCS | Mod: CPTII,S$GLB,, | Performed by: INTERNAL MEDICINE

## 2022-05-11 PROCEDURE — 1159F PR MEDICATION LIST DOCUMENTED IN MEDICAL RECORD: ICD-10-PCS | Mod: CPTII,S$GLB,, | Performed by: INTERNAL MEDICINE

## 2022-05-11 PROCEDURE — 3288F FALL RISK ASSESSMENT DOCD: CPT | Mod: CPTII,S$GLB,, | Performed by: INTERNAL MEDICINE

## 2022-05-11 PROCEDURE — 4010F PR ACE/ARB THEARPY RXD/TAKEN: ICD-10-PCS | Mod: CPTII,S$GLB,, | Performed by: INTERNAL MEDICINE

## 2022-05-11 PROCEDURE — 99999 PR PBB SHADOW E&M-EST. PATIENT-LVL IV: CPT | Mod: PBBFAC,,, | Performed by: INTERNAL MEDICINE

## 2022-05-11 PROCEDURE — 3079F DIAST BP 80-89 MM HG: CPT | Mod: CPTII,S$GLB,, | Performed by: INTERNAL MEDICINE

## 2022-05-11 PROCEDURE — 1160F RVW MEDS BY RX/DR IN RCRD: CPT | Mod: CPTII,S$GLB,, | Performed by: INTERNAL MEDICINE

## 2022-05-11 PROCEDURE — 3008F PR BODY MASS INDEX (BMI) DOCUMENTED: ICD-10-PCS | Mod: CPTII,S$GLB,, | Performed by: INTERNAL MEDICINE

## 2022-05-11 PROCEDURE — 4010F ACE/ARB THERAPY RXD/TAKEN: CPT | Mod: CPTII,S$GLB,, | Performed by: INTERNAL MEDICINE

## 2022-05-11 PROCEDURE — 3079F PR MOST RECENT DIASTOLIC BLOOD PRESSURE 80-89 MM HG: ICD-10-PCS | Mod: CPTII,S$GLB,, | Performed by: INTERNAL MEDICINE

## 2022-05-11 PROCEDURE — 1126F AMNT PAIN NOTED NONE PRSNT: CPT | Mod: CPTII,S$GLB,, | Performed by: INTERNAL MEDICINE

## 2022-05-11 PROCEDURE — 99999 PR PBB SHADOW E&M-EST. PATIENT-LVL IV: ICD-10-PCS | Mod: PBBFAC,,, | Performed by: INTERNAL MEDICINE

## 2022-05-11 PROCEDURE — 1101F PT FALLS ASSESS-DOCD LE1/YR: CPT | Mod: CPTII,S$GLB,, | Performed by: INTERNAL MEDICINE

## 2022-05-11 PROCEDURE — 99214 OFFICE O/P EST MOD 30 MIN: CPT | Mod: S$GLB,,, | Performed by: INTERNAL MEDICINE

## 2022-05-11 PROCEDURE — 3075F SYST BP GE 130 - 139MM HG: CPT | Mod: CPTII,S$GLB,, | Performed by: INTERNAL MEDICINE

## 2022-05-11 PROCEDURE — 1160F PR REVIEW ALL MEDS BY PRESCRIBER/CLIN PHARMACIST DOCUMENTED: ICD-10-PCS | Mod: CPTII,S$GLB,, | Performed by: INTERNAL MEDICINE

## 2022-05-11 RX ORDER — METOPROLOL TARTRATE 50 MG/1
50 TABLET ORAL DAILY
Qty: 90 TABLET | Refills: 3 | Status: SHIPPED | OUTPATIENT
Start: 2022-05-11 | End: 2022-09-13

## 2022-05-11 RX ORDER — MELATONIN 5 MG
CAPSULE ORAL
COMMUNITY

## 2022-05-11 NOTE — PROGRESS NOTES
Subjective:   Patient ID:  Zora Davis is a 72 y.o. female who presents for follow-up of Follow-up  Stress echo wnl  Cardiac monitor:  · Patient Reported Event #1 The symptom(s) included palpitations. The corresponding rhythm to the patient reported event was atrial tachycardia. These symptoms were associated with PVCs. Monitor Reported Event The patient had sinus tachycardia and atrial tachycardia. The symptom(s) included PACs and PVCs.  · (atrial tachycardia). The rate varied between 144 and 160 bpm.  · The patient presented symptoms corresponding with normal sinus rhythm. The patient was asymptomatic with atrial tachycardia    Pt with palpitations and SOB    Palpitations   This is a new problem. The current episode started 1 to 4 weeks ago. The problem occurs intermittently. The problem has been waxing and waning. Nothing aggravates the symptoms. Pertinent negatives include no chest pain, dizziness or shortness of breath. She has tried bed rest for the symptoms. The treatment provided mild relief.   Shortness of Breath  This is a new problem. The current episode started 1 to 4 weeks ago. The problem occurs intermittently. The problem has been waxing and waning. Pertinent negatives include no chest pain or leg swelling. The symptoms are aggravated by any activity. She has tried rest for the symptoms. The treatment provided mild relief.   Hypertension  This is a chronic problem. The current episode started more than 1 year ago. The problem has been gradually improving since onset. The problem is controlled. Pertinent negatives include no chest pain, palpitations or shortness of breath. Past treatments include beta blockers and diuretics. The current treatment provides moderate improvement. There are no compliance problems.        Review of Systems   Constitutional: Negative. Negative for weight gain.   HENT: Negative.    Eyes: Negative.    Cardiovascular: Negative.  Negative for chest pain, leg swelling and  palpitations.   Respiratory: Negative.  Negative for shortness of breath.    Endocrine: Negative.    Hematologic/Lymphatic: Negative.    Skin: Negative.    Musculoskeletal: Negative for muscle weakness.   Gastrointestinal: Negative.    Genitourinary: Negative.    Neurological: Negative.  Negative for dizziness.   Psychiatric/Behavioral: Negative.    Allergic/Immunologic: Negative.      Family History   Problem Relation Age of Onset    Colon cancer Paternal Aunt     COPD Paternal Aunt         colon    Hypertension Father     Arthritis Father     Arthritis Mother     Asthma Mother     Depression Mother     Hypertension Mother     Crohn's disease Neg Hx     Stomach cancer Neg Hx     Ulcerative colitis Neg Hx     Esophageal cancer Neg Hx      Past Medical History:   Diagnosis Date    COVID-19 2020    Essential hypertension 2013    Fatty liver     GERD (gastroesophageal reflux disease)     Lumbar facet arthropathy 2020    Mixed hyperlipidemia 2021    JOSE DE JESUS on CPAP     Osteopenia of multiple sites 2020    Paroxysmal atrial fibrillation 3/22/2019    Primary hypothyroidism 2010    PVC (premature ventricular contraction) 2020    Sigmoid diverticulosis      Social History     Socioeconomic History    Marital status:     Number of children: 2   Occupational History    Occupation: retired    Tobacco Use    Smoking status: Former Smoker     Packs/day: 0.25     Years: 13.00     Pack years: 3.25     Types: Cigarettes     Start date:      Quit date:      Years since quittin.3    Smokeless tobacco: Never Used   Substance and Sexual Activity    Alcohol use: Yes     Alcohol/week: 1.0 standard drink     Types: 1 Glasses of wine per week     Comment: 3 times a week    Drug use: No    Sexual activity: Yes     Partners: Male     Birth control/protection: None     Current Outpatient Medications on File Prior to Visit   Medication Sig  Dispense Refill    albuterol (PROVENTIL HFA) 90 mcg/actuation inhaler Inhale 2 puffs into the lungs every 6 (six) hours as needed for Wheezing. Rescue 18 g 5    aspirin (ECOTRIN) 81 MG EC tablet Take 1 tablet (81 mg total) by mouth once daily. 90 tablet 4    baclofen (LIORESAL) 10 MG tablet Take 1 tablet (10 mg total) by mouth 2 (two) times daily as needed. 10 tablet 0    budesonide (PULMICORT FLEXHALER) 90 mcg/actuation AePB Inhale 2 puffs (180 mcg total) into the lungs 2 (two) times a day. Controller 1 each 5    co-enzyme Q-10 30 mg capsule Take 30 mg by mouth once daily.      hydroCHLOROthiazide (HYDRODIURIL) 25 MG tablet Take 1 tablet (25 mg total) by mouth once daily. 90 tablet 4    hydrOXYzine (ATARAX) 50 MG tablet Take 1 tablet (50 mg total) by mouth nightly as needed (insomnia). 90 tablet 4    ipratropium (ATROVENT HFA) 17 mcg/actuation inhaler Inhale 2 puffs into the lungs every 6 (six) hours. Rescue 12.9 g 0    levothyroxine (SYNTHROID) 50 MCG tablet Take 1 tablet (50 mcg total) by mouth before breakfast. 90 tablet 4    losartan (COZAAR) 50 MG tablet Take 1 tablet (50 mg total) by mouth once daily. 90 tablet 4    melatonin 5 mg Cap Take by mouth.      metoprolol tartrate (LOPRESSOR) 25 MG tablet Take 1 tablet (25 mg total) by mouth once daily. 90 tablet 4    traZODone (DESYREL) 50 MG tablet Take 1 tablet (50 mg total) by mouth every evening. 30 tablet 11     No current facility-administered medications on file prior to visit.     Review of patient's allergies indicates:  No Known Allergies    Objective:     Physical Exam  Vitals and nursing note reviewed.   Constitutional:       Appearance: She is well-developed.   HENT:      Head: Normocephalic and atraumatic.   Eyes:      Conjunctiva/sclera: Conjunctivae normal.      Pupils: Pupils are equal, round, and reactive to light.   Cardiovascular:      Rate and Rhythm: Normal rate and regular rhythm.      Pulses: Intact distal pulses.      Heart  sounds: Normal heart sounds.   Pulmonary:      Effort: Pulmonary effort is normal.      Breath sounds: Normal breath sounds.   Abdominal:      General: Bowel sounds are normal.      Palpations: Abdomen is soft.   Musculoskeletal:         General: Normal range of motion.      Cervical back: Normal range of motion and neck supple.   Skin:     General: Skin is warm and dry.   Neurological:      Mental Status: She is alert and oriented to person, place, and time.         Assessment:     1. History of atrial fibrillation    2. Palpitations    3. Essential hypertension    4. [place] cardiac risk 10-20% in next 10 years    5. Mixed hyperlipidemia    6. History of COVID-19    7. Cigarette nicotine dependence in remission        Plan:     History of atrial fibrillation    Palpitations    Essential hypertension    [place] cardiac risk 10-20% in next 10 years    Mixed hyperlipidemia    History of COVID-19    Cigarette nicotine dependence in remission         Continue losartan, metoprolol, [id number]  Check lipids    Increase metoprolol- palpitations

## 2022-05-31 ENCOUNTER — PATIENT MESSAGE (OUTPATIENT)
Dept: FAMILY MEDICINE | Facility: CLINIC | Age: 72
End: 2022-05-31
Payer: MEDICARE

## 2022-07-12 ENCOUNTER — HOSPITAL ENCOUNTER (OUTPATIENT)
Dept: RADIOLOGY | Facility: HOSPITAL | Age: 72
Discharge: HOME | End: 2022-07-12
Attending: NURSE PRACTITIONER
Payer: MEDICARE

## 2022-07-12 ENCOUNTER — OFFICE VISIT (OUTPATIENT)
Dept: FAMILY MEDICINE | Facility: CLINIC | Age: 72
End: 2022-07-12
Payer: MEDICARE

## 2022-07-12 VITALS
WEIGHT: 209 LBS | BODY MASS INDEX: 32.8 KG/M2 | RESPIRATION RATE: 18 BRPM | OXYGEN SATURATION: 99 % | TEMPERATURE: 98 F | DIASTOLIC BLOOD PRESSURE: 67 MMHG | HEIGHT: 67 IN | HEART RATE: 82 BPM | SYSTOLIC BLOOD PRESSURE: 119 MMHG

## 2022-07-12 DIAGNOSIS — M54.9 BACK PAIN, UNSPECIFIED BACK LOCATION, UNSPECIFIED BACK PAIN LATERALITY, UNSPECIFIED CHRONICITY: ICD-10-CM

## 2022-07-12 DIAGNOSIS — K21.9 GASTROESOPHAGEAL REFLUX DISEASE, UNSPECIFIED WHETHER ESOPHAGITIS PRESENT: ICD-10-CM

## 2022-07-12 DIAGNOSIS — M54.9 BACK PAIN, UNSPECIFIED BACK LOCATION, UNSPECIFIED BACK PAIN LATERALITY, UNSPECIFIED CHRONICITY: Primary | ICD-10-CM

## 2022-07-12 DIAGNOSIS — R10.13 EPIGASTRIC PAIN: ICD-10-CM

## 2022-07-12 DIAGNOSIS — Z98.84 HISTORY OF GASTRIC BYPASS: ICD-10-CM

## 2022-07-12 PROCEDURE — 4010F ACE/ARB THERAPY RXD/TAKEN: CPT | Mod: CPTII,S$GLB,, | Performed by: NURSE PRACTITIONER

## 2022-07-12 PROCEDURE — 1160F RVW MEDS BY RX/DR IN RCRD: CPT | Mod: CPTII,S$GLB,, | Performed by: NURSE PRACTITIONER

## 2022-07-12 PROCEDURE — 93010 ELECTROCARDIOGRAM REPORT: CPT | Mod: S$GLB,,, | Performed by: INTERNAL MEDICINE

## 2022-07-12 PROCEDURE — 99214 PR OFFICE/OUTPT VISIT, EST, LEVL IV, 30-39 MIN: ICD-10-PCS | Mod: S$GLB,,, | Performed by: NURSE PRACTITIONER

## 2022-07-12 PROCEDURE — 99214 OFFICE O/P EST MOD 30 MIN: CPT | Mod: S$GLB,,, | Performed by: NURSE PRACTITIONER

## 2022-07-12 PROCEDURE — 1160F PR REVIEW ALL MEDS BY PRESCRIBER/CLIN PHARMACIST DOCUMENTED: ICD-10-PCS | Mod: CPTII,S$GLB,, | Performed by: NURSE PRACTITIONER

## 2022-07-12 PROCEDURE — 72070 X-RAY EXAM THORAC SPINE 2VWS: CPT | Mod: 26,,, | Performed by: RADIOLOGY

## 2022-07-12 PROCEDURE — 72070 X-RAY EXAM THORAC SPINE 2VWS: CPT | Mod: TC,PO

## 2022-07-12 PROCEDURE — 3078F PR MOST RECENT DIASTOLIC BLOOD PRESSURE < 80 MM HG: ICD-10-PCS | Mod: CPTII,S$GLB,, | Performed by: NURSE PRACTITIONER

## 2022-07-12 PROCEDURE — 1101F PR PT FALLS ASSESS DOC 0-1 FALLS W/OUT INJ PAST YR: ICD-10-PCS | Mod: CPTII,S$GLB,, | Performed by: NURSE PRACTITIONER

## 2022-07-12 PROCEDURE — 3288F FALL RISK ASSESSMENT DOCD: CPT | Mod: CPTII,S$GLB,, | Performed by: NURSE PRACTITIONER

## 2022-07-12 PROCEDURE — 72070 XR THORACIC SPINE AP LATERAL: ICD-10-PCS | Mod: 26,,, | Performed by: RADIOLOGY

## 2022-07-12 PROCEDURE — 1125F PR PAIN SEVERITY QUANTIFIED, PAIN PRESENT: ICD-10-PCS | Mod: CPTII,S$GLB,, | Performed by: NURSE PRACTITIONER

## 2022-07-12 PROCEDURE — 93005 ELECTROCARDIOGRAM TRACING: CPT | Mod: S$GLB,,, | Performed by: NURSE PRACTITIONER

## 2022-07-12 PROCEDURE — 3288F PR FALLS RISK ASSESSMENT DOCUMENTED: ICD-10-PCS | Mod: CPTII,S$GLB,, | Performed by: NURSE PRACTITIONER

## 2022-07-12 PROCEDURE — 3078F DIAST BP <80 MM HG: CPT | Mod: CPTII,S$GLB,, | Performed by: NURSE PRACTITIONER

## 2022-07-12 PROCEDURE — 3008F BODY MASS INDEX DOCD: CPT | Mod: CPTII,S$GLB,, | Performed by: NURSE PRACTITIONER

## 2022-07-12 PROCEDURE — 99999 PR PBB SHADOW E&M-EST. PATIENT-LVL V: ICD-10-PCS | Mod: PBBFAC,,, | Performed by: NURSE PRACTITIONER

## 2022-07-12 PROCEDURE — 1101F PT FALLS ASSESS-DOCD LE1/YR: CPT | Mod: CPTII,S$GLB,, | Performed by: NURSE PRACTITIONER

## 2022-07-12 PROCEDURE — 1159F PR MEDICATION LIST DOCUMENTED IN MEDICAL RECORD: ICD-10-PCS | Mod: CPTII,S$GLB,, | Performed by: NURSE PRACTITIONER

## 2022-07-12 PROCEDURE — 3074F SYST BP LT 130 MM HG: CPT | Mod: CPTII,S$GLB,, | Performed by: NURSE PRACTITIONER

## 2022-07-12 PROCEDURE — 93010 EKG 12-LEAD: ICD-10-PCS | Mod: S$GLB,,, | Performed by: INTERNAL MEDICINE

## 2022-07-12 PROCEDURE — 1159F MED LIST DOCD IN RCRD: CPT | Mod: CPTII,S$GLB,, | Performed by: NURSE PRACTITIONER

## 2022-07-12 PROCEDURE — 4010F PR ACE/ARB THEARPY RXD/TAKEN: ICD-10-PCS | Mod: CPTII,S$GLB,, | Performed by: NURSE PRACTITIONER

## 2022-07-12 PROCEDURE — 99999 PR PBB SHADOW E&M-EST. PATIENT-LVL V: CPT | Mod: PBBFAC,,, | Performed by: NURSE PRACTITIONER

## 2022-07-12 PROCEDURE — 1125F AMNT PAIN NOTED PAIN PRSNT: CPT | Mod: CPTII,S$GLB,, | Performed by: NURSE PRACTITIONER

## 2022-07-12 PROCEDURE — 3008F PR BODY MASS INDEX (BMI) DOCUMENTED: ICD-10-PCS | Mod: CPTII,S$GLB,, | Performed by: NURSE PRACTITIONER

## 2022-07-12 PROCEDURE — 93005 EKG 12-LEAD: ICD-10-PCS | Mod: S$GLB,,, | Performed by: NURSE PRACTITIONER

## 2022-07-12 PROCEDURE — 3074F PR MOST RECENT SYSTOLIC BLOOD PRESSURE < 130 MM HG: ICD-10-PCS | Mod: CPTII,S$GLB,, | Performed by: NURSE PRACTITIONER

## 2022-07-12 RX ORDER — PANTOPRAZOLE SODIUM 20 MG/1
20 TABLET, DELAYED RELEASE ORAL DAILY
Qty: 30 TABLET | Refills: 11 | Status: SHIPPED | OUTPATIENT
Start: 2022-07-12 | End: 2023-10-24

## 2022-07-12 RX ORDER — BACLOFEN 10 MG/1
10 TABLET ORAL 2 TIMES DAILY PRN
Qty: 10 TABLET | Refills: 0 | Status: SHIPPED | OUTPATIENT
Start: 2022-07-12 | End: 2022-09-13

## 2022-07-12 NOTE — PROGRESS NOTES
"Subjective:       Patient ID: Zora Davis is a 72 y.o. female.    Chief Complaint: Back Pain and Shortness of Breath    Back Pain  This is a new (Pt states, "I think I pulled a muscle lifting myself up from the tub.") problem. The current episode started 1 to 4 weeks ago. The problem occurs daily. The problem has been gradually improving since onset. The pain is present in the thoracic spine. The quality of the pain is described as aching. The pain does not radiate. The pain is mild. The symptoms are aggravated by position. Associated symptoms include abdominal pain (epigastric). Pertinent negatives include no bladder incontinence, bowel incontinence, chest pain, dysuria, fever, headaches, leg pain, numbness, paresis, paresthesias, pelvic pain, perianal numbness, tingling, weakness or weight loss. Risk factors include menopause and obesity. She has tried nothing for the symptoms. The treatment provided moderate relief.   Gastroesophageal Reflux  She complains of abdominal pain (epigastric) and heartburn. She reports no belching, no chest pain, no choking, no coughing, no dysphagia, no early satiety, no globus sensation, no hoarse voice, no nausea, no sore throat, no stridor, no tooth decay, no water brash or no wheezing. States worse at night when lying down. This is a recurrent problem. The current episode started 1 to 4 weeks ago. The problem has been waxing and waning. The heartburn is located in the back and substernum (epigastric). The heartburn is of moderate intensity. The heartburn does not wake her from sleep. The heartburn does not limit her activity. The heartburn changes with position. The symptoms are aggravated by certain foods. Pertinent negatives include no anemia, fatigue, melena, muscle weakness, orthopnea or weight loss. Risk factors include obesity (History of gastric bypass; recent 30lb weight gain). She has tried a histamine-2 antagonist (States has taken protonix in the past) for the " symptoms. The treatment provided mild relief. Past procedures do not include an abdominal ultrasound, an EGD, esophageal manometry, esophageal pH monitoring, H. pylori antibody titer or a UGI. Past invasive treatments do not include gastroplasty, gastroplication or reflux surgery.     Past Medical History:   Diagnosis Date    COVID-19 2020    Essential hypertension 2013    Fatty liver     GERD (gastroesophageal reflux disease)     Lumbar facet arthropathy 2020    Mixed hyperlipidemia 2021    JOSE DE JESUS on CPAP     Osteopenia of multiple sites 2020    Paroxysmal atrial fibrillation 3/22/2019    Primary hypothyroidism 2010    PVC (premature ventricular contraction) 2020    Sigmoid diverticulosis      Social History     Socioeconomic History    Marital status:     Number of children: 2   Occupational History    Occupation: retired    Tobacco Use    Smoking status: Former Smoker     Packs/day: 0.25     Years: 13.00     Pack years: 3.25     Types: Cigarettes     Start date:      Quit date:      Years since quittin.5    Smokeless tobacco: Never Used   Substance and Sexual Activity    Alcohol use: Yes     Alcohol/week: 1.0 standard drink     Types: 1 Glasses of wine per week     Comment: 3 times a week    Drug use: No    Sexual activity: Yes     Partners: Male     Birth control/protection: None     Past Surgical History:   Procedure Laterality Date    BREAST BIOPSY      BREAST SURGERY       SECTION      x 2    CHOLECYSTECTOMY      COLONOSCOPY  2014    Dr. Richey; diverticulosis; repeat in 5 years    ESOPHAGOGASTRODUODENOSCOPY N/A 2018    Dr. Steel; gastritis; gastric polyps    ESOPHAGOGASTRODUODENOSCOPY N/A 2020    Procedure: EGD (ESOPHAGOGASTRODUODENOSCOPY);  Surgeon: Efrain Steel MD;  Location: [facility];  Service: Endoscopy;  Laterality: N/A;    HYSTERECTOMY      LAPAROSCOPIC LYSIS OF ADHESIONS  N/A 3/22/2019    Procedure: LYSIS, ADHESIONS, LAPAROSCOPIC;  Surgeon: Cole Armstrong MD;  Location: [facility] OR;  Service: General;  Laterality: N/A;    ROBOT-ASSISTED LAPAROSCOPIC SLEEVE GASTRECTOMY USING DA LARRY XI N/A 3/22/2019    Procedure: XI ROBOTIC SLEEVE GASTRECTOMY;  Surgeon: Cole Armstrong MD;  Location: [facility] OR;  Service: General;  Laterality: N/A;    TOTAL REDUCTION MAMMOPLASTY Bilateral     UPPER GASTROINTESTINAL ENDOSCOPY  08/13/2015    Dr. Padron       Review of Systems   Constitutional: Negative.  Negative for fatigue, fever and weight loss.   HENT: Negative.  Negative for hoarse voice and sore throat.    Eyes: Negative.    Respiratory: Negative for cough, choking and wheezing.    Cardiovascular: Negative.  Negative for chest pain.   Gastrointestinal: Positive for abdominal pain (epigastric) and heartburn. Negative for bowel incontinence, dysphagia, melena and nausea.   Endocrine: Negative.    Genitourinary: Negative.  Negative for bladder incontinence, dysuria and pelvic pain.   Musculoskeletal: Positive for back pain. Negative for leg pain and muscle weakness.   Integumentary:  Negative.   Allergic/Immunologic: Negative.    Neurological: Negative.  Negative for tingling, weakness, numbness, headaches and paresthesias.   Psychiatric/Behavioral: Negative.          Objective:      Physical Exam  Vitals and nursing note reviewed.   Constitutional:       Appearance: Normal appearance.   HENT:      Head: Normocephalic.      Right Ear: Hearing, tympanic membrane, ear canal and external ear normal.      Left Ear: Hearing, tympanic membrane, ear canal and external ear normal.      Nose: Nose normal.      Mouth/Throat:      Mouth: Mucous membranes are moist.      Pharynx: Oropharynx is clear.   Eyes:      Conjunctiva/sclera: Conjunctivae normal.      Pupils: Pupils are equal, round, and reactive to light.   Cardiovascular:      Rate and Rhythm: Normal rate and regular rhythm.      Pulses: Normal pulses.       Heart sounds: Normal heart sounds.   Pulmonary:      Effort: Pulmonary effort is normal.      Breath sounds: Normal breath sounds.   Abdominal:      General: Bowel sounds are normal.      Palpations: Abdomen is soft.      Tenderness: There is no abdominal tenderness.   Musculoskeletal:         General: Normal range of motion.      Cervical back: Normal range of motion and neck supple.   Skin:     General: Skin is warm and dry.      Capillary Refill: Capillary refill takes 2 to 3 seconds.   Neurological:      Mental Status: She is alert and oriented to person, place, and time.   Psychiatric:         Mood and Affect: Mood normal.         Behavior: Behavior normal.         Thought Content: Thought content normal.         Judgment: Judgment normal.         Assessment:       Problem List Items Addressed This Visit     GERD (gastroesophageal reflux disease) (Chronic)    Relevant Orders    H. pylori antigen, stool      Other Visit Diagnoses     Back pain, unspecified back location, unspecified back pain laterality, unspecified chronicity    -  Primary    Relevant Orders    IN OFFICE EKG 12-LEAD (to Muse)    X-Ray Thoracic Spine AP Lateral (Completed)    Epigastric pain        Relevant Orders    H. pylori antigen, stool    CT Abdomen Without Contrast    Hepatic Function Panel    Amylase    Lipase    History of gastric bypass              Plan:           Zora was seen today for back pain and gastroesophageal reflux.    Diagnoses and all orders for this visit:    Back pain, unspecified back location, unspecified back pain laterality, unspecified chronicity  -     IN OFFICE EKG 12-LEAD (to Muse)  -     X-Ray Thoracic Spine AP Lateral; Future  -     baclofen (LIORESAL) 10 MG tablet; Take 1 tablet (10 mg total) by mouth 2 (two) times daily as needed.  Avoid strenuous activity, lifting    Gastroesophageal reflux disease, unspecified whether esophagitis present  Epigastric pain  History of gastric bypass  -     H. pylori  antigen, stool; Future  -     CT Abdomen Without Contrast; Future  -     Hepatic Function Panel; Future  -     Amylase; Future  -     Lipase; Future  -     pantoprazole (PROTONIX) 20 MG tablet; Take 1 tablet (20 mg total) by mouth once daily.  Avoid GERD inducing foods, beverages    F/U with cardiologist as scheduled  Report to ER immediately if symptoms worsen or persist

## 2022-07-12 NOTE — PATIENT INSTRUCTIONS
Avoid GERD inducing foods, beverages  Avoid strenuous activity, lifting  F/U with cardiologist as scheduled  Report to ER immediately if symptoms worsen or persist

## 2022-07-26 ENCOUNTER — PATIENT MESSAGE (OUTPATIENT)
Dept: FAMILY MEDICINE | Facility: CLINIC | Age: 72
End: 2022-07-26
Payer: MEDICARE

## 2022-08-02 ENCOUNTER — TELEPHONE (OUTPATIENT)
Dept: FAMILY MEDICINE | Facility: CLINIC | Age: 72
End: 2022-08-02
Payer: MEDICARE

## 2022-08-02 NOTE — TELEPHONE ENCOUNTER
CT abd w/o contrast results received from [facility]; reviewed. No acute findings noted. Consider GI for further evaluation.

## 2022-09-13 ENCOUNTER — OFFICE VISIT (OUTPATIENT)
Dept: CARDIOLOGY | Facility: CLINIC | Age: 72
End: 2022-09-13
Payer: MEDICARE

## 2022-09-13 VITALS
SYSTOLIC BLOOD PRESSURE: 138 MMHG | WEIGHT: 206.81 LBS | OXYGEN SATURATION: 98 % | BODY MASS INDEX: 32.46 KG/M2 | HEIGHT: 67 IN | DIASTOLIC BLOOD PRESSURE: 88 MMHG | HEART RATE: 56 BPM

## 2022-09-13 DIAGNOSIS — I10 ESSENTIAL HYPERTENSION: Chronic | ICD-10-CM

## 2022-09-13 DIAGNOSIS — Z86.16 HISTORY OF COVID-19: Chronic | ICD-10-CM

## 2022-09-13 DIAGNOSIS — Z86.79 HISTORY OF ATRIAL FIBRILLATION: Primary | ICD-10-CM

## 2022-09-13 DIAGNOSIS — F17.211 CIGARETTE NICOTINE DEPENDENCE IN REMISSION: Chronic | ICD-10-CM

## 2022-09-13 DIAGNOSIS — Z91.89 FRAMINGHAM CARDIAC RISK 10-20% IN NEXT 10 YEARS: Chronic | ICD-10-CM

## 2022-09-13 DIAGNOSIS — E78.2 MIXED HYPERLIPIDEMIA: Chronic | ICD-10-CM

## 2022-09-13 DIAGNOSIS — R00.2 PALPITATIONS: ICD-10-CM

## 2022-09-13 DIAGNOSIS — Z79.899 ENCOUNTER FOR LONG-TERM (CURRENT) USE OF MEDICATIONS: ICD-10-CM

## 2022-09-13 PROCEDURE — 99214 PR OFFICE/OUTPT VISIT, EST, LEVL IV, 30-39 MIN: ICD-10-PCS | Mod: S$GLB,,, | Performed by: INTERNAL MEDICINE

## 2022-09-13 PROCEDURE — 3288F FALL RISK ASSESSMENT DOCD: CPT | Mod: CPTII,S$GLB,, | Performed by: INTERNAL MEDICINE

## 2022-09-13 PROCEDURE — 3008F PR BODY MASS INDEX (BMI) DOCUMENTED: ICD-10-PCS | Mod: CPTII,S$GLB,, | Performed by: INTERNAL MEDICINE

## 2022-09-13 PROCEDURE — 99214 OFFICE O/P EST MOD 30 MIN: CPT | Mod: S$GLB,,, | Performed by: INTERNAL MEDICINE

## 2022-09-13 PROCEDURE — 1160F RVW MEDS BY RX/DR IN RCRD: CPT | Mod: CPTII,S$GLB,, | Performed by: INTERNAL MEDICINE

## 2022-09-13 PROCEDURE — 4010F PR ACE/ARB THEARPY RXD/TAKEN: ICD-10-PCS | Mod: CPTII,S$GLB,, | Performed by: INTERNAL MEDICINE

## 2022-09-13 PROCEDURE — 1159F PR MEDICATION LIST DOCUMENTED IN MEDICAL RECORD: ICD-10-PCS | Mod: CPTII,S$GLB,, | Performed by: INTERNAL MEDICINE

## 2022-09-13 PROCEDURE — 3075F SYST BP GE 130 - 139MM HG: CPT | Mod: CPTII,S$GLB,, | Performed by: INTERNAL MEDICINE

## 2022-09-13 PROCEDURE — 4010F ACE/ARB THERAPY RXD/TAKEN: CPT | Mod: CPTII,S$GLB,, | Performed by: INTERNAL MEDICINE

## 2022-09-13 PROCEDURE — 1160F PR REVIEW ALL MEDS BY PRESCRIBER/CLIN PHARMACIST DOCUMENTED: ICD-10-PCS | Mod: CPTII,S$GLB,, | Performed by: INTERNAL MEDICINE

## 2022-09-13 PROCEDURE — 99999 PR PBB SHADOW E&M-EST. PATIENT-LVL IV: ICD-10-PCS | Mod: PBBFAC,,, | Performed by: INTERNAL MEDICINE

## 2022-09-13 PROCEDURE — 99999 PR PBB SHADOW E&M-EST. PATIENT-LVL IV: CPT | Mod: PBBFAC,,, | Performed by: INTERNAL MEDICINE

## 2022-09-13 PROCEDURE — 1101F PR PT FALLS ASSESS DOC 0-1 FALLS W/OUT INJ PAST YR: ICD-10-PCS | Mod: CPTII,S$GLB,, | Performed by: INTERNAL MEDICINE

## 2022-09-13 PROCEDURE — 3008F BODY MASS INDEX DOCD: CPT | Mod: CPTII,S$GLB,, | Performed by: INTERNAL MEDICINE

## 2022-09-13 PROCEDURE — 1101F PT FALLS ASSESS-DOCD LE1/YR: CPT | Mod: CPTII,S$GLB,, | Performed by: INTERNAL MEDICINE

## 2022-09-13 PROCEDURE — 3075F PR MOST RECENT SYSTOLIC BLOOD PRESS GE 130-139MM HG: ICD-10-PCS | Mod: CPTII,S$GLB,, | Performed by: INTERNAL MEDICINE

## 2022-09-13 PROCEDURE — 1126F AMNT PAIN NOTED NONE PRSNT: CPT | Mod: CPTII,S$GLB,, | Performed by: INTERNAL MEDICINE

## 2022-09-13 PROCEDURE — 1159F MED LIST DOCD IN RCRD: CPT | Mod: CPTII,S$GLB,, | Performed by: INTERNAL MEDICINE

## 2022-09-13 PROCEDURE — 3288F PR FALLS RISK ASSESSMENT DOCUMENTED: ICD-10-PCS | Mod: CPTII,S$GLB,, | Performed by: INTERNAL MEDICINE

## 2022-09-13 PROCEDURE — 1126F PR PAIN SEVERITY QUANTIFIED, NO PAIN PRESENT: ICD-10-PCS | Mod: CPTII,S$GLB,, | Performed by: INTERNAL MEDICINE

## 2022-09-13 PROCEDURE — 3079F PR MOST RECENT DIASTOLIC BLOOD PRESSURE 80-89 MM HG: ICD-10-PCS | Mod: CPTII,S$GLB,, | Performed by: INTERNAL MEDICINE

## 2022-09-13 PROCEDURE — 3079F DIAST BP 80-89 MM HG: CPT | Mod: CPTII,S$GLB,, | Performed by: INTERNAL MEDICINE

## 2022-09-13 RX ORDER — METOPROLOL TARTRATE 50 MG/1
50 TABLET ORAL DAILY
Qty: 110 TABLET | Refills: 3 | Status: SHIPPED | OUTPATIENT
Start: 2022-09-13 | End: 2023-09-06

## 2022-09-13 RX ORDER — METOPROLOL TARTRATE 50 MG/1
50 TABLET ORAL DAILY
Qty: 110 TABLET | Refills: 3 | Status: SHIPPED | OUTPATIENT
Start: 2022-09-13 | End: 2022-09-13 | Stop reason: SDUPTHER

## 2022-09-13 NOTE — PROGRESS NOTES
Subjective:   Patient ID:  Zora Davis is a 72 y.o. female who presents for follow-up of Follow-up and Palpitations  Palpitations improved with increased B blockers.  Patient denies CP, angina or anginal equivalent.   Pt active gardening.  Palpitations   This is a new problem. The current episode started 1 to 4 weeks ago. The problem occurs intermittently. The problem has been waxing and waning. On average, each episode lasts 5 minutes. Nothing aggravates the symptoms. Pertinent negatives include no chest pain, dizziness or shortness of breath. She has tried bed rest for the symptoms. The treatment provided mild relief.   Shortness of Breath  This is a new problem. The current episode started 1 to 4 weeks ago. The problem occurs intermittently. The problem has been waxing and waning. The average episode lasts 30 minutes. Pertinent negatives include no chest pain or leg swelling. The symptoms are aggravated by any activity. She has tried rest for the symptoms. The treatment provided mild relief.   Hypertension  This is a chronic problem. The current episode started more than 1 year ago. The problem has been gradually improving since onset. The problem is controlled. Pertinent negatives include no chest pain, palpitations or shortness of breath. Past treatments include beta blockers and diuretics. The current treatment provides moderate improvement. There are no compliance problems.      Review of Systems   Constitutional: Negative. Negative for weight gain.   HENT: Negative.     Eyes: Negative.    Cardiovascular: Negative.  Negative for chest pain, leg swelling and palpitations.   Respiratory: Negative.  Negative for shortness of breath.    Endocrine: Negative.    Hematologic/Lymphatic: Negative.    Skin: Negative.    Musculoskeletal:  Negative for muscle weakness.   Gastrointestinal: Negative.    Genitourinary: Negative.    Neurological: Negative.  Negative for dizziness.   Psychiatric/Behavioral: Negative.      Allergic/Immunologic: Negative.    All other systems reviewed and are negative.  Family History   Problem Relation Age of Onset    Colon cancer Paternal Aunt     COPD Paternal Aunt         colon    Hypertension Father     Arthritis Father     Arthritis Mother     Asthma Mother     Depression Mother     Hypertension Mother     Crohn's disease Neg Hx     Stomach cancer Neg Hx     Ulcerative colitis Neg Hx     Esophageal cancer Neg Hx      Past Medical History:   Diagnosis Date    COVID-19 2020    Essential hypertension 2013    Fatty liver     GERD (gastroesophageal reflux disease)     Lumbar facet arthropathy 2020    Mixed hyperlipidemia 2021    JOSE DE JESUS on CPAP     Osteopenia of multiple sites 2020    Paroxysmal atrial fibrillation 3/22/2019    Primary hypothyroidism 2010    PVC (premature ventricular contraction) 2020    Sigmoid diverticulosis      Social History     Socioeconomic History    Marital status:     Number of children: 2   Occupational History    Occupation: retired    Tobacco Use    Smoking status: Former     Packs/day: 0.25     Years: 13.00     Pack years: 3.25     Types: Cigarettes     Start date:      Quit date:      Years since quittin.7    Smokeless tobacco: Never   Substance and Sexual Activity    Alcohol use: Yes     Alcohol/week: 1.0 standard drink     Types: 1 Glasses of wine per week     Comment: 3 times a week    Drug use: No    Sexual activity: Yes     Partners: Male     Birth control/protection: None     Current Outpatient Medications on File Prior to Visit   Medication Sig Dispense Refill    albuterol (PROVENTIL HFA) 90 mcg/actuation inhaler Inhale 2 puffs into the lungs every 6 (six) hours as needed for Wheezing. Rescue 18 g 5    aspirin (ECOTRIN) 81 MG EC tablet Take 1 tablet (81 mg total) by mouth once daily. 90 tablet 4    budesonide (PULMICORT FLEXHALER) 90 mcg/actuation AePB Inhale 2 puffs (180 mcg total) into the  lungs 2 (two) times a day. Controller 1 each 5    co-enzyme Q-10 30 mg capsule Take 30 mg by mouth once daily.      hydroCHLOROthiazide (HYDRODIURIL) 25 MG tablet Take 1 tablet (25 mg total) by mouth once daily. 90 tablet 4    hydrOXYzine (ATARAX) 50 MG tablet Take 1 tablet (50 mg total) by mouth nightly as needed (insomnia). 90 tablet 4    levothyroxine (SYNTHROID) 50 MCG tablet Take 1 tablet (50 mcg total) by mouth before breakfast. 90 tablet 4    losartan (COZAAR) 50 MG tablet Take 1 tablet (50 mg total) by mouth once daily. 90 tablet 4    melatonin 5 mg Cap Take by mouth.      metoprolol tartrate (LOPRESSOR) 50 MG tablet Take 1 tablet (50 mg total) by mouth once daily. 90 tablet 3    pantoprazole (PROTONIX) 20 MG tablet Take 1 tablet (20 mg total) by mouth once daily. 30 tablet 11    baclofen (LIORESAL) 10 MG tablet Take 1 tablet (10 mg total) by mouth 2 (two) times daily as needed. (Patient not taking: Reported on 9/13/2022) 10 tablet 0    ipratropium (ATROVENT HFA) 17 mcg/actuation inhaler Inhale 2 puffs into the lungs every 6 (six) hours. Rescue (Patient not taking: Reported on 9/13/2022) 12.9 g 0    traZODone (DESYREL) 50 MG tablet Take 1 tablet (50 mg total) by mouth every evening. (Patient not taking: Reported on 9/13/2022) 30 tablet 11     No current facility-administered medications on file prior to visit.     Review of patient's allergies indicates:  No Known Allergies    Objective:     Physical Exam  Vitals and nursing note reviewed.   Constitutional:       Appearance: She is well-developed.   HENT:      Head: Normocephalic and atraumatic.   Eyes:      Conjunctiva/sclera: Conjunctivae normal.      Pupils: Pupils are equal, round, and reactive to light.   Cardiovascular:      Rate and Rhythm: Normal rate and regular rhythm.      Pulses: Intact distal pulses.      Heart sounds: Normal heart sounds.   Pulmonary:      Effort: Pulmonary effort is normal.      Breath sounds: Normal breath sounds.    Abdominal:      General: Bowel sounds are normal.      Palpations: Abdomen is soft.   Musculoskeletal:         General: Normal range of motion.      Cervical back: Normal range of motion and neck supple.   Skin:     General: Skin is warm and dry.   Neurological:      Mental Status: She is alert and oriented to person, place, and time.       Assessment:     1. History of atrial fibrillation    2. Palpitations    3. Essential hypertension    4. [place] cardiac risk 10-20% in next 10 years    5. Mixed hyperlipidemia    6. History of COVID-19    7. Cigarette nicotine dependence in remission        Plan:     History of atrial fibrillation    Palpitations    Essential hypertension    [place] cardiac risk 10-20% in next 10 years    Mixed hyperlipidemia    History of COVID-19    Cigarette nicotine dependence in remission        Continue losartan, metoprolol, [id number]  Check lipids    Can take also prn metoprolol

## 2022-09-20 ENCOUNTER — LAB VISIT (OUTPATIENT)
Dept: LAB | Facility: HOSPITAL | Age: 72
End: 2022-09-20
Attending: FAMILY MEDICINE
Payer: MEDICARE

## 2022-09-20 DIAGNOSIS — Z00.00 ENCOUNTER FOR MEDICAL EXAMINATION TO ESTABLISH CARE: ICD-10-CM

## 2022-09-20 DIAGNOSIS — E03.9 PRIMARY HYPOTHYROIDISM: ICD-10-CM

## 2022-09-20 DIAGNOSIS — I48.0 PAROXYSMAL ATRIAL FIBRILLATION: ICD-10-CM

## 2022-09-20 DIAGNOSIS — Z13.6 ENCOUNTER FOR LIPID SCREENING FOR CARDIOVASCULAR DISEASE: ICD-10-CM

## 2022-09-20 DIAGNOSIS — Z79.899 ENCOUNTER FOR LONG-TERM (CURRENT) USE OF MEDICATIONS: ICD-10-CM

## 2022-09-20 DIAGNOSIS — Z13.220 ENCOUNTER FOR LIPID SCREENING FOR CARDIOVASCULAR DISEASE: ICD-10-CM

## 2022-09-20 LAB
ALBUMIN SERPL BCP-MCNC: 3.8 G/DL (ref 3.5–5.2)
ALP SERPL-CCNC: 76 U/L (ref 55–135)
ALT SERPL W/O P-5'-P-CCNC: 12 U/L (ref 10–44)
ANION GAP SERPL CALC-SCNC: 8 MMOL/L (ref 8–16)
AST SERPL-CCNC: 16 U/L (ref 10–40)
BILIRUB SERPL-MCNC: 0.7 MG/DL (ref 0.1–1)
BUN SERPL-MCNC: 13 MG/DL (ref 8–23)
CALCIUM SERPL-MCNC: 9.4 MG/DL (ref 8.7–10.5)
CHLORIDE SERPL-SCNC: 106 MMOL/L (ref 95–110)
CHOLEST SERPL-MCNC: 197 MG/DL (ref 120–199)
CHOLEST/HDLC SERPL: 3.8 {RATIO} (ref 2–5)
CO2 SERPL-SCNC: 27 MMOL/L (ref 23–29)
CREAT SERPL-MCNC: 0.7 MG/DL (ref 0.5–1.4)
EST. GFR  (NO RACE VARIABLE): >60 ML/MIN/1.73 M^2
ESTIMATED AVG GLUCOSE: 105 MG/DL (ref 68–131)
GLUCOSE SERPL-MCNC: 92 MG/DL (ref 70–110)
HBA1C MFR BLD: 5.3 % (ref 4–5.6)
HDLC SERPL-MCNC: 52 MG/DL (ref 40–75)
HDLC SERPL: 26.4 % (ref 20–50)
HGB BLD-MCNC: 13.3 G/DL (ref 12–16)
LDLC SERPL CALC-MCNC: 124 MG/DL (ref 63–159)
NONHDLC SERPL-MCNC: 145 MG/DL
POTASSIUM SERPL-SCNC: 4.1 MMOL/L (ref 3.5–5.1)
PROT SERPL-MCNC: 7.4 G/DL (ref 6–8.4)
SODIUM SERPL-SCNC: 141 MMOL/L (ref 136–145)
TRIGL SERPL-MCNC: 105 MG/DL (ref 30–150)
TSH SERPL DL<=0.005 MIU/L-ACNC: 3.66 UIU/ML (ref 0.4–4)

## 2022-09-20 PROCEDURE — 80053 COMPREHEN METABOLIC PANEL: CPT | Performed by: FAMILY MEDICINE

## 2022-09-20 PROCEDURE — 84443 ASSAY THYROID STIM HORMONE: CPT | Performed by: FAMILY MEDICINE

## 2022-09-20 PROCEDURE — 85018 HEMOGLOBIN: CPT | Mod: PO | Performed by: FAMILY MEDICINE

## 2022-09-20 PROCEDURE — 80061 LIPID PANEL: CPT | Performed by: FAMILY MEDICINE

## 2022-09-20 PROCEDURE — 36415 COLL VENOUS BLD VENIPUNCTURE: CPT | Mod: PO | Performed by: FAMILY MEDICINE

## 2022-09-20 PROCEDURE — 83036 HEMOGLOBIN GLYCOSYLATED A1C: CPT | Performed by: FAMILY MEDICINE

## 2022-09-26 NOTE — PROGRESS NOTES
Make follow-up lab appointment per recommendation below.  Check to see if patient has seen the results through my chart.  If not then,  #CALL THE PATIENT# to discuss results/see if they have questions and document verification of contact. Make F/U appt if needed. [phone]    #My interpretation that was sent to them through [facility]:  Zora, I have reviewed your recent blood work.     Your hemoglobin is normal, no anemia.  Your metabolic panel which shows your glucose, kidney function, electrolytes, and liver function is normal.   Thyroid study is normal on current dosage of levothyroxine.   Your cholesterol is stable.    Your hemoglobin A1c is normal.  This test is gold standard screening test for diabetes.  It is a measures 3 months of your average blood sugar.    Will discuss in detail follow-up office visit.  Continue current medications.  Plan to recheck annual wellness labs one year a few days prior to an appointment.  =========================  Also please address any outstanding health maintenance that may be due: Shingles Vaccine(1 of 2) Never done  COVID-19 Vaccine(4 - Booster for Moderna series) due on 03/30/2022  Influenza Vaccine(1) due on 09/01/2022  DEXA Scan due on 10/12/2022

## 2022-11-29 ENCOUNTER — PATIENT MESSAGE (OUTPATIENT)
Dept: FAMILY MEDICINE | Facility: CLINIC | Age: 72
End: 2022-11-29
Payer: MEDICARE

## 2022-11-29 ENCOUNTER — TELEPHONE (OUTPATIENT)
Dept: FAMILY MEDICINE | Facility: CLINIC | Age: 72
End: 2022-11-29
Payer: MEDICARE

## 2022-11-29 DIAGNOSIS — U07.1 COVID-19: Primary | ICD-10-CM

## 2022-11-29 RX ORDER — PROMETHAZINE HYDROCHLORIDE AND DEXTROMETHORPHAN HYDROBROMIDE 6.25; 15 MG/5ML; MG/5ML
5 SYRUP ORAL EVERY 4 HOURS PRN
Qty: 120 ML | Refills: 0 | Status: SHIPPED | OUTPATIENT
Start: 2022-11-29 | End: 2022-12-09

## 2022-11-29 NOTE — TELEPHONE ENCOUNTER
----- Message from Violette Bowser sent at 11/29/2022  9:10 AM CST -----  Contact: Deisy from [facility]  Deisy is clarification on Rx Paxlovid. States needs the GFR. Pls retuen call to [phone]. Thanks DB

## 2022-11-29 NOTE — TELEPHONE ENCOUNTER
I received your message which was reviewed along with the the medication list and allergies that we have below.  Please review it for accuracy to make sure that we have the most recent records on your history.     Based on this, the following orders were placed AND/OR medicines were sent in.     Orders Placed This Encounter   Procedures    COVID-19 Home Symptom Monitoring  - Duration (days): 14     Order Specific Question:   Duration (days)     Answer:   14       Medications written and sent at this time include:  Medications Ordered This Encounter   Medications    nirmatrelvir-ritonavir 300 mg (150 mg x 2)-100 mg copackaged tablets (EUA)     Sig: Take 3 tablets by mouth 2 (two) times daily for 5 days. Each dose contains 2 nirmatrelvir (pink tablets) and 1 ritonavir (white tablet). Take all 3 tablets together     Dispense:  30 tablet     Refill:  0    promethazine-dextromethorphan (PROMETHAZINE-DM) 6.25-15 mg/5 mL Syrp     Sig: Take 5 mLs by mouth every 4 (four) hours as needed (cough).     Dispense:  120 mL     Refill:  0       Your pharmacy(ies) of choice at this time on record include the list below and any medications would have been sent to the one at the top.    [facility] - [address] [address]  Phone: [phone] Fax: [phone]    [facility] - [address] [address]  Phone: [phone] Fax: [phone]      Thank you for choosing us as your healthcare provider!  Dr. Carlos Pichardo    ALLERGY LIST  Review of patient's allergies indicates:  No Known Allergies    MEDICATION LIST  Current Outpatient Medications on File Prior to Visit   Medication Sig Dispense Refill    albuterol (PROVENTIL HFA) 90 mcg/actuation inhaler Inhale 2 puffs into the lungs every 6 (six) hours as needed for Wheezing. Rescue 18 g 5    aspirin (ECOTRIN) 81 MG EC tablet Take 1 tablet (81 mg  total) by mouth once daily. 90 tablet 4    baclofen (LIORESAL) 10 MG tablet Take 1 tablet (10 mg total) by mouth 2 (two) times daily as needed. (Patient not taking: Reported on 9/13/2022) 10 tablet 0    budesonide (PULMICORT FLEXHALER) 90 mcg/actuation AePB Inhale 2 puffs (180 mcg total) into the lungs 2 (two) times a day. Controller 1 each 5    co-enzyme Q-10 30 mg capsule Take 30 mg by mouth once daily.      hydroCHLOROthiazide (HYDRODIURIL) 25 MG tablet Take 1 tablet (25 mg total) by mouth once daily. 90 tablet 4    hydrOXYzine (ATARAX) 50 MG tablet Take 1 tablet (50 mg total) by mouth nightly as needed (insomnia). 90 tablet 4    ipratropium (ATROVENT HFA) 17 mcg/actuation inhaler Inhale 2 puffs into the lungs every 6 (six) hours. Rescue (Patient not taking: Reported on 9/13/2022) 12.9 g 0    levothyroxine (SYNTHROID) 50 MCG tablet Take 1 tablet (50 mcg total) by mouth before breakfast. 90 tablet 4    losartan (COZAAR) 50 MG tablet Take 1 tablet (50 mg total) by mouth once daily. 90 tablet 4    melatonin 5 mg Cap Take by mouth.      metoprolol tartrate (LOPRESSOR) 50 MG tablet Take 1 tablet (50 mg total) by mouth once daily. And prn palpitations 110 tablet 3    pantoprazole (PROTONIX) 20 MG tablet Take 1 tablet (20 mg total) by mouth once daily. 30 tablet 11    traZODone (DESYREL) 50 MG tablet Take 1 tablet (50 mg total) by mouth every evening. (Patient not taking: Reported on 9/13/2022) 30 tablet 11     No current facility-administered medications on file prior to visit.       HEALTH MAINTENANCE THAT IS OVERDUE OR NEEDS TO BE UPDATED ON OUR CHART IS LISTED BELOW.  IF YOU HAVE HAD IT DONE ELSEWHERE, PLEASE SEND US DATES AND RECORDS IF YOU HAVE THEM TO MAKE YOUR CHART ACCURATE.  IF YOU HAVE NOT HAD THESE DONE AND ARE READY FOR US TO SCHEDULE THEM, PLEASE SEND US A MESSAGE.  Health Maintenance Due   Topic Date Due    Shingles Vaccine (1 of 2) Never done    COVID-19 Vaccine (4 - Booster for Moderna series)  03/30/2022    Influenza Vaccine (1) 09/01/2022    DEXA Scan  10/12/2022    Mammogram  12/01/2022       DISCLAIMER: This note was compiled by using a speech recognition dictation system and therefore please be aware that typographical / speech recognition errors can and do occur.  Please contact me if you see any errors specifically.    Carlos Pichardo MD  We Offer Telehealth & Same Day Appointments!   Book your Telehealth appointment with me through my nurse or   Clinic appointments on [facility]!  [phone]     Check out my Facebook Page and Follow Me at: CLICK HERE    Check out my website at [employer] by clicking on: CLICK HERE    To Schedule appointments online, go to listedplaces: CLICK HERE     Location: [email or web address]    [address] [place] [address]    FAX: [phone]

## 2022-12-20 DIAGNOSIS — Z12.31 OTHER SCREENING MAMMOGRAM: ICD-10-CM

## 2022-12-28 ENCOUNTER — OFFICE VISIT (OUTPATIENT)
Dept: FAMILY MEDICINE | Facility: CLINIC | Age: 72
End: 2022-12-28
Payer: MEDICARE

## 2022-12-28 ENCOUNTER — PATIENT MESSAGE (OUTPATIENT)
Dept: FAMILY MEDICINE | Facility: CLINIC | Age: 72
End: 2022-12-28

## 2022-12-28 DIAGNOSIS — J06.9 UPPER RESPIRATORY TRACT INFECTION, UNSPECIFIED TYPE: Primary | ICD-10-CM

## 2022-12-28 PROCEDURE — 1159F MED LIST DOCD IN RCRD: CPT | Mod: CPTII,95,, | Performed by: NURSE PRACTITIONER

## 2022-12-28 PROCEDURE — 3044F HG A1C LEVEL LT 7.0%: CPT | Mod: CPTII,95,, | Performed by: NURSE PRACTITIONER

## 2022-12-28 PROCEDURE — 3044F PR MOST RECENT HEMOGLOBIN A1C LEVEL <7.0%: ICD-10-PCS | Mod: CPTII,95,, | Performed by: NURSE PRACTITIONER

## 2022-12-28 PROCEDURE — 4010F ACE/ARB THERAPY RXD/TAKEN: CPT | Mod: CPTII,95,, | Performed by: NURSE PRACTITIONER

## 2022-12-28 PROCEDURE — 99213 PR OFFICE/OUTPT VISIT, EST, LEVL III, 20-29 MIN: ICD-10-PCS | Mod: 95,,, | Performed by: NURSE PRACTITIONER

## 2022-12-28 PROCEDURE — 1160F PR REVIEW ALL MEDS BY PRESCRIBER/CLIN PHARMACIST DOCUMENTED: ICD-10-PCS | Mod: CPTII,95,, | Performed by: NURSE PRACTITIONER

## 2022-12-28 PROCEDURE — 99213 OFFICE O/P EST LOW 20 MIN: CPT | Mod: 95,,, | Performed by: NURSE PRACTITIONER

## 2022-12-28 PROCEDURE — 1160F RVW MEDS BY RX/DR IN RCRD: CPT | Mod: CPTII,95,, | Performed by: NURSE PRACTITIONER

## 2022-12-28 PROCEDURE — 4010F PR ACE/ARB THEARPY RXD/TAKEN: ICD-10-PCS | Mod: CPTII,95,, | Performed by: NURSE PRACTITIONER

## 2022-12-28 PROCEDURE — 1159F PR MEDICATION LIST DOCUMENTED IN MEDICAL RECORD: ICD-10-PCS | Mod: CPTII,95,, | Performed by: NURSE PRACTITIONER

## 2022-12-28 RX ORDER — PREDNISONE 20 MG/1
20 TABLET ORAL 2 TIMES DAILY
Qty: 10 TABLET | Refills: 0 | Status: SHIPPED | OUTPATIENT
Start: 2022-12-28 | End: 2023-01-02

## 2022-12-28 NOTE — PROGRESS NOTES
Subjective:       Patient ID: Zora Davis is a 72 y.o. female.    Primary Care Telemedicine Note    The patient location is:  Patient Home   The chief complaint leading to consultation is: cough  Total time spent with patient: 15 mins    Visit type: Virtual visit with synchronous audio only and video  Each patient to whom he or she provides medical services by telemedicine is:  (1) informed of the relationship between the physician and patient and the respective role of any other health care provider with respect to management of the patient; and (2) notified that he or she may decline to receive medical services by telemedicine and may withdraw from such care at any time.      Chief Complaint: Cough    Cough  This is a new problem. The current episode started in the past 7 days. The problem has been gradually worsening. The problem occurs every few minutes. The cough is Non-productive. Associated symptoms include ear congestion, headaches, nasal congestion, postnasal drip, rhinorrhea, shortness of breath and wheezing. Pertinent negatives include no chest pain, chills, ear pain, fever, heartburn, hemoptysis, myalgias, rash, sore throat, sweats or weight loss. She has tried OTC cough suppressant, a beta-agonist inhaler and steroid inhaler for the symptoms. The treatment provided mild relief. Her past medical history is significant for asthma and bronchitis. There is no history of bronchiectasis, COPD, emphysema, environmental allergies or pneumonia.     Review of Systems   Constitutional:  Negative for chills, fatigue, fever, unexpected weight change and weight loss.   HENT:  Positive for postnasal drip and rhinorrhea. Negative for ear pain and sore throat.    Eyes:  Negative for pain and visual disturbance.   Respiratory:  Positive for cough, shortness of breath and wheezing. Negative for hemoptysis.    Cardiovascular:  Negative for chest pain and palpitations.   Gastrointestinal:  Negative for abdominal  pain, diarrhea, heartburn and vomiting.   Musculoskeletal:  Negative for arthralgias and myalgias.   Skin:  Negative for color change and rash.   Allergic/Immunologic: Negative for environmental allergies.   Neurological:  Positive for headaches. Negative for dizziness.   Psychiatric/Behavioral:  Negative for dysphoric mood and sleep disturbance. The patient is not nervous/anxious.      There were no vitals filed for this visit.    Objective:     Current Outpatient Medications   Medication Sig Dispense Refill    albuterol (PROVENTIL HFA) 90 mcg/actuation inhaler Inhale 2 puffs into the lungs every 6 (six) hours as needed for Wheezing. Rescue 18 g 5    aspirin (ECOTRIN) 81 MG EC tablet Take 1 tablet (81 mg total) by mouth once daily. 90 tablet 4    baclofen (LIORESAL) 10 MG tablet Take 1 tablet (10 mg total) by mouth 2 (two) times daily as needed. (Patient not taking: Reported on 9/13/2022) 10 tablet 0    budesonide (PULMICORT FLEXHALER) 90 mcg/actuation AePB Inhale 2 puffs (180 mcg total) into the lungs 2 (two) times a day. Controller 1 each 5    co-enzyme Q-10 30 mg capsule Take 30 mg by mouth once daily.      hydroCHLOROthiazide (HYDRODIURIL) 25 MG tablet Take 1 tablet (25 mg total) by mouth once daily. 90 tablet 4    hydrOXYzine (ATARAX) 50 MG tablet Take 1 tablet (50 mg total) by mouth nightly as needed (insomnia). 90 tablet 4    ipratropium (ATROVENT HFA) 17 mcg/actuation inhaler Inhale 2 puffs into the lungs every 6 (six) hours. Rescue (Patient not taking: Reported on 9/13/2022) 12.9 g 0    levothyroxine (SYNTHROID) 50 MCG tablet Take 1 tablet (50 mcg total) by mouth before breakfast. 90 tablet 4    losartan (COZAAR) 50 MG tablet Take 1 tablet (50 mg total) by mouth once daily. 90 tablet 4    melatonin 5 mg Cap Take by mouth.      metoprolol tartrate (LOPRESSOR) 50 MG tablet Take 1 tablet (50 mg total) by mouth once daily. And prn palpitations 110 tablet 3    pantoprazole (PROTONIX) 20 MG tablet Take 1 tablet  (20 mg total) by mouth once daily. 30 tablet 11    predniSONE (DELTASONE) 20 MG tablet Take 1 tablet (20 mg total) by mouth 2 (two) times daily. for 5 days 10 tablet 0    traZODone (DESYREL) 50 MG tablet Take 1 tablet (50 mg total) by mouth every evening. (Patient not taking: Reported on 9/13/2022) 30 tablet 11     No current facility-administered medications for this visit.       Physical Exam  Constitutional:       Appearance: She is well-developed.   HENT:      Head: Normocephalic.   Pulmonary:      Effort: Pulmonary effort is normal. No respiratory distress.      Comments: + cough  Musculoskeletal:      Cervical back: Normal range of motion.   Neurological:      Mental Status: She is alert and oriented to person, place, and time.   Psychiatric:         Thought Content: Thought content normal.         Judgment: Judgment normal.       Assessment:       1. Upper respiratory tract infection, unspecified type          Plan:   Upper respiratory tract infection, unspecified type    Other orders  -     predniSONE (DELTASONE) 20 MG tablet; Take 1 tablet (20 mg total) by mouth 2 (two) times daily. for 5 days  Dispense: 10 tablet; Refill: 0    Continue albuterol, budesonide, and mucinex      Follow up if symptoms worsen or fail to improve.    There are no Patient Instructions on file for this visit.

## 2022-12-30 ENCOUNTER — PATIENT MESSAGE (OUTPATIENT)
Dept: FAMILY MEDICINE | Facility: CLINIC | Age: 72
End: 2022-12-30
Payer: MEDICARE

## 2022-12-30 RX ORDER — AMOXICILLIN AND CLAVULANATE POTASSIUM 875; 125 MG/1; MG/1
1 TABLET, FILM COATED ORAL 2 TIMES DAILY
Qty: 14 TABLET | Refills: 0 | Status: SHIPPED | OUTPATIENT
Start: 2022-12-30 | End: 2023-01-06

## 2022-12-30 NOTE — TELEPHONE ENCOUNTER
Andres received your message which was reviewed along with the the medication list and allergies that we have below.  Please review it for accuracy to make sure that we have the most recent records on your history.     Based on this, the following orders were placed AND/OR medicines were sent in.     No orders of the defined types were placed in this encounter.      Medications written and sent at this time include:  Medications Ordered This Encounter   Medications    amoxicillin-clavulanate 875-125mg (AUGMENTIN) 875-125 mg per tablet     Sig: Take 1 tablet by mouth 2 (two) times daily. Take with food. for 7 days     Dispense:  14 tablet     Refill:  0       Your pharmacy(ies) of choice at this time on record include the list below and any medications would have been sent to the one at the top.    [facility] - [address] [address]  Phone: [phone] Fax: [phone]    [facility] - [address] [address]  Phone: [phone] Fax: [phone]      Thank you for choosing us as your healthcare provider!  Dr. Carlos Pichardo    ALLERGY LIST  Review of patient's allergies indicates:  No Known Allergies    MEDICATION LIST  Current Outpatient Medications on File Prior to Visit   Medication Sig Dispense Refill    albuterol (PROVENTIL HFA) 90 mcg/actuation inhaler Inhale 2 puffs into the lungs every 6 (six) hours as needed for Wheezing. Rescue 18 g 5    aspirin (ECOTRIN) 81 MG EC tablet Take 1 tablet (81 mg total) by mouth once daily. 90 tablet 4    baclofen (LIORESAL) 10 MG tablet Take 1 tablet (10 mg total) by mouth 2 (two) times daily as needed. (Patient not taking: Reported on 9/13/2022) 10 tablet 0    budesonide (PULMICORT FLEXHALER) 90 mcg/actuation AePB Inhale 2 puffs (180 mcg total) into the lungs 2 (two) times a day. Controller 1 each 5    co-enzyme Q-10 30 mg capsule Take  30 mg by mouth once daily.      hydroCHLOROthiazide (HYDRODIURIL) 25 MG tablet Take 1 tablet (25 mg total) by mouth once daily. 90 tablet 4    hydrOXYzine (ATARAX) 50 MG tablet Take 1 tablet (50 mg total) by mouth nightly as needed (insomnia). 90 tablet 4    ipratropium (ATROVENT HFA) 17 mcg/actuation inhaler Inhale 2 puffs into the lungs every 6 (six) hours. Rescue (Patient not taking: Reported on 9/13/2022) 12.9 g 0    levothyroxine (SYNTHROID) 50 MCG tablet Take 1 tablet (50 mcg total) by mouth before breakfast. 90 tablet 4    losartan (COZAAR) 50 MG tablet Take 1 tablet (50 mg total) by mouth once daily. 90 tablet 4    melatonin 5 mg Cap Take by mouth.      metoprolol tartrate (LOPRESSOR) 50 MG tablet Take 1 tablet (50 mg total) by mouth once daily. And prn palpitations 110 tablet 3    pantoprazole (PROTONIX) 20 MG tablet Take 1 tablet (20 mg total) by mouth once daily. 30 tablet 11    predniSONE (DELTASONE) 20 MG tablet Take 1 tablet (20 mg total) by mouth 2 (two) times daily. for 5 days 10 tablet 0    traZODone (DESYREL) 50 MG tablet Take 1 tablet (50 mg total) by mouth every evening. (Patient not taking: Reported on 9/13/2022) 30 tablet 11     No current facility-administered medications on file prior to visit.       HEALTH MAINTENANCE THAT IS OVERDUE OR NEEDS TO BE UPDATED ON OUR CHART IS LISTED BELOW.  IF YOU HAVE HAD IT DONE ELSEWHERE, PLEASE SEND US DATES AND RECORDS IF YOU HAVE THEM TO MAKE YOUR CHART ACCURATE.  IF YOU HAVE NOT HAD THESE DONE AND ARE READY FOR US TO SCHEDULE THEM, PLEASE SEND US A MESSAGE.  Health Maintenance Due   Topic Date Due    Shingles Vaccine (1 of 2) Never done    COVID-19 Vaccine (5 - Booster for Moderna series) 03/30/2022    Influenza Vaccine (1) 09/01/2022    DEXA Scan  10/12/2022    Mammogram  12/01/2022       DISCLAIMER: This note was compiled by using a speech recognition dictation system and therefore please be aware that typographical / speech recognition errors can and  do occur.  Please contact me if you see any errors specifically.    Carlos Pichardo MD  We Offer Telehealth & Same Day Appointments!   Book your Telehealth appointment with me through my nurse or   Clinic appointments on [facility]!  Geassc-[phone]     Check out my Facebook Page and Follow Me at: CLICK HERE    Check out my website at [employer] by clicking on: CLICK HERE    To Schedule appointments online, go to [employer]: CLICK HERE     Location: [email or web address]    [address] [place] [address]    FAX: [phone]

## 2023-02-28 ENCOUNTER — PES CALL (OUTPATIENT)
Dept: ADMINISTRATIVE | Facility: OTHER | Age: 73
End: 2023-02-28
Payer: MEDICARE

## 2023-03-31 ENCOUNTER — TELEPHONE (OUTPATIENT)
Dept: ADMINISTRATIVE | Facility: HOSPITAL | Age: 73
End: 2023-03-31
Payer: MEDICARE

## 2023-04-20 ENCOUNTER — OFFICE VISIT (OUTPATIENT)
Dept: FAMILY MEDICINE | Facility: CLINIC | Age: 73
End: 2023-04-20
Payer: MEDICARE

## 2023-04-20 ENCOUNTER — PATIENT MESSAGE (OUTPATIENT)
Dept: FAMILY MEDICINE | Facility: CLINIC | Age: 73
End: 2023-04-20

## 2023-04-20 VITALS
TEMPERATURE: 98 F | BODY MASS INDEX: 33.64 KG/M2 | HEIGHT: 67 IN | SYSTOLIC BLOOD PRESSURE: 140 MMHG | HEART RATE: 65 BPM | DIASTOLIC BLOOD PRESSURE: 56 MMHG | WEIGHT: 214.31 LBS

## 2023-04-20 DIAGNOSIS — R10.32 LLQ PAIN: ICD-10-CM

## 2023-04-20 DIAGNOSIS — K57.92 DIVERTICULITIS: Primary | ICD-10-CM

## 2023-04-20 DIAGNOSIS — R35.0 URINARY FREQUENCY: ICD-10-CM

## 2023-04-20 LAB
BILIRUB UR QL STRIP: NEGATIVE
CLARITY UR: CLEAR
COLOR UR: YELLOW
GLUCOSE UR QL STRIP: NEGATIVE
HGB UR QL STRIP: NEGATIVE
KETONES UR QL STRIP: NEGATIVE
LEUKOCYTE ESTERASE UR QL STRIP: NEGATIVE
NITRITE UR QL STRIP: NEGATIVE
PH UR STRIP: 7.5 [PH] (ref 5–8)
PROT UR QL STRIP: NEGATIVE
SP GR UR STRIP: 1.01 (ref 1–1.03)
URN SPEC COLLECT METH UR: NORMAL

## 2023-04-20 PROCEDURE — 3288F FALL RISK ASSESSMENT DOCD: CPT | Mod: CPTII,S$GLB,, | Performed by: NURSE PRACTITIONER

## 2023-04-20 PROCEDURE — 99213 OFFICE O/P EST LOW 20 MIN: CPT | Mod: S$GLB,,, | Performed by: NURSE PRACTITIONER

## 2023-04-20 PROCEDURE — 3008F PR BODY MASS INDEX (BMI) DOCUMENTED: ICD-10-PCS | Mod: CPTII,S$GLB,, | Performed by: NURSE PRACTITIONER

## 2023-04-20 PROCEDURE — 1160F PR REVIEW ALL MEDS BY PRESCRIBER/CLIN PHARMACIST DOCUMENTED: ICD-10-PCS | Mod: CPTII,S$GLB,, | Performed by: NURSE PRACTITIONER

## 2023-04-20 PROCEDURE — 99999 PR PBB SHADOW E&M-EST. PATIENT-LVL V: CPT | Mod: PBBFAC,,, | Performed by: NURSE PRACTITIONER

## 2023-04-20 PROCEDURE — 3078F DIAST BP <80 MM HG: CPT | Mod: CPTII,S$GLB,, | Performed by: NURSE PRACTITIONER

## 2023-04-20 PROCEDURE — 1101F PT FALLS ASSESS-DOCD LE1/YR: CPT | Mod: CPTII,S$GLB,, | Performed by: NURSE PRACTITIONER

## 2023-04-20 PROCEDURE — 3078F PR MOST RECENT DIASTOLIC BLOOD PRESSURE < 80 MM HG: ICD-10-PCS | Mod: CPTII,S$GLB,, | Performed by: NURSE PRACTITIONER

## 2023-04-20 PROCEDURE — 99999 PR PBB SHADOW E&M-EST. PATIENT-LVL V: ICD-10-PCS | Mod: PBBFAC,,, | Performed by: NURSE PRACTITIONER

## 2023-04-20 PROCEDURE — 1125F AMNT PAIN NOTED PAIN PRSNT: CPT | Mod: CPTII,S$GLB,, | Performed by: NURSE PRACTITIONER

## 2023-04-20 PROCEDURE — 1101F PR PT FALLS ASSESS DOC 0-1 FALLS W/OUT INJ PAST YR: ICD-10-PCS | Mod: CPTII,S$GLB,, | Performed by: NURSE PRACTITIONER

## 2023-04-20 PROCEDURE — 4010F PR ACE/ARB THEARPY RXD/TAKEN: ICD-10-PCS | Mod: CPTII,S$GLB,, | Performed by: NURSE PRACTITIONER

## 2023-04-20 PROCEDURE — 1159F PR MEDICATION LIST DOCUMENTED IN MEDICAL RECORD: ICD-10-PCS | Mod: CPTII,S$GLB,, | Performed by: NURSE PRACTITIONER

## 2023-04-20 PROCEDURE — 1159F MED LIST DOCD IN RCRD: CPT | Mod: CPTII,S$GLB,, | Performed by: NURSE PRACTITIONER

## 2023-04-20 PROCEDURE — 3288F PR FALLS RISK ASSESSMENT DOCUMENTED: ICD-10-PCS | Mod: CPTII,S$GLB,, | Performed by: NURSE PRACTITIONER

## 2023-04-20 PROCEDURE — 3077F SYST BP >= 140 MM HG: CPT | Mod: CPTII,S$GLB,, | Performed by: NURSE PRACTITIONER

## 2023-04-20 PROCEDURE — 3077F PR MOST RECENT SYSTOLIC BLOOD PRESSURE >= 140 MM HG: ICD-10-PCS | Mod: CPTII,S$GLB,, | Performed by: NURSE PRACTITIONER

## 2023-04-20 PROCEDURE — 1125F PR PAIN SEVERITY QUANTIFIED, PAIN PRESENT: ICD-10-PCS | Mod: CPTII,S$GLB,, | Performed by: NURSE PRACTITIONER

## 2023-04-20 PROCEDURE — 3008F BODY MASS INDEX DOCD: CPT | Mod: CPTII,S$GLB,, | Performed by: NURSE PRACTITIONER

## 2023-04-20 PROCEDURE — 99213 PR OFFICE/OUTPT VISIT, EST, LEVL III, 20-29 MIN: ICD-10-PCS | Mod: S$GLB,,, | Performed by: NURSE PRACTITIONER

## 2023-04-20 PROCEDURE — 81002 URINALYSIS NONAUTO W/O SCOPE: CPT | Mod: PO | Performed by: NURSE PRACTITIONER

## 2023-04-20 PROCEDURE — 1160F RVW MEDS BY RX/DR IN RCRD: CPT | Mod: CPTII,S$GLB,, | Performed by: NURSE PRACTITIONER

## 2023-04-20 PROCEDURE — 4010F ACE/ARB THERAPY RXD/TAKEN: CPT | Mod: CPTII,S$GLB,, | Performed by: NURSE PRACTITIONER

## 2023-04-20 RX ORDER — AMOXICILLIN AND CLAVULANATE POTASSIUM 875; 125 MG/1; MG/1
1 TABLET, FILM COATED ORAL EVERY 12 HOURS
Qty: 20 TABLET | Refills: 0 | Status: SHIPPED | OUTPATIENT
Start: 2023-04-20 | End: 2023-04-30

## 2023-04-20 RX ORDER — TRAMADOL HYDROCHLORIDE 50 MG/1
50 TABLET ORAL EVERY 8 HOURS PRN
Qty: 15 TABLET | Refills: 0 | Status: SHIPPED | OUTPATIENT
Start: 2023-04-20 | End: 2023-04-25

## 2023-04-20 RX ORDER — ESTRADIOL 0.1 MG/G
CREAM VAGINAL
COMMUNITY
Start: 2022-07-28 | End: 2023-05-16

## 2023-04-20 NOTE — PROGRESS NOTES
Subjective     Patient ID: Zora Davis is a 73 y.o. female.    Chief Complaint: Abdominal Pain    Abdominal Pain  This is a new problem. The current episode started in the past 7 days. The onset quality is gradual. The problem occurs daily. The problem has been unchanged. The pain is located in the LLQ. The pain is moderate. The quality of the pain is aching. The abdominal pain does not radiate. Associated symptoms include frequency. Pertinent negatives include no anorexia, arthralgias, belching, constipation, diarrhea, dysuria, fever, flatus, headaches, hematochezia, hematuria, melena, myalgias, nausea, vomiting or weight loss. Associated symptoms comments: Pt has recurrent diverticulitis; states has had 3 episodes this year. Pt states current symptoms same as previous diverticulosis episodes. The pain is aggravated by palpation. The pain is relieved by Nothing. She has tried nothing for the symptoms. The treatment provided no relief. Prior diagnostic workup includes GI consult. There is no history of abdominal surgery, colon cancer, Crohn's disease, gallstones, GERD, irritable bowel syndrome, pancreatitis, PUD or ulcerative colitis. Patient's medical history does not include kidney stones and UTI.   Past Medical History:   Diagnosis Date    COVID-19 11/2020    Essential hypertension 6/11/2013    Fatty liver     GERD (gastroesophageal reflux disease)     Lumbar facet arthropathy 1/31/2020    Mixed hyperlipidemia 5/20/2021    JOSE DE JESUS on CPAP     Osteopenia of multiple sites 1/17/2020    Paroxysmal atrial fibrillation 3/22/2019    Primary hypothyroidism 5/11/2010    PVC (premature ventricular contraction) 4/28/2020    Sigmoid diverticulosis      Social History     Socioeconomic History    Marital status:     Number of children: 2   Occupational History    Occupation: retired    Tobacco Use    Smoking status: Former     Packs/day: 0.25     Years: 13.00     Pack years: 3.25     Types:  Cigarettes     Start date:      Quit date:      Years since quittin.3    Smokeless tobacco: Never   Substance and Sexual Activity    Alcohol use: Yes     Alcohol/week: 1.0 standard drink     Types: 1 Glasses of wine per week     Comment: 3 times a week    Drug use: No    Sexual activity: Yes     Partners: Male     Birth control/protection: None     Past Surgical History:   Procedure Laterality Date    BREAST BIOPSY      BREAST SURGERY  2000     SECTION      x 2    CHOLECYSTECTOMY      COLONOSCOPY  2014    Dr. Richey; diverticulosis; repeat in 5 years    ESOPHAGOGASTRODUODENOSCOPY N/A 2018    Dr. Steel; gastritis; gastric polyps    ESOPHAGOGASTRODUODENOSCOPY N/A 2020    Procedure: EGD (ESOPHAGOGASTRODUODENOSCOPY);  Surgeon: Efrain Steel MD;  Location: [facility];  Service: Endoscopy;  Laterality: N/A;    HYSTERECTOMY      LAPAROSCOPIC LYSIS OF ADHESIONS N/A 3/22/2019    Procedure: LYSIS, ADHESIONS, LAPAROSCOPIC;  Surgeon: Cole Armstrong MD;  Location: [facility] OR;  Service: General;  Laterality: N/A;    ROBOT-ASSISTED LAPAROSCOPIC SLEEVE GASTRECTOMY USING DA LARRY XI N/A 3/22/2019    Procedure: XI ROBOTIC SLEEVE GASTRECTOMY;  Surgeon: Cole Armstrong MD;  Location: [facility] OR;  Service: General;  Laterality: N/A;    TOTAL REDUCTION MAMMOPLASTY Bilateral     UPPER GASTROINTESTINAL ENDOSCOPY  2015    Dr. Padron       Review of Systems   Constitutional: Negative.  Negative for fever and weight loss.   HENT: Negative.     Eyes: Negative.    Respiratory: Negative.     Cardiovascular: Negative.    Gastrointestinal:  Positive for abdominal pain. Negative for anorexia, constipation, diarrhea, flatus, hematochezia, melena, nausea and vomiting.   Endocrine: Negative.    Genitourinary:  Positive for frequency. Negative for dysuria and hematuria.   Musculoskeletal: Negative.  Negative for arthralgias and myalgias.   Integumentary:  Negative.   Allergic/Immunologic: Negative.     Neurological: Negative.  Negative for headaches.   Psychiatric/Behavioral: Negative.          Objective     Physical Exam  Vitals and nursing note reviewed.   Constitutional:       Appearance: Normal appearance.   HENT:      Head: Normocephalic.      Right Ear: Tympanic membrane, ear canal and external ear normal.      Left Ear: Tympanic membrane, ear canal and external ear normal.      Nose: Nose normal.      Mouth/Throat:      Mouth: Mucous membranes are moist.      Pharynx: Oropharynx is clear.   Eyes:      Conjunctiva/sclera: Conjunctivae normal.      Pupils: Pupils are equal, round, and reactive to light.   Cardiovascular:      Rate and Rhythm: Normal rate and regular rhythm.      Pulses: Normal pulses.      Heart sounds: Normal heart sounds.   Pulmonary:      Effort: Pulmonary effort is normal.      Breath sounds: Normal breath sounds.   Abdominal:      General: Bowel sounds are normal.      Palpations: Abdomen is soft.      Tenderness: There is abdominal tenderness in the right lower quadrant. There is no right CVA tenderness, left CVA tenderness, guarding or rebound. Negative signs include Gutiérrez's sign, Rovsing's sign, McBurney's sign, psoas sign and obturator sign.   Musculoskeletal:         General: Normal range of motion.      Cervical back: Normal range of motion and neck supple.   Skin:     General: Skin is warm and dry.      Capillary Refill: Capillary refill takes 2 to 3 seconds.   Neurological:      Mental Status: She is alert and oriented to person, place, and time.   Psychiatric:         Mood and Affect: Mood normal.         Behavior: Behavior normal.         Thought Content: Thought content normal.         Judgment: Judgment normal.          Assessment and Plan     Problem List Items Addressed This Visit    None  Visit Diagnoses       Diverticulitis    -  Primary    Recurrent    Relevant Orders    Ambulatory referral/consult to Gastroenterology    LLQ pain        Relevant Orders    Ambulatory  referral/consult to Gastroenterology    Urinary frequency        Relevant Orders    Urinalysis (Completed)    Urine culture            Zora was seen today for abdominal pain.    Diagnoses and all orders for this visit:    Diverticulitis  Comments:  Recurrent  LLQ pain  Orders:  -     Ambulatory referral/consult to Gastroenterology; Future  -     amoxicillin-clavulanate 875-125mg (AUGMENTIN) 875-125 mg per tablet; Take 1 tablet by mouth every 12 (twelve) hours. for 10 days  Hydrate well  Rest  Ultram request sent to PCP to approve  Report to ER immediately if symptoms worsen or persist    Urinary frequency  -     Urinalysis  -     Urine culture; Future

## 2023-04-20 NOTE — PATIENT INSTRUCTIONS
Hydrate well  Rest  Ultram request sent to PCP to approve  Report to ER immediately if symptoms worsen or persist

## 2023-05-04 DIAGNOSIS — I10 ESSENTIAL HYPERTENSION: ICD-10-CM

## 2023-05-04 DIAGNOSIS — E03.9 HYPOTHYROIDISM (ACQUIRED): ICD-10-CM

## 2023-05-04 RX ORDER — LOSARTAN POTASSIUM 50 MG/1
TABLET ORAL
Qty: 90 TABLET | Refills: 3 | Status: SHIPPED | OUTPATIENT
Start: 2023-05-04

## 2023-05-04 RX ORDER — LEVOTHYROXINE SODIUM 50 UG/1
50 TABLET ORAL
Qty: 90 TABLET | Refills: 0 | Status: SHIPPED | OUTPATIENT
Start: 2023-05-04 | End: 2023-05-09 | Stop reason: SDUPTHER

## 2023-05-04 NOTE — TELEPHONE ENCOUNTER
----- Message from Kisha Dior sent at 5/4/2023 11:44 AM CDT -----  Contact: Vera // [facility]  Vera calling to get a rx for levothyroxine (SYNTHROID) 50 MCG tablet sent over to   [facility] - [address] [address]  Phone: [phone] Fax: [phone]     Thanks  CF

## 2023-05-04 NOTE — TELEPHONE ENCOUNTER
Refill Routing Note   Refill Routing Note   Medication(s) are not appropriate for processing by [facility] for the following reason(s):      Required vitals abnormal    [facility] action(s):  Approve  Defer Appointment due        Medication reconciliation completed: No     Appointments  past 12m or future 3m with PCP    Date Provider   Last Visit   2/15/2022 Carlos Pichardo MD   Next Visit   Visit date not found Carlos Pichardo MD   ED visits in past 90 days: 0        Note composed:12:35 PM 05/04/2023

## 2023-05-04 NOTE — TELEPHONE ENCOUNTER
Care Due:                  Date            Visit Type   Department     Provider  --------------------------------------------------------------------------------                                EP -                              PRIMARY      [facility] FAMILY  Last Visit: 02-      CARE ([facility])   MEDICINE       Carlos Pichardo  Next Visit: None Scheduled  None         None Found                                                            Last  Test          Frequency    Reason                     Performed    Due Date  --------------------------------------------------------------------------------    Office Visit  12 months..  hydroCHLOROthiazide,       02-   02-                             levothyroxine, losartan,                             traZODone................    [employer] Embedded Care Due Messages. Reference number: [id number].   5/04/2023 12:27:09 AM CDT

## 2023-05-09 DIAGNOSIS — E03.9 HYPOTHYROIDISM (ACQUIRED): ICD-10-CM

## 2023-05-09 NOTE — TELEPHONE ENCOUNTER
No care due was identified.  [facility] Embedded Care Due Messages. Reference number: [id number].   5/09/2023 3:59:38 PM CDT

## 2023-05-09 NOTE — TELEPHONE ENCOUNTER
----- Message from Nadiya Schaefer sent at 5/9/2023 12:27 PM CDT -----  Contact: Terrie/ Pharmacy  Type:  Pharmacy Calling to Clarify an RX    Name of Caller: Terrie  Pharmacy Name:   [facility] - [address] [address]  Phone: [phone] Fax: [phone]  Prescription Name: Levothyroxine (SYNTHROID) 50 MCG tablet  What do they need to clarify?: Refill is needed   Best Call Back Number: Please call her at [phone]  Additional Information:

## 2023-05-10 RX ORDER — LEVOTHYROXINE SODIUM 50 UG/1
50 TABLET ORAL
Qty: 90 TABLET | Refills: 0 | Status: SHIPPED | OUTPATIENT
Start: 2023-05-10 | End: 2023-05-10 | Stop reason: SDUPTHER

## 2023-05-10 RX ORDER — LEVOTHYROXINE SODIUM 50 UG/1
50 TABLET ORAL
Qty: 90 TABLET | Refills: 0 | Status: SHIPPED | OUTPATIENT
Start: 2023-05-10

## 2023-05-10 NOTE — TELEPHONE ENCOUNTER
Refill Decision Note   Zora Davis  is requesting a refill authorization.  Brief Assessment and Rationale for Refill:  Approve     Medication Therapy Plan:  Change of pharmacy request. Requested pharmacy listed under preferred pharmacy. Sent to [facility] in error, resent medication to [facility].      Comments:     No Care Gaps recommended.     Note composed:1:17 PM 05/10/2023

## 2023-05-12 ENCOUNTER — OFFICE VISIT (OUTPATIENT)
Dept: FAMILY MEDICINE | Facility: CLINIC | Age: 73
End: 2023-05-12
Payer: MEDICARE

## 2023-05-12 DIAGNOSIS — R05.9 COUGH, UNSPECIFIED TYPE: ICD-10-CM

## 2023-05-12 DIAGNOSIS — R52 BODY ACHES: ICD-10-CM

## 2023-05-12 DIAGNOSIS — R50.9 FEVER, UNSPECIFIED FEVER CAUSE: ICD-10-CM

## 2023-05-12 DIAGNOSIS — R51.9 NONINTRACTABLE HEADACHE, UNSPECIFIED CHRONICITY PATTERN, UNSPECIFIED HEADACHE TYPE: ICD-10-CM

## 2023-05-12 DIAGNOSIS — Z20.828 EXPOSURE TO THE FLU: Primary | ICD-10-CM

## 2023-05-12 PROCEDURE — 1159F MED LIST DOCD IN RCRD: CPT | Mod: CPTII,95,, | Performed by: NURSE PRACTITIONER

## 2023-05-12 PROCEDURE — 99213 PR OFFICE/OUTPT VISIT, EST, LEVL III, 20-29 MIN: ICD-10-PCS | Mod: 95,,, | Performed by: NURSE PRACTITIONER

## 2023-05-12 PROCEDURE — 99213 OFFICE O/P EST LOW 20 MIN: CPT | Mod: 95,,, | Performed by: NURSE PRACTITIONER

## 2023-05-12 PROCEDURE — 1160F PR REVIEW ALL MEDS BY PRESCRIBER/CLIN PHARMACIST DOCUMENTED: ICD-10-PCS | Mod: CPTII,95,, | Performed by: NURSE PRACTITIONER

## 2023-05-12 PROCEDURE — 4010F PR ACE/ARB THEARPY RXD/TAKEN: ICD-10-PCS | Mod: CPTII,95,, | Performed by: NURSE PRACTITIONER

## 2023-05-12 PROCEDURE — 1159F PR MEDICATION LIST DOCUMENTED IN MEDICAL RECORD: ICD-10-PCS | Mod: CPTII,95,, | Performed by: NURSE PRACTITIONER

## 2023-05-12 PROCEDURE — 4010F ACE/ARB THERAPY RXD/TAKEN: CPT | Mod: CPTII,95,, | Performed by: NURSE PRACTITIONER

## 2023-05-12 PROCEDURE — 1160F RVW MEDS BY RX/DR IN RCRD: CPT | Mod: CPTII,95,, | Performed by: NURSE PRACTITIONER

## 2023-05-12 RX ORDER — OSELTAMIVIR PHOSPHATE 75 MG/1
75 CAPSULE ORAL 2 TIMES DAILY
Qty: 10 CAPSULE | Refills: 0 | Status: SHIPPED | OUTPATIENT
Start: 2023-05-12 | End: 2023-05-16

## 2023-05-12 RX ORDER — AZELASTINE 1 MG/ML
1 SPRAY, METERED NASAL 2 TIMES DAILY
Qty: 30 ML | Refills: 0 | Status: SHIPPED | OUTPATIENT
Start: 2023-05-12 | End: 2023-10-24

## 2023-05-12 RX ORDER — PROMETHAZINE HYDROCHLORIDE AND DEXTROMETHORPHAN HYDROBROMIDE 6.25; 15 MG/5ML; MG/5ML
5 SYRUP ORAL 2 TIMES DAILY PRN
Qty: 180 ML | Refills: 0 | Status: SHIPPED | OUTPATIENT
Start: 2023-05-12 | End: 2023-05-22

## 2023-05-12 NOTE — PROGRESS NOTES
Subjective     Patient ID: Zora Davis is a 73 y.o. female.    Chief Complaint: No chief complaint on file.  The patient location is: [place]  The chief complaint leading to consultation is: Influenza exposure, cough, HA, fever, BA    Visit type: audiovisual    Face to Face time with patient: 20 min   minutes of total time spent on the encounter, which includes face to face time and non-face to face time preparing to see the patient (eg, review of tests), Obtaining and/or reviewing separately obtained history, Documenting clinical information in the electronic or other health record, Independently interpreting results (not separately reported) and communicating results to the patient/family/caregiver, or Care coordination (not separately reported).         Each patient to whom he or she provides medical services by telemedicine is:  (1) informed of the relationship between the physician and patient and the respective role of any other health care provider with respect to management of the patient; and (2) notified that he or she may decline to receive medical services by telemedicine and may withdraw from such care at any time.    Notes:     Cough  This is a new problem. The current episode started in the past 7 days (Pt states influenza exposure on Monday; states symptoms began on Wednesday). The problem has been rapidly worsening. The problem occurs every few minutes. The cough is Non-productive. Associated symptoms include chills, a fever (states 101 at home), headaches, myalgias, nasal congestion, postnasal drip, shortness of breath and wheezing. Pertinent negatives include no chest pain, ear congestion, ear pain, heartburn, hemoptysis, rash, rhinorrhea, sore throat, sweats or weight loss. The symptoms are aggravated by lying down. She has tried OTC cough suppressant (Pt states home COVID-19 test negative; states unable to come into clinic for COVID-19/influenza testing) for the symptoms. The treatment  provided no relief. Her past medical history is significant for asthma and bronchitis. There is no history of bronchiectasis, COPD, emphysema, environmental allergies or pneumonia.   Past Medical History:   Diagnosis Date    COVID-19 2020    Essential hypertension 2013    Fatty liver     GERD (gastroesophageal reflux disease)     Lumbar facet arthropathy 2020    Mixed hyperlipidemia 2021    JOSE DE JESUS on CPAP     Osteopenia of multiple sites 2020    Paroxysmal atrial fibrillation 3/22/2019    Primary hypothyroidism 2010    PVC (premature ventricular contraction) 2020    Sigmoid diverticulosis      Social History     Socioeconomic History    Marital status:     Number of children: 2   Occupational History    Occupation: retired    Tobacco Use    Smoking status: Former     Packs/day: 0.25     Years: 13.00     Pack years: 3.25     Types: Cigarettes     Start date:      Quit date:      Years since quittin.3    Smokeless tobacco: Never   Substance and Sexual Activity    Alcohol use: Yes     Alcohol/week: 1.0 standard drink     Types: 1 Glasses of wine per week     Comment: 3 times a week    Drug use: No    Sexual activity: Yes     Partners: Male     Birth control/protection: None     Past Surgical History:   Procedure Laterality Date    BREAST BIOPSY      BREAST SURGERY       SECTION      x 2    CHOLECYSTECTOMY      COLONOSCOPY  2014    Dr. Richey; diverticulosis; repeat in 5 years    ESOPHAGOGASTRODUODENOSCOPY N/A 2018    Dr. Steel; gastritis; gastric polyps    ESOPHAGOGASTRODUODENOSCOPY N/A 2020    Procedure: EGD (ESOPHAGOGASTRODUODENOSCOPY);  Surgeon: Efrain Steel MD;  Location: [facility];  Service: Endoscopy;  Laterality: N/A;    HYSTERECTOMY      LAPAROSCOPIC LYSIS OF ADHESIONS N/A 3/22/2019    Procedure: LYSIS, ADHESIONS, LAPAROSCOPIC;  Surgeon: Cole Armstrong MD;  Location: [facility] OR;  Service: General;  Laterality:  N/A;    ROBOT-ASSISTED LAPAROSCOPIC SLEEVE GASTRECTOMY USING DA LARRY XI N/A 3/22/2019    Procedure: XI ROBOTIC SLEEVE GASTRECTOMY;  Surgeon: Cole Armstrong MD;  Location: [facility];  Service: General;  Laterality: N/A;    TOTAL REDUCTION MAMMOPLASTY Bilateral     UPPER GASTROINTESTINAL ENDOSCOPY  08/13/2015    Dr. Padron       Review of Systems   Constitutional:  Positive for chills and fever (states 101 at home). Negative for weight loss.   HENT:  Positive for postnasal drip. Negative for ear pain, rhinorrhea and sore throat.    Eyes: Negative.    Respiratory:  Positive for cough, shortness of breath and wheezing. Negative for hemoptysis.    Cardiovascular: Negative.  Negative for chest pain.   Gastrointestinal: Negative.  Negative for heartburn.   Endocrine: Negative.    Genitourinary: Negative.    Musculoskeletal:  Positive for myalgias.   Integumentary:  Negative for rash. Negative.   Allergic/Immunologic: Negative.  Negative for environmental allergies.   Neurological:  Positive for headaches.   Psychiatric/Behavioral: Negative.          Objective     Physical Exam  Constitutional:       Appearance: Normal appearance.   Neurological:      Mental Status: She is alert.          Assessment and Plan     Problem List Items Addressed This Visit    None  Visit Diagnoses       Exposure to the flu    -  Primary    Cough, unspecified type        Nonintractable headache, unspecified chronicity pattern, unspecified headache type        Fever, unspecified fever cause        Body aches                Diagnoses and all orders for this visit:    Exposure to the flu  Cough, unspecified type  Nonintractable headache, unspecified chronicity pattern, unspecified headache type  Fever, unspecified fever cause  Body aches        -     oseltamivir (TAMIFLU) 75 MG capsule; Take 1 capsule (75 mg total) by mouth 2 (two) times daily. for 5 days  -     promethazine-dextromethorphan (PROMETHAZINE-DM) 6.25-15 mg/5 mL Syrp; Take 5 mLs by  mouth 2 (two) times daily as needed.  -     azelastine (ASTELIN) 137 mcg (0.1 %) nasal spray; 1 spray (137 mcg total) by Nasal route 2 (two) times daily. for 7 days  Hydrate well  Rest  Tylenol OTC as directed  Handout r/t tamiflu uses, potential side effects/interactions given via myChart  Report to ER immediately if symptoms worsen or persist

## 2023-05-12 NOTE — PATIENT INSTRUCTIONS
"Hydrate well  Rest  Tylenol OTC as directed  Report to ER immediately if symptoms worsen or persist    Oseltamivir: Patient drug information  Access [employer] for additional drug information, tools, and databases.  Copyright 7082-4545 [employer] All rights reserved.  Contributor Disclosures  (For additional information see "Oseltamivir: Drug information" and see "Oseltamivir: Pediatric drug information")    You must carefully read the "Consumer Information Use and Disclaimer" below in order to understand and correctly use this information.  Brand Names: [place]  Tamiflu    Brand Names: Thomas  JAMP Oseltamivir;     MAR-Oseltamivir;     MINT-Oseltamivir;     JORDANA-Oseltamivir;     Tamiflu    What is this drug used for?  It is used to treat or prevent the flu.  It may be given to you for other reasons. Talk with the doctor.    What do I need to tell my doctor BEFORE I take this drug?  If you are allergic to this drug; any part of this drug; or any other drugs, foods, or substances. Tell your doctor about the allergy and what signs you had.  If you have kidney disease.  This is not a list of all drugs or health problems that interact with this drug.  Tell your doctor and pharmacist about all of your drugs (prescription or OTC, natural products, vitamins) and health problems. You must check to make sure that it is safe for you to take this drug with all of your drugs and health problems. Do not start, stop, or change the dose of any drug without checking with your doctor.    What are some things I need to know or do while I take this drug?  All products:  Tell all of your health care providers that you take this drug. This includes your doctors, nurses, pharmacists, and dentists.  This drug is not to be taken in place of a flu shot. If your doctor told you to get the flu shot, you need to get it.  This drug does not treat the common cold.  This drug does not stop the spread of the flu to others.  Talk with your " doctor before getting a flu vaccine after taking this drug. Talk with your doctor before you take this drug if you have just gotten a flu vaccine.  Tell your doctor if you are pregnant, plan on getting pregnant, or are breast-feeding. You will need to talk about the benefits and risks to you and the baby.  Liquid (suspension):  This drug has sorbitol in it and may lead to upset stomach and diarrhea in people who have fructose intolerance. Talk with the doctor.    What are some side effects that I need to call my doctor about right away?  WARNING/CAUTION: Even though it may be rare, some people may have very bad and sometimes deadly side effects when taking a drug. Tell your doctor or get medical help right away if you have any of the following signs or symptoms that may be related to a very bad side effect:  Signs of an allergic reaction, like rash; hives; itching; red, swollen, blistered, or peeling skin with or without fever; wheezing; tightness in the chest or throat; trouble breathing, swallowing, or talking; unusual hoarseness; or swelling of the mouth, face, lips, tongue, or throat.  People with the flu can have nervous system problems and behavior problems that can lead to death. The chance may be higher in children. Call your doctor right away if you have change in thinking clearly and with logic, change in the way you act, speech problems, shakiness, seizures, or hallucinations.  A severe skin reaction (Zavala-Daryl syndrome/toxic epidermal necrolysis) may happen. It can cause severe health problems that may not go away, and sometimes death. Get medical help right away if you have signs like red, swollen, blistered, or peeling skin (with or without fever); red or irritated eyes; or sores in your mouth, throat, nose, or eyes.    What are some other side effects of this drug?  All drugs may cause side effects. However, many people have no side effects or only have minor side effects. Call your doctor or  get medical help if any of these side effects or any other side effects bother you or do not go away:  Diarrhea, upset stomach, or throwing up.  Headache.  These are not all of the side effects that may occur. If you have questions about side effects, call your doctor. Call your doctor for medical advice about side effects.  You may report side effects to your national health agency.    How is this drug best taken?  Use this drug as ordered by your doctor. Read all information given to you. Follow all instructions closely.  All products:  Take with or without food. Take with food if it causes an upset stomach.  Keep taking this drug as you have been told by your doctor or other health care provider, even if you feel well.  Capsules:  If you have trouble swallowing this drug, talk with your doctor. If your doctor tells you to, you may mix contents of the capsule with a sweet liquid like chocolate syrup, caramel topping, corn syrup, or light brown sugar dissolved in water.  A liquid (suspension) can be made from the capsules if needed. Talk with the doctor or pharmacist.  Liquid (suspension):  Shake well before use.  Measure liquid doses carefully. Use the measuring device that comes with this drug. If there is none, ask the pharmacist for a device to measure this drug.    What do I do if I miss a dose?  Take a missed dose as soon as you think about it.  If it is less than 2 hours until your next dose, skip the missed dose and go back to your normal time.  Do not take 2 doses at the same time or extra doses.    How do I store and/or throw out this drug?  Capsules:  Store at room temperature in a dry place. Do not store in a bathroom.  If a liquid (suspension) is made from the capsules, store in a refrigerator. Do not freeze. Throw away any part not used after 35 days. You may also store at room temperature. If you do, throw away any part not used after 5 days.  Liquid (suspension):  Store liquid in a refrigerator. Do  not freeze. Throw away any part not used after 17 days.  You may also store at room temperature. If you do, throw away any part not used after 10 days.  Store in a dry place. Do not store in a bathroom.  All products:  Keep all drugs in a safe place. Keep all drugs out of the reach of children and pets.  Throw away unused or  drugs. Do not flush down a toilet or pour down a drain unless you are told to do so. Check with your pharmacist if you have questions about the best way to throw out drugs. There may be drug take-back programs in your area.    General drug facts  If your symptoms or health problems do not get better or if they become worse, call your doctor.  Do not share your drugs with others and do not take anyone else's drugs.  Some drugs may have another patient information leaflet. If you have any questions about this drug, please talk with your doctor, nurse, pharmacist, or other health care provider.  If you think there has been an overdose, call your poison control center or get medical care right away. Be ready to tell or show what was taken, how much, and when it happened.

## 2023-05-16 ENCOUNTER — OFFICE VISIT (OUTPATIENT)
Dept: FAMILY MEDICINE | Facility: CLINIC | Age: 73
End: 2023-05-16
Payer: MEDICARE

## 2023-05-16 ENCOUNTER — HOSPITAL ENCOUNTER (OUTPATIENT)
Dept: RADIOLOGY | Facility: HOSPITAL | Age: 73
Discharge: HOME | End: 2023-05-16
Attending: NURSE PRACTITIONER
Payer: MEDICARE

## 2023-05-16 ENCOUNTER — TELEPHONE (OUTPATIENT)
Dept: FAMILY MEDICINE | Facility: CLINIC | Age: 73
End: 2023-05-16

## 2023-05-16 VITALS
HEIGHT: 67 IN | BODY MASS INDEX: 33.02 KG/M2 | WEIGHT: 210.38 LBS | SYSTOLIC BLOOD PRESSURE: 135 MMHG | HEART RATE: 58 BPM | DIASTOLIC BLOOD PRESSURE: 69 MMHG

## 2023-05-16 DIAGNOSIS — R06.2 WHEEZING: ICD-10-CM

## 2023-05-16 DIAGNOSIS — R05.9 COUGH, UNSPECIFIED TYPE: ICD-10-CM

## 2023-05-16 DIAGNOSIS — Z09 FOLLOW-UP EXAM: Primary | ICD-10-CM

## 2023-05-16 PROCEDURE — 71046 X-RAY EXAM CHEST 2 VIEWS: CPT | Mod: 26,,, | Performed by: RADIOLOGY

## 2023-05-16 PROCEDURE — 99213 PR OFFICE/OUTPT VISIT, EST, LEVL III, 20-29 MIN: ICD-10-PCS | Mod: ,,, | Performed by: NURSE PRACTITIONER

## 2023-05-16 PROCEDURE — 3008F BODY MASS INDEX DOCD: CPT | Mod: CPTII,,, | Performed by: NURSE PRACTITIONER

## 2023-05-16 PROCEDURE — 3288F PR FALLS RISK ASSESSMENT DOCUMENTED: ICD-10-PCS | Mod: CPTII,,, | Performed by: NURSE PRACTITIONER

## 2023-05-16 PROCEDURE — 3078F PR MOST RECENT DIASTOLIC BLOOD PRESSURE < 80 MM HG: ICD-10-PCS | Mod: CPTII,,, | Performed by: NURSE PRACTITIONER

## 2023-05-16 PROCEDURE — 3078F DIAST BP <80 MM HG: CPT | Mod: CPTII,,, | Performed by: NURSE PRACTITIONER

## 2023-05-16 PROCEDURE — 71046 X-RAY EXAM CHEST 2 VIEWS: CPT | Mod: TC,PO

## 2023-05-16 PROCEDURE — 4010F PR ACE/ARB THEARPY RXD/TAKEN: ICD-10-PCS | Mod: CPTII,,, | Performed by: NURSE PRACTITIONER

## 2023-05-16 PROCEDURE — 99213 OFFICE O/P EST LOW 20 MIN: CPT | Mod: ,,, | Performed by: NURSE PRACTITIONER

## 2023-05-16 PROCEDURE — 71046 XR CHEST PA AND LATERAL: ICD-10-PCS | Mod: 26,,, | Performed by: RADIOLOGY

## 2023-05-16 PROCEDURE — 1160F RVW MEDS BY RX/DR IN RCRD: CPT | Mod: CPTII,,, | Performed by: NURSE PRACTITIONER

## 2023-05-16 PROCEDURE — 1160F PR REVIEW ALL MEDS BY PRESCRIBER/CLIN PHARMACIST DOCUMENTED: ICD-10-PCS | Mod: CPTII,,, | Performed by: NURSE PRACTITIONER

## 2023-05-16 PROCEDURE — 3075F PR MOST RECENT SYSTOLIC BLOOD PRESS GE 130-139MM HG: ICD-10-PCS | Mod: CPTII,,, | Performed by: NURSE PRACTITIONER

## 2023-05-16 PROCEDURE — 3008F PR BODY MASS INDEX (BMI) DOCUMENTED: ICD-10-PCS | Mod: CPTII,,, | Performed by: NURSE PRACTITIONER

## 2023-05-16 PROCEDURE — 3288F FALL RISK ASSESSMENT DOCD: CPT | Mod: CPTII,,, | Performed by: NURSE PRACTITIONER

## 2023-05-16 PROCEDURE — 3075F SYST BP GE 130 - 139MM HG: CPT | Mod: CPTII,,, | Performed by: NURSE PRACTITIONER

## 2023-05-16 PROCEDURE — 99999 PR PBB SHADOW E&M-EST. PATIENT-LVL V: ICD-10-PCS | Mod: PBBFAC,,, | Performed by: NURSE PRACTITIONER

## 2023-05-16 PROCEDURE — 1101F PT FALLS ASSESS-DOCD LE1/YR: CPT | Mod: CPTII,,, | Performed by: NURSE PRACTITIONER

## 2023-05-16 PROCEDURE — 1101F PR PT FALLS ASSESS DOC 0-1 FALLS W/OUT INJ PAST YR: ICD-10-PCS | Mod: CPTII,,, | Performed by: NURSE PRACTITIONER

## 2023-05-16 PROCEDURE — 1159F MED LIST DOCD IN RCRD: CPT | Mod: CPTII,,, | Performed by: NURSE PRACTITIONER

## 2023-05-16 PROCEDURE — 1126F PR PAIN SEVERITY QUANTIFIED, NO PAIN PRESENT: ICD-10-PCS | Mod: CPTII,,, | Performed by: NURSE PRACTITIONER

## 2023-05-16 PROCEDURE — 1159F PR MEDICATION LIST DOCUMENTED IN MEDICAL RECORD: ICD-10-PCS | Mod: CPTII,,, | Performed by: NURSE PRACTITIONER

## 2023-05-16 PROCEDURE — 4010F ACE/ARB THERAPY RXD/TAKEN: CPT | Mod: CPTII,,, | Performed by: NURSE PRACTITIONER

## 2023-05-16 PROCEDURE — 99999 PR PBB SHADOW E&M-EST. PATIENT-LVL V: CPT | Mod: PBBFAC,,, | Performed by: NURSE PRACTITIONER

## 2023-05-16 PROCEDURE — 1126F AMNT PAIN NOTED NONE PRSNT: CPT | Mod: CPTII,,, | Performed by: NURSE PRACTITIONER

## 2023-05-16 RX ORDER — METHYLPREDNISOLONE 4 MG/1
TABLET ORAL
Qty: 21 TABLET | Refills: 0 | Status: SHIPPED | OUTPATIENT
Start: 2023-05-16 | End: 2023-06-06

## 2023-05-16 NOTE — PROGRESS NOTES
Subjective     Patient ID: Zora Davis is a 73 y.o. female.    Chief Complaint: Cough (Exposure to flu)    Cough  This is a recurrent (Treated for influenza on 23 via telehealth visit) problem. The current episode started in the past 7 days. The problem has been unchanged. The problem occurs every few minutes. The cough is Non-productive. Associated symptoms include wheezing. Pertinent negatives include no chest pain, chills, ear congestion, ear pain, fever, headaches, heartburn, hemoptysis, myalgias, nasal congestion, postnasal drip, rash, rhinorrhea, sore throat, shortness of breath, sweats or weight loss. The symptoms are aggravated by lying down. She has tried a beta-agonist inhaler, body position changes, prescription cough suppressant and steroid inhaler (Declines medication for cough) for the symptoms. The treatment provided mild relief. Her past medical history is significant for asthma and bronchitis. There is no history of bronchiectasis, COPD, emphysema, environmental allergies or pneumonia.   Past Medical History:   Diagnosis Date    COVID-19 2020    Essential hypertension 2013    Fatty liver     GERD (gastroesophageal reflux disease)     Lumbar facet arthropathy 2020    Mixed hyperlipidemia 2021    JOSE DE JESUS on CPAP     Osteopenia of multiple sites 2020    Paroxysmal atrial fibrillation 3/22/2019    Primary hypothyroidism 2010    PVC (premature ventricular contraction) 2020    Sigmoid diverticulosis      Social History     Socioeconomic History    Marital status:     Number of children: 2   Occupational History    Occupation: retired    Tobacco Use    Smoking status: Former     Packs/day: 0.25     Years: 13.00     Pack years: 3.25     Types: Cigarettes     Start date:      Quit date:      Years since quittin.4    Smokeless tobacco: Never   Substance and Sexual Activity    Alcohol use: Yes     Alcohol/week: 1.0 standard drink      Types: 1 Glasses of wine per week     Comment: 3 times a week    Drug use: No    Sexual activity: Yes     Partners: Male     Birth control/protection: None     Past Surgical History:   Procedure Laterality Date    BREAST BIOPSY      BREAST SURGERY  2000     SECTION      x 2    CHOLECYSTECTOMY      COLONOSCOPY  2014    Dr. Richey; diverticulosis; repeat in 5 years    ESOPHAGOGASTRODUODENOSCOPY N/A 2018    Dr. Steel; gastritis; gastric polyps    ESOPHAGOGASTRODUODENOSCOPY N/A 2020    Procedure: EGD (ESOPHAGOGASTRODUODENOSCOPY);  Surgeon: Efrain Steel MD;  Location: [facility] END[facility];  Service: Endoscopy;  Laterality: N/A;    HYSTERECTOMY      LAPAROSCOPIC LYSIS OF ADHESIONS N/A 3/22/2019    Procedure: LYSIS, ADHESIONS, LAPAROSCOPIC;  Surgeon: Cole Armstrong MD;  Location: [facility] OR;  Service: General;  Laterality: N/A;    ROBOT-ASSISTED LAPAROSCOPIC SLEEVE GASTRECTOMY USING DA LARRY XI N/A 3/22/2019    Procedure: XI ROBOTIC SLEEVE GASTRECTOMY;  Surgeon: Cole Armstrong MD;  Location: [facility] OR;  Service: General;  Laterality: N/A;    TOTAL REDUCTION MAMMOPLASTY Bilateral     UPPER GASTROINTESTINAL ENDOSCOPY  2015    Dr. Padron       Review of Systems   Constitutional: Negative.  Negative for chills, fever and weight loss.   HENT: Negative.  Negative for ear pain, postnasal drip, rhinorrhea and sore throat.    Eyes: Negative.    Respiratory:  Positive for cough and wheezing. Negative for hemoptysis and shortness of breath.    Cardiovascular: Negative.  Negative for chest pain.   Gastrointestinal: Negative.  Negative for heartburn.   Endocrine: Negative.    Genitourinary: Negative.    Musculoskeletal: Negative.  Negative for myalgias.   Integumentary:  Negative for rash. Negative.   Allergic/Immunologic: Negative.  Negative for environmental allergies.   Neurological: Negative.  Negative for headaches.   Psychiatric/Behavioral: Negative.          Objective     Physical Exam  Vitals and nursing  note reviewed.   Constitutional:       Appearance: Normal appearance.   HENT:      Head: Normocephalic.      Right Ear: Tympanic membrane, ear canal and external ear normal.      Left Ear: Tympanic membrane, ear canal and external ear normal.      Nose: Nose normal.      Mouth/Throat:      Mouth: Mucous membranes are moist.      Pharynx: Oropharynx is clear.   Eyes:      Conjunctiva/sclera: Conjunctivae normal.      Pupils: Pupils are equal, round, and reactive to light.   Cardiovascular:      Rate and Rhythm: Normal rate and regular rhythm.      Pulses: Normal pulses.      Heart sounds: Normal heart sounds.   Pulmonary:      Effort: Pulmonary effort is normal.      Breath sounds: Examination of the right-upper field reveals wheezing. Examination of the left-upper field reveals wheezing. Examination of the right-lower field reveals wheezing. Examination of the left-lower field reveals wheezing. Wheezing present.   Abdominal:      General: Bowel sounds are normal.      Palpations: Abdomen is soft.   Musculoskeletal:         General: Normal range of motion.      Cervical back: Normal range of motion and neck supple.   Skin:     General: Skin is warm and dry.      Capillary Refill: Capillary refill takes 2 to 3 seconds.   Neurological:      Mental Status: She is alert and oriented to person, place, and time.   Psychiatric:         Mood and Affect: Mood normal.         Behavior: Behavior normal.         Thought Content: Thought content normal.         Judgment: Judgment normal.          Assessment and Plan     Problem List Items Addressed This Visit    None  Visit Diagnoses       Follow-up exam    -  Primary    Influenza    Cough, unspecified type        Relevant Orders    X-Ray Chest PA And Lateral (Completed)    Wheezing        Relevant Orders    X-Ray Chest PA And Lateral (Completed)            Zora was seen today for cough.    Diagnoses and all orders for this visit:    Follow-up  exam  Comments:  Influenza  Cough, unspecified type  Wheezing  -     X-Ray Chest PA And Lateral; Future        -     ipratropium-albuteroL (COMBIVENT)  mcg/actuation inhaler; Inhale 1 puff into the lungs every 6 (six) hours as needed for Wheezing. Rescue  Hydrate well  Rest  Continue current medications  Report to ER immediately if symptoms worsen or persist

## 2023-05-16 NOTE — PATIENT INSTRUCTIONS
Hold albuterol while using combivent  Hydrate well  Rest  Continue current medications  Report to ER immediately if symptoms worsen or persist

## 2023-05-16 NOTE — TELEPHONE ENCOUNTER
----- Message from Pastora Brito sent at 5/16/2023  4:00 PM CDT -----  Contact: [phone]  Patient is requesting a steroid pack be sent to   [facility] - [address] [address]  Phone: [phone] Fax: [phone]      Please call her back at [phone]. Thanks

## 2023-05-25 ENCOUNTER — PES CALL (OUTPATIENT)
Dept: ADMINISTRATIVE | Facility: CLINIC | Age: 73
End: 2023-05-25
Payer: MEDICARE

## 2023-05-29 DIAGNOSIS — I10 ESSENTIAL HYPERTENSION: ICD-10-CM

## 2023-05-29 DIAGNOSIS — Z79.899 ENCOUNTER FOR LONG-TERM (CURRENT) USE OF MEDICATIONS: ICD-10-CM

## 2023-05-29 RX ORDER — HYDROCHLOROTHIAZIDE 25 MG/1
TABLET ORAL
Qty: 90 TABLET | Refills: 0 | Status: SHIPPED | OUTPATIENT
Start: 2023-05-29 | End: 2023-08-28 | Stop reason: SDUPTHER

## 2023-05-29 NOTE — TELEPHONE ENCOUNTER
No care due was identified.  [facility] Embedded Care Due Messages. Reference number: [id number].   5/29/2023 1:05:24 AM CDT

## 2023-06-12 RX ORDER — IPRATROPIUM BROMIDE AND ALBUTEROL 20; 100 UG/1; UG/1
SPRAY, METERED RESPIRATORY (INHALATION)
Qty: 4 G | Refills: 0 | Status: SHIPPED | OUTPATIENT
Start: 2023-06-12

## 2023-08-28 DIAGNOSIS — Z79.899 ENCOUNTER FOR LONG-TERM (CURRENT) USE OF MEDICATIONS: ICD-10-CM

## 2023-08-28 DIAGNOSIS — I10 ESSENTIAL HYPERTENSION: ICD-10-CM

## 2023-08-28 RX ORDER — HYDROCHLOROTHIAZIDE 25 MG/1
TABLET ORAL
Qty: 90 TABLET | Refills: 0 | Status: SHIPPED | OUTPATIENT
Start: 2023-08-28 | End: 2023-11-27

## 2023-08-28 NOTE — TELEPHONE ENCOUNTER
Discharge Summary/Instructions after an Endoscopic Procedure  Patient Name: Eliza Tenorio  Patient MRN: [id number]  Patient YOB: Refill Routing Note   Medication(s) are not appropriate for processing by [facility] for the following reason(s):      Patient not seen by provider within 15 months    [facility] action(s):  Defer Care Due:  Appointment due  Labs due            Appointments  past 12m or future 3m with PCP    Date Provider   Last Visit   2/15/2022 Carlos Pichardo MD   Next Visit   Visit date not found Carlos Pichardo MD   ED visits in past 90 days: 0        Note composed:2:25 PM 08/28/2023           tablespoon   Any other symptom or condition that you feel may need urgent attention  Your doctor recommends these additional instructions:  If any biopsies were taken, your doctors clinic will contact you in 1 to 2   weeks with any results.  - Discharge patient to home.   - Repeat colonoscopy at the next available appointment per protocol with   advanced endoscopist for EMR removal of large ascending colon polyps.  - Refer to a gastroenterologist as previously scheduled.   - The findings and recommendations were discussed with the patient.   - Return to referring physician.  For questions, problems or results please call your physician - Gary Day MD at Work:  [phone].  [facility], EMERGENCY ROOM PHONE NUMBER: [phone]  

## 2023-08-28 NOTE — TELEPHONE ENCOUNTER
Care Due:                  Date            Visit Type   Department     Provider  --------------------------------------------------------------------------------                                EP -                              PRIMARY      [facility] FAM[facility]  Last Visit: 02-      CARE ([facility])   MEDICINE       Carlos Pichardo  Next Visit: None Scheduled  None         None Found                                                            Last  Test          Frequency    Reason                     Performed    Due Date  --------------------------------------------------------------------------------    Office Visit  12 months..  hydroCHLOROthiazide,       02-   02-                             levothyroxine, traZODone.    CMP.........  12 months..  hydroCHLOROthiazide......  09-   09-    TSH.........  12 months..  levothyroxine............  09-   09-    [facility] Embedded Care Due Messages. Reference number: [id number].   8/28/2023 1:19:12 AM CDT

## 2023-09-04 DIAGNOSIS — I10 ESSENTIAL HYPERTENSION: Chronic | ICD-10-CM

## 2023-09-04 DIAGNOSIS — Z79.899 ENCOUNTER FOR LONG-TERM (CURRENT) USE OF MEDICATIONS: ICD-10-CM

## 2023-09-06 RX ORDER — METOPROLOL TARTRATE 50 MG/1
TABLET ORAL
Qty: 110 TABLET | Refills: 3 | Status: SHIPPED | OUTPATIENT
Start: 2023-09-06

## 2023-10-24 ENCOUNTER — HOSPITAL ENCOUNTER (OUTPATIENT)
Dept: CARDIOLOGY | Facility: HOSPITAL | Age: 73
Discharge: HOME | End: 2023-10-24
Attending: INTERNAL MEDICINE
Payer: MEDICARE

## 2023-10-24 ENCOUNTER — OFFICE VISIT (OUTPATIENT)
Dept: CARDIOLOGY | Facility: CLINIC | Age: 73
End: 2023-10-24
Payer: MEDICARE

## 2023-10-24 VITALS
HEART RATE: 56 BPM | DIASTOLIC BLOOD PRESSURE: 90 MMHG | SYSTOLIC BLOOD PRESSURE: 160 MMHG | WEIGHT: 207 LBS | BODY MASS INDEX: 32.49 KG/M2 | HEIGHT: 67 IN

## 2023-10-24 DIAGNOSIS — E78.2 MIXED HYPERLIPIDEMIA: Chronic | ICD-10-CM

## 2023-10-24 DIAGNOSIS — F17.211 CIGARETTE NICOTINE DEPENDENCE IN REMISSION: Chronic | ICD-10-CM

## 2023-10-24 DIAGNOSIS — Z01.810 PREOP CARDIOVASCULAR EXAM: ICD-10-CM

## 2023-10-24 DIAGNOSIS — Z86.79 HISTORY OF ATRIAL FIBRILLATION: Primary | ICD-10-CM

## 2023-10-24 DIAGNOSIS — I10 ESSENTIAL HYPERTENSION: Chronic | ICD-10-CM

## 2023-10-24 DIAGNOSIS — R00.2 PALPITATIONS: ICD-10-CM

## 2023-10-24 PROCEDURE — 3077F PR MOST RECENT SYSTOLIC BLOOD PRESSURE >= 140 MM HG: ICD-10-PCS | Mod: CPTII,S$GLB,, | Performed by: INTERNAL MEDICINE

## 2023-10-24 PROCEDURE — 99999 PR PBB SHADOW E&M-EST. PATIENT-LVL III: ICD-10-PCS | Mod: PBBFAC,,, | Performed by: INTERNAL MEDICINE

## 2023-10-24 PROCEDURE — 3080F DIAST BP >= 90 MM HG: CPT | Mod: CPTII,S$GLB,, | Performed by: INTERNAL MEDICINE

## 2023-10-24 PROCEDURE — 99215 PR OFFICE/OUTPT VISIT, EST, LEVL V, 40-54 MIN: ICD-10-PCS | Mod: S$GLB,,, | Performed by: INTERNAL MEDICINE

## 2023-10-24 PROCEDURE — 3008F PR BODY MASS INDEX (BMI) DOCUMENTED: ICD-10-PCS | Mod: CPTII,S$GLB,, | Performed by: INTERNAL MEDICINE

## 2023-10-24 PROCEDURE — 1101F PR PT FALLS ASSESS DOC 0-1 FALLS W/OUT INJ PAST YR: ICD-10-PCS | Mod: CPTII,S$GLB,, | Performed by: INTERNAL MEDICINE

## 2023-10-24 PROCEDURE — 3288F FALL RISK ASSESSMENT DOCD: CPT | Mod: CPTII,S$GLB,, | Performed by: INTERNAL MEDICINE

## 2023-10-24 PROCEDURE — 99999 PR PBB SHADOW E&M-EST. PATIENT-LVL III: CPT | Mod: PBBFAC,,, | Performed by: INTERNAL MEDICINE

## 2023-10-24 PROCEDURE — 3044F PR MOST RECENT HEMOGLOBIN A1C LEVEL <7.0%: ICD-10-PCS | Mod: CPTII,S$GLB,, | Performed by: INTERNAL MEDICINE

## 2023-10-24 PROCEDURE — 4010F ACE/ARB THERAPY RXD/TAKEN: CPT | Mod: CPTII,S$GLB,, | Performed by: INTERNAL MEDICINE

## 2023-10-24 PROCEDURE — 3080F PR MOST RECENT DIASTOLIC BLOOD PRESSURE >= 90 MM HG: ICD-10-PCS | Mod: CPTII,S$GLB,, | Performed by: INTERNAL MEDICINE

## 2023-10-24 PROCEDURE — 93010 ELECTROCARDIOGRAM REPORT: CPT | Mod: ,,, | Performed by: INTERNAL MEDICINE

## 2023-10-24 PROCEDURE — 3288F PR FALLS RISK ASSESSMENT DOCUMENTED: ICD-10-PCS | Mod: CPTII,S$GLB,, | Performed by: INTERNAL MEDICINE

## 2023-10-24 PROCEDURE — 3044F HG A1C LEVEL LT 7.0%: CPT | Mod: CPTII,S$GLB,, | Performed by: INTERNAL MEDICINE

## 2023-10-24 PROCEDURE — 4010F PR ACE/ARB THEARPY RXD/TAKEN: ICD-10-PCS | Mod: CPTII,S$GLB,, | Performed by: INTERNAL MEDICINE

## 2023-10-24 PROCEDURE — 3008F BODY MASS INDEX DOCD: CPT | Mod: CPTII,S$GLB,, | Performed by: INTERNAL MEDICINE

## 2023-10-24 PROCEDURE — 3077F SYST BP >= 140 MM HG: CPT | Mod: CPTII,S$GLB,, | Performed by: INTERNAL MEDICINE

## 2023-10-24 PROCEDURE — 1159F MED LIST DOCD IN RCRD: CPT | Mod: CPTII,S$GLB,, | Performed by: INTERNAL MEDICINE

## 2023-10-24 PROCEDURE — 93005 ELECTROCARDIOGRAM TRACING: CPT | Mod: PO

## 2023-10-24 PROCEDURE — 99215 OFFICE O/P EST HI 40 MIN: CPT | Mod: S$GLB,,, | Performed by: INTERNAL MEDICINE

## 2023-10-24 PROCEDURE — 93010 EKG 12-LEAD: ICD-10-PCS | Mod: ,,, | Performed by: INTERNAL MEDICINE

## 2023-10-24 PROCEDURE — 1101F PT FALLS ASSESS-DOCD LE1/YR: CPT | Mod: CPTII,S$GLB,, | Performed by: INTERNAL MEDICINE

## 2023-10-24 PROCEDURE — 1159F PR MEDICATION LIST DOCUMENTED IN MEDICAL RECORD: ICD-10-PCS | Mod: CPTII,S$GLB,, | Performed by: INTERNAL MEDICINE

## 2023-10-24 NOTE — PROGRESS NOTES
Subjective:   Patient ID:  Zora Davis is a 73 y.o. female who presents for follow-up of No chief complaint on file.  Stress echo and cardiac monitor (-) 2022    Pt with recent CP and palpitations.  CP- at rest or exertion, L sided, heaviness, lasting a few minutes.  Pt also palpitations chronic  relieved with metoprolol. Sx 1-2x week  Pt planned for colectomy  Palpitations   This is a new problem. The current episode started 1 to 4 weeks ago. The problem occurs intermittently. The problem has been waxing and waning. On average, each episode lasts 5 minutes. Nothing aggravates the symptoms. Pertinent negatives include no chest pain, dizziness or shortness of breath. She has tried bed rest for the symptoms. The treatment provided mild relief.   Shortness of Breath  This is a new problem. The current episode started 1 to 4 weeks ago. The problem occurs intermittently. The problem has been waxing and waning. The average episode lasts 30 minutes. Pertinent negatives include no chest pain or leg swelling. The symptoms are aggravated by any activity. She has tried rest for the symptoms. The treatment provided mild relief.   Hypertension  This is a chronic problem. The current episode started more than 1 year ago. The problem has been gradually improving since onset. The problem is controlled. Pertinent negatives include no chest pain, palpitations or shortness of breath. Past treatments include beta blockers and diuretics. The current treatment provides moderate improvement. There are no compliance problems.        Review of Systems   Constitutional: Negative. Negative for weight gain.   HENT: Negative.     Eyes: Negative.    Cardiovascular: Negative.  Negative for chest pain, leg swelling and palpitations.   Respiratory: Negative.  Negative for shortness of breath.    Endocrine: Negative.    Hematologic/Lymphatic: Negative.    Skin: Negative.    Musculoskeletal:  Negative for muscle weakness.   Gastrointestinal:  Negative.    Genitourinary: Negative.    Neurological: Negative.  Negative for dizziness.   Psychiatric/Behavioral: Negative.     Allergic/Immunologic: Negative.    All other systems reviewed and are negative.    Family History   Problem Relation Age of Onset    Colon cancer Paternal Aunt     COPD Paternal Aunt         colon    Hypertension Father     Arthritis Father     Arthritis Mother     Asthma Mother     Depression Mother     Hypertension Mother     Crohn's disease Neg Hx     Stomach cancer Neg Hx     Ulcerative colitis Neg Hx     Esophageal cancer Neg Hx      Past Medical History:   Diagnosis Date    COVID-19 2020    Essential hypertension 2013    Fatty liver     GERD (gastroesophageal reflux disease)     Lumbar facet arthropathy 2020    Mixed hyperlipidemia 2021    JOSE DE JESUS on CPAP     Osteopenia of multiple sites 2020    Paroxysmal atrial fibrillation 3/22/2019    Primary hypothyroidism 2010    PVC (premature ventricular contraction) 2020    Sigmoid diverticulosis      Social History     Socioeconomic History    Marital status:     Number of children: 2   Occupational History    Occupation: retired    Tobacco Use    Smoking status: Former     Current packs/day: 0.00     Average packs/day: 0.3 packs/day for 13.0 years (3.3 total pack years)     Types: Cigarettes     Start date:      Quit date:      Years since quittin.8    Smokeless tobacco: Never   Substance and Sexual Activity    Alcohol use: Yes     Alcohol/week: 1.0 standard drink of alcohol     Types: 1 Glasses of wine per week     Comment: 3 times a week    Drug use: No    Sexual activity: Yes     Partners: Male     Birth control/protection: None     Current Outpatient Medications on File Prior to Visit   Medication Sig Dispense Refill    albuterol (PROVENTIL HFA) 90 mcg/actuation inhaler Inhale 2 puffs into the lungs every 6 (six) hours as needed for Wheezing. Rescue 18 g 5    aspirin  (ECOTRIN) 81 MG EC tablet Take 1 tablet (81 mg total) by mouth once daily. 90 tablet 4    azelastine (ASTELIN) 137 mcg (0.1 %) nasal spray 1 spray (137 mcg total) by Nasal route 2 (two) times daily. for 7 days 30 mL 0    baclofen (LIORESAL) 10 MG tablet Take 1 tablet (10 mg total) by mouth 2 (two) times daily as needed. (Patient not taking: Reported on 9/13/2022) 10 tablet 0    budesonide (PULMICORT FLEXHALER) 90 mcg/actuation AePB Inhale 2 puffs (180 mcg total) into the lungs 2 (two) times a day. Controller 1 each 5    co-enzyme Q-10 30 mg capsule Take 30 mg by mouth once daily.      COMBIVENT RESPIMAT  mcg/actuation inhaler INHALE 1 PUFF EVERY 6 HOURS AS NEEDED 4 g 0    hydroCHLOROthiazide (HYDRODIURIL) 25 MG tablet TAKE 1 TABLET DAILY 90 tablet 0    hydrOXYzine (ATARAX) 50 MG tablet Take 1 tablet (50 mg total) by mouth nightly as needed (insomnia). (Patient not taking: Reported on 5/16/2023) 90 tablet 4    levothyroxine (SYNTHROID) 50 MCG tablet Take 1 tablet (50 mcg total) by mouth before breakfast. 90 tablet 0    losartan (COZAAR) 50 MG tablet TAKE 1 TABLET DAILY 90 tablet 3    melatonin 5 mg Cap Take by mouth.      metoprolol tartrate (LOPRESSOR) 50 MG tablet TAKE 1 TABLET ONCE DAILY AND AS NEEDED FOR PALPITATIONS 110 tablet 3    pantoprazole (PROTONIX) 20 MG tablet Take 1 tablet (20 mg total) by mouth once daily. (Patient not taking: Reported on 5/16/2023) 30 tablet 11    traZODone (DESYREL) 50 MG tablet Take 1 tablet (50 mg total) by mouth every evening. (Patient not taking: Reported on 9/13/2022) 30 tablet 11     No current facility-administered medications on file prior to visit.     Review of patient's allergies indicates:  No Known Allergies    Objective:     Physical Exam  Vitals and nursing note reviewed.   Constitutional:       Appearance: She is well-developed.   HENT:      Head: Normocephalic and atraumatic.   Eyes:      Conjunctiva/sclera: Conjunctivae normal.      Pupils: Pupils are equal,  round, and reactive to light.   Cardiovascular:      Rate and Rhythm: Normal rate and regular rhythm.      Pulses: Intact distal pulses.      Heart sounds: Normal heart sounds.   Pulmonary:      Effort: Pulmonary effort is normal.      Breath sounds: Normal breath sounds.   Abdominal:      General: Bowel sounds are normal.      Palpations: Abdomen is soft.   Musculoskeletal:         General: Normal range of motion.      Cervical back: Normal range of motion and neck supple.   Skin:     General: Skin is warm and dry.   Neurological:      Mental Status: She is alert and oriented to person, place, and time.         Assessment:     1. History of atrial fibrillation    2. Palpitations    3. Essential hypertension    4. Mixed hyperlipidemia    5. Cigarette nicotine dependence in remission      Now NSR  Plan:     History of atrial fibrillation    Palpitations    Essential hypertension    Mixed hyperlipidemia    Cigarette nicotine dependence in remission      Continue losartan, metoprolol, [id number]  Check lipids     Can take also prn metoprolol           NMT/stress

## 2023-11-03 ENCOUNTER — PATIENT MESSAGE (OUTPATIENT)
Dept: CARDIOLOGY | Facility: CLINIC | Age: 73
End: 2023-11-03
Payer: MEDICARE

## 2023-11-27 DIAGNOSIS — Z79.899 ENCOUNTER FOR LONG-TERM (CURRENT) USE OF MEDICATIONS: ICD-10-CM

## 2023-11-27 DIAGNOSIS — I10 ESSENTIAL HYPERTENSION: ICD-10-CM

## 2023-11-27 RX ORDER — HYDROCHLOROTHIAZIDE 25 MG/1
TABLET ORAL
Qty: 90 TABLET | Refills: 1 | Status: SHIPPED | OUTPATIENT
Start: 2023-11-27

## 2023-11-27 NOTE — TELEPHONE ENCOUNTER
Refill Routing Note   Medication(s) are not appropriate for processing by [facility] for the following reason(s):        Patient not seen by provider within 15 months    [facility] action(s):  Defer   Requires appointment : Yes     Requires labs : Yes             Appointments  past 12m or future 3m with PCP    Date Provider   Last Visit   2/15/2022 Carlos Pichardo MD   Next Visit   Visit date not found Carlos Pichardo MD   ED visits in past 90 days: 0        Note composed:3:26 PM 11/27/2023

## 2024-04-29 DIAGNOSIS — I10 ESSENTIAL HYPERTENSION: ICD-10-CM

## 2024-04-30 RX ORDER — LOSARTAN POTASSIUM 50 MG/1
TABLET ORAL
Qty: 90 TABLET | Refills: 0 | Status: SHIPPED | OUTPATIENT
Start: 2024-04-30

## 2024-04-30 NOTE — TELEPHONE ENCOUNTER
Refill Routing Note   Medication(s) are not appropriate for processing by [facility] for the following reason(s):        Non-participating provider    [facility] action(s):  Route      Medication Therapy Plan: No PCP listed in chart. Routing to last prescribing physician      Appointments  past 12m or future 3m with PCP    Date Provider   Last Visit   Visit date not found Holly Schaefer NP   Next Visit   Visit date not found Holly Schaefer NP   ED visits in past 90 days: 0        Note composed:9:26 PM 04/29/2024

## 2024-05-14 NOTE — ASSESSMENT & PLAN NOTE
CPAP qhs      SW met with pt to provide support and assistance with psychosocial needs. Pt was in good spirits, and communicated he is still recovering from radiation but doing well. PT inquired about assistance to get personal hygiene products. SW provided pt with [employer] card to assist. Pt thanked SW and denied further needs. SW encouraged pt to continue to reach out should needs arise.      Interventions:  Practical Needs: Food (provided [employer] card)

## 2024-05-18 ENCOUNTER — PATIENT MESSAGE (OUTPATIENT)
Dept: FAMILY MEDICINE | Facility: CLINIC | Age: 74
End: 2024-05-18
Payer: MEDICARE

## 2024-05-21 ENCOUNTER — PATIENT MESSAGE (OUTPATIENT)
Dept: ADMINISTRATIVE | Facility: HOSPITAL | Age: 74
End: 2024-05-21
Payer: MEDICARE

## 2024-05-23 DIAGNOSIS — Z79.899 ENCOUNTER FOR LONG-TERM (CURRENT) USE OF MEDICATIONS: ICD-10-CM

## 2024-05-23 DIAGNOSIS — I10 ESSENTIAL HYPERTENSION: ICD-10-CM

## 2024-05-24 ENCOUNTER — PATIENT MESSAGE (OUTPATIENT)
Dept: FAMILY MEDICINE | Facility: CLINIC | Age: 74
End: 2024-05-24
Payer: MEDICARE

## 2024-05-24 RX ORDER — HYDROCHLOROTHIAZIDE 25 MG/1
TABLET ORAL
Qty: 90 TABLET | Refills: 0 | Status: SHIPPED | OUTPATIENT
Start: 2024-05-24

## 2024-05-24 NOTE — TELEPHONE ENCOUNTER
Care Due:                  Date            Visit Type   Department     Provider  --------------------------------------------------------------------------------    Last Visit: None Found      None         None Found                              EP -                              PRIMARY      [facility] FAM[facility]  Next Visit: 08-      CARE ([facility])   MEDICINE       Carlos Pichardo                                                            Last  Test          Frequency    Reason                     Performed    Due Date  --------------------------------------------------------------------------------    CMP.........  12 months..  hydroCHLOROthiazide......  09-   09-    TSH.........  12 months..  levothyroxine............  09-   09-    [facility] Embedded Care Due Messages. Reference number: [id number].   5/23/2024 11:12:12 PM CDT

## 2024-06-06 DIAGNOSIS — M25.562 LEFT KNEE PAIN, UNSPECIFIED CHRONICITY: Primary | ICD-10-CM

## 2024-06-13 ENCOUNTER — HOSPITAL ENCOUNTER (OUTPATIENT)
Dept: RADIOLOGY | Facility: HOSPITAL | Age: 74
Discharge: HOME | End: 2024-06-13
Attending: NURSE PRACTITIONER
Payer: MEDICARE

## 2024-06-13 ENCOUNTER — OFFICE VISIT (OUTPATIENT)
Dept: ORTHOPEDICS | Facility: CLINIC | Age: 74
End: 2024-06-13
Payer: MEDICARE

## 2024-06-13 VITALS — BODY MASS INDEX: 32.49 KG/M2 | WEIGHT: 207 LBS | HEIGHT: 67 IN

## 2024-06-13 DIAGNOSIS — M25.562 LEFT KNEE PAIN, UNSPECIFIED CHRONICITY: ICD-10-CM

## 2024-06-13 DIAGNOSIS — S89.92XA LEFT KNEE INJURY, INITIAL ENCOUNTER: Primary | ICD-10-CM

## 2024-06-13 PROCEDURE — 99214 OFFICE O/P EST MOD 30 MIN: CPT | Mod: S$GLB,,, | Performed by: NURSE PRACTITIONER

## 2024-06-13 PROCEDURE — 3008F BODY MASS INDEX DOCD: CPT | Mod: CPTII,S$GLB,, | Performed by: NURSE PRACTITIONER

## 2024-06-13 PROCEDURE — 1159F MED LIST DOCD IN RCRD: CPT | Mod: CPTII,S$GLB,, | Performed by: NURSE PRACTITIONER

## 2024-06-13 PROCEDURE — 4010F ACE/ARB THERAPY RXD/TAKEN: CPT | Mod: CPTII,S$GLB,, | Performed by: NURSE PRACTITIONER

## 2024-06-13 PROCEDURE — 73562 X-RAY EXAM OF KNEE 3: CPT | Mod: 26,LT,, | Performed by: RADIOLOGY

## 2024-06-13 PROCEDURE — 3288F FALL RISK ASSESSMENT DOCD: CPT | Mod: CPTII,S$GLB,, | Performed by: NURSE PRACTITIONER

## 2024-06-13 PROCEDURE — 99999 PR PBB SHADOW E&M-EST. PATIENT-LVL III: CPT | Mod: PBBFAC,,, | Performed by: NURSE PRACTITIONER

## 2024-06-13 PROCEDURE — 1101F PT FALLS ASSESS-DOCD LE1/YR: CPT | Mod: CPTII,S$GLB,, | Performed by: NURSE PRACTITIONER

## 2024-06-13 PROCEDURE — 73560 X-RAY EXAM OF KNEE 1 OR 2: CPT | Mod: 26,59,RT, | Performed by: RADIOLOGY

## 2024-06-13 PROCEDURE — 1126F AMNT PAIN NOTED NONE PRSNT: CPT | Mod: CPTII,S$GLB,, | Performed by: NURSE PRACTITIONER

## 2024-06-13 PROCEDURE — 3044F HG A1C LEVEL LT 7.0%: CPT | Mod: CPTII,S$GLB,, | Performed by: NURSE PRACTITIONER

## 2024-06-13 PROCEDURE — 1160F RVW MEDS BY RX/DR IN RCRD: CPT | Mod: CPTII,S$GLB,, | Performed by: NURSE PRACTITIONER

## 2024-06-13 PROCEDURE — 73560 X-RAY EXAM OF KNEE 1 OR 2: CPT | Mod: TC,PO,RT

## 2024-06-13 RX ORDER — CELECOXIB 200 MG/1
200 CAPSULE ORAL DAILY PRN
Qty: 90 CAPSULE | Refills: 0 | Status: SHIPPED | OUTPATIENT
Start: 2024-06-13 | End: 2024-09-11

## 2024-06-13 NOTE — H&P
Chief Complaint   Patient presents with    Right Knee - Pain, Injury     DOI 24, pt twisted her knee        HPI:    This is a 74 y.o. who presents today complaining of right knee pain for 11 days after twisting injury. States she was having sinus issues and was walking out of urgent care and when leaving step down threshold and twisted and felt immediate pain. Was unable to bear full weight until about 5 or 6 days ago. Prior to that she was having to use walker. Has done hot and cold compresses, rested, used a brace (which she thought might've aggravated). Pain is aching now only with activity or bearing weight. No numbness or tingling. No associated signs or symptoms.      Past Medical History:   Diagnosis Date    COVID-19 2020    Essential hypertension 2013    Fatty liver     GERD (gastroesophageal reflux disease)     Lumbar facet arthropathy 2020    Mixed hyperlipidemia 2021    JOSE DE JESUS on CPAP     Osteopenia of multiple sites 2020    Paroxysmal atrial fibrillation 3/22/2019    Primary hypothyroidism 2010    PVC (premature ventricular contraction) 2020    Sigmoid diverticulosis       Past Surgical History:   Procedure Laterality Date    BREAST BIOPSY      BREAST SURGERY       SECTION      x 2    CHOLECYSTECTOMY      COLONOSCOPY  2014    Dr. Richey; diverticulosis; repeat in 5 years    ESOPHAGOGASTRODUODENOSCOPY N/A 2018    Dr. Steel; gastritis; gastric polyps    ESOPHAGOGASTRODUODENOSCOPY N/A 2020    Procedure: EGD (ESOPHAGOGASTRODUODENOSCOPY);  Surgeon: Efrain Steel MD;  Location: [facility] ENDO;  Service: Endoscopy;  Laterality: N/A;    HYSTERECTOMY      LAPAROSCOPIC LYSIS OF ADHESIONS N/A 3/22/2019    Procedure: LYSIS, ADHESIONS, LAPAROSCOPIC;  Surgeon: Cole Armstrong MD;  Location: [facility] OR;  Service: General;  Laterality: N/A;    ROBOT-ASSISTED LAPAROSCOPIC SLEEVE GASTRECTOMY USING DA LARRY XI N/A 3/22/2019    Procedure: XI ROBOTIC SLEEVE  GASTRECTOMY;  Surgeon: Cole Armstrong MD;  Location: [facility];  Service: General;  Laterality: N/A;    TOTAL REDUCTION MAMMOPLASTY Bilateral     UPPER GASTROINTESTINAL ENDOSCOPY  08/13/2015    Dr. Padron      Current Outpatient Medications on File Prior to Visit   Medication Sig Dispense Refill    albuterol (PROVENTIL HFA) 90 mcg/actuation inhaler Inhale 2 puffs into the lungs every 6 (six) hours as needed for Wheezing. Rescue 18 g 5    co-enzyme Q-10 30 mg capsule Take 30 mg by mouth once daily.      COMBIVENT RESPIMAT  mcg/actuation inhaler INHALE 1 PUFF EVERY 6 HOURS AS NEEDED 4 g 0    ergocalciferol, vitamin D2, (VITAMIN D ORAL) Take by mouth Daily.      hydroCHLOROthiazide (HYDRODIURIL) 25 MG tablet TAKE 1 TABLET DAILY 90 tablet 0    hydrOXYzine (ATARAX) 50 MG tablet Take 1 tablet (50 mg total) by mouth nightly as needed (insomnia). 90 tablet 4    levothyroxine (SYNTHROID) 50 MCG tablet Take 1 tablet (50 mcg total) by mouth before breakfast. 90 tablet 0    losartan (COZAAR) 50 MG tablet TAKE 1 TABLET DAILY 90 tablet 0    melatonin 5 mg Cap Take by mouth.      metoprolol tartrate (LOPRESSOR) 50 MG tablet TAKE 1 TABLET ONCE DAILY AND AS NEEDED FOR PALPITATIONS 110 tablet 3    aspirin (ECOTRIN) 81 MG EC tablet Take 1 tablet (81 mg total) by mouth once daily. 90 tablet 4    azelastine (ASTELIN) 137 mcg (0.1 %) nasal spray 1 spray (137 mcg total) by Nasal route 2 (two) times daily. for 7 days 30 mL 0    baclofen (LIORESAL) 10 MG tablet Take 1 tablet (10 mg total) by mouth 2 (two) times daily as needed. 10 tablet 0    budesonide (PULMICORT FLEXHALER) 90 mcg/actuation AePB Inhale 2 puffs (180 mcg total) into the lungs 2 (two) times a day. Controller 1 each 5    pantoprazole (PROTONIX) 20 MG tablet Take 1 tablet (20 mg total) by mouth once daily. 30 tablet 11    traZODone (DESYREL) 50 MG tablet Take 1 tablet (50 mg total) by mouth every evening. 30 tablet 11     No current facility-administered medications on  file prior to visit.      Review of patient's allergies indicates:   Allergen Reactions    Hydrocodone-acetaminophen Itching    Trazodone Other (See Comments)     Tongue tingling      Family History not pertinent   Social History     Socioeconomic History    Marital status:     Number of children: 2   Occupational History    Occupation: retired    Tobacco Use    Smoking status: Former     Current packs/day: 0.00     Average packs/day: 0.3 packs/day for 13.0 years (3.3 ttl pk-yrs)     Types: Cigarettes     Start date:      Quit date:      Years since quittin.4    Smokeless tobacco: Never   Substance and Sexual Activity    Alcohol use: Yes     Alcohol/week: 1.0 standard drink of alcohol     Types: 1 Glasses of wine per week     Comment: 3 times a week    Drug use: No    Sexual activity: Yes     Partners: Male     Birth control/protection: None     Social Determinants of Health     Food Insecurity: Unknown (2024)    Received from [facility]    Hunger Vital Sign     Ran Out of Food in the Last Year: Never true   Transportation Needs: Unknown (2024)    Received from [facility]    PRAPARE - Transportation     Lack of Transportation (Medical): No         Review of Systems:   Constitutional:  Denies fever or chills    Eyes:  Denies change in visual acuity    HENT:  Denies nasal congestion or sore throat    Respiratory:  Denies cough or shortness of breath    Cardiovascular:  Denies chest pain or edema    GI:  Denies abdominal pain, nausea, vomiting, bloody stools or diarrhea    :  Denies dysuria    Integument:  Denies rash    Neurologic:  Denies headache, focal weakness or sensory changes    Endocrine:  Denies polyuria or polydipsia    Lymphatic:  Denies swollen glands    Psychiatric:  Denies depression or anxiety       Physical Exam:    Constitutional:  Well developed, well nourished, no acute distress, non-toxic appearance    Integument:  Well  hydrated  Neurologic:  Alert & oriented x 3  Psychiatric:  Speech and behavior appropriate      Bilateral Knee Exam    left Knee Exam   Tenderness   The patient is experiencing tenderness in the medial joint line.    Range of Motion   Flexion: abnormal     Muscle Strength   The patient has normal  knee strength.    Tests   Lew:  Medial - positive   Lachman:  Anterior - negative      Varus: negative  Valgus: negative  Patellar Apprehension: negative      Other   Erythema: absent  Sensation: normal  Pulse: present  Swelling: mild    right Knee exam   Knee exam performed same as contralateral side and is normal        X-rays were performed today, personally reviewed by me and findings discussed with the patient.  3 views of the left knee show tricompartmental degenerative changes and medial joint space narrowing bilaterally.           Assessment & plan     Left knee injury, initial encounter  -     celecoxib (CELEBREX) 200 MG capsule; Take 1 capsule (200 mg total) by mouth daily as needed for Pain.  Dispense: 90 capsule; Refill: 0      Take medications as prescribed.   Continue ice, rest, elevation and and activity modifications and call if pain persists.

## 2024-06-20 ENCOUNTER — PATIENT MESSAGE (OUTPATIENT)
Dept: ORTHOPEDICS | Facility: CLINIC | Age: 74
End: 2024-06-20
Payer: MEDICARE

## 2024-07-29 DIAGNOSIS — I10 ESSENTIAL HYPERTENSION: ICD-10-CM

## 2024-07-30 ENCOUNTER — HOSPITAL ENCOUNTER (OUTPATIENT)
Dept: RADIOLOGY | Facility: HOSPITAL | Age: 74
Discharge: HOME | End: 2024-07-30
Attending: NURSE PRACTITIONER
Payer: MEDICARE

## 2024-07-30 ENCOUNTER — OFFICE VISIT (OUTPATIENT)
Dept: FAMILY MEDICINE | Facility: CLINIC | Age: 74
End: 2024-07-30
Payer: MEDICARE

## 2024-07-30 VITALS
WEIGHT: 201.81 LBS | OXYGEN SATURATION: 100 % | HEIGHT: 67 IN | SYSTOLIC BLOOD PRESSURE: 125 MMHG | BODY MASS INDEX: 31.67 KG/M2 | HEART RATE: 73 BPM | TEMPERATURE: 97 F | DIASTOLIC BLOOD PRESSURE: 62 MMHG

## 2024-07-30 DIAGNOSIS — K59.00 CONSTIPATION, UNSPECIFIED CONSTIPATION TYPE: ICD-10-CM

## 2024-07-30 DIAGNOSIS — Z09 FOLLOW-UP EXAM: Primary | ICD-10-CM

## 2024-07-30 DIAGNOSIS — T88.7XXA MEDICATION SIDE EFFECT: ICD-10-CM

## 2024-07-30 DIAGNOSIS — R42 VERTIGO: ICD-10-CM

## 2024-07-30 LAB
BILIRUB UR QL STRIP: NEGATIVE
CLARITY UR: CLEAR
COLOR UR: YELLOW
GLUCOSE UR QL STRIP: NEGATIVE
HGB UR QL STRIP: NEGATIVE
KETONES UR QL STRIP: NEGATIVE
LEUKOCYTE ESTERASE UR QL STRIP: NEGATIVE
NITRITE UR QL STRIP: NEGATIVE
PH UR STRIP: 6 [PH] (ref 5–8)
PROT UR QL STRIP: NEGATIVE
SP GR UR STRIP: 1.01 (ref 1–1.03)
URN SPEC COLLECT METH UR: NORMAL

## 2024-07-30 PROCEDURE — 74019 RADEX ABDOMEN 2 VIEWS: CPT | Mod: TC,PO

## 2024-07-30 PROCEDURE — 1101F PT FALLS ASSESS-DOCD LE1/YR: CPT | Mod: CPTII,S$GLB,, | Performed by: NURSE PRACTITIONER

## 2024-07-30 PROCEDURE — 74019 RADEX ABDOMEN 2 VIEWS: CPT | Mod: 26,,, | Performed by: RADIOLOGY

## 2024-07-30 PROCEDURE — 3074F SYST BP LT 130 MM HG: CPT | Mod: CPTII,S$GLB,, | Performed by: NURSE PRACTITIONER

## 2024-07-30 PROCEDURE — 3288F FALL RISK ASSESSMENT DOCD: CPT | Mod: CPTII,S$GLB,, | Performed by: NURSE PRACTITIONER

## 2024-07-30 PROCEDURE — 1126F AMNT PAIN NOTED NONE PRSNT: CPT | Mod: CPTII,S$GLB,, | Performed by: NURSE PRACTITIONER

## 2024-07-30 PROCEDURE — 4010F ACE/ARB THERAPY RXD/TAKEN: CPT | Mod: CPTII,S$GLB,, | Performed by: NURSE PRACTITIONER

## 2024-07-30 PROCEDURE — 81003 URINALYSIS AUTO W/O SCOPE: CPT | Mod: PO | Performed by: NURSE PRACTITIONER

## 2024-07-30 PROCEDURE — 99214 OFFICE O/P EST MOD 30 MIN: CPT | Mod: S$GLB,,, | Performed by: NURSE PRACTITIONER

## 2024-07-30 PROCEDURE — 99999 PR PBB SHADOW E&M-EST. PATIENT-LVL V: CPT | Mod: PBBFAC,,, | Performed by: NURSE PRACTITIONER

## 2024-07-30 PROCEDURE — 3008F BODY MASS INDEX DOCD: CPT | Mod: CPTII,S$GLB,, | Performed by: NURSE PRACTITIONER

## 2024-07-30 PROCEDURE — 1159F MED LIST DOCD IN RCRD: CPT | Mod: CPTII,S$GLB,, | Performed by: NURSE PRACTITIONER

## 2024-07-30 PROCEDURE — 1160F RVW MEDS BY RX/DR IN RCRD: CPT | Mod: CPTII,S$GLB,, | Performed by: NURSE PRACTITIONER

## 2024-07-30 PROCEDURE — 3078F DIAST BP <80 MM HG: CPT | Mod: CPTII,S$GLB,, | Performed by: NURSE PRACTITIONER

## 2024-07-30 PROCEDURE — 3044F HG A1C LEVEL LT 7.0%: CPT | Mod: CPTII,S$GLB,, | Performed by: NURSE PRACTITIONER

## 2024-07-30 RX ORDER — ESZOPICLONE 2 MG/1
2 TABLET, FILM COATED ORAL NIGHTLY PRN
COMMUNITY

## 2024-07-30 RX ORDER — ALPRAZOLAM 0.5 MG/1
0.5 TABLET ORAL
COMMUNITY
Start: 2024-07-16

## 2024-07-30 RX ORDER — MECLIZINE HYDROCHLORIDE 25 MG/1
25 TABLET ORAL 2 TIMES DAILY PRN
Qty: 14 TABLET | Refills: 0 | Status: SHIPPED | OUTPATIENT
Start: 2024-07-30 | End: 2024-08-06

## 2024-07-30 RX ORDER — LOSARTAN POTASSIUM 50 MG/1
TABLET ORAL
Qty: 90 TABLET | Refills: 3 | Status: SHIPPED | OUTPATIENT
Start: 2024-07-30

## 2024-07-30 NOTE — PROGRESS NOTES
Subjective     Patient ID: Zora Davis is a 74 y.o. female.    Chief Complaint: Follow-up (ER), Dizziness, and Nausea    Follow-up  Chronicity: Pt went to [facility] on 7/25/24 with c/o nausea, dizziness, constipation; states began following ozempic increase. The current episode started in the past 7 days. The problem occurs daily. The problem has been gradually improving. Associated symptoms include vertigo. Pertinent negatives include no abdominal pain, anorexia, arthralgias, change in bowel habit, chest pain, chills, congestion, coughing, diaphoresis, fatigue, fever, headaches, joint swelling, myalgias, nausea, neck pain, numbness, rash, sore throat, swollen glands, urinary symptoms, visual change, vomiting or weakness. Associated symptoms comments: Nausea, constipation. Nothing aggravates the symptoms. Treatments tried: Pt stopped ozempic. The treatment provided mild (Labs, radiology studies done at ER; results below) relief.     MRI Brain Without Contrast  Order: [id number]  Impression     1.  No acute intracranial abnormality.   2.  Parenchymal atrophy and chronic microvascular ischemia.      Electronically signed by Chema Jerez MD on 7/25/2024 1:19 PM  Narrative    REASON FOR EXAM: vertigo    TECHNICAL FACTORS: Multiplanar, multisequence MRI of the brain was performed without administration of intravenous contrast.    COMPARISON: None    FINDINGS: Prominent ventricles and sulci, likely mild parenchymal atrophy. There is no evidence of acute intracranial hemorrhage or infarct.  There is no evidence of mass, mass effect, or midline shift.  Periventricular white matter T2/FLAIR  hyperintensity, likely moderate chronic microvascular ischemia. The visualized orbits are normal in appearance.  Paranasal sinuses are clear.  Normal flow voids are present.  Exam End: 07/25/24 12:29    Specimen Collected: 07/25/24 13:16 Last Resulted: 07/25/24 13:19   Received From: [facility]  Result Received:  07/29/24 12:09     CT Abdomen Pelvis With IV Contrast  Order: [id number]  Narrative    REASON FOR EXAM: abdominal pain, hx of diverticulitis, but also recently on ozempic    TECHNICAL FACTORS: Multiple contiguous axial CT images were obtained of the abdomen and pelvis after administration of intravenous contrast. 2D reformatted images were performed. Automated exposure control was utilized for radiation dose reduction.    COMPARISON: 10/19/2023    FINDINGS:  Soft tissues/Musculature: Unremarkable  Osseous Structures: Degenerative changes of the visualized spine.    Lung bases: Cardiomegaly.  Aorta/Vasculature: Moderate atherosclerotic disease.  Lymph nodes: Unremarkable  Liver: Unremarkable  Gallbladder/Biliary System: Status post cholecystectomy  Pancreas: Unremarkable  Spleen: Unremarkable  Adrenal glands: Unremarkable  Kidneys/Ureters: Left upper pole renal simple cyst. Right renal parapelvic simple cyst.  Urinary bladder: Unremarkable  Reproductive organs: Status post hysterectomy.  Peritoneum/Mesentery: Unremarkable  Bowel/Stomach: There is bowel anastomosis suture material in the rectum. No evidence of bowel obstruction. Gastric sleeve surgical change.  Appendix: Unremarkable    IMPRESSION:   1.  No CT evidence of acute pathology within the abdomen or pelvis.   2.  Cholecystectomy, hysterectomy, sleeve gastrectomy, and partial colectomy.  3. Mild cardiomegaly.  4. Additional incidental, age-related, and/or chronic findings, as described above.        Electronically signed by Carie Carvalho MD on 7/25/2024 1:36 PM  Exam End: 07/25/24 13:26    Specimen Collected: 07/25/24 13:29 Last Resulted: 07/25/24 13:36   Received From: [facility]  Result Received: 07/29/24 12:09     BMP w/ Reflex to Mg  Order: [id number]   Ref Range & Units 5 d ago   Glucose 65 - 99 mg/dL 88   Sodium 136 - 144 mmol/L 138   Potassium 3.6 - 5.1 mmol/L 3.6   Chloride 101 - 111 mmol/L 101   CO2 22 - 32 mmol/L 27   Blood Urea  Nitrogen 8 - 20 mg/dL 14   Calcium 8.9 - 10.3 mg/dL 10.0   Creatinine 0.60 - 1.10 mg/dL 0.81   Anion Gap 7 - 16 mmol/L 10   Resulting Agency  [facility]     Specimen Collected: 07/25/24 12:05    Performed by: [facility] Last Resulted: 07/25/24 12:55   Received From: [facility]  Result Received: 07/29/24 12:09     HEPATIC FUNCTION PANEL  Order: [id number]   Ref Range & Units 5 d ago   Albumin 3.5 - 4.8 g/dL 4.4   Total Bilirubin 0.4 - 2.0 mg/dL 0.6   Bilirubin, Direct 0.0 - 0.4 mg/dL 0.2   Bilirubin, Indirect 0.0 - 1.1 mg/dL 0.4   Total Protein 6.1 - 7.9 g/dL 8.0 High    ALT 5 - 41 U/L 16   AST 10 - 34 U/L 19   Alkaline Phosphatase 28 - 116 U/L 63   Resulting Agency  [facility]     Specimen Collected: 07/25/24 12:05    Performed by: [facility] Last Resulted: 07/25/24 12:55   Received From: [facility]  Result Received: 07/29/24 12:09     LIPASE  Order: [id number]   Ref Range & Units 5 d ago   Lipase 8 - 57 U/L 50   Resulting Agency  [facility]     Specimen Collected: 07/25/24 12:05    Performed by: [facility] Last Resulted: 07/25/24 12:55   Received From: [facility]  Result Received: 07/29/24 12:09     TROPONIN I  Order: [id number]  Status: Final result       Next appt: 08/05/2024 at 10:00 AM in Cardiology (Myles Granado MD)         Component Ref Range & Units 5 d ago   Troponin-I 0.00 - 0.04 ng/mL <0.03   Comment: Troponin I values of >0.49 ng/mL are recommended for diagnosis  of Acute Myocardial Infarction, as this yields optimal  performance of sensitivity and specificity.  After a myocardial infarction, cardiac Troponin I increases  quickly and peaks (12-10 hours), then returns to normal in  4-9 days.  Any condition resulting in myocardial cell damage  can potentially elevate cardiac Troponin I levels - i.e.  Unstable Angina, CHF, Myocarditis, Cardiac Surgery, or  invasive testing.  Interpret Troponin I results in the light  of the total clinical history, Troponin I  results from serial  samples, additional lab tests, ECG's, etc.   Resulting [facility]              Specimen Collected: 07/25/24 12:05 CDT    Performed By: [facility] Last Resulted: 07/25/24 12:58 CDT   Received From: [facility]  Result Received: 07/29/24 12:09               ESTIMATED GLOMERULAR FILTRATION RATE  Order: [id number]   Ref Range & Units 5 d ago   GFR Non  >59 mL/min >60   eGFR African American >59 mL/min >60   Comment:              STAGES OF CHRONIC KIDNEY DISEASE  STAGE          DESCRIPTION           GFR(mL/min/1.73 m2)  3         Moderate decrease GFR            30-59  4           Severe decrease GFR            15-29  5              Kidney Failure         <15 (or dialysis)  Chronic kidney disease is defined as either kidney damage  or GFR <60mL/min/1.73 m2 for >=3 months. Kidney damage is  defined as pathologic abnormalities or markers of damage,  including abnormalities in blood or urine tests or imaging  studies.  GFR is not calculated for patients under the age of 18.   Resulting [facility]     Specimen Collected: 07/25/24 12:05    Performed by: [facility] Last Resulted: 07/25/24 12:55   Received From: [facility]  Result Received: 07/29/24 12:09     Review of Systems   Constitutional: Negative.  Negative for chills, diaphoresis, fatigue and fever.   HENT: Negative.  Negative for nasal congestion and sore throat.    Eyes: Negative.    Respiratory: Negative.  Negative for cough.    Cardiovascular: Negative.  Negative for chest pain.   Gastrointestinal:  Positive for constipation. Negative for abdominal pain, anorexia, change in bowel habit, nausea and vomiting.   Endocrine: Negative.    Genitourinary: Negative.    Musculoskeletal: Negative.  Negative for arthralgias, joint swelling, myalgias and neck pain.   Integumentary:  Negative for rash. Negative.   Allergic/Immunologic: Negative.    Neurological:  Positive for vertigo. Negative for  weakness, numbness and headaches.   Psychiatric/Behavioral: Negative.            Objective     Physical Exam  Vitals and nursing note reviewed.   Constitutional:       Appearance: Normal appearance.   HENT:      Head: Normocephalic.      Right Ear: Tympanic membrane, ear canal and external ear normal.      Left Ear: Tympanic membrane, ear canal and external ear normal.      Nose: Nose normal.      Mouth/Throat:      Mouth: Mucous membranes are moist.      Pharynx: Oropharynx is clear.   Eyes:      Conjunctiva/sclera: Conjunctivae normal.      Pupils: Pupils are equal, round, and reactive to light.   Cardiovascular:      Rate and Rhythm: Regular rhythm.      Pulses: Normal pulses.      Heart sounds: Normal heart sounds.   Pulmonary:      Effort: Pulmonary effort is normal.      Breath sounds: Normal breath sounds.   Abdominal:      General: Bowel sounds are normal.      Palpations: Abdomen is soft.   Musculoskeletal:         General: Normal range of motion.      Cervical back: Normal range of motion and neck supple.   Skin:     General: Skin is warm and dry.      Capillary Refill: Capillary refill takes 2 to 3 seconds.   Neurological:      Mental Status: She is alert and oriented to person, place, and time.   Psychiatric:         Mood and Affect: Mood normal.         Behavior: Behavior normal.         Thought Content: Thought content normal.         Judgment: Judgment normal.            Assessment and Plan     1. Follow-up exam  2. Vertigo  3. Medication side effect  4. Constipation, unspecified constipation type  -     Urinalysis, Reflex to Urine Culture Urine, Clean Catch  -     CBC Without Differential; Future; Expected date: 07/30/2024  -     TSH; Future; Expected date: 07/30/2024  -     X-Ray Abdomen Flat And Erect; Future; Expected date: 07/30/2024  -     meclizine (ANTIVERT) 25 mg tablet; Take 1 tablet (25 mg total) by mouth 2 (two) times daily as needed.  Dispense: 14 tablet; Refill: 0  Hydrate  well  Rest  Meclizine causes drowsiness  F/U with cardiology  Hold ozempic; f/u with PCP  Increase H20, fiber intake  Report to ER immediately if symptoms worsen or persist               No follow-ups on file.

## 2024-07-30 NOTE — TELEPHONE ENCOUNTER
Refill Routing Note   Medication(s) are not appropriate for processing by [facility] for the following reason(s):      Non-participating provider    [facility] action(s):  Defer Care Due:  None identified            Appointments  past 12m or future 3m with PCP    Date Provider   Last Visit   Visit date not found Holly Schaefer NP   Next Visit   Visit date not found Holly Schaefer NP   ED visits in past 90 days: 0        Note composed:9:29 PM 07/29/2024

## 2024-07-30 NOTE — PATIENT INSTRUCTIONS
"Hydrate well  Rest  Meclizine causes drowsiness  F/U with cardiology  Hold ozempic; f/u with PCP  Increase H20, fiber intake  Report to ER immediately if symptoms worsen or persist    Meclizine: Patient drug information  2024© UpToDate, Inc. and its affiliates and/or licensors. All Rights Reserved.  Access [employer] for additional drug information, tools, and databases.  Contributor Disclosures  For additional information see "Meclizine: Drug information" and "Meclizine: Pediatric drug information"    You must carefully read the "Consumer Information Use and Disclaimer" below in order to understand and correctly use this information.  Brand Names: [place]  Antivert;     Bonine [OTC];     Dramamine Less Drowsy [OTC] [DSC];     FT Motion Sickness [OTC];     Medi-Meclizine [OTC];     Motion-Time [OTC];     Travel Sickness [OTC] [DSC];     Travel-Ease [OTC]    What is this drug used for?  It is used to help motion sickness.  It is used to treat dizziness (vertigo).  It may be given to you for other reasons. Talk with the doctor.    What do I need to tell my doctor BEFORE I take this drug?  If you are allergic to this drug; any part of this drug; or any other drugs, foods, or substances. Tell your doctor about the allergy and what signs you had.  This drug may interact with other drugs or health problems.  Tell your doctor and pharmacist about all of your drugs (prescription or OTC, natural products, vitamins) and health problems. You must check to make sure that it is safe for you to take this drug with all of your drugs and health problems. Do not start, stop, or change the dose of any drug without checking with your doctor.    What are some things I need to know or do while I take this drug?  Tell all of your health care providers that you take this drug. This includes your doctors, nurses, pharmacists, and dentists.  Avoid driving and doing other tasks or actions that call for you to be alert until you see how " this drug affects you.  If you are allergic to tartrazine (FD&C Yellow No. 5), talk with your doctor. Some products have tartrazine.  Avoid drinking alcohol while taking this drug.  Talk with your doctor before you use marijuana, other forms of cannabis, or prescription or OTC drugs that may slow your actions.  If you have phenylketonuria (PKU), talk with your doctor. Some products have phenylalanine.  If you are 65 or older, use this drug with care. You could have more side effects.  Do not give this drug to a child without first checking with your child's doctor.  Tell your doctor if you are pregnant, plan on getting pregnant, or are breast-feeding. You will need to talk about the benefits and risks to you and the baby.    What are some side effects that I need to call my doctor about right away?  WARNING/CAUTION: Even though it may be rare, some people may have very bad and sometimes deadly side effects when taking a drug. Tell your doctor or get medical help right away if you have any of the following signs or symptoms that may be related to a very bad side effect:  Signs of an allergic reaction, like rash; hives; itching; red, swollen, blistered, or peeling skin with or without fever; wheezing; tightness in the chest or throat; trouble breathing, swallowing, or talking; unusual hoarseness; or swelling of the mouth, face, lips, tongue, or throat.    What are some other side effects of this drug?  All drugs may cause side effects. However, many people have no side effects or only have minor side effects. Call your doctor or get medical help if any of these side effects or any other side effects bother you or do not go away:  Feeling sleepy.  Dry mouth.  Headache.  Feeling tired or weak.  Throwing up.  These are not all of the side effects that may occur. If you have questions about side effects, call your doctor. Call your doctor for medical advice about side effects.  You may report side effects to your national  health agency.    How is this drug best taken?  Use this drug as ordered by your doctor. Read all information given to you. Follow all instructions closely.  Tablets:  Swallow whole. Do not chew, break, or crush.  If you are taking this drug to prevent motion sickness, take 1 hour before travel.  Chewable tablets:  Chew well before swallowing.  Some brands may be swallowed whole. Check with your pharmacist to see if your brand may be swallowed whole.  If you are taking this drug to prevent motion sickness, take 1 hour before travel.    What do I do if I miss a dose?  If you take this drug on a regular basis, take a missed dose as soon as you think about it.  If it is close to the time for your next dose, skip the missed dose and go back to your normal time.  Do not take 2 doses at the same time or extra doses.  Many times this drug is taken on an as needed basis. Do not take more often than told by the doctor.    How do I store and/or throw out this drug?  Store at room temperature protected from light. Store in a dry place. Do not store in a bathroom.  Keep all drugs in a safe place. Keep all drugs out of the reach of children and pets.  Throw away unused or  drugs. Do not flush down a toilet or pour down a drain unless you are told to do so. Check with your pharmacist if you have questions about the best way to throw out drugs. There may be drug take-back programs in your area.    General drug facts  If your symptoms or health problems do not get better or if they become worse, call your doctor.  Do not share your drugs with others and do not take anyone else's drugs.  Some drugs may have another patient information leaflet. If you have any questions about this drug, please talk with your doctor, nurse, pharmacist, or other health care provider.  If you think there has been an overdose, call your poison control center or get medical care right away. Be ready to tell or show what was taken, how much, and  when it happened.      Juanjo Blakely,     If you are due for any health screening(s) below please notify me so we can arrange them to be ordered and scheduled. Most healthy patients at your age complete them, but you are free to accept or refuse.     If you can't do it, I'll definitely understand. If you can, I'd certainly appreciate it!    Tests to Keep You Healthy    Mammogram: Met on 8/24/2023  Colon Cancer Screening: Met on 1/31/2022  Last Blood Pressure <= 139/89 (10/24/2023): NO

## 2024-07-31 DIAGNOSIS — Z78.0 MENOPAUSE: ICD-10-CM

## 2024-08-01 ENCOUNTER — PATIENT MESSAGE (OUTPATIENT)
Dept: FAMILY MEDICINE | Facility: CLINIC | Age: 74
End: 2024-08-01
Payer: MEDICARE

## 2024-08-01 ENCOUNTER — TELEPHONE (OUTPATIENT)
Dept: FAMILY MEDICINE | Facility: CLINIC | Age: 74
End: 2024-08-01
Payer: MEDICARE

## 2024-08-01 DIAGNOSIS — R68.89 OTHER GENERAL SYMPTOMS AND SIGNS: ICD-10-CM

## 2024-08-01 DIAGNOSIS — R79.89 ABNORMAL TSH: Primary | ICD-10-CM

## 2024-08-01 NOTE — TELEPHONE ENCOUNTER
----- Message from Dara Stephens DNP sent at 8/1/2024  1:48 PM CDT -----  Reviewed; TSH mildly elevated; this can fluctuate and may be r/t recent illness; will recheck in 2-3 w. Otherwise, labs unremarkable.

## 2024-08-05 ENCOUNTER — HOSPITAL ENCOUNTER (OUTPATIENT)
Dept: RADIOLOGY | Facility: HOSPITAL | Age: 74
Discharge: HOME | End: 2024-08-05
Attending: FAMILY MEDICINE
Payer: MEDICARE

## 2024-08-05 ENCOUNTER — OFFICE VISIT (OUTPATIENT)
Dept: CARDIOLOGY | Facility: CLINIC | Age: 74
End: 2024-08-05
Payer: MEDICARE

## 2024-08-05 VITALS
SYSTOLIC BLOOD PRESSURE: 142 MMHG | DIASTOLIC BLOOD PRESSURE: 70 MMHG | WEIGHT: 204.5 LBS | OXYGEN SATURATION: 98 % | HEART RATE: 72 BPM | BODY MASS INDEX: 32.1 KG/M2 | HEIGHT: 67 IN

## 2024-08-05 DIAGNOSIS — E66.09 CLASS 1 OBESITY DUE TO EXCESS CALORIES WITH SERIOUS COMORBIDITY AND BODY MASS INDEX (BMI) OF 31.0 TO 31.9 IN ADULT: ICD-10-CM

## 2024-08-05 DIAGNOSIS — Z78.0 MENOPAUSE: ICD-10-CM

## 2024-08-05 DIAGNOSIS — I10 ESSENTIAL HYPERTENSION: Primary | Chronic | ICD-10-CM

## 2024-08-05 DIAGNOSIS — E78.2 MIXED HYPERLIPIDEMIA: Chronic | ICD-10-CM

## 2024-08-05 PROCEDURE — 3008F BODY MASS INDEX DOCD: CPT | Mod: CPTII,S$GLB,, | Performed by: INTERNAL MEDICINE

## 2024-08-05 PROCEDURE — 99214 OFFICE O/P EST MOD 30 MIN: CPT | Mod: S$GLB,,, | Performed by: INTERNAL MEDICINE

## 2024-08-05 PROCEDURE — 4010F ACE/ARB THERAPY RXD/TAKEN: CPT | Mod: CPTII,S$GLB,, | Performed by: INTERNAL MEDICINE

## 2024-08-05 PROCEDURE — 77080 DXA BONE DENSITY AXIAL: CPT | Mod: 26,,, | Performed by: RADIOLOGY

## 2024-08-05 PROCEDURE — 1101F PT FALLS ASSESS-DOCD LE1/YR: CPT | Mod: CPTII,S$GLB,, | Performed by: INTERNAL MEDICINE

## 2024-08-05 PROCEDURE — 1126F AMNT PAIN NOTED NONE PRSNT: CPT | Mod: CPTII,S$GLB,, | Performed by: INTERNAL MEDICINE

## 2024-08-05 PROCEDURE — 77080 DXA BONE DENSITY AXIAL: CPT | Mod: TC,PO

## 2024-08-05 PROCEDURE — 3077F SYST BP >= 140 MM HG: CPT | Mod: CPTII,S$GLB,, | Performed by: INTERNAL MEDICINE

## 2024-08-05 PROCEDURE — 99999 PR PBB SHADOW E&M-EST. PATIENT-LVL IV: CPT | Mod: PBBFAC,,, | Performed by: INTERNAL MEDICINE

## 2024-08-05 PROCEDURE — 1159F MED LIST DOCD IN RCRD: CPT | Mod: CPTII,S$GLB,, | Performed by: INTERNAL MEDICINE

## 2024-08-05 PROCEDURE — 3288F FALL RISK ASSESSMENT DOCD: CPT | Mod: CPTII,S$GLB,, | Performed by: INTERNAL MEDICINE

## 2024-08-05 PROCEDURE — 3078F DIAST BP <80 MM HG: CPT | Mod: CPTII,S$GLB,, | Performed by: INTERNAL MEDICINE

## 2024-08-05 PROCEDURE — 3044F HG A1C LEVEL LT 7.0%: CPT | Mod: CPTII,S$GLB,, | Performed by: INTERNAL MEDICINE

## 2024-08-20 ENCOUNTER — OFFICE VISIT (OUTPATIENT)
Dept: FAMILY MEDICINE | Facility: CLINIC | Age: 74
End: 2024-08-20
Payer: MEDICARE

## 2024-08-20 ENCOUNTER — LAB VISIT (OUTPATIENT)
Dept: LAB | Facility: HOSPITAL | Age: 74
End: 2024-08-20
Attending: FAMILY MEDICINE
Payer: MEDICARE

## 2024-08-20 VITALS
HEIGHT: 67 IN | OXYGEN SATURATION: 100 % | SYSTOLIC BLOOD PRESSURE: 130 MMHG | BODY MASS INDEX: 32.46 KG/M2 | DIASTOLIC BLOOD PRESSURE: 84 MMHG | HEART RATE: 65 BPM | WEIGHT: 206.81 LBS

## 2024-08-20 DIAGNOSIS — Z12.31 ENCOUNTER FOR SCREENING MAMMOGRAM FOR BREAST CANCER: ICD-10-CM

## 2024-08-20 DIAGNOSIS — Z79.899 ENCOUNTER FOR LONG-TERM (CURRENT) USE OF MEDICATIONS: ICD-10-CM

## 2024-08-20 DIAGNOSIS — E53.8 DEFICIENCY OF OTHER SPECIFIED B GROUP VITAMINS: ICD-10-CM

## 2024-08-20 DIAGNOSIS — Z13.6 ENCOUNTER FOR LIPID SCREENING FOR CARDIOVASCULAR DISEASE: ICD-10-CM

## 2024-08-20 DIAGNOSIS — Z98.84 HISTORY OF BARIATRIC SURGERY: ICD-10-CM

## 2024-08-20 DIAGNOSIS — G47.33 OSA (OBSTRUCTIVE SLEEP APNEA): ICD-10-CM

## 2024-08-20 DIAGNOSIS — T88.7XXA MEDICATION SIDE EFFECT: ICD-10-CM

## 2024-08-20 DIAGNOSIS — R53.83 FATIGUE, UNSPECIFIED TYPE: Primary | ICD-10-CM

## 2024-08-20 DIAGNOSIS — J34.89 NASAL SORE: ICD-10-CM

## 2024-08-20 DIAGNOSIS — Z00.00 ENCOUNTER FOR MEDICAL EXAMINATION TO ESTABLISH CARE: ICD-10-CM

## 2024-08-20 DIAGNOSIS — Z13.220 ENCOUNTER FOR LIPID SCREENING FOR CARDIOVASCULAR DISEASE: ICD-10-CM

## 2024-08-20 DIAGNOSIS — E03.9 HYPOTHYROIDISM (ACQUIRED): ICD-10-CM

## 2024-08-20 DIAGNOSIS — R79.89 ABNORMAL TSH: ICD-10-CM

## 2024-08-20 DIAGNOSIS — Z78.9 INTOLERANCE OF CONTINUOUS POSITIVE AIRWAY PRESSURE (CPAP) VENTILATION: ICD-10-CM

## 2024-08-20 DIAGNOSIS — E53.1 PYRIDOXINE DEFICIENCY: ICD-10-CM

## 2024-08-20 DIAGNOSIS — R53.83 FATIGUE, UNSPECIFIED TYPE: ICD-10-CM

## 2024-08-20 DIAGNOSIS — Z87.09 HISTORY OF ASTHMA: ICD-10-CM

## 2024-08-20 DIAGNOSIS — I10 ESSENTIAL HYPERTENSION: ICD-10-CM

## 2024-08-20 PROBLEM — E66.1 CLASS 1 DRUG-INDUCED OBESITY WITH BODY MASS INDEX (BMI) OF 32.0 TO 32.9 IN ADULT: Status: ACTIVE | Noted: 2020-01-16

## 2024-08-20 LAB
ALBUMIN SERPL BCP-MCNC: 3.7 G/DL (ref 3.5–5.2)
ALP SERPL-CCNC: 75 U/L (ref 55–135)
ALT SERPL W/O P-5'-P-CCNC: 19 U/L (ref 10–44)
ANION GAP SERPL CALC-SCNC: 9 MMOL/L (ref 8–16)
AST SERPL-CCNC: 21 U/L (ref 10–40)
BILIRUB SERPL-MCNC: 0.6 MG/DL (ref 0.1–1)
BUN SERPL-MCNC: 13 MG/DL (ref 8–23)
CALCIUM SERPL-MCNC: 10.1 MG/DL (ref 8.7–10.5)
CHLORIDE SERPL-SCNC: 102 MMOL/L (ref 95–110)
CHOLEST SERPL-MCNC: 221 MG/DL (ref 120–199)
CHOLEST/HDLC SERPL: 3.9 {RATIO} (ref 2–5)
CO2 SERPL-SCNC: 29 MMOL/L (ref 23–29)
CREAT SERPL-MCNC: 0.8 MG/DL (ref 0.5–1.4)
EST. GFR  (NO RACE VARIABLE): >60 ML/MIN/1.73 M^2
ESTIMATED AVG GLUCOSE: 103 MG/DL (ref 68–131)
GLUCOSE SERPL-MCNC: 90 MG/DL (ref 70–110)
HBA1C MFR BLD: 5.2 % (ref 4–5.6)
HDLC SERPL-MCNC: 56 MG/DL (ref 40–75)
HDLC SERPL: 25.3 % (ref 20–50)
HGB BLD-MCNC: 12.8 G/DL (ref 12–16)
LDLC SERPL CALC-MCNC: 135.2 MG/DL (ref 63–159)
NONHDLC SERPL-MCNC: 165 MG/DL
PHOSPHATE SERPL-MCNC: 3.6 MG/DL (ref 2.7–4.5)
POTASSIUM SERPL-SCNC: 3.6 MMOL/L (ref 3.5–5.1)
PROT SERPL-MCNC: 7.7 G/DL (ref 6–8.4)
SODIUM SERPL-SCNC: 140 MMOL/L (ref 136–145)
T4 FREE SERPL-MCNC: 0.94 NG/DL (ref 0.71–1.51)
TRIGL SERPL-MCNC: 149 MG/DL (ref 30–150)
TSH SERPL DL<=0.005 MIU/L-ACNC: 2.52 UIU/ML (ref 0.4–4)

## 2024-08-20 PROCEDURE — 84207 ASSAY OF VITAMIN B-6: CPT | Performed by: FAMILY MEDICINE

## 2024-08-20 PROCEDURE — 80061 LIPID PANEL: CPT | Performed by: FAMILY MEDICINE

## 2024-08-20 PROCEDURE — 1101F PT FALLS ASSESS-DOCD LE1/YR: CPT | Mod: CPTII,S$GLB,, | Performed by: FAMILY MEDICINE

## 2024-08-20 PROCEDURE — 84439 ASSAY OF FREE THYROXINE: CPT | Performed by: FAMILY MEDICINE

## 2024-08-20 PROCEDURE — G2211 COMPLEX E/M VISIT ADD ON: HCPCS | Mod: S$GLB,,, | Performed by: FAMILY MEDICINE

## 2024-08-20 PROCEDURE — 83036 HEMOGLOBIN GLYCOSYLATED A1C: CPT | Performed by: FAMILY MEDICINE

## 2024-08-20 PROCEDURE — 3075F SYST BP GE 130 - 139MM HG: CPT | Mod: CPTII,S$GLB,, | Performed by: FAMILY MEDICINE

## 2024-08-20 PROCEDURE — 85018 HEMOGLOBIN: CPT | Mod: PO | Performed by: FAMILY MEDICINE

## 2024-08-20 PROCEDURE — 1160F RVW MEDS BY RX/DR IN RCRD: CPT | Mod: CPTII,S$GLB,, | Performed by: FAMILY MEDICINE

## 2024-08-20 PROCEDURE — 82746 ASSAY OF FOLIC ACID SERUM: CPT | Performed by: FAMILY MEDICINE

## 2024-08-20 PROCEDURE — 3008F BODY MASS INDEX DOCD: CPT | Mod: CPTII,S$GLB,, | Performed by: FAMILY MEDICINE

## 2024-08-20 PROCEDURE — 1159F MED LIST DOCD IN RCRD: CPT | Mod: CPTII,S$GLB,, | Performed by: FAMILY MEDICINE

## 2024-08-20 PROCEDURE — 99214 OFFICE O/P EST MOD 30 MIN: CPT | Mod: S$GLB,,, | Performed by: FAMILY MEDICINE

## 2024-08-20 PROCEDURE — 1126F AMNT PAIN NOTED NONE PRSNT: CPT | Mod: CPTII,S$GLB,, | Performed by: FAMILY MEDICINE

## 2024-08-20 PROCEDURE — 3288F FALL RISK ASSESSMENT DOCD: CPT | Mod: CPTII,S$GLB,, | Performed by: FAMILY MEDICINE

## 2024-08-20 PROCEDURE — 82306 VITAMIN D 25 HYDROXY: CPT | Performed by: FAMILY MEDICINE

## 2024-08-20 PROCEDURE — 99999 PR PBB SHADOW E&M-EST. PATIENT-LVL V: CPT | Mod: PBBFAC,,, | Performed by: FAMILY MEDICINE

## 2024-08-20 PROCEDURE — 84100 ASSAY OF PHOSPHORUS: CPT | Performed by: FAMILY MEDICINE

## 2024-08-20 PROCEDURE — 84425 ASSAY OF VITAMIN B-1: CPT | Performed by: FAMILY MEDICINE

## 2024-08-20 PROCEDURE — 36415 COLL VENOUS BLD VENIPUNCTURE: CPT | Mod: PO | Performed by: FAMILY MEDICINE

## 2024-08-20 PROCEDURE — 82607 VITAMIN B-12: CPT | Performed by: FAMILY MEDICINE

## 2024-08-20 PROCEDURE — 4010F ACE/ARB THERAPY RXD/TAKEN: CPT | Mod: CPTII,S$GLB,, | Performed by: FAMILY MEDICINE

## 2024-08-20 PROCEDURE — 84443 ASSAY THYROID STIM HORMONE: CPT | Performed by: FAMILY MEDICINE

## 2024-08-20 PROCEDURE — 3079F DIAST BP 80-89 MM HG: CPT | Mod: CPTII,S$GLB,, | Performed by: FAMILY MEDICINE

## 2024-08-20 PROCEDURE — 3044F HG A1C LEVEL LT 7.0%: CPT | Mod: CPTII,S$GLB,, | Performed by: FAMILY MEDICINE

## 2024-08-20 PROCEDURE — 80053 COMPREHEN METABOLIC PANEL: CPT | Performed by: FAMILY MEDICINE

## 2024-08-20 RX ORDER — DOXYCYCLINE 100 MG/1
100 CAPSULE ORAL EVERY 12 HOURS
Qty: 20 CAPSULE | Refills: 0 | Status: SHIPPED | OUTPATIENT
Start: 2024-08-20

## 2024-08-20 RX ORDER — IPRATROPIUM BROMIDE AND ALBUTEROL 20; 100 UG/1; UG/1
1 SPRAY, METERED RESPIRATORY (INHALATION) EVERY 6 HOURS PRN
Qty: 4 G | Refills: 0 | Status: SHIPPED | OUTPATIENT
Start: 2024-08-20

## 2024-08-20 RX ORDER — HYDROCHLOROTHIAZIDE 25 MG/1
25 TABLET ORAL DAILY
Qty: 90 TABLET | Refills: 0 | Status: SHIPPED | OUTPATIENT
Start: 2024-08-20

## 2024-08-20 RX ORDER — LEVOTHYROXINE SODIUM 50 UG/1
50 TABLET ORAL
Qty: 90 TABLET | Refills: 0 | Status: SHIPPED | OUTPATIENT
Start: 2024-08-20

## 2024-08-20 RX ORDER — ALBUTEROL SULFATE 90 UG/1
2 INHALANT RESPIRATORY (INHALATION) EVERY 6 HOURS PRN
Qty: 46.9 EACH | Refills: 5 | Status: SHIPPED | OUTPATIENT
Start: 2024-08-20

## 2024-08-20 NOTE — PATIENT INSTRUCTIONS
Follow up in about 1 year (around 8/20/2025), or if symptoms worsen or fail to improve, for Med refills, LAB RESULTS.     Dear patient,   As a result of recent federal legislation (The Federal Cures Act), you may receive lab or pathology results from your visit in your [facility] account before your physician is able to contact you. Your physician or their representative will relay the results to you with their recommendations at their soonest availability.     If no improvement in symptoms or symptoms worsen, please be advised to call MD, follow-up at clinic and/or go to ER if becomes severe.    Carlos Pichardo M.D.        We Offer TELEHEALTH & Same Day Appointments!   Book your Telehealth appointment with me through my nurse or   Clinic appointments on L2!    [address] [place] [address]    Office: [phone]   FAX: [phone]    Check out my Facebook Page [email or web address] Follow Me at: [email or web address]    Check out my website at [employer] by clicking on: [email or web address]    To Schedule appointments online, go to L2: [email or web address]

## 2024-08-20 NOTE — ASSESSMENT & PLAN NOTE
Start doxycycline.  Referral to ENT for further evaluation.  Discussed condition course and signs and symptoms to expect.  Patient advised take anti-inflammatories and or Tylenol for pain or fever.  ER precautions.  Call MD or follow-up to clinic if not improving or worsening symptoms.

## 2024-08-20 NOTE — PROGRESS NOTES
PLAN:    Assessment & Plan  1. Diverticulitis.  She had colon surgery performed by Dr. Parks, where approximately 5 inches of the colon were removed. She reports that her condition has improved post-surgery.    2. Left knee injury.  She injured her left knee after a fall and has been using essential oils and a soft brace for stabilization. No therapy or injections have been administered. She has been advised to continue using the soft brace.    3. Adverse reaction to semaglutide.  She experienced severe dehydration, weakness, and dizziness after using compounded semaglutide injections. She has discontinued the medication.    4. Chronic fatigue and weakness.  She reports persistent fatigue and weakness in her legs, which has worsened over the past six months. Blood work including B12, folate, and iron levels will be checked to rule out deficiencies. Her thyroid levels will also be rechecked today.    5. Asthma.  Refills for Pulmicort, albuterol, and Combivent have been provided. She does not have a lung doctor and uses [facility] for her medications.    6. Sleep apnea.  She has a history of sleep apnea diagnosed in 2018 and does not use a CPAP machine due to discomfort. She has been referred to Dr. Medeiros, an ENT specialist, to discuss the Inspire device as an alternative treatment.    7. Nasal sores.  She has been experiencing recurrent nasal sores that have not responded to mupirocin. Doxycycline has been prescribed and she has been advised to use over-the-counter Neosporin instead of mupirocin. The ENT specialist will be informed for further evaluation.    8. Health Maintenance.  A mammogram is due and she has been advised to schedule it.    Follow-up  A follow-up visit is scheduled in a few weeks.    Problem List Items Addressed This Visit       Essential hypertension (Chronic)     Counseled on importance of hypertension disease course, I recommend ongoing Education for DASH-diet and exercise.  Counseled  on medication regimen importance of treating high blood pressure.  Please be advised of risk of untreated blood pressure as discussed.  Please advised of ER precautions were given for symptoms of hypertensive urgency and emergency.           Relevant Medications    hydroCHLOROthiazide (HYDRODIURIL) 25 MG tablet    Hypothyroidism (acquired) (Chronic)     Please be advised of hypothyroidism disease course.  We will plan to monitor thyroid labs at routine intervals.  Please be advised of risks and benefits of medication use, see medication insert for complete details.  ER precautions.           Relevant Medications    levothyroxine (SYNTHROID) 50 MCG tablet    Other Relevant Orders    TSH    T4, FREE    Abnormal TSH (Chronic)    Relevant Orders    TSH    Encounter for long-term (current) use of medications (Chronic)     Complete history and physical was completed today.  Complete and thorough medication reconciliation was performed.  Discussed risks and benefits of medications.  Advised patient on orders and health maintenance.  We discussed old records and old labs if available.  Will request any records not available through epic.  Continue current medications listed on your summary sheet.           Relevant Medications    hydroCHLOROthiazide (HYDRODIURIL) 25 MG tablet    Nasal sore (Chronic)     Start doxycycline.  Referral to ENT for further evaluation.  Discussed condition course and signs and symptoms to expect.  Patient advised take anti-inflammatories and or Tylenol for pain or fever.  ER precautions.  Call MD or follow-up to clinic if not improving or worsening symptoms.           Relevant Medications    doxycycline (VIBRAMYCIN) 100 MG Cap    History of bariatric surgery (Chronic)     Check levels.  Follow-up with bariatric.         Relevant Orders    Vitamin D    Vitamin B12    Vitamin B6    Vitamin B1    Folate    Phosphorus    JOSE DE JESUS (obstructive sleep apnea) (Chronic)     See other problem.         Relevant  Orders    Ambulatory referral/consult to ENT    History of asthma     Refill inhalers.  Asthma action plan.  ER precautions for severe symptoms.         Relevant Medications    budesonide (PULMICORT FLEXHALER) 90 mcg/actuation AePB    albuterol (PROVENTIL HFA) 90 mcg/actuation inhaler    ipratropium-albuteroL (COMBIVENT RESPIMAT)  mcg/actuation inhaler    Medication side effect     Avoid GLP 1 medications especially with history of gastric sleeve.         Pyridoxine deficiency    Relevant Orders    Vitamin B6    Deficiency of other specified B group vitamins     Check levels.         Relevant Orders    Vitamin B12    Vitamin B6    Folate    Intolerance of continuous positive airway pressure (CPAP) ventilation     Referral to ENT for further evaluation of possible inspire placement.  Referral to sleep Medicine for further evaluation.         Relevant Orders    Ambulatory referral/consult to ENT     Other Visit Diagnoses       Fatigue, unspecified type    -  Primary    Relevant Orders    Vitamin D    Vitamin B12    Vitamin B6    Vitamin B1    Folate    Phosphorus    Encounter for screening mammogram for breast cancer        Relevant Orders    Mammo Digital Screening Bilat w/ José          Future Appointments       Date Provider Specialty Appt Notes    8/20/2024  Lab     9/4/2024  Radiology mammo    10/15/2024 Cole Medeiros MD Otolaryngology JOSE DE JESUS (obstructive sleep apnea) [G47.33]  Intolerance of continuous positive airway pressure (CPAP) ventilation [Z78.9]    2/10/2025 Myles Granado Jr., MD Cardiology 6 month f/u           Medication Management for assessment above:   Medication List with Changes/Refills   New Medications    DOXYCYCLINE (VIBRAMYCIN) 100 MG CAP    Take 1 capsule (100 mg total) by mouth every 12 (twelve) hours.   Current Medications    ALPRAZOLAM (XANAX) 0.5 MG TABLET    Take 0.5 mg by mouth.    AZELASTINE (ASTELIN) 137 MCG (0.1 %) NASAL SPRAY    1 spray (137 mcg total) by Nasal route 2  (two) times daily. for 7 days    CELECOXIB (CELEBREX) 200 MG CAPSULE    Take 1 capsule (200 mg total) by mouth daily as needed for Pain.    ERGOCALCIFEROL, VITAMIN D2, (VITAMIN D ORAL)    Take by mouth Daily.    ESZOPICLONE (LUNESTA) 2 MG TAB    Take 2 mg by mouth nightly as needed.    LOSARTAN (COZAAR) 50 MG TABLET    TAKE 1 TABLET DAILY    MECLIZINE (ANTIVERT) 25 MG TABLET    Take 1 tablet (25 mg total) by mouth 2 (two) times daily as needed.    METOPROLOL TARTRATE (LOPRESSOR) 50 MG TABLET    TAKE 1 TABLET ONCE DAILY AND AS NEEDED FOR PALPITATIONS   Changed and/or Refilled Medications    Modified Medication Previous Medication    ALBUTEROL (PROVENTIL HFA) 90 MCG/ACTUATION INHALER albuterol (PROVENTIL HFA) 90 mcg/actuation inhaler       Inhale 2 puffs into the lungs every 6 (six) hours as needed for Wheezing. Rescue    Inhale 2 puffs into the lungs every 6 (six) hours as needed for Wheezing. Rescue    BUDESONIDE (PULMICORT FLEXHALER) 90 MCG/ACTUATION AEPB budesonide (PULMICORT FLEXHALER) 90 mcg/actuation AePB       Inhale 2 puffs (180 mcg total) into the lungs 2 (two) times a day. Controller    Inhale 2 puffs (180 mcg total) into the lungs 2 (two) times a day. Controller    HYDROCHLOROTHIAZIDE (HYDRODIURIL) 25 MG TABLET hydroCHLOROthiazide (HYDRODIURIL) 25 MG tablet       Take 1 tablet (25 mg total) by mouth once daily.    TAKE 1 TABLET DAILY    IPRATROPIUM-ALBUTEROL (COMBIVENT RESPIMAT)  MCG/ACTUATION INHALER COMBIVENT RESPIMAT  mcg/actuation inhaler       Inhale 1 puff into the lungs every 6 (six) hours as needed for Wheezing or Shortness of Breath. Rescue    INHALE 1 PUFF EVERY 6 HOURS AS NEEDED    LEVOTHYROXINE (SYNTHROID) 50 MCG TABLET levothyroxine (SYNTHROID) 50 MCG tablet       Take 1 tablet (50 mcg total) by mouth before breakfast.    Take 1 tablet (50 mcg total) by mouth before breakfast.   Discontinued Medications    ASPIRIN (ECOTRIN) 81 MG EC TABLET    Take 1 tablet (81 mg total) by mouth  once daily.    BACLOFEN (LIORESAL) 10 MG TABLET    Take 1 tablet (10 mg total) by mouth 2 (two) times daily as needed.    CO-ENZYME Q-10 30 MG CAPSULE    Take 30 mg by mouth once daily.    HYDROXYZINE (ATARAX) 50 MG TABLET    Take 1 tablet (50 mg total) by mouth nightly as needed (insomnia).    MELATONIN 5 MG CAP    Take by mouth.    PANTOPRAZOLE (PROTONIX) 20 MG TABLET    Take 1 tablet (20 mg total) by mouth once daily.    TRAZODONE (DESYREL) 50 MG TABLET    Take 1 tablet (50 mg total) by mouth every evening.       Carlos Pichardo M.D.  ==========================================================================  Subjective:   Patient ID: Zora Davis is a 74 y.o. female.  has a past medical history of COVID-19 (11/2020), Essential hypertension (6/11/2013), Fatty liver, GERD (gastroesophageal reflux disease), Lumbar facet arthropathy (1/31/2020), Mixed hyperlipidemia (5/20/2021), JOSE DE JESUS on CPAP, Osteopenia of multiple sites (1/17/2020), Paroxysmal atrial fibrillation (3/22/2019), Primary hypothyroidism (5/11/2010), PVC (premature ventricular contraction) (4/28/2020), and Sigmoid diverticulosis.   Chief Complaint: Follow-up      History of Present Illness  The patient is a 74-year-old female here for a follow-up on chronic medical conditions. She needs refills and will discuss lab results.    She underwent colon surgery performed by Dr. Parks due to diverticulitis, during which approximately 5 inches of her colon was removed. Her condition has since improved.    She experienced a knee injury after a visit to urgent care for influenza. She has been applying essential oils to her left knee and wearing a soft brace for support.    She had an adverse reaction to semaglutide, which she was on for 6 weeks. The medication caused dehydration, weakness, dizziness, and constipation. She lost 12 pounds during this period and has since discontinued the medication. She reports feeling fatigued and experiencing leg weakness.  She consulted with Dr. Granado, who found her heart to be in good condition. She is scheduled for blood work in two days.    She has a history of asthma but does not currently have a pulmonologist. Her asthma typically flares up when she has an upper respiratory infection. She uses Flonase for nasal congestion. She uses Lunesta for sleep and has been diagnosed with sleep apnea but does not use a CPAP machine. She has not seen her sleep doctor in 4 years.    She underwent gastric sleeve surgery in 2019 and takes vitamins regularly. Her weight prior to the surgery was 215 pounds, and she has since lost weight.    She has been dealing with nasal sores and has used mupirocin ointment without much success.    She has been on levothyroxine for approximately 25 years.    Problem List Items Addressed This Visit       Essential hypertension (Chronic)    Overview     CHRONIC. STABLE. BP Reviewed.  Compliant with BP medications. No SE reported.   (-) CP, SOB, palpitations, dizziness, lightheadedness, HA, arm numbness, tingling or weakness, syncope.  Creatinine   Date Value Ref Range Status   07/25/2024 0.81 0.60 - 1.10 mg/dL Final   09/20/2022 0.7 0.5 - 1.4 mg/dL Final     Hypertension Medications               losartan (COZAAR) 50 MG tablet TAKE 1 TABLET DAILY    metoprolol tartrate (LOPRESSOR) 50 MG tablet TAKE 1 TABLET ONCE DAILY AND AS NEEDED FOR PALPITATIONS    hydroCHLOROthiazide (HYDRODIURIL) 25 MG tablet Take 1 tablet (25 mg total) by mouth once daily.                   Current Assessment & Plan     Counseled on importance of hypertension disease course, I recommend ongoing Education for DASH-diet and exercise.  Counseled on medication regimen importance of treating high blood pressure.  Please be advised of risk of untreated blood pressure as discussed.  Please advised of ER precautions were given for symptoms of hypertensive urgency and emergency.           Hypothyroidism (acquired) (Chronic)    Overview     Chronic.   Stable.  Patient on levothyroxine 50 micrograms daily.  Reports compliance.  No side effects reported.  She is having fatigue.    Lab Results   Component Value Date    TSH 4.055 (H) 07/30/2024    FREET4 1.25 07/30/2024              Current Assessment & Plan     Please be advised of hypothyroidism disease course.  We will plan to monitor thyroid labs at routine intervals.  Please be advised of risks and benefits of medication use, see medication insert for complete details.  ER precautions.           Abnormal TSH (Chronic)    Overview     See hypothyroidism         Encounter for long-term (current) use of medications (Chronic)    Overview     CHRONIC. Stable. Compliant with medications for managed conditions. See medication list. No SE reported.   Routine lab analysis is being monitored. Refills were addressed.  Lab Results   Component Value Date    WBC 6.29 07/30/2024    HGB 12.8 08/20/2024    HCT 41.3 07/30/2024    MCV 88 07/30/2024     07/30/2024         Chemistry        Component Value Date/Time     07/25/2024 1205     09/20/2022 1009    K 3.6 07/25/2024 1205    K 4.1 09/20/2022 1009     09/20/2022 1009    CO2 27 07/25/2024 1205    CO2 27 09/20/2022 1009    BUN 14 07/25/2024 1205    BUN 13 09/20/2022 1009    CREATININE 0.81 07/25/2024 1205    CREATININE 0.7 09/20/2022 1009    GLU 92 09/20/2022 1009        Component Value Date/Time    CALCIUM 10.0 07/25/2024 1205    CALCIUM 9.4 09/20/2022 1009    ALKPHOS 74 03/04/2024 0930    ALKPHOS 76 09/20/2022 1009    AST 20 03/04/2024 0930    AST 16 09/20/2022 1009    ALT 17 03/04/2024 0930    ALT 12 09/20/2022 1009    BILITOT 0.6 03/04/2024 0930    BILITOT 0.7 09/20/2022 1009    ESTGFRAFRICA >60.0 05/19/2021 1221    EGFRNONAA >60.0 05/19/2021 1221          Lab Results   Component Value Date    TSH 4.055 (H) 07/30/2024    FREET4 1.25 07/30/2024              Current Assessment & Plan     Complete history and physical was completed today.  Complete and  thorough medication reconciliation was performed.  Discussed risks and benefits of medications.  Advised patient on orders and health maintenance.  We discussed old records and old labs if available.  Will request any records not available through epic.  Continue current medications listed on your summary sheet.           Nasal sore (Chronic)    Overview     Chronic.  Uncontrolled.  Patient has been using Bactroban with little to no improvement.         Current Assessment & Plan     Start doxycycline.  Referral to ENT for further evaluation.  Discussed condition course and signs and symptoms to expect.  Patient advised take anti-inflammatories and or Tylenol for pain or fever.  ER precautions.  Call MD or follow-up to clinic if not improving or worsening symptoms.           History of bariatric surgery (Chronic)    Overview     History of gastric sleeve.  Patient reports she was initially as high as 240 pounds she lost weight down to 215 pounds prior to surgery.  After surgery she lost weight down to 175 pounds.  She is currently at 206 pounds.         Current Assessment & Plan     Check levels.  Follow-up with bariatric.         JOSE DE JESUS (obstructive sleep apnea) (Chronic)    Overview     Patient intolerant to CPAP.  See other problem.         Current Assessment & Plan     See other problem.         History of asthma    Overview     Chronic.  Intermittent control.  Patient needing refill on inhalers.  Uses albuterol as needed.  Pulmicort and Combivent.  She does not follow with Pulmonary.         Current Assessment & Plan     Refill inhalers.  Asthma action plan.  ER precautions for severe symptoms.         Medication side effect    Overview     Triage Note Reviewed    History    Chief Complaint  Patient presents with  Emesis    History of Present IllnessZora Davis is a 74 y.o. female with a significant medical history of hypertension, diabetes, presenting with nausea and vomiting since Sunday. Discontinued  "Ozempic 9 days ago. Increased dose 9 days ago. Has been having some vertigo as well for the last 7 to 10 days. Room not spinning around her but she feels unsteady at times. Cells been having some abdominal pain and cramping. She thinks it is all from the Ozempic but she is not sure. Pain currently mild. Nothing makes better or worse. Some constipation.    Triage note:  Complains of dizziness, N/V since , abdominal cramping to mid abdomen, has been constipated, took 2 dulcolax yesterday, has had scant results.    Began on semiglutide 6 weeks ago, 10 units initially, now on 20 units, last dose was 9 days ago due to what she describes as above side effects.    Review of Systems        Allergies  Allergen Reactions  Hydrocodone-Acetaminophen Itching  Trazodone Other (See Comments)  Tongue tingling    Past Medical History:  Diagnosis Date  Asthma  Diverticulitis  Hypertension  Insulin resistance  Sleep apnea  Tachycardia  Thyroid disease    Past Surgical History:  Procedure Laterality Date   SECTION  x2  CHOLECYSTECTOMY  gastric sleeve  HYSTERECTOMY  REDUCTION MAMMAPLASTY      Family History  Problem Relation Name Age of Onset  Hypertension Father  Hypertension Mother    Social History    Tobacco Use  Smoking status: Former  Types: Cigarettes  Smokeless tobacco: Never  Tobacco comments:  2024: quit over 40 years ago  Vaping Use  Vaping status: Never Used  Substance Use Topics  Alcohol use: Yes  Comment: occasionally  Drug use: Never    Tobacco Cessation Program    E-Cigarette/Vaping  E-cigarette/Vaping Use Never User    Physical Exam  Visit Vitals  /73 (BP Location: Right arm, Patient Position: Lying)  Pulse 78  Temp 98.4 °F (36.9 °C) (Oral)  Resp 17  Ht 5' 7" (1.702 m)  Wt 95.2 kg  SpO2 99%  BMI 32.87 kg/m²    Physical Exam  Vitals and nursing note reviewed.  Constitutional:  General: She is not in acute distress.  Appearance: Normal appearance. She is well-developed. She is not ill-appearing, " toxic-appearing or diaphoretic.  HENT:  Head: Normocephalic and atraumatic.  Right Ear: External ear normal.  Left Ear: External ear normal.  Nose: Nose normal.  Mouth/Throat:  Mouth: Mucous membranes are moist.  Pharynx: Oropharynx is clear. No oropharyngeal exudate.  Eyes:  General: No scleral icterus.  Right eye: No discharge.  Left eye: No discharge.  Conjunctiva/sclera: Conjunctivae normal.  Pupils: Pupils are equal, round, and reactive to light.  Neck:  Vascular: No JVD.  Cardiovascular:  Rate and Rhythm: Normal rate and regular rhythm.  Pulses: Normal pulses.  Heart sounds: Normal heart sounds. No murmur heard.  No friction rub. No gallop.  Pulmonary:  Effort: Pulmonary effort is normal. No respiratory distress.  Breath sounds: Normal breath sounds. No stridor. No wheezing, rhonchi or rales.  Chest:  Chest wall: No tenderness.  Abdominal:  General: There is no distension.  Palpations: Abdomen is soft. There is no mass.  Tenderness: There is no abdominal tenderness. There is no guarding or rebound.  Comments: Benign abdominal exam  Musculoskeletal:  General: No tenderness, deformity or signs of injury. Normal range of motion.  Cervical back: Normal range of motion and neck supple.  Skin:  General: Skin is warm and dry.  Capillary Refill: Capillary refill takes less than 2 seconds.  Coloration: Skin is not jaundiced or pale.  Findings: No bruising, erythema, lesion or rash.  Neurological:  General: No focal deficit present.  Mental Status: She is alert and oriented to person, place, and time. Mental status is at baseline.  Cranial Nerves: No cranial nerve deficit.  Sensory: No sensory deficit.  Motor: No abnormal muscle tone.  Psychiatric:  Mood and Affect: Mood normal.  Behavior: Behavior normal.  Thought Content: Thought content normal.  Judgment: Judgment normal.    ED Course    Labs Reviewed  HEPATIC FUNCTION PANEL - Abnormal; Notable for the following components:  Result Value  Total Protein 8.0 (*)  All  other components within normal limits  CBC WITH DIFFERENTIAL  BMP W/ REFLEX TO MG  LIPASE  TROPONIN I  GLOMERULAR FILTRATION RATE    Lab Results for last 36Hrs:  Recent Results (from the past 36 hour(s))  ECG 12 lead  Collection Time: 07/25/24 12:00 PM  Result Value Ref Range  Ventricular Rate 71 BPM  P-R Interval 132 ms  QRS Duration 82 ms  Q-T Interval 398 ms  QTC Calculation 432 ms  Calculated P Axis 43 degrees  Calculated R Axis 22 degrees  Calculated T Axis 24 degrees  Interpretation  Normal sinus rhythm with sinus arrhythmia  Normal ECG  When compared with ECG of 02-JAN-2024 13:09,  Nonspecific T wave abnormality no longer evident in Lateral leads    CBC with Differential  Collection Time: 07/25/24 12:05 PM  Result Value Ref Range  WBC 9.1 4.4 - 11.2 10*3/uL  RBC 4.63 4.20 - 5.40 10*6/uL  HGB 13.5 12.0 - 16.0 g/dL  HCT 39.6 37.0 - 47.0 %  MCV 85.5 81.0 - 99.0 fL  MCH 29.2 27.0 - 31.0 pg  MCHC 34.1 33.0 - 37.0 g/dL  RDW 13.3 11.5 - 14.5 %  Platelet Count 280 130 - 375 10*3/uL  MPV 10.1 8.7 - 13.0 fL  Neutrophils Percent 57.5 36.0 - 66.0 %  Lymphocytes Percent 32.6 21.0 - 50.0 %  Monocytes Percent 8.0 2.0 - 10.0 %  Eosinophils Percent 1.0 0.0 - 10.0 %  Basophils Percent 0.4 0.0 - 1.0 %  Immature Granulocyte % 0.1 0.0 - 0.4 %  Neutrophils Absolute 5.2 1.4 - 6.5 10*3/uL  Lymphocytes Absolute 3.0 1.2 - 3.4 10*3/uL  Monocytes Absolute 0.7 0.1 - 1.0 10*3/uL  Eosinophils Absolute 0.1 0.0 - 0.7 10*3/uL  Basophils Absolute 0.0 0.0 - 0.2 10*3/uL  # Immature Granulocyte 0.01 0.00 - 0.03 10*3/uL  BMP w/ Reflex to Mg  Collection Time: 07/25/24 12:05 PM  Result Value Ref Range  Glucose 88 65 - 99 mg/dL  Sodium 138 136 - 144 mmol/L  Potassium 3.6 3.6 - 5.1 mmol/L  Chloride 101 101 - 111 mmol/L  CO2 27 22 - 32 mmol/L  BUN 14 8 - 20 mg/dL  Calcium 10.0 8.9 - 10.3 mg/dL  Creatinine 0.81 0.60 - 1.10 mg/dL  Anion Gap 10 7 - 16 mmol/L  Hepatic function panel  Collection Time: 07/25/24 12:05 PM  Result Value Ref Range  Albumin 4.4  3.5 - 4.8 g/dL  Total Bilirubin 0.6 0.4 - 2.0 mg/dL  Bilirubin, Direct 0.2 0.0 - 0.4 mg/dL  Bilirubin, Indirect 0.4 0.0 - 1.1 mg/dL  Total Protein 8.0 (H) 6.1 - 7.9 g/dL  ALT 16 5 - 41 U/L  AST 19 10 - 34 U/L  ALKP 63 28 - 116 U/L  Lipase  Collection Time: 07/25/24 12:05 PM  Result Value Ref Range  Lipase 50 8 - 57 U/L  Troponin I  Collection Time: 07/25/24 12:05 PM  Result Value Ref Range  Troponin I <0.03 0.00 - 0.04 ng/mL  Glomerular Filtration Rate  Collection Time: 07/25/24 12:05 PM  Result Value Ref Range  GFR Non African American >60 >59 mL/min  GFR African American >60 >59 mL/min    Diagnostic Results for last 36Hrs:  CT Abdomen Pelvis W Contrast    Result Date: 7/25/2024  REASON FOR EXAM: abdominal pain, hx of diverticulitis, but also recently on ozempic TECHNICAL FACTORS: Multiple contiguous axial CT images were obtained of the abdomen and pelvis after administration of intravenous contrast. 2D reformatted images were performed. Automated exposure control was utilized for radiation dose reduction. COMPARISON: 10/19/2023 FINDINGS: Soft tissues/Musculature: Unremarkable Osseous Structures: Degenerative changes of the visualized spine. Lung bases: Cardiomegaly. Aorta/Vasculature: Moderate atherosclerotic disease. Lymph nodes: Unremarkable Liver: Unremarkable Gallbladder/Biliary System: Status post cholecystectomy Pancreas: Unremarkable Spleen: Unremarkable Adrenal glands: Unremarkable Kidneys/Ureters: Left upper pole renal simple cyst. Right renal parapelvic simple cyst. Urinary bladder: Unremarkable Reproductive organs: Status post hysterectomy. Peritoneum/Mesentery: Unremarkable Bowel/Stomach: There is bowel anastomosis suture material in the rectum. No evidence of bowel obstruction. Gastric sleeve surgical change. Appendix: Unremarkable IMPRESSION: 1. No CT evidence of acute pathology within the abdomen or pelvis. 2. Cholecystectomy, hysterectomy, sleeve gastrectomy, and partial colectomy. 3. Mild  cardiomegaly. 4. Additional incidental, age-related, and/or chronic findings, as described above. Electronically signed by Carie Carvalho MD on 7/25/2024 1:36 PM    MRI Brain WO Contrast    Result Date: 7/25/2024  REASON FOR EXAM: vertigo TECHNICAL FACTORS: Multiplanar, multisequence MRI of the brain was performed without administration of intravenous contrast. COMPARISON: None FINDINGS: Prominent ventricles and sulci, likely mild parenchymal atrophy. There is no evidence of acute intracranial hemorrhage or infarct. There is no evidence of mass, mass effect, or midline shift. Periventricular white matter T2/FLAIR hyperintensity, likely moderate chronic microvascular ischemia. The visualized orbits are normal in appearance. Paranasal sinuses are clear. Normal flow voids are present.    1. No acute intracranial abnormality. 2. Parenchymal atrophy and chronic microvascular ischemia. Electronically signed by Chema Jerez MD on 7/25/2024 1:19 PM    Wet Read Results  CT Abdomen Pelvis W Contrast  Final Result    MRI Brain WO Contrast  Final Result  1. No acute intracranial abnormality.  2. Parenchymal atrophy and chronic microvascular ischemia.      Electronically signed by Chema Jerez MD on 7/25/2024 1:19 PM        Medications  0.9% NaCl bolus 1,000 mL (0 mLs Intravenous Complete 7/25/24 1448)  LORazepam (ATIVAN) 2 mg/mL vial for inj 1 mg (1 mg Intravenous $Given 7/25/24 1208)  iopamidoL (ISOVUE-370) 370 mg iodine /mL (76 %) solution 100 mL (100 mLs Intravenous $Given 7/25/24 1326)    Procedures        Medical Decision Making  I personally reviewed the patient's medical chart: No recent visits or admissions for similar complaints    Patient's pulse oximetry is 99% which is not hypoxic    Zora Davis is a 74 y.o. female presenting with vertigo and abdominal pain. I suspect this is all secondary to Ozempic but she has never had vertigo before so we did an MRI that was negative to rule out posterior  circulation stroke. We also did a CT of the abdomen pelvis because she was concerned it could be diverticulitis. CT scan is negative. Symptoms improved in the emergency department. Vital signs normal, workup negative. She will discontinue the Ozempic and follow-up with her PCP. She has Zofran and meclizine at home and will take those. Will return if she is unable to tolerate oral fluids or has any concerns whatsoever.    History provided by patient as well as  at bedside    I independently interpreted the patient's lab results which showed no significant abnormalities    I independently interpreted the patient's ECG which showed normal sinus rhythm, no acute ischemic changes    Prior to Admission medications  Medication Sig Start Date End Date Taking?  albuterol (VENTOLIN) 90 mcg/actuation HFAA inhaler Inhale 2 puffs into the lungs every 6 (six) hours as needed 12/30/21  ALPRAZolam (Xanax) 0.5 MG Tab tablet Take 1 tablet (0.5 mg total) by mouth at bedtime nightly as needed for Sleep 7/16/24  celecoxib (CeleBREX) 100 MG Cap capsule Take 1 capsule (100 mg total) by mouth 2 (two) times daily 4/26/24  cholecalciferol, vitamin D3, (Vitamin D3) 125 mcg (5,000 unit) Tab tablet Take 1 tablet (5,000 Units total) by mouth daily  Combivent Respimat  mcg/actuation Mist INHALE 1 PUFF EVERY 6 HOURS AS NEEDED 5/16/23  dicyclomine (BENTYL) 10 MG Cap capsule Take 1 capsule (10 mg total) by mouth 4 (four) times daily before meals and nightly 9/22/23  eszopiclone (Lunesta) 2 MG Tab Take 1 tablet (2 mg total) by mouth at bedtime nightly as needed for Sleep Take immediately before bedtime 2/16/24  fluticasone (FLONASE) 50 mcg/actuation nasal spray 1 spray each nostril twice daily 12/16/16  guaiFENesin (Mucinex) 1,200 mg Ta12 Mucinex Take 1 tablet (oral) 2 times per day PRN - Congestion take with full glass of water [id number] tablet extended release 12hr 2 times per day oral No set duration recorded No set duration amount  recorded active 1,200 mg 10/28/20  hydroCHLOROthiazide (HYDRODIURIL) 25 MG Tab tablet Take 1 tablet (25 mg total) by mouth daily 3/16/22  levothyroxine (SYNTHROID) 50 MCG Tab tablet Take 1 tablet (50 mcg total) by mouth every morning before breakfast 12/4/23  losartan (COZAAR) 50 MG Tab tablet Take 1 tablet (50 mg total) by mouth daily 5/27/22  melatonin 5 mg Cap Take 1 capsule (5 mg total) by mouth at bedtime nightly as needed  metoprolol tartrate (LOPRESSOR) 50 MG Tab tablet Take 1 tablet (50 mg total) by mouth nightly 7/22/22  multivitamin (THERAGRAN) Tab per tablet Take 1 tablet by mouth daily  mupirocin (BACTROBAN) 2 % Oint topical ointment Apply topically 3 (three) times daily 4/26/24  predniSONE (DELTASONE) 20 MG Tab tablet Take 1 tablet (20 mg total) by mouth daily 4/26/24    ED Critical Care Time        Diagnosis:    Final diagnoses:  Medication side effect  Vertigo    MD Greg ORNELAS Patrick, MD  07/25/24 1500    Electronically signed by Myles Garza MD at 07/25/2024 3:00 PM CDT   Back to top of ED Notes  Pushpa Pineda RN - 07/25/2024 10:11 AM CDT  Formatting of this note might be different from the original.  Complains of dizziness, N/V since Sunday, abdominal cramping to mid abdomen, has been constipated, took 2 dulcolax yesterday, has had scant results.    Began on semiglutide 6 weeks ago, 10 units initially, now on 20 units, last dose was 9 days ago due to what she describes as above side effects.  Electronically signed by Pushpa Pineda RN at 07/25/2024 10:19 AM CDT         Current Assessment & Plan     Avoid GLP 1 medications especially with history of gastric sleeve.         Pyridoxine deficiency    Deficiency of other specified B group vitamins    Overview     Lab Results   Component Value Date    IRON 75 05/19/2021    TRANSFERRIN 261 05/19/2021    TIBC 386 05/19/2021    FESATURATED 19 (L) 05/19/2021      Lab Results   Component Value Date    CRXPIACF16 >2000 (H)  02/10/2020     Lab Results   Component Value Date    FOLATE 14.5 05/19/2021     Lab Results   Component Value Date    WBC 6.29 07/30/2024    HGB 12.8 08/20/2024    HCT 41.3 07/30/2024    MCV 88 07/30/2024     07/30/2024                Current Assessment & Plan     Check levels.         Intolerance of continuous positive airway pressure (CPAP) ventilation    Overview     Chronic.  Patient reports history of JOSE DE JESUS intolerant to CPAP.  She is not wearing the machine.  She reports insomnia.  She has tried Lunesta in the past.         Current Assessment & Plan     Referral to ENT for further evaluation of possible inspire placement.  Referral to sleep Medicine for further evaluation.          Other Visit Diagnoses       Fatigue, unspecified type    -  Primary    Encounter for screening mammogram for breast cancer                 Review of patient's allergies indicates:   Allergen Reactions    Hydrocodone-acetaminophen Itching    Trazodone Other (See Comments)     Tongue tingling     Current Outpatient Medications   Medication Instructions    albuterol (PROVENTIL HFA) 90 mcg/actuation inhaler 2 puffs, Inhalation, Every 6 hours PRN, Rescue    ALPRAZolam (XANAX) 0.5 mg, Oral    azelastine (ASTELIN) 137 mcg, Nasal, 2 times daily    budesonide (PULMICORT FLEXHALER) 180 mcg, Inhalation, 2 times daily, Controller    celecoxib (CELEBREX) 200 mg, Oral, Daily PRN    doxycycline (VIBRAMYCIN) 100 mg, Oral, Every 12 hours    ergocalciferol, vitamin D2, (VITAMIN D ORAL) Oral, Daily    eszopiclone (LUNESTA) 2 mg, Oral, Nightly PRN    hydroCHLOROthiazide (HYDRODIURIL) 25 mg, Oral, Daily    ipratropium-albuteroL (COMBIVENT RESPIMAT)  mcg/actuation inhaler 1 puff, Inhalation, Every 6 hours PRN, Rescue    levothyroxine (SYNTHROID) 50 mcg, Oral, Before breakfast    losartan (COZAAR) 50 MG tablet TAKE 1 TABLET DAILY    meclizine (ANTIVERT) 25 mg, Oral, 2 times daily PRN    metoprolol tartrate (LOPRESSOR) 50 MG tablet TAKE 1 TABLET  "ONCE DAILY AND AS NEEDED FOR PALPITATIONS      I have reviewed the PMH, social history, FamilyHx, surgical history, allergies and medications documented / confirmed by the patient at the time of this visit.  Review of Systems   Constitutional:  Positive for fatigue. Negative for chills, fever and unexpected weight change.   HENT:  Negative for ear pain and sore throat.    Eyes:  Negative for redness and visual disturbance.   Respiratory:  Negative for cough and shortness of breath.    Cardiovascular:  Negative for chest pain and palpitations.   Gastrointestinal:  Negative for nausea and vomiting.   Genitourinary:  Negative for difficulty urinating and hematuria.   Musculoskeletal:  Negative for arthralgias and myalgias.   Skin:  Negative for rash and wound.   Neurological:  Negative for weakness and headaches.   Psychiatric/Behavioral:  Positive for sleep disturbance. The patient is not nervous/anxious.      Objective:   /84   Pulse 65   Ht 5' 7" (1.702 m)   Wt 93.8 kg (206 lb 12.8 oz)   LMP  (LMP Unknown)   SpO2 100%   BMI 32.39 kg/m²   Physical Exam  Vitals and nursing note reviewed.   Constitutional:       General: She is not in acute distress.     Appearance: She is well-developed. She is not ill-appearing, toxic-appearing or diaphoretic.   HENT:      Head: Normocephalic and atraumatic.      Right Ear: Hearing and external ear normal.      Left Ear: Hearing and external ear normal.      Nose: Nose normal. No rhinorrhea.   Eyes:      General: Lids are normal.      Extraocular Movements: Extraocular movements intact.      Conjunctiva/sclera: Conjunctivae normal.      Pupils: Pupils are equal, round, and reactive to light.   Cardiovascular:      Rate and Rhythm: Normal rate.      Pulses: Normal pulses.   Pulmonary:      Effort: Pulmonary effort is normal. No respiratory distress.      Breath sounds: Normal breath sounds.   Musculoskeletal:         General: Normal range of motion.      Cervical back: " Normal range of motion and neck supple.   Skin:     General: Skin is warm and dry.      Capillary Refill: Capillary refill takes less than 2 seconds.      Coloration: Skin is not pale.   Neurological:      General: No focal deficit present.      Mental Status: She is alert and oriented to person, place, and time. She is not disoriented.   Psychiatric:         Attention and Perception: She is attentive.         Mood and Affect: Mood normal. Mood is not anxious or depressed.         Speech: Speech is not rapid and pressured or slurred.         Behavior: Behavior normal. Behavior is not agitated, aggressive or hyperactive. Behavior is cooperative.         Thought Content: Thought content normal. Thought content is not paranoid or delusional. Thought content does not include homicidal or suicidal ideation. Thought content does not include homicidal or suicidal plan.         Cognition and Memory: Memory is not impaired.         Judgment: Judgment normal.       Physical Exam  Clear fluid noted in the left ear. Little wax present in the ear, membrane visibility is compromised.  Lungs are clear.    Results  Laboratory Studies  Thyroid was a little borderline, but the free T4 was normal.    Assessment:     1. Fatigue, unspecified type    2. Abnormal TSH    3. Encounter for screening mammogram for breast cancer    4. Hypothyroidism (acquired)    5. Essential hypertension    6. Encounter for long-term (current) use of medications    7. Medication side effect    8. History of asthma    9. JOSE DE JESUS (obstructive sleep apnea)    10. Intolerance of continuous positive airway pressure (CPAP) ventilation    11. History of bariatric surgery    12. Deficiency of other specified B group vitamins    13. Pyridoxine deficiency    14. Nasal sore      MDM:   Moderate medical complexity.  Moderate risk.  Total time: 21 minutes.  This includes total time spent on the encounter, which includes face to face time and non-face to face time preparing to  see the patient (eg, review of previous medical records, tests), Obtaining and/or reviewing separately obtained history, documenting clinical information in the electronic or other health record, independently interpreting results (not separately reported)/communicating results to the patient/family/caregiver, and/or care coordination (not separately reported).    I have Reviewed and summarized old records.  I have performed thorough medication reconciliation today and discussed risk and benefits of medications.  I have reviewed labs and discussed with patient.  All questions were answered.  I am requesting old records and will review them once they are available.  Bariatric, sleep medicine  Visit today included increased complexity associated with the care of the episodic problem see above assessment addressed and managing the longitudinal care of the patient due to the serious and/or complex managed problem(s) see above.  I have signed for the following orders AND/OR meds.  Orders Placed This Encounter   Procedures    Mammo Digital Screening Bilat w/ José     Standing Status:   Future     Standing Expiration Date:   2/20/2026     Order Specific Question:   May the Radiologist modify the order per protocol to meet the clinical needs of the patient?     Answer:   Yes    TSH     Standing Status:   Future     Number of Occurrences:   1     Standing Expiration Date:   11/18/2025     Order Specific Question:   Send normal result to authorizing provider's In Basket if patient is active on MyChart:     Answer:   Yes    Vitamin D     Standing Status:   Future     Number of Occurrences:   1     Standing Expiration Date:   8/20/2025    Vitamin B12     Standing Status:   Future     Number of Occurrences:   1     Standing Expiration Date:   10/19/2025    Vitamin B6     Standing Status:   Future     Number of Occurrences:   1     Standing Expiration Date:   10/19/2025    Vitamin B1     Standing Status:   Future     Number of  Occurrences:   1     Standing Expiration Date:   10/19/2025    Folate     Standing Status:   Future     Number of Occurrences:   1     Standing Expiration Date:   10/19/2025    Phosphorus     Standing Status:   Future     Number of Occurrences:   1     Standing Expiration Date:   10/19/2025    T4, FREE     Standing Status:   Future     Number of Occurrences:   1     Standing Expiration Date:   11/18/2025     Order Specific Question:   Send normal result to authorizing provider's In Basket if patient is active on MyChart:     Answer:   Yes    Ambulatory referral/consult to ENT     Standing Status:   Future     Standing Expiration Date:   9/20/2025     Referral Priority:   Routine     Referral Type:   Consultation     Referral Reason:   Specialty Services Required     Referred to Provider:   Cole Medeiros MD     Requested Specialty:   Otolaryngology     Number of Visits Requested:   1     Medications Ordered This Encounter   Medications    albuterol (PROVENTIL HFA) 90 mcg/actuation inhaler     Sig: Inhale 2 puffs into the lungs every 6 (six) hours as needed for Wheezing. Rescue     Dispense:  46.9 each     Refill:  5    budesonide (PULMICORT FLEXHALER) 90 mcg/actuation AePB     Sig: Inhale 2 puffs (180 mcg total) into the lungs 2 (two) times a day. Controller     Dispense:  1 each     Refill:  5    doxycycline (VIBRAMYCIN) 100 MG Cap     Sig: Take 1 capsule (100 mg total) by mouth every 12 (twelve) hours.     Dispense:  20 capsule     Refill:  0    hydroCHLOROthiazide (HYDRODIURIL) 25 MG tablet     Sig: Take 1 tablet (25 mg total) by mouth once daily.     Dispense:  90 tablet     Refill:  0     .    ipratropium-albuteroL (COMBIVENT RESPIMAT)  mcg/actuation inhaler     Sig: Inhale 1 puff into the lungs every 6 (six) hours as needed for Wheezing or Shortness of Breath. Rescue     Dispense:  4 g     Refill:  0    levothyroxine (SYNTHROID) 50 MCG tablet     Sig: Take 1 tablet (50 mcg total) by mouth before  breakfast.     Dispense:  90 tablet     Refill:  0     Office visit/labs required for further refills, may self-schedule via [facility].        Follow up in about 1 year (around 8/20/2025), or if symptoms worsen or fail to improve, for Med refills, LAB RESULTS.  Future Appointments       Date Provider Specialty Appt Notes    8/20/2024  Lab     9/4/2024  Radiology mammo    10/15/2024 oCle Medeiros MD Otolaryngology JOSE DE JESUS (obstructive sleep apnea) [G47.33]  Intolerance of continuous positive airway pressure (CPAP) ventilation [Z78.9]    2/10/2025 Myles Granado Jr., MD Cardiology 6 month f/u          If no improvement in symptoms or symptoms worsen, advised to call/follow-up at clinic or go to ER. Patient voiced understanding and all questions/concerns were addressed.   DISCLAIMER: This note was compiled by using a speech recognition dictation system and therefore please be aware that typographical / speech recognition errors can and do occur.  Please contact me if you see any errors specifically.  Consent was obtained for JEREMIE recording system prior to the visit.    Carlos Pichardo M.D.       Office: [phone]   [address] [place] [address]  FAX: [phone]

## 2024-08-20 NOTE — ASSESSMENT & PLAN NOTE
Referral to ENT for further evaluation of possible inspire placement.  Referral to sleep Medicine for further evaluation.

## 2024-08-21 ENCOUNTER — PATIENT MESSAGE (OUTPATIENT)
Dept: FAMILY MEDICINE | Facility: CLINIC | Age: 74
End: 2024-08-21
Payer: MEDICARE

## 2024-08-21 DIAGNOSIS — E53.8 B12 DEFICIENCY: Primary | ICD-10-CM

## 2024-08-21 LAB
25(OH)D3+25(OH)D2 SERPL-MCNC: 88 NG/ML (ref 30–96)
FOLATE SERPL-MCNC: 32.8 NG/ML (ref 4–24)
VIT B12 SERPL-MCNC: 282 PG/ML (ref 210–950)

## 2024-08-21 RX ORDER — CYANOCOBALAMIN 1000 UG/ML
1000 INJECTION, SOLUTION INTRAMUSCULAR; SUBCUTANEOUS
Status: COMPLETED | OUTPATIENT
Start: 2024-08-22 | End: 2024-08-22

## 2024-08-21 NOTE — PROGRESS NOTES
Make follow-up lab appointment per recommendation below.  Check to see if patient has seen the results through my chart.  If not then,  #CALL THE PATIENT# to discuss results/see if they have questions and document verification of contact. Make F/U appt if needed. [phone]    #My interpretation that was sent to them through [facility]:  Zora, I have reviewed your recent blood work.     Folate level is too high.  Decrease supplementation.  B12 level is very low.  Start B12 supplement over-the-counter.  Phosphorus level is normal.  Your hemoglobin is normal.    Your metabolic panel which shows your glucose, kidney function, electrolytes, and liver function is normal.   Thyroid study is now normal.   Your cholesterol is elevated.  Risk score is high.  I recommend lifestyle modification with Mediterranean diet and exercise.  If no improvement over six months will need to discuss starting medication for lowering cholesterol to reduce risk of heart attacks and strokes.  Your hemoglobin A1c is normal.  This test is gold standard screening test for diabetes.  It is a measures 3 months of your average blood sugar.    The 10-year ASCVD risk score (Bushra MORAN, et al., 2019) is: 18%  =========================  Also please address any outstanding health maintenance that may be due: Shingles Vaccine(1 of 2) Never done  RSV Vaccine (Age 60+ and Pregnant patients)(1 - 1-dose 60+ series) Never done  Mammogram due on 08/24/2024

## 2024-08-21 NOTE — TELEPHONE ENCOUNTER
I received your message which was reviewed along with the the medication list and allergies that we have below.  Please review it for accuracy to make sure that we have the most recent records on your history.     Based on this, the following orders were placed AND/OR medicines were sent in.     No orders of the defined types were placed in this encounter.      Medications written and sent at this time include:  Medications Ordered This Encounter   Medications    cyanocobalamin injection 1,000 mcg       Your pharmacy(ies) of choice at this time on record include the list below and any medications would have been sent to the one at the top.    [facility] - [address] [address]  Phone: [phone] Fax: [phone]    [address] [address]  Phone: [phone] Fax: [phone]      Thank you for choosing us as your healthcare provider!  Dr. Carlos Pichardo    ALLERGY LIST  Review of patient's allergies indicates:   Allergen Reactions    Hydrocodone-acetaminophen Itching    Trazodone Other (See Comments)     Tongue tingling       MEDICATION LIST  Current Outpatient Medications on File Prior to Visit   Medication Sig Dispense Refill    albuterol (PROVENTIL HFA) 90 mcg/actuation inhaler Inhale 2 puffs into the lungs every 6 (six) hours as needed for Wheezing. Rescue 46.9 each 5    ALPRAZolam (XANAX) 0.5 MG tablet Take 0.5 mg by mouth.      azelastine (ASTELIN) 137 mcg (0.1 %) nasal spray 1 spray (137 mcg total) by Nasal route 2 (two) times daily. for 7 days 30 mL 0    budesonide (PULMICORT FLEXHALER) 90 mcg/actuation AePB Inhale 2 puffs (180 mcg total) into the lungs 2 (two) times a day. Controller 1 each 5    celecoxib (CELEBREX) 200 MG capsule Take 1 capsule (200 mg total) by mouth daily as needed for Pain. 90 capsule 0    doxycycline (VIBRAMYCIN) 100 MG Cap Take 1  capsule (100 mg total) by mouth every 12 (twelve) hours. 20 capsule 0    ergocalciferol, vitamin D2, (VITAMIN D ORAL) Take by mouth Daily.      eszopiclone (LUNESTA) 2 MG Tab Take 2 mg by mouth nightly as needed.      hydroCHLOROthiazide (HYDRODIURIL) 25 MG tablet Take 1 tablet (25 mg total) by mouth once daily. 90 tablet 0    ipratropium-albuteroL (COMBIVENT RESPIMAT)  mcg/actuation inhaler Inhale 1 puff into the lungs every 6 (six) hours as needed for Wheezing or Shortness of Breath. Rescue 4 g 0    levothyroxine (SYNTHROID) 50 MCG tablet Take 1 tablet (50 mcg total) by mouth before breakfast. 90 tablet 0    losartan (COZAAR) 50 MG tablet TAKE 1 TABLET DAILY 90 tablet 3    meclizine (ANTIVERT) 25 mg tablet Take 1 tablet (25 mg total) by mouth 2 (two) times daily as needed. 14 tablet 0    metoprolol tartrate (LOPRESSOR) 50 MG tablet TAKE 1 TABLET ONCE DAILY AND AS NEEDED FOR PALPITATIONS 110 tablet 3     No current facility-administered medications on file prior to visit.       HEALTH MAINTENANCE THAT IS OVERDUE OR NEEDS TO BE UPDATED ON OUR CHART IS LISTED BELOW.  IF YOU HAVE HAD IT DONE ELSEWHERE, PLEASE SEND US DATES AND RECORDS IF YOU HAVE THEM TO MAKE YOUR CHART ACCURATE.  IF YOU HAVE NOT HAD THESE DONE AND ARE READY FOR US TO SCHEDULE THEM, PLEASE SEND US A MESSAGE.  Health Maintenance Due   Topic Date Due    Shingles Vaccine (1 of 2) Never done    RSV Vaccine (Age 60+ and Pregnant patients) (1 - 1-dose 60+ series) Never done    Mammogram  08/24/2024       DISCLAIMER: This note was compiled by using a speech recognition dictation system and therefore please be aware that typographical / speech recognition errors can and do occur.  Please contact me if you see any errors specifically.    Carlos Pichardo MD  We Offer Telehealth & Same Day Appointments!   Book your Telehealth appointment with me through my nurse or   Clinic appointments on [facility]!  Opmkhd-[phone]     Check out my Facebook Page and  Follow Me at: CLICK HERE    Check out my website at [email or web address] by clicking on: CLICK HERE    To Schedule appointments online, go to MyChart: CLICK HERE     Location: [email or web address]    [address] [facility] [place] [address]    FAX: [phone]

## 2024-08-22 ENCOUNTER — CLINICAL SUPPORT (OUTPATIENT)
Dept: FAMILY MEDICINE | Facility: CLINIC | Age: 74
End: 2024-08-22
Payer: MEDICARE

## 2024-08-22 DIAGNOSIS — E53.8 B12 DEFICIENCY: Primary | ICD-10-CM

## 2024-08-22 PROCEDURE — 99999 PR PBB SHADOW E&M-EST. PATIENT-LVL II: CPT | Mod: PBBFAC,,,

## 2024-08-22 PROCEDURE — 96372 THER/PROPH/DIAG INJ SC/IM: CPT | Mod: S$GLB,,, | Performed by: FAMILY MEDICINE

## 2024-08-22 RX ADMIN — CYANOCOBALAMIN 1000 MCG: 1000 INJECTION, SOLUTION INTRAMUSCULAR; SUBCUTANEOUS at 12:08

## 2024-08-26 LAB
PYRIDOXAL SERPL-MCNC: 79 UG/L (ref 5–50)
VIT B1 BLD-MCNC: 61 UG/L (ref 38–122)

## 2024-08-26 NOTE — PROGRESS NOTES
1st check to see if patient has seen the results.  If not then  CALL patient with results and Document verification.  Schedule follow-up if needed.  [phone]  Thiamine level is within normal limits.  B6 level is elevated.  Reduce supplementation.

## 2024-08-27 ENCOUNTER — TELEPHONE (OUTPATIENT)
Dept: FAMILY MEDICINE | Facility: CLINIC | Age: 74
End: 2024-08-27
Payer: MEDICARE

## 2024-08-27 DIAGNOSIS — E53.8 B12 DEFICIENCY: Primary | ICD-10-CM

## 2024-08-27 RX ORDER — CYANOCOBALAMIN 1000 UG/ML
1000 INJECTION, SOLUTION INTRAMUSCULAR; SUBCUTANEOUS WEEKLY
Status: SHIPPED | OUTPATIENT
Start: 2024-08-29 | End: 2024-09-19

## 2024-08-29 ENCOUNTER — CLINICAL SUPPORT (OUTPATIENT)
Dept: FAMILY MEDICINE | Facility: CLINIC | Age: 74
End: 2024-08-29
Payer: MEDICARE

## 2024-08-29 DIAGNOSIS — I10 ESSENTIAL HYPERTENSION: Chronic | ICD-10-CM

## 2024-08-29 DIAGNOSIS — Z79.899 ENCOUNTER FOR LONG-TERM (CURRENT) USE OF MEDICATIONS: ICD-10-CM

## 2024-08-29 DIAGNOSIS — E53.8 B12 DEFICIENCY: Primary | ICD-10-CM

## 2024-08-29 PROCEDURE — 99999 PR PBB SHADOW E&M-EST. PATIENT-LVL II: CPT | Mod: PBBFAC,,,

## 2024-08-29 PROCEDURE — 96372 THER/PROPH/DIAG INJ SC/IM: CPT | Mod: S$GLB,,, | Performed by: FAMILY MEDICINE

## 2024-08-29 RX ADMIN — CYANOCOBALAMIN 1000 MCG: 1000 INJECTION, SOLUTION INTRAMUSCULAR; SUBCUTANEOUS at 10:08

## 2024-08-31 RX ORDER — METOPROLOL TARTRATE 50 MG/1
TABLET ORAL
Qty: 90 TABLET | Refills: 3 | Status: SHIPPED | OUTPATIENT
Start: 2024-08-31

## 2024-09-05 ENCOUNTER — PATIENT MESSAGE (OUTPATIENT)
Dept: FAMILY MEDICINE | Facility: CLINIC | Age: 74
End: 2024-09-05

## 2024-09-05 ENCOUNTER — CLINICAL SUPPORT (OUTPATIENT)
Dept: FAMILY MEDICINE | Facility: CLINIC | Age: 74
End: 2024-09-05
Payer: MEDICARE

## 2024-09-05 DIAGNOSIS — E53.8 B12 DEFICIENCY: Primary | ICD-10-CM

## 2024-09-05 PROCEDURE — 99999 PR PBB SHADOW E&M-EST. PATIENT-LVL II: CPT | Mod: PBBFAC,,,

## 2024-09-05 PROCEDURE — 96372 THER/PROPH/DIAG INJ SC/IM: CPT | Mod: S$GLB,,, | Performed by: FAMILY MEDICINE

## 2024-09-05 RX ADMIN — CYANOCOBALAMIN 1000 MCG: 1000 INJECTION, SOLUTION INTRAMUSCULAR; SUBCUTANEOUS at 10:09

## 2024-09-11 ENCOUNTER — PATIENT MESSAGE (OUTPATIENT)
Dept: FAMILY MEDICINE | Facility: CLINIC | Age: 74
End: 2024-09-11
Payer: MEDICARE

## 2024-09-13 ENCOUNTER — CLINICAL SUPPORT (OUTPATIENT)
Dept: FAMILY MEDICINE | Facility: CLINIC | Age: 74
End: 2024-09-13
Payer: MEDICARE

## 2024-09-13 DIAGNOSIS — E53.8 B12 DEFICIENCY: Primary | ICD-10-CM

## 2024-09-13 PROCEDURE — 99999 PR PBB SHADOW E&M-EST. PATIENT-LVL II: CPT | Mod: PBBFAC,,,

## 2024-09-13 RX ADMIN — CYANOCOBALAMIN 1000 MCG: 1000 INJECTION, SOLUTION INTRAMUSCULAR; SUBCUTANEOUS at 02:09

## 2024-09-18 ENCOUNTER — HOSPITAL ENCOUNTER (OUTPATIENT)
Dept: RADIOLOGY | Facility: HOSPITAL | Age: 74
Discharge: HOME | End: 2024-09-18
Attending: FAMILY MEDICINE
Payer: MEDICARE

## 2024-09-18 DIAGNOSIS — Z12.31 ENCOUNTER FOR SCREENING MAMMOGRAM FOR BREAST CANCER: ICD-10-CM

## 2024-09-18 PROCEDURE — 77067 SCR MAMMO BI INCL CAD: CPT | Mod: TC,PO

## 2024-09-18 PROCEDURE — 77067 SCR MAMMO BI INCL CAD: CPT | Mod: 26,,, | Performed by: RADIOLOGY

## 2024-09-18 PROCEDURE — 77063 BREAST TOMOSYNTHESIS BI: CPT | Mod: 26,,, | Performed by: RADIOLOGY

## 2024-10-11 ENCOUNTER — PATIENT MESSAGE (OUTPATIENT)
Dept: FAMILY MEDICINE | Facility: CLINIC | Age: 74
End: 2024-10-11
Payer: MEDICARE

## 2024-10-15 ENCOUNTER — PATIENT MESSAGE (OUTPATIENT)
Dept: FAMILY MEDICINE | Facility: CLINIC | Age: 74
End: 2024-10-15
Payer: MEDICARE

## 2024-10-15 RX ORDER — ESZOPICLONE 2 MG/1
2 TABLET, FILM COATED ORAL NIGHTLY PRN
Qty: 90 TABLET | Refills: 1 | Status: SHIPPED | OUTPATIENT
Start: 2024-10-15

## 2024-10-15 NOTE — TELEPHONE ENCOUNTER
No care due was identified.  [facility] Embedded Care Due Messages. Reference number: [id number].   10/15/2024 11:01:05 AM CDT

## 2024-10-17 ENCOUNTER — TELEPHONE (OUTPATIENT)
Dept: FAMILY MEDICINE | Facility: CLINIC | Age: 74
End: 2024-10-17
Payer: MEDICARE

## 2024-10-17 DIAGNOSIS — E53.8 B12 DEFICIENCY: Primary | ICD-10-CM

## 2024-10-17 RX ORDER — CYANOCOBALAMIN 1000 UG/ML
1000 INJECTION, SOLUTION INTRAMUSCULAR; SUBCUTANEOUS
Status: SHIPPED | OUTPATIENT
Start: 2024-10-18 | End: 2025-04-16

## 2024-10-18 ENCOUNTER — CLINICAL SUPPORT (OUTPATIENT)
Dept: FAMILY MEDICINE | Facility: CLINIC | Age: 74
End: 2024-10-18
Payer: MEDICARE

## 2024-10-18 ENCOUNTER — PATIENT MESSAGE (OUTPATIENT)
Dept: FAMILY MEDICINE | Facility: CLINIC | Age: 74
End: 2024-10-18

## 2024-10-18 DIAGNOSIS — E53.8 B12 DEFICIENCY: Primary | ICD-10-CM

## 2024-10-18 PROCEDURE — 99999 PR PBB SHADOW E&M-EST. PATIENT-LVL II: CPT | Mod: PBBFAC,,,

## 2024-10-18 RX ADMIN — CYANOCOBALAMIN 1000 MCG: 1000 INJECTION, SOLUTION INTRAMUSCULAR; SUBCUTANEOUS at 01:10

## 2024-11-13 ENCOUNTER — PATIENT MESSAGE (OUTPATIENT)
Dept: FAMILY MEDICINE | Facility: CLINIC | Age: 74
End: 2024-11-13
Payer: MEDICARE

## 2024-11-14 ENCOUNTER — OFFICE VISIT (OUTPATIENT)
Dept: FAMILY MEDICINE | Facility: CLINIC | Age: 74
End: 2024-11-14
Payer: MEDICARE

## 2024-11-14 ENCOUNTER — HOSPITAL ENCOUNTER (OUTPATIENT)
Dept: RADIOLOGY | Facility: HOSPITAL | Age: 74
Discharge: HOME | End: 2024-11-14
Attending: FAMILY MEDICINE
Payer: MEDICARE

## 2024-11-14 ENCOUNTER — TELEPHONE (OUTPATIENT)
Dept: FAMILY MEDICINE | Facility: CLINIC | Age: 74
End: 2024-11-14

## 2024-11-14 VITALS
SYSTOLIC BLOOD PRESSURE: 138 MMHG | OXYGEN SATURATION: 98 % | DIASTOLIC BLOOD PRESSURE: 84 MMHG | BODY MASS INDEX: 32.65 KG/M2 | WEIGHT: 208 LBS | HEIGHT: 67 IN | HEART RATE: 76 BPM

## 2024-11-14 DIAGNOSIS — K21.9 GASTROESOPHAGEAL REFLUX DISEASE, UNSPECIFIED WHETHER ESOPHAGITIS PRESENT: Chronic | ICD-10-CM

## 2024-11-14 DIAGNOSIS — M47.816 LUMBAR FACET ARTHROPATHY: Primary | ICD-10-CM

## 2024-11-14 DIAGNOSIS — M47.816 LUMBAR FACET ARTHROPATHY: Chronic | ICD-10-CM

## 2024-11-14 DIAGNOSIS — I70.0 AORTIC ATHEROSCLEROSIS: Primary | ICD-10-CM

## 2024-11-14 DIAGNOSIS — E03.9 HYPOTHYROIDISM (ACQUIRED): ICD-10-CM

## 2024-11-14 DIAGNOSIS — H69.90 DYSFUNCTION OF EUSTACHIAN TUBE, UNSPECIFIED LATERALITY: ICD-10-CM

## 2024-11-14 DIAGNOSIS — E53.8 B12 DEFICIENCY: ICD-10-CM

## 2024-11-14 DIAGNOSIS — I10 ESSENTIAL HYPERTENSION: ICD-10-CM

## 2024-11-14 DIAGNOSIS — G47.00 INSOMNIA, UNSPECIFIED TYPE: ICD-10-CM

## 2024-11-14 DIAGNOSIS — Z79.899 ENCOUNTER FOR LONG-TERM (CURRENT) USE OF MEDICATIONS: ICD-10-CM

## 2024-11-14 PROCEDURE — 72100 X-RAY EXAM L-S SPINE 2/3 VWS: CPT | Mod: TC,PO

## 2024-11-14 PROCEDURE — 72100 X-RAY EXAM L-S SPINE 2/3 VWS: CPT | Mod: 26,,, | Performed by: RADIOLOGY

## 2024-11-14 PROCEDURE — 99999 PR PBB SHADOW E&M-EST. PATIENT-LVL V: CPT | Mod: PBBFAC,,, | Performed by: FAMILY MEDICINE

## 2024-11-14 RX ORDER — LEVOTHYROXINE SODIUM 50 UG/1
50 TABLET ORAL
Qty: 90 TABLET | Refills: 4 | Status: SHIPPED | OUTPATIENT
Start: 2024-11-14

## 2024-11-14 RX ORDER — OMEPRAZOLE 40 MG/1
40 CAPSULE, DELAYED RELEASE ORAL DAILY
Qty: 90 CAPSULE | Refills: 3 | Status: SHIPPED | OUTPATIENT
Start: 2024-11-14 | End: 2025-11-14

## 2024-11-14 RX ORDER — CYANOCOBALAMIN 1000 UG/ML
1000 INJECTION, SOLUTION INTRAMUSCULAR; SUBCUTANEOUS
Status: SHIPPED | OUTPATIENT
Start: 2024-11-14 | End: 2025-05-13

## 2024-11-14 RX ORDER — LOSARTAN POTASSIUM 50 MG/1
50 TABLET ORAL DAILY
Qty: 90 TABLET | Refills: 4 | Status: SHIPPED | OUTPATIENT
Start: 2024-11-14

## 2024-11-14 RX ADMIN — CYANOCOBALAMIN 1000 MCG: 1000 INJECTION, SOLUTION INTRAMUSCULAR; SUBCUTANEOUS at 03:11

## 2024-11-14 NOTE — ASSESSMENT & PLAN NOTE
The patient was checked in the [facility] Board of Pharmacy's  Prescription Monitoring Program. There appears to be no incongruities with the patient's verbalized history.   Discussed insomnia condition course.  Advised of first-line medications for this condition.  Also discussed sleep hygiene.  Information was given below.  Good sleep habits (sometimes referred to as sleep hygiene) can help you get a good nights sleep.    Some habits that can improve your sleep health:  -Be consistent. Go to bed at the same time each night and get up at the same time each morning, including on the weekends  -Make sure your bedroom is quiet, dark, relaxing, and at a comfortable temperature  -Remove electronic devices, such as TVs, computers, and smart phones, from the bedroom  -Avoid large meals, caffeine, and alcohol before bedtime  -Get some exercise. Being physically active during the day can help you fall asleep more easily at night.

## 2024-11-14 NOTE — ASSESSMENT & PLAN NOTE
Continue good blood pressure control.  Cholesterol LDL near 130.  Counseled on hyperlipidemia disease course, healthy diet and increased need for exercise.  Please be advised of the risk of cardiovascular disease, increase stroke and heart attack risk with uncontrolled/untreated hyperlipidemia.     Patient voiced understanding and understood the treatment plan. All questions were answered.

## 2024-11-14 NOTE — PATIENT INSTRUCTIONS
Follow up in about 1 year (around 11/14/2025), or if symptoms worsen or fail to improve.     Dear patient,   As a result of recent federal legislation (The Federal Cures Act), you may receive lab or pathology results from your visit in your [facility] account before your physician is able to contact you. Your physician or their representative will relay the results to you with their recommendations at their soonest availability.     If no improvement in symptoms or symptoms worsen, please be advised to call MD, follow-up at clinic and/or go to ER if becomes severe.    Carlos Pichardo M.D.        We Offer TELEHEALTH & Same Day Appointments!   Book your Telehealth appointment with me through my nurse or   Clinic appointments on [employer]!    [address] [place] [address]    Office: [phone]   FAX: [phone]    Check out my Facebook Page and Follow Me at: [email or web address]    Check out my website at [employer] by clicking on: [email or web address]    To Schedule appointments online, go to [email or web address]: [email or web address]

## 2024-11-14 NOTE — ASSESSMENT & PLAN NOTE
GI precautions.  Start omeprazole.  GERD RECOMMENDATIONS  Please be advised of condition course.  - Take PPI in the morning 30-60 minutes before breakfast  - I recommend ongoing Education for lifestyle modifications to help control/reduce reflux/abdominal pain including: avoid large meals, avoid eating within 2-3 hours of bedtime (avoid late night eating & lying down soon after eating), elevate head of bed if nocturnal symptoms are present, smoking cessation (if current smoker), & weight loss (if overweight).   - please be advised to avoid known foods which trigger reflux symptoms & to minimize/avoid high-fat foods, chocolate, caffeine, citrus, alcohol, & tomato products.  - Advised to avoid/limit use of NSAID's, since they can cause GI upset, bleeding, and/or ulcers. If needed, take with food.

## 2024-11-14 NOTE — PROGRESS NOTES
1st check to see if patient has seen the results.  If not then  CALL patient with results and Document verification.  Schedule follow-up if needed.  [phone]    X-ray of the lumbar spine reviewed by radiology.  There is abnormality seen at L3-L4 that shows misalignment of the vertebrae.  Also mild degenerative changes between T11 and L1.  I recommend that you proceed with anti-inflammatory medications and physical therapy if no improvement follow-up with me sooner.

## 2024-11-14 NOTE — ASSESSMENT & PLAN NOTE
Update x-ray.  Consider physical therapy if no improvement.  Patient can take over-the-counter anti-inflammatory or Tylenol.

## 2024-11-14 NOTE — PROGRESS NOTES
PLAN:      Assessment & Plan  1. Chronic back pain.  She reports experiencing back pain for about 5 years, with episodes triggered by activities such as vacuuming, mopping, and lifting her grandchildren. The pain wraps around to her rib cage when it flares up. She has been using ibuprofen, which helps but upsets her stomach due to her previous gastric sleeve surgery. She was advised to continue taking ibuprofen and was prescribed omeprazole to protect her stomach lining. An x-ray will be updated as it has been 4 years since the last one. Physical therapy may be considered if the pain does not improve.    2. Mild cerumen impaction.  Mild cerumen impaction was noted in her ears. She reported feeling like she has fluid in her ears, which could be due to the weather. Flonase and Claritin were recommended to help with the symptoms.    3. Vitamin B12 deficiency.  She feels better since starting B12 injections. A B12 injection will be administered today.    4. Hypertension.  Her blood pressure has been monitored and was slightly elevated due to pain but is generally within a decent range. She was advised to continue her current medication regimen, including levothyroxine and losartan, which were refilled.    Eustachian tube dysfunction:Discussed condition course and signs and symptoms to expect.  Patient advised take anti-inflammatories and or Tylenol for pain or fever.  ER precautions.  Call MD or follow-up to clinic if not improving or worsening symptoms.      Follow-up  Return in a few months for follow-up.    Problem List Items Addressed This Visit       Essential hypertension (Chronic)     Counseled on importance of hypertension disease course, I recommend ongoing Education for DASH-diet and exercise.  Counseled on medication regimen importance of treating high blood pressure.  Please be advised of risk of untreated blood pressure as discussed.  Please advised of ER precautions were given for symptoms of hypertensive  urgency and emergency.           Relevant Medications    losartan (COZAAR) 50 MG tablet    Hypothyroidism (acquired) (Chronic)     Please be advised of hypothyroidism disease course.  We will plan to monitor thyroid labs at routine intervals.  Please be advised of risks and benefits of medication use, see medication insert for complete details.  ER precautions.    Common complaints from hypothyroidism include weight gain, fatigue, cold intolerance, constipation, dry and flaky shin, coarse or loss of hair, and trouble with memory.     Complaints with hyperthyroidism are weight loss or decrease in appetite, weakness and fatigue, visual changes, fast heart rate, decreased heat tolerance, thinning scalp hair, change in bowel habits, and palpations.         Relevant Medications    levothyroxine (SYNTHROID) 50 MCG tablet    Lumbar facet arthropathy (Chronic)     Update x-ray.  Consider physical therapy if no improvement.  Patient can take over-the-counter anti-inflammatory or Tylenol.         Relevant Orders    X-Ray Lumbar Spine AP And Lateral (Completed)    GERD (gastroesophageal reflux disease) (Chronic)     GI precautions.  Start omeprazole.  GERD RECOMMENDATIONS  Please be advised of condition course.  - Take PPI in the morning 30-60 minutes before breakfast  - I recommend ongoing Education for lifestyle modifications to help control/reduce reflux/abdominal pain including: avoid large meals, avoid eating within 2-3 hours of bedtime (avoid late night eating & lying down soon after eating), elevate head of bed if nocturnal symptoms are present, smoking cessation (if current smoker), & weight loss (if overweight).   - please be advised to avoid known foods which trigger reflux symptoms & to minimize/avoid high-fat foods, chocolate, caffeine, citrus, alcohol, & tomato products.  - Advised to avoid/limit use of NSAID's, since they can cause GI upset, bleeding, and/or ulcers. If needed, take with food.          Relevant  Medications    omeprazole (PRILOSEC) 40 MG capsule    Insomnia (Chronic)     The patient was checked in the [facility]acy's  Prescription Monitoring Program. There appears to be no incongruities with the patient's verbalized history.   Discussed insomnia condition course.  Advised of first-line medications for this condition.  Also discussed sleep hygiene.  Information was given below.  Good sleep habits (sometimes referred to as sleep hygiene) can help you get a good nights sleep.    Some habits that can improve your sleep health:  -Be consistent. Go to bed at the same time each night and get up at the same time each morning, including on the weekends  -Make sure your bedroom is quiet, dark, relaxing, and at a comfortable temperature  -Remove electronic devices, such as TVs, computers, and smart phones, from the bedroom  -Avoid large meals, caffeine, and alcohol before bedtime  -Get some exercise. Being physically active during the day can help you fall asleep more easily at night.           Encounter for long-term (current) use of medications (Chronic)     Complete history and physical was completed today.  Complete and thorough medication reconciliation was performed.  Discussed risks and benefits of medications.  Advised patient on orders and health maintenance.  We discussed old records and old labs if available.  Will request any records not available through epic.  Continue current medications listed on your summary sheet.           B12 deficiency (Chronic)     Patient is benefitting from the injection.  Continue B12 injections.  Monitoring level.         Relevant Medications    cyanocobalamin injection 1,000 mcg    Aortic atherosclerosis - Primary (Chronic)     Continue good blood pressure control.  Cholesterol LDL near 130.  Counseled on hyperlipidemia disease course, healthy diet and increased need for exercise.  Please be advised of the risk of cardiovascular disease, increase  stroke and heart attack risk with uncontrolled/untreated hyperlipidemia.     Patient voiced understanding and understood the treatment plan. All questions were answered.              Future Appointments       Date Provider Specialty Appt Notes    12/17/2024  Family Medicine b12 montly    2/11/2025 Tay Alcantar MD Cardiology Follow up    2/21/2025  Lab            Medication Management for assessment above:   Medication List with Changes/Refills   New Medications    OMEPRAZOLE (PRILOSEC) 40 MG CAPSULE    Take 1 capsule (40 mg total) by mouth once daily.   Current Medications    ALBUTEROL (PROVENTIL HFA) 90 MCG/ACTUATION INHALER    Inhale 2 puffs into the lungs every 6 (six) hours as needed for Wheezing. Rescue    ALPRAZOLAM (XANAX) 0.5 MG TABLET    Take 0.5 mg by mouth.    AZELASTINE (ASTELIN) 137 MCG (0.1 %) NASAL SPRAY    1 spray (137 mcg total) by Nasal route 2 (two) times daily. for 7 days    BUDESONIDE (PULMICORT FLEXHALER) 90 MCG/ACTUATION AEPB    Inhale 2 puffs (180 mcg total) into the lungs 2 (two) times a day. Controller    ERGOCALCIFEROL, VITAMIN D2, (VITAMIN D ORAL)    Take by mouth Daily.    ESZOPICLONE (LUNESTA) 2 MG TAB    Take 1 tablet (2 mg total) by mouth nightly as needed.    HYDROCHLOROTHIAZIDE (HYDRODIURIL) 25 MG TABLET    Take 1 tablet (25 mg total) by mouth once daily.    IPRATROPIUM-ALBUTEROL (COMBIVENT RESPIMAT)  MCG/ACTUATION INHALER    Inhale 1 puff into the lungs every 6 (six) hours as needed for Wheezing or Shortness of Breath. Rescue    MECLIZINE (ANTIVERT) 25 MG TABLET    Take 1 tablet (25 mg total) by mouth 2 (two) times daily as needed.    METOPROLOL TARTRATE (LOPRESSOR) 50 MG TABLET    TAKE 1 TABLET DAILY AND AS NEEDED FOR PALPITATIONS   Changed and/or Refilled Medications    Modified Medication Previous Medication    LEVOTHYROXINE (SYNTHROID) 50 MCG TABLET levothyroxine (SYNTHROID) 50 MCG tablet       Take 1 tablet (50 mcg total) by mouth before breakfast.    Take 1  tablet (50 mcg total) by mouth before breakfast.    LOSARTAN (COZAAR) 50 MG TABLET losartan (COZAAR) 50 MG tablet       Take 1 tablet (50 mg total) by mouth once daily.    TAKE 1 TABLET DAILY   Discontinued Medications    DOXYCYCLINE (VIBRAMYCIN) 100 MG CAP    Take 1 capsule (100 mg total) by mouth every 12 (twelve) hours.       Carlos Pichardo M.D.  ==========================================================================  Subjective:   Patient ID: Zora Davis is a 74 y.o. female.  has a past medical history of COVID-19 (11/2020), Essential hypertension (6/11/2013), Fatty liver, GERD (gastroesophageal reflux disease), Lumbar facet arthropathy (1/31/2020), Mixed hyperlipidemia (5/20/2021), JOSE DE JESUS on CPAP, Osteopenia of multiple sites (1/17/2020), Paroxysmal atrial fibrillation (3/22/2019), Primary hypothyroidism (5/11/2010), PVC (premature ventricular contraction) (4/28/2020), and Sigmoid diverticulosis.   Chief Complaint: Back Pain (Saturday )      History of Present Illness  The patient is a 4-year-old female who presents for follow-up on chronic medical conditions. I reviewed labs from 08/2023.    She has been experiencing back issues for the past 5 years, with the first episode occurring around that time. She consulted Dr. Davenport and underwent x-rays, but did not have an MRI. Physical therapy was part of her treatment, and after approximately 1.5 months, her symptoms subsided. However, she occasionally experiences flare-ups, particularly when engaging in activities such as vacuuming, mopping, riding a lawnmower, or lifting her grandchildren. The most recent episode was severe, and she is unsure of what medication to take. Tylenol does not alleviate her pain, and she cannot take hydrocodone due to itching. She took ibuprofen two days ago, which she usually avoids due to a previous gastric sleeve surgery that makes her stomach sensitive. She was informed that she may have degenerative disc disease at L4  or L5. When her pain intensifies, it radiates to her rib cage. She has not consulted a neurosurgeon or an orthopedic specialist.    She also reports that her blood pressure has been elevated, which she attributes to her pain.     Additionally, she has noticed a sensation of fluid in her ears.  She has Flonase and antihistamine at home but has not been using it recently.    Problem List Items Addressed This Visit       Essential hypertension (Chronic)    Overview     November 2024:  Hypertension Medications               hydroCHLOROthiazide (HYDRODIURIL) 25 MG tablet Take 1 tablet (25 mg total) by mouth once daily.    losartan (COZAAR) 50 MG tablet Take 1 tablet (50 mg total) by mouth once daily.    metoprolol tartrate (LOPRESSOR) 50 MG tablet TAKE 1 TABLET DAILY AND AS NEEDED FOR PALPITATIONS          CHRONIC. STABLE. BP Reviewed.  Compliant with BP medications. No SE reported.   (-) CP, SOB, palpitations, dizziness, lightheadedness, HA, arm numbness, tingling or weakness, syncope.  Creatinine   Date Value Ref Range Status   08/20/2024 0.8 0.5 - 1.4 mg/dL Final     Hypertension Medications               losartan (COZAAR) 50 MG tablet TAKE 1 TABLET DAILY    metoprolol tartrate (LOPRESSOR) 50 MG tablet TAKE 1 TABLET ONCE DAILY AND AS NEEDED FOR PALPITATIONS    hydroCHLOROthiazide (HYDRODIURIL) 25 MG tablet Take 1 tablet (25 mg total) by mouth once daily.                   Current Assessment & Plan     Counseled on importance of hypertension disease course, I recommend ongoing Education for DASH-diet and exercise.  Counseled on medication regimen importance of treating high blood pressure.  Please be advised of risk of untreated blood pressure as discussed.  Please advised of ER precautions were given for symptoms of hypertensive urgency and emergency.           Hypothyroidism (acquired) (Chronic)    Overview     Chronic.  Stable.  Patient on levothyroxine 50 micrograms daily.  Reports compliance.  No side effects  reported.  She is having fatigue.    Lab Results   Component Value Date    TSH 2.516 08/20/2024    FREET4 0.94 08/20/2024              Current Assessment & Plan     Please be advised of hypothyroidism disease course.  We will plan to monitor thyroid labs at routine intervals.  Please be advised of risks and benefits of medication use, see medication insert for complete details.  ER precautions.    Common complaints from hypothyroidism include weight gain, fatigue, cold intolerance, constipation, dry and flaky shin, coarse or loss of hair, and trouble with memory.     Complaints with hyperthyroidism are weight loss or decrease in appetite, weakness and fatigue, visual changes, fast heart rate, decreased heat tolerance, thinning scalp hair, change in bowel habits, and palpations.         Lumbar facet arthropathy (Chronic)    Overview     Chronic intermittent control.          Current Assessment & Plan     Update x-ray.  Consider physical therapy if no improvement.  Patient can take over-the-counter anti-inflammatory or Tylenol.         GERD (gastroesophageal reflux disease) (Chronic)    Overview     Chronic.  Intermittent control.  Patient has upset stomach when using anti-inflammatory medication.         Current Assessment & Plan     GI precautions.  Start omeprazole.  GERD RECOMMENDATIONS  Please be advised of condition course.  - Take PPI in the morning 30-60 minutes before breakfast  - I recommend ongoing Education for lifestyle modifications to help control/reduce reflux/abdominal pain including: avoid large meals, avoid eating within 2-3 hours of bedtime (avoid late night eating & lying down soon after eating), elevate head of bed if nocturnal symptoms are present, smoking cessation (if current smoker), & weight loss (if overweight).   - please be advised to avoid known foods which trigger reflux symptoms & to minimize/avoid high-fat foods, chocolate, caffeine, citrus, alcohol, & tomato products.  - Advised to  avoid/limit use of NSAID's, since they can cause GI upset, bleeding, and/or ulcers. If needed, take with food.          Insomnia (Chronic)    Overview     Chronic.  Intermittent control.  Patient is on Lunesta.         Current Assessment & Plan     The patient was checked in the [facility] Board of Pharmacy's  Prescription Monitoring Program. There appears to be no incongruities with the patient's verbalized history.   Discussed insomnia condition course.  Advised of first-line medications for this condition.  Also discussed sleep hygiene.  Information was given below.  Good sleep habits (sometimes referred to as sleep hygiene) can help you get a good nights sleep.    Some habits that can improve your sleep health:  -Be consistent. Go to bed at the same time each night and get up at the same time each morning, including on the weekends  -Make sure your bedroom is quiet, dark, relaxing, and at a comfortable temperature  -Remove electronic devices, such as TVs, computers, and smart phones, from the bedroom  -Avoid large meals, caffeine, and alcohol before bedtime  -Get some exercise. Being physically active during the day can help you fall asleep more easily at night.           Encounter for long-term (current) use of medications (Chronic)    Overview     November 2024:  Reviewed labs.  CHRONIC. Stable. Compliant with medications for managed conditions. See medication list. No SE reported.   Routine lab analysis is being monitored. Refills were addressed.  Lab Results   Component Value Date    WBC 6.29 07/30/2024    HGB 12.8 08/20/2024    HCT 41.3 07/30/2024    MCV 88 07/30/2024     07/30/2024         Chemistry        Component Value Date/Time     08/20/2024 0945    K 3.6 08/20/2024 0945     08/20/2024 0945    CO2 29 08/20/2024 0945    BUN 13 08/20/2024 0945    CREATININE 0.8 08/20/2024 0945    GLU 90 08/20/2024 0945        Component Value Date/Time    CALCIUM 10.1 08/20/2024 0945    ALKPHOS  "75 08/20/2024 0945    AST 21 08/20/2024 0945    ALT 19 08/20/2024 0945    BILITOT 0.6 08/20/2024 0945    ESTGFRAFRICA >60.0 05/19/2021 1221    EGFRNONAA >60.0 05/19/2021 1221          Lab Results   Component Value Date    TSH 2.516 08/20/2024    FREET4 0.94 08/20/2024              Current Assessment & Plan     Complete history and physical was completed today.  Complete and thorough medication reconciliation was performed.  Discussed risks and benefits of medications.  Advised patient on orders and health maintenance.  We discussed old records and old labs if available.  Will request any records not available through epic.  Continue current medications listed on your summary sheet.           B12 deficiency (Chronic)    Overview     Lab Results   Component Value Date    IRON 75 05/19/2021    TRANSFERRIN 261 05/19/2021    TIBC 386 05/19/2021    FESATURATED 19 (L) 05/19/2021      Lab Results   Component Value Date    GUPNTXLE11 282 08/20/2024     Lab Results   Component Value Date    FOLATE 32.8 (H) 08/20/2024     Lab Results   Component Value Date    WBC 6.29 07/30/2024    HGB 12.8 08/20/2024    HCT 41.3 07/30/2024    MCV 88 07/30/2024     07/30/2024                Current Assessment & Plan     Patient is benefitting from the injection.  Continue B12 injections.  Monitoring level.         Aortic atherosclerosis - Primary (Chronic)    Overview     Aortic Atherosclerosis in Imaging: Date:25-JUL-24; CT Abdomen Pelvis With IV Contrast: "..SCULATURE: MODERATE ATHEROSCLEROTIC DISEASE. . CHRONIC. STABLE. Lab analysis reviewed.   (-) CP, SOB, abdominal pain, N/V/D, constipation, jaundice, skin changes.  (-) Myalgias  Lab Results   Component Value Date    CHOL 221 (H) 08/20/2024    CHOL 221 (H) 03/04/2024    CHOL 189 07/18/2023     Lab Results   Component Value Date    HDL 56 08/20/2024    HDL 53 03/04/2024    HDL 47 07/18/2023     Lab Results   Component Value Date    LDLCALC 135.2 08/20/2024    LDLCALC 137 (H) " 03/04/2024    LDLCALC 122 (H) 07/18/2023     Lab Results   Component Value Date    TRIG 149 08/20/2024    TRIG 154 03/04/2024    TRIG 101 07/18/2023     Lab Results   Component Value Date    CHOLHDL 25.3 08/20/2024    CHOLHDL 4.2 03/04/2024    CHOLHDL 4.0 07/18/2023     Lab Results   Component Value Date    TOTALCHOLEST 3.9 08/20/2024    TOTALCHOLEST 3.8 09/20/2022    TOTALCHOLEST 3.4 05/19/2021     Lab Results   Component Value Date    ALT 19 08/20/2024    AST 21 08/20/2024    ALKPHOS 75 08/20/2024    BILITOT 0.6 08/20/2024     ======================================================           Current Assessment & Plan     Continue good blood pressure control.  Cholesterol LDL near 130.  Counseled on hyperlipidemia disease course, healthy diet and increased need for exercise.  Please be advised of the risk of cardiovascular disease, increase stroke and heart attack risk with uncontrolled/untreated hyperlipidemia.     Patient voiced understanding and understood the treatment plan. All questions were answered.                 Review of patient's allergies indicates:   Allergen Reactions    Hydrocodone-acetaminophen Itching    Trazodone Other (See Comments)     Tongue tingling     Current Outpatient Medications   Medication Instructions    albuterol (PROVENTIL HFA) 90 mcg/actuation inhaler 2 puffs, Inhalation, Every 6 hours PRN, Rescue    ALPRAZolam (XANAX) 0.5 mg    azelastine (ASTELIN) 137 mcg, Nasal, 2 times daily    budesonide (PULMICORT FLEXHALER) 180 mcg, Inhalation, 2 times daily, Controller    ergocalciferol, vitamin D2, (VITAMIN D ORAL) Daily    eszopiclone (LUNESTA) 2 mg, Oral, Nightly PRN    hydroCHLOROthiazide (HYDRODIURIL) 25 mg, Oral, Daily    ipratropium-albuteroL (COMBIVENT RESPIMAT)  mcg/actuation inhaler 1 puff, Inhalation, Every 6 hours PRN, Rescue    levothyroxine (SYNTHROID) 50 mcg, Oral, Before breakfast    losartan (COZAAR) 50 mg, Oral, Daily    meclizine (ANTIVERT) 25 mg, Oral, 2 times  "daily PRN    metoprolol tartrate (LOPRESSOR) 50 MG tablet TAKE 1 TABLET DAILY AND AS NEEDED FOR PALPITATIONS    omeprazole (PRILOSEC) 40 mg, Oral, Daily      I have reviewed the PMH, social history, FamilyHx, surgical history, allergies and medications documented / confirmed by the patient at the time of this visit.  Review of Systems   Constitutional:  Positive for fatigue. Negative for chills, fever and unexpected weight change.   HENT:  Negative for ear pain and sore throat.    Eyes:  Negative for redness and visual disturbance.   Respiratory:  Negative for cough and shortness of breath.    Cardiovascular:  Negative for chest pain and palpitations.   Gastrointestinal:  Negative for nausea and vomiting.   Genitourinary:  Negative for difficulty urinating and hematuria.   Musculoskeletal:  Positive for back pain. Negative for arthralgias and myalgias.   Skin:  Negative for rash and wound.   Neurological:  Negative for weakness and headaches.   Psychiatric/Behavioral:  Positive for sleep disturbance. The patient is not nervous/anxious.      Objective:   /84   Pulse 76   Ht 5' 7" (1.702 m)   Wt 94.3 kg (208 lb)   LMP  (LMP Unknown)   SpO2 98%   BMI 32.58 kg/m²   Physical Exam  Vitals and nursing note reviewed.   Constitutional:       General: She is not in acute distress.     Appearance: She is well-developed. She is not ill-appearing, toxic-appearing or diaphoretic.   HENT:      Head: Normocephalic and atraumatic.      Right Ear: Hearing and external ear normal.      Left Ear: Hearing and external ear normal.      Nose: Nose normal. No rhinorrhea.   Eyes:      General: Lids are normal.      Extraocular Movements: Extraocular movements intact.      Conjunctiva/sclera: Conjunctivae normal.      Pupils: Pupils are equal, round, and reactive to light.   Neck:      Vascular: No carotid bruit.   Cardiovascular:      Rate and Rhythm: Normal rate.      Pulses: Normal pulses.   Pulmonary:      Effort: Pulmonary " effort is normal. No respiratory distress.      Breath sounds: Normal breath sounds.   Musculoskeletal:         General: Tenderness present. No deformity. Normal range of motion.      Cervical back: Normal range of motion and neck supple.   Lymphadenopathy:      Cervical: No cervical adenopathy.   Skin:     General: Skin is warm and dry.      Capillary Refill: Capillary refill takes less than 2 seconds.      Coloration: Skin is not pale.   Neurological:      General: No focal deficit present.      Mental Status: She is alert and oriented to person, place, and time. She is not disoriented.      Cranial Nerves: No cranial nerve deficit.      Motor: No weakness.      Gait: Gait normal.   Psychiatric:         Attention and Perception: She is attentive.         Mood and Affect: Mood normal. Mood is not anxious or depressed.         Speech: Speech is not rapid and pressured or slurred.         Behavior: Behavior normal. Behavior is not agitated, aggressive or hyperactive. Behavior is cooperative.         Thought Content: Thought content normal. Thought content is not paranoid or delusional. Thought content does not include homicidal or suicidal ideation. Thought content does not include homicidal or suicidal plan.         Cognition and Memory: Memory is not impaired.         Judgment: Judgment normal.       Physical Exam  Clear lungs.  Normal heart sounds.    Results  Imaging  X-ray from 2020 showed some curvature and possible degenerative disc disease at L5.    Assessment:     1. Aortic atherosclerosis    2. Lumbar facet arthropathy    3. Encounter for long-term (current) use of medications    4. B12 deficiency    5. Essential hypertension    6. Hypothyroidism (acquired)    7. Gastroesophageal reflux disease, unspecified whether esophagitis present    8. Insomnia, unspecified type      MDM:   Moderate medical complexity.  Moderate risk.  Total time: 21 minutes.  This includes total time spent on the encounter, which  includes face to face time and non-face to face time preparing to see the patient (eg, review of previous medical records, tests), Obtaining and/or reviewing separately obtained history, documenting clinical information in the electronic or other health record, independently interpreting results (not separately reported)/communicating results to the patient/family/caregiver, and/or care coordination (not separately reported).    I have Reviewed and summarized old records.  I have performed thorough medication reconciliation today and discussed risk and benefits of medications.  I have reviewed labs and discussed with patient.  All questions were answered.  I am requesting old records and will review them once they are available.  Visit today included increased complexity associated with the care of the episodic problem see above assessment addressed and managing the longitudinal care of the patient due to the serious and/or complex managed problem(s) see above.  I have signed for the following orders AND/OR meds.  Orders Placed This Encounter   Procedures    X-Ray Lumbar Spine AP And Lateral     Standing Status:   Future     Number of Occurrences:   1     Standing Expiration Date:   11/14/2025     Order Specific Question:   May the Radiologist modify the order per protocol to meet the clinical needs of the patient?     Answer:   Yes     Medications Ordered This Encounter   Medications    cyanocobalamin injection 1,000 mcg    levothyroxine (SYNTHROID) 50 MCG tablet     Sig: Take 1 tablet (50 mcg total) by mouth before breakfast.     Dispense:  90 tablet     Refill:  4    losartan (COZAAR) 50 MG tablet     Sig: Take 1 tablet (50 mg total) by mouth once daily.     Dispense:  90 tablet     Refill:  4     .    omeprazole (PRILOSEC) 40 MG capsule     Sig: Take 1 capsule (40 mg total) by mouth once daily.     Dispense:  90 capsule     Refill:  3        Follow up in about 1 year (around 11/14/2025), or if symptoms worsen or  fail to improve.  Future Appointments       Date Provider Specialty Appt Notes    12/17/2024  Family Medicine b12 montly    2/11/2025 Tay Alcantar MD Cardiology Follow up    2/21/2025  Lab           If no improvement in symptoms or symptoms worsen, advised to call/follow-up at clinic or go to ER. Patient voiced understanding and all questions/concerns were addressed.   DISCLAIMER: This note was compiled by using a speech recognition dictation system and therefore please be aware that typographical / speech recognition errors can and do occur.  Please contact me if you see any errors specifically.  Consent was obtained for [facility] recording system prior to the visit.    Carlos Pichardo M.D.       Office: [phone] [address] [place] [address]  FAX: [phone]

## 2024-11-14 NOTE — TELEPHONE ENCOUNTER
Zora Davis Staff  Phone Number: [phone]     I would like to go ahead with physical therapy.  I would like to go to [facility] in [place] if my insurance accepts it. What do I need to do to proceed?

## 2024-11-15 NOTE — TELEPHONE ENCOUNTER
I have signed for the following orders AND/OR meds.  Please call the patient and ask the patient to schedule the testing AND/OR inform about any medications that were sent.      Orders Placed This Encounter   Procedures    Ambulatory referral/consult to Physical/Occupational Therapy     Standing Status:   Future     Standing Expiration Date:   12/14/2025     Referral Priority:   Routine     Referral Type:   Physical Medicine     Referral Reason:   Specialty Services Required     Referred to Provider:   Therapy - Bi Padilla     Requested Specialty:   Physical Therapy     Number of Visits Requested:   1

## 2024-11-20 ENCOUNTER — PATIENT MESSAGE (OUTPATIENT)
Dept: FAMILY MEDICINE | Facility: CLINIC | Age: 74
End: 2024-11-20
Payer: MEDICARE

## 2024-11-20 DIAGNOSIS — M47.816 LUMBAR FACET ARTHROPATHY: ICD-10-CM

## 2024-11-20 DIAGNOSIS — M62.838 MUSCLE SPASM: Primary | ICD-10-CM

## 2024-11-20 RX ORDER — CYCLOBENZAPRINE HCL 10 MG
10 TABLET ORAL 3 TIMES DAILY PRN
Qty: 10 TABLET | Refills: 0 | Status: SHIPPED | OUTPATIENT
Start: 2024-11-20

## 2024-11-20 NOTE — TELEPHONE ENCOUNTER
I received your message which was reviewed along with the the medication list and allergies that we have below.  Please review it for accuracy to make sure that we have the most recent records on your history.     Based on this, the following orders were placed AND/OR medicines were sent in.     No orders of the defined types were placed in this encounter.      Medications written and sent at this time include:  Medications Ordered This Encounter   Medications    cyclobenzaprine (FLEXERIL) 10 MG tablet     Sig: Take 1 tablet (10 mg total) by mouth 3 (three) times daily as needed for Muscle spasms.     Dispense:  10 tablet     Refill:  0       Your pharmacy(ies) of choice at this time on record include the list below and any medications would have been sent to the one at the top.    [facility] - [address] [address]  Phone: [phone] Fax: [phone]    [facility] - [place] [id number]  Phone: [phone] Fax: [phone]      Thank you for choosing us as your healthcare provider!  Dr. Carlos Pichardo    ALLERGY LIST  Review of patient's allergies indicates:   Allergen Reactions    Hydrocodone-acetaminophen Itching    Trazodone Other (See Comments)     Tongue tingling       MEDICATION LIST  Current Outpatient Medications on File Prior to Visit   Medication Sig Dispense Refill    albuterol (PROVENTIL HFA) 90 mcg/actuation inhaler Inhale 2 puffs into the lungs every 6 (six) hours as needed for Wheezing. Rescue 46.9 each 5    ALPRAZolam (XANAX) 0.5 MG tablet Take 0.5 mg by mouth.      azelastine (ASTELIN) 137 mcg (0.1 %) nasal spray 1 spray (137 mcg total) by Nasal route 2 (two) times daily. for 7 days 30 mL 0    budesonide (PULMICORT FLEXHALER) 90 mcg/actuation AePB Inhale 2 puffs (180 mcg total) into the lungs 2 (two) times a day. Controller 1 each 5    ergocalciferol, vitamin  D2, (VITAMIN D ORAL) Take by mouth Daily.      eszopiclone (LUNESTA) 2 MG Tab Take 1 tablet (2 mg total) by mouth nightly as needed. 90 tablet 1    hydroCHLOROthiazide (HYDRODIURIL) 25 MG tablet Take 1 tablet (25 mg total) by mouth once daily. 90 tablet 0    ipratropium-albuteroL (COMBIVENT RESPIMAT)  mcg/actuation inhaler Inhale 1 puff into the lungs every 6 (six) hours as needed for Wheezing or Shortness of Breath. Rescue 4 g 0    levothyroxine (SYNTHROID) 50 MCG tablet Take 1 tablet (50 mcg total) by mouth before breakfast. 90 tablet 4    losartan (COZAAR) 50 MG tablet Take 1 tablet (50 mg total) by mouth once daily. 90 tablet 4    meclizine (ANTIVERT) 25 mg tablet Take 1 tablet (25 mg total) by mouth 2 (two) times daily as needed. 14 tablet 0    metoprolol tartrate (LOPRESSOR) 50 MG tablet TAKE 1 TABLET DAILY AND AS NEEDED FOR PALPITATIONS 90 tablet 3    omeprazole (PRILOSEC) 40 MG capsule Take 1 capsule (40 mg total) by mouth once daily. 90 capsule 3     Current Facility-Administered Medications on File Prior to Visit   Medication Dose Route Frequency Provider Last Rate Last Admin    cyanocobalamin injection 1,000 mcg  1,000 mcg Intramuscular Q30 Days    1,000 mcg at 11/14/24 1504       HEALTH MAINTENANCE THAT IS OVERDUE OR NEEDS TO BE UPDATED ON OUR CHART IS LISTED BELOW.  IF YOU HAVE HAD IT DONE ELSEWHERE, PLEASE SEND US DATES AND RECORDS IF YOU HAVE THEM TO MAKE YOUR CHART ACCURATE.  IF YOU HAVE NOT HAD THESE DONE AND ARE READY FOR US TO SCHEDULE THEM, PLEASE SEND US A MESSAGE.  Health Maintenance Due   Topic Date Due    High Dose Statin  Never done    Shingles Vaccine (1 of 2) Never done    RSV Vaccine (Age 60+ and Pregnant patients) (1 - Risk 60-74 years 1-dose series) Never done    Influenza Vaccine (1) 09/01/2024       DISCLAIMER: This note was compiled by using a speech recognition dictation system and therefore please be aware that typographical / speech recognition errors can and do occur.   Please contact me if you see any errors specifically.    Carlos Pichardo MD  We Offer Telehealth & Same Day Appointments!   Book your Telehealth appointment with me through my nurse or   Clinic appointments on [employer]!  [phone]     Check out my Facebook Page and Follow Me at: CLICK HERE    Check out my website at [employer] by clicking on: CLICK HERE    To Schedule appointments online, go to [employer]: CLICK HERE     Location: [email or web address]    [address] [place] [address]    FAX: [phone]

## 2024-11-20 NOTE — TELEPHONE ENCOUNTER
I have signed for the following orders AND/OR meds.  Please call the patient and ask the patient to schedule the testing AND/OR inform about any medications that were sent.      Orders Placed This Encounter   Procedures    Ambulatory referral/consult to Physical/Occupational Therapy     Standing Status:   Future     Standing Expiration Date:   12/20/2025     Referral Priority:   Routine     Referral Type:   Physical Medicine     Referral Reason:   Specialty Services Required     Referred to Provider:   Therapy - Bi Padilla     Requested Specialty:   Physical Therapy     Number of Visits Requested:   1       Medications Ordered This Encounter   Medications    cyclobenzaprine (FLEXERIL) 10 MG tablet     Sig: Take 1 tablet (10 mg total) by mouth 3 (three) times daily as needed for Muscle spasms.     Dispense:  10 tablet     Refill:  0

## 2024-12-10 DIAGNOSIS — M62.838 MUSCLE SPASM: ICD-10-CM

## 2024-12-10 RX ORDER — CYCLOBENZAPRINE HCL 10 MG
10 TABLET ORAL
Qty: 10 TABLET | Refills: 0 | Status: SHIPPED | OUTPATIENT
Start: 2024-12-10

## 2024-12-10 NOTE — TELEPHONE ENCOUNTER
Refill Routing Note   Medication(s) are not appropriate for processing by [facility] for the following reason(s):        Outside of protocol    [facility] action(s):  Route               Appointments  past 12m or future 3m with PCP    Date Provider   Last Visit   11/14/2024 Carlos Pichardo MD   Next Visit   Visit date not found Carlos Pichardo MD   ED visits in past 90 days: 0        Note composed:3:41 PM 12/10/2024

## 2024-12-10 NOTE — TELEPHONE ENCOUNTER
No care due was identified.  Health [facility] Embedded Care Due Messages. Reference number: [id number].   12/10/2024 11:28:15 AM CST

## 2024-12-17 ENCOUNTER — CLINICAL SUPPORT (OUTPATIENT)
Dept: FAMILY MEDICINE | Facility: CLINIC | Age: 74
End: 2024-12-17
Payer: MEDICARE

## 2024-12-17 DIAGNOSIS — E53.8 B12 DEFICIENCY: Primary | ICD-10-CM

## 2024-12-17 PROCEDURE — 96372 THER/PROPH/DIAG INJ SC/IM: CPT | Mod: S$GLB,,, | Performed by: FAMILY MEDICINE

## 2024-12-17 PROCEDURE — 99999 PR PBB SHADOW E&M-EST. PATIENT-LVL II: CPT | Mod: PBBFAC,,,

## 2024-12-17 RX ADMIN — CYANOCOBALAMIN 1000 MCG: 1000 INJECTION, SOLUTION INTRAMUSCULAR; SUBCUTANEOUS at 09:12

## 2025-01-13 ENCOUNTER — PATIENT MESSAGE (OUTPATIENT)
Dept: FAMILY MEDICINE | Facility: CLINIC | Age: 75
End: 2025-01-13
Payer: MEDICARE

## 2025-01-15 DIAGNOSIS — E03.9 HYPOTHYROIDISM (ACQUIRED): ICD-10-CM

## 2025-01-16 ENCOUNTER — CLINICAL SUPPORT (OUTPATIENT)
Dept: FAMILY MEDICINE | Facility: CLINIC | Age: 75
End: 2025-01-16
Payer: MEDICARE

## 2025-01-16 DIAGNOSIS — E53.8 B12 DEFICIENCY: Primary | ICD-10-CM

## 2025-01-16 PROCEDURE — 96372 THER/PROPH/DIAG INJ SC/IM: CPT | Mod: S$GLB,,, | Performed by: FAMILY MEDICINE

## 2025-01-16 PROCEDURE — 99999 PR PBB SHADOW E&M-EST. PATIENT-LVL II: CPT | Mod: PBBFAC,,,

## 2025-01-16 RX ORDER — LEVOTHYROXINE SODIUM 50 UG/1
TABLET ORAL
Qty: 90 TABLET | Refills: 2 | Status: SHIPPED | OUTPATIENT
Start: 2025-01-16

## 2025-01-16 RX ADMIN — CYANOCOBALAMIN 1000 MCG: 1000 INJECTION, SOLUTION INTRAMUSCULAR; SUBCUTANEOUS at 11:01

## 2025-01-16 NOTE — TELEPHONE ENCOUNTER
Refill Decision Note   Zora Davis  is requesting a refill authorization.  Brief Assessment and Rationale for Refill:  Approve     Medication Therapy Plan:         Comments:     Note composed:5:22 AM 01/16/2025

## 2025-01-16 NOTE — TELEPHONE ENCOUNTER
No care due was identified.  [facility] Embedded Care Due Messages. Reference number: [id number].   1/15/2025 11:46:38 PM CST

## 2025-02-13 ENCOUNTER — PATIENT MESSAGE (OUTPATIENT)
Dept: FAMILY MEDICINE | Facility: CLINIC | Age: 75
End: 2025-02-13
Payer: MEDICARE

## 2025-02-18 ENCOUNTER — TELEPHONE (OUTPATIENT)
Dept: FAMILY MEDICINE | Facility: CLINIC | Age: 75
End: 2025-02-18
Payer: MEDICARE

## 2025-02-18 DIAGNOSIS — E53.8 B12 DEFICIENCY: Primary | ICD-10-CM

## 2025-02-18 RX ORDER — CYANOCOBALAMIN 1000 UG/ML
1000 INJECTION, SOLUTION INTRAMUSCULAR; SUBCUTANEOUS
Status: SHIPPED | OUTPATIENT
Start: 2025-02-21 | End: 2025-06-21

## 2025-02-21 ENCOUNTER — LAB VISIT (OUTPATIENT)
Dept: LAB | Facility: HOSPITAL | Age: 75
End: 2025-02-21
Attending: FAMILY MEDICINE
Payer: MEDICARE

## 2025-02-21 ENCOUNTER — CLINICAL SUPPORT (OUTPATIENT)
Dept: FAMILY MEDICINE | Facility: CLINIC | Age: 75
End: 2025-02-21
Payer: MEDICARE

## 2025-02-21 ENCOUNTER — RESULTS FOLLOW-UP (OUTPATIENT)
Dept: FAMILY MEDICINE | Facility: CLINIC | Age: 75
End: 2025-02-21

## 2025-02-21 DIAGNOSIS — Z79.899 ENCOUNTER FOR LONG-TERM (CURRENT) USE OF MEDICATIONS: ICD-10-CM

## 2025-02-21 DIAGNOSIS — Z13.220 ENCOUNTER FOR LIPID SCREENING FOR CARDIOVASCULAR DISEASE: ICD-10-CM

## 2025-02-21 DIAGNOSIS — E53.8 B12 DEFICIENCY: Primary | ICD-10-CM

## 2025-02-21 DIAGNOSIS — Z00.00 ENCOUNTER FOR MEDICARE ANNUAL WELLNESS EXAM: ICD-10-CM

## 2025-02-21 DIAGNOSIS — Z13.6 ENCOUNTER FOR LIPID SCREENING FOR CARDIOVASCULAR DISEASE: ICD-10-CM

## 2025-02-21 DIAGNOSIS — Z00.00 ENCOUNTER FOR MEDICAL EXAMINATION TO ESTABLISH CARE: ICD-10-CM

## 2025-02-21 LAB
CHOLEST SERPL-MCNC: 193 MG/DL (ref 120–199)
CHOLEST/HDLC SERPL: 3.9 {RATIO} (ref 2–5)
HDLC SERPL-MCNC: 49 MG/DL (ref 40–75)
HDLC SERPL: 25.4 % (ref 20–50)
LDLC SERPL CALC-MCNC: 115.6 MG/DL (ref 63–159)
NONHDLC SERPL-MCNC: 144 MG/DL
TRIGL SERPL-MCNC: 142 MG/DL (ref 30–150)

## 2025-02-21 PROCEDURE — 80061 LIPID PANEL: CPT | Performed by: FAMILY MEDICINE

## 2025-02-21 PROCEDURE — 36415 COLL VENOUS BLD VENIPUNCTURE: CPT | Mod: PO | Performed by: FAMILY MEDICINE

## 2025-02-21 RX ADMIN — CYANOCOBALAMIN 1000 MCG: 1000 INJECTION, SOLUTION INTRAMUSCULAR; SUBCUTANEOUS at 09:02

## 2025-02-22 NOTE — PROGRESS NOTES
Make follow-up lab appointment per recommendation below.  Check to see if patient has seen the results through my chart.  If not then,  #CALL THE PATIENT# to discuss results/see if they have questions and document verification of contact. Make F/U appt if needed. [phone]      #My interpretation that was sent to them through [employer]:  Zora, I have reviewed your recent blood work.       Your cholesterol is improved significantly.  Keep up the great work!  =========================  Also please address any outstanding health maintenance that may be due:   Health Maintenance Due   Topic Date Due    High Dose Statin  Never done    Shingles Vaccine (1 of 2) Never done    RSV Vaccine (Age 60+ and Pregnant patients) (1 - Risk 60-74 years 1-dose series) Never done    Influenza Vaccine (1) 09/01/2024

## 2025-03-03 DIAGNOSIS — M62.838 MUSCLE SPASM: ICD-10-CM

## 2025-03-03 RX ORDER — CYCLOBENZAPRINE HCL 10 MG
10 TABLET ORAL
Qty: 10 TABLET | Refills: 0 | Status: SHIPPED | OUTPATIENT
Start: 2025-03-03

## 2025-03-03 NOTE — TELEPHONE ENCOUNTER
Refill Routing Note   Medication(s) are not appropriate for processing by [facility] for the following reason(s):        Outside of protocol    [facility] action(s):  Route               Appointments  past 12m or future 3m with PCP    Date Provider   Last Visit   11/14/2024 Carlos Pichardo MD   Next Visit   Visit date not found Carlos Pichardo MD   ED visits in past 90 days: 0        Note composed:12:53 PM 03/03/2025

## 2025-03-03 NOTE — TELEPHONE ENCOUNTER
No care due was identified.  Health [facility] Embedded Care Due Messages. Reference number: [id number].   3/03/2025 11:14:51 AM CST

## 2025-03-05 ENCOUNTER — PATIENT MESSAGE (OUTPATIENT)
Dept: FAMILY MEDICINE | Facility: CLINIC | Age: 75
End: 2025-03-05
Payer: MEDICARE

## 2025-03-10 ENCOUNTER — OFFICE VISIT (OUTPATIENT)
Dept: FAMILY MEDICINE | Facility: CLINIC | Age: 75
End: 2025-03-10
Payer: MEDICARE

## 2025-03-10 ENCOUNTER — HOSPITAL ENCOUNTER (OUTPATIENT)
Dept: RADIOLOGY | Facility: HOSPITAL | Age: 75
Discharge: HOME | End: 2025-03-10
Attending: NURSE PRACTITIONER
Payer: MEDICARE

## 2025-03-10 VITALS
TEMPERATURE: 98 F | RESPIRATION RATE: 16 BRPM | HEART RATE: 80 BPM | SYSTOLIC BLOOD PRESSURE: 122 MMHG | HEIGHT: 67 IN | WEIGHT: 216.13 LBS | OXYGEN SATURATION: 99 % | DIASTOLIC BLOOD PRESSURE: 76 MMHG | BODY MASS INDEX: 33.92 KG/M2

## 2025-03-10 DIAGNOSIS — F41.9 ANXIETY: ICD-10-CM

## 2025-03-10 DIAGNOSIS — M54.50 CHRONIC RIGHT-SIDED LOW BACK PAIN WITHOUT SCIATICA: ICD-10-CM

## 2025-03-10 DIAGNOSIS — M25.531 RIGHT WRIST PAIN: ICD-10-CM

## 2025-03-10 DIAGNOSIS — M47.816 LUMBAR FACET ARTHROPATHY: Chronic | ICD-10-CM

## 2025-03-10 DIAGNOSIS — G89.29 CHRONIC RIGHT-SIDED LOW BACK PAIN WITHOUT SCIATICA: ICD-10-CM

## 2025-03-10 DIAGNOSIS — M54.50 CHRONIC RIGHT-SIDED LOW BACK PAIN WITHOUT SCIATICA: Primary | ICD-10-CM

## 2025-03-10 DIAGNOSIS — Z98.84 S/P LAPAROSCOPIC SLEEVE GASTRECTOMY: Chronic | ICD-10-CM

## 2025-03-10 DIAGNOSIS — G89.29 CHRONIC RIGHT-SIDED LOW BACK PAIN WITHOUT SCIATICA: Primary | ICD-10-CM

## 2025-03-10 PROBLEM — E11.9 TYPE 2 DIABETES MELLITUS WITHOUT COMPLICATION, WITHOUT LONG-TERM CURRENT USE OF INSULIN: Status: RESOLVED | Noted: 2025-03-10 | Resolved: 2025-03-10

## 2025-03-10 PROBLEM — E11.9 TYPE 2 DIABETES MELLITUS WITHOUT COMPLICATION, WITHOUT LONG-TERM CURRENT USE OF INSULIN: Status: ACTIVE | Noted: 2025-03-10

## 2025-03-10 PROBLEM — Z86.16 HISTORY OF COVID-19: Chronic | Status: RESOLVED | Noted: 2020-10-31 | Resolved: 2025-03-10

## 2025-03-10 PROCEDURE — 1159F MED LIST DOCD IN RCRD: CPT | Mod: CPTII,S$GLB,, | Performed by: NURSE PRACTITIONER

## 2025-03-10 PROCEDURE — 72100 X-RAY EXAM L-S SPINE 2/3 VWS: CPT | Mod: 26,,, | Performed by: RADIOLOGY

## 2025-03-10 PROCEDURE — 1101F PT FALLS ASSESS-DOCD LE1/YR: CPT | Mod: CPTII,S$GLB,, | Performed by: NURSE PRACTITIONER

## 2025-03-10 PROCEDURE — 99999 PR PBB SHADOW E&M-EST. PATIENT-LVL V: CPT | Mod: PBBFAC,,, | Performed by: NURSE PRACTITIONER

## 2025-03-10 PROCEDURE — 99214 OFFICE O/P EST MOD 30 MIN: CPT | Mod: S$GLB,,, | Performed by: NURSE PRACTITIONER

## 2025-03-10 PROCEDURE — 3288F FALL RISK ASSESSMENT DOCD: CPT | Mod: CPTII,S$GLB,, | Performed by: NURSE PRACTITIONER

## 2025-03-10 PROCEDURE — 1125F AMNT PAIN NOTED PAIN PRSNT: CPT | Mod: CPTII,S$GLB,, | Performed by: NURSE PRACTITIONER

## 2025-03-10 PROCEDURE — 81003 URINALYSIS AUTO W/O SCOPE: CPT | Mod: PO | Performed by: NURSE PRACTITIONER

## 2025-03-10 PROCEDURE — G2211 COMPLEX E/M VISIT ADD ON: HCPCS | Mod: S$GLB,,, | Performed by: NURSE PRACTITIONER

## 2025-03-10 PROCEDURE — 3074F SYST BP LT 130 MM HG: CPT | Mod: CPTII,S$GLB,, | Performed by: NURSE PRACTITIONER

## 2025-03-10 PROCEDURE — 3078F DIAST BP <80 MM HG: CPT | Mod: CPTII,S$GLB,, | Performed by: NURSE PRACTITIONER

## 2025-03-10 PROCEDURE — 72100 X-RAY EXAM L-S SPINE 2/3 VWS: CPT | Mod: TC,PO

## 2025-03-10 PROCEDURE — 73110 X-RAY EXAM OF WRIST: CPT | Mod: 26,RT,, | Performed by: RADIOLOGY

## 2025-03-10 PROCEDURE — 3008F BODY MASS INDEX DOCD: CPT | Mod: CPTII,S$GLB,, | Performed by: NURSE PRACTITIONER

## 2025-03-10 PROCEDURE — 1160F RVW MEDS BY RX/DR IN RCRD: CPT | Mod: CPTII,S$GLB,, | Performed by: NURSE PRACTITIONER

## 2025-03-10 PROCEDURE — 73110 X-RAY EXAM OF WRIST: CPT | Mod: TC,PO,RT

## 2025-03-10 RX ORDER — DESOXIMETASONE 2.5 MG/G
15 CREAM TOPICAL 2 TIMES DAILY
COMMUNITY
Start: 2025-01-28

## 2025-03-10 RX ORDER — TRAMADOL HYDROCHLORIDE 50 MG/1
50 TABLET ORAL EVERY 8 HOURS PRN
Qty: 18 TABLET | Refills: 0 | Status: SHIPPED | OUTPATIENT
Start: 2025-03-10 | End: 2025-03-16

## 2025-03-10 RX ORDER — PREDNISONE 10 MG/1
10 TABLET ORAL 2 TIMES DAILY
Qty: 10 TABLET | Refills: 0 | Status: SHIPPED | OUTPATIENT
Start: 2025-03-10

## 2025-03-10 RX ORDER — ALPRAZOLAM 0.5 MG/1
0.5 TABLET ORAL
Status: CANCELLED | OUTPATIENT
Start: 2025-03-10

## 2025-03-10 RX ORDER — ALPRAZOLAM 0.5 MG/1
0.5 TABLET ORAL DAILY PRN
Qty: 12 TABLET | Refills: 0 | Status: SHIPPED | OUTPATIENT
Start: 2025-03-10

## 2025-03-10 NOTE — PROGRESS NOTES
Assessment/Plan:  Problem List Items Addressed This Visit          Neuro    Lumbar facet arthropathy (Chronic)    Overview   Chronic intermittent control.           Current Assessment & Plan   See chronic back pain.             Psychiatric    Anxiety    Overview   Chronic. She is severely claustrophobic when in small spaces including traveling in a car for long distances as well as airplane. She takes xanax for these situations which provides relief.          Current Assessment & Plan   Refill Xanax.  reviewed. Discussed medication risks.          Relevant Medications    ALPRAZolam (XANAX) 0.5 MG tablet       Endocrine    S/P laparoscopic sleeve gastrectomy (Chronic)    Overview   S/p sleeve gastrectomy by Dr. Armstrong March 2019.          Current Assessment & Plan   Monitor labs. Follow up with bariatrics as needed.             Orthopedic    Chronic right-sided low back pain without sciatica - Primary    Overview   Chronic. Intermittent flares. She has tried PT in the past and this made it worse. No recent injuries or known trauma.    X-Ray Lumbar Spine AP And Lateral  Narrative & Impression     EXAM:  XR LUMBAR SPINE AP AND LATERAL     CLINICAL HISTORY: [M47.816]-Spondylosis without myelopathy or radiculopathy, lumbar region.     COMPARISON: None     FINDINGS: This examination consists of 3 views of the lumbar spine. There are 4 lumbar type vertebral bodies.  There is a transitional vertebral body between L4 and the sacrum.  There is grade 1 anterolisthesis of L3 on L4.  There is no fracture or scoliosis. There is normal lumbar lordosis.  There are mild degenerative changes between T10 and L1.        Impression:     1.  There is grade 1 anterolisthesis of L3 on L4.  2.  There are mild degenerative changes between T10 and L1.     Finalized on: 11/14/2024 3:21 PM By:  Erik Trejo MD  [facility]# [id number]      2024-11-14 15:23:[id number]    [facility]        Exam Ended: 11/14/24 15:16 CST            Current Assessment & Plan    Start Prednisone 20 mg x5 days. Avoid NSAIDs w/ hx of bariatric surgery. Continue Flexeril. Tramadol provided for severe pain.  reviewed discussed medication risks.    Update x-ray today. Check U/A. Referral to back and spine for further evaluation. May need to get MRI for further evaluation.          Relevant Medications    predniSONE (DELTASONE) 10 MG tablet    traMADoL (ULTRAM) 50 mg tablet    Other Relevant Orders    X-Ray Lumbar Spine AP And Lateral (Completed)    Urinalysis, Reflex to Urine Culture Urine, Clean Catch (Completed)    Ambulatory referral/consult to Spine [facility]     Other Visit Diagnoses         Right wrist pain        Relevant Orders    X-Ray Wrist Complete Right (Completed)          BACK PAIN:  - Evaluated patient's ongoing back pain, severe, stabbing pain   - Ordered new X-rays to compare with previous imaging  - Prescribed Prednisone (twice daily for 5 days) and Tramadol for severe pain, cautioning about potential drowsiness.  - Advised continuing with Tylenol and Flexeril PRN  - Discussed chronic back pain management  - Will hold off on physical therapy since this exacerbated her pain in the past   - Provided information on potential future treatment options, ultimately patient may need MRI  - Referral to back and spine for further evaluation   - Supportive care- rest, ice, heat, avoid heavy lifting, limit strenuous activity.     WRIST PAIN:  - Evaluated right wrist pain occurring when pressing down or putting pressure on it.  - Assessed pain likely due to overuse and inflammation   - Ordered X-ray for evaluation  - Supportive care- rest, ice, heat, avoid heavy lifting, limit strenuous activity.     ANXIETY:  - Evaluated ongoing anxiety related to flying, long car rides, and claustrophobia  - Refilled Xanax prescription for anxiety management    BARIATRIC SURGERY:  - Noted patient's history of bariatric surgery, which affects medication choices.  - Recommend steroids as an alternative to  anti-inflammatory medications due to this status.    FOLLOW UP:  - Instructed patient to contact the office if symptoms worsen.      Follow up if symptoms worsen or fail to improve.  ER precautions for severe or worsening symptoms.     Holly Schaefer NP  _____________________________________________________________________________________________________________________________________________________    CC: back pain     HPI: Patient is a 75-year-old female who presents in clinic today as an established patient here for back pain.    BACK PAIN:  She reports back pain radiating to the side that started in 2020 with recurring episodes, noting increased frequency. Pain severity ranges from 3-6/10, worse at night when attempting to get up from bed. She experiences stabbing pain that takes her breath away, causing her to freeze until she can slowly work her way up. Previous physical therapy exacerbated the pain, leading to discontinuation of treatment. She has tried tylenol and flexeril with no relief. She has a history of bariatric surgery and is unable to take anti-inflammatories. There has been no recent injuries, falls, or known trauma. She recently mopped which may have exacerbated the pain. There is no numbness, tingling, gait problems, incontinence of stool or urine.     WRIST PAIN:  She also reports right wrist pain exacerbated by pressure and daily activities such as opening jars and doors. Previous swelling has resolved with the use of compression wrap, though pain persists particularly with pressure application. No recent injuries or known trauma.     CURRENT MEDICATIONS:  She takes Xanax 0.25mg as needed for anxiety with travel, particularly during car rides and flights. She is requesting medication refill.     Past Medical History:  Past Medical History:   Diagnosis Date    COVID-19 11/2020    Essential hypertension 6/11/2013    Fatty liver     GERD (gastroesophageal reflux disease)     Lumbar facet  arthropathy 2020    Mixed hyperlipidemia 2021    JOSE DE JESUS on CPAP     Osteopenia of multiple sites 2020    Paroxysmal atrial fibrillation 3/22/2019    Primary hypothyroidism 2010    PVC (premature ventricular contraction) 2020    Sigmoid diverticulosis      Past Surgical History:   Procedure Laterality Date    BREAST BIOPSY      BREAST SURGERY  2000     SECTION      x 2    CHOLECYSTECTOMY      COLON SURGERY Bilateral 2024    COLONOSCOPY  2014    Dr. Richey; diverticulosis; repeat in 5 years    ESOPHAGOGASTRODUODENOSCOPY N/A 2018    Dr. Steel; gastritis; gastric polyps    ESOPHAGOGASTRODUODENOSCOPY N/A 2020    Procedure: EGD (ESOPHAGOGASTRODUODENOSCOPY);  Surgeon: Efrain Steel MD;  Location: [facility] ENDO;  Service: Endoscopy;  Laterality: N/A;    HYSTERECTOMY      LAPAROSCOPIC LYSIS OF ADHESIONS N/A 2019    Procedure: LYSIS, ADHESIONS, LAPAROSCOPIC;  Surgeon: Cole Armstrong MD;  Location: [facility] OR;  Service: General;  Laterality: N/A;    ROBOT-ASSISTED LAPAROSCOPIC SLEEVE GASTRECTOMY USING DA LARRY XI N/A 2019    Procedure: XI ROBOTIC SLEEVE GASTRECTOMY;  Surgeon: Cole Armstrong MD;  Location: [facility] OR;  Service: General;  Laterality: N/A;    TOTAL REDUCTION MAMMOPLASTY Bilateral     UPPER GASTROINTESTINAL ENDOSCOPY  2015    Dr. Padron     Review of patient's allergies indicates:   Allergen Reactions    Hydrocodone-acetaminophen Itching    Trazodone Other (See Comments)     Tongue tingling     Social History[1]  Family History   Problem Relation Name Age of Onset    Colon cancer Paternal Aunt      COPD Paternal Aunt          colon    Hypertension Father Dad     Arthritis Father Dad     Arthritis Mother Mother     Asthma Mother Mother     Depression Mother Mother     Hypertension Mother Mother     Crohn's disease Neg Hx      Stomach cancer Neg Hx      Ulcerative colitis Neg Hx      Esophageal cancer Neg Hx       Medications Ordered Prior to  "Encounter[2]    Review of Systems   Constitutional:  Negative for appetite change, chills, fatigue and fever.   HENT:  Negative for congestion, rhinorrhea and sore throat.    Eyes:  Negative for visual disturbance.   Respiratory:  Negative for cough and shortness of breath.    Cardiovascular:  Negative for chest pain, palpitations and leg swelling.   Gastrointestinal:  Negative for abdominal pain, diarrhea and vomiting.   Genitourinary:  Negative for difficulty urinating, dysuria and hematuria.   Musculoskeletal:  Positive for arthralgias and back pain. Negative for myalgias.   Skin:  Negative for rash and wound.   Neurological:  Negative for dizziness and headaches.   Psychiatric/Behavioral:  Negative for behavioral problems. The patient is not nervous/anxious.      Vitals:    03/10/25 0943   BP: 122/76   Pulse: 80   Resp: 16   Temp: 98.1 °F (36.7 °C)   TempSrc: Oral   SpO2: 99%   Weight: 98 kg (216 lb 1.6 oz)   Height: 5' 7" (1.702 m)     Wt Readings from Last 3 Encounters:   03/10/25 98 kg (216 lb 1.6 oz)   11/14/24 94.3 kg (208 lb)   08/20/24 93.8 kg (206 lb 12.8 oz)     Physical Exam  Vitals reviewed.   Constitutional:       General: She is not in acute distress.     Appearance: Normal appearance. She is not ill-appearing.   HENT:      Head: Normocephalic and atraumatic.      Right Ear: External ear normal.      Left Ear: External ear normal.      Nose: Nose normal.   Eyes:      Extraocular Movements: Extraocular movements intact.      Conjunctiva/sclera: Conjunctivae normal.   Cardiovascular:      Rate and Rhythm: Normal rate.      Heart sounds: Normal heart sounds.   Pulmonary:      Effort: Pulmonary effort is normal. No respiratory distress.      Breath sounds: Normal breath sounds.   Abdominal:      General: Abdomen is flat. There is no distension.   Musculoskeletal:         General: Normal range of motion.      Right wrist: No swelling, deformity or tenderness. Normal range of motion.      Left wrist: " Normal.      Cervical back: Normal range of motion.      Lumbar back: Tenderness present. No bony tenderness. Normal range of motion.        Back:    Skin:     General: Skin is warm and dry.      Capillary Refill: Capillary refill takes less than 2 seconds.      Coloration: Skin is not pale.      Findings: No rash.   Neurological:      General: No focal deficit present.      Mental Status: She is alert and oriented to person, place, and time. Mental status is at baseline.      Sensory: Sensation is intact.      Motor: Motor function is intact. No tremor or seizure activity.      Coordination: Coordination is intact. Coordination normal.   Psychiatric:         Mood and Affect: Mood normal. Mood is not anxious, depressed or elated. Affect is not labile, blunt, flat or angry.         Speech: Speech normal. She is communicative. Speech is not rapid and pressured, delayed or slurred.         Behavior: Behavior normal. Behavior is not agitated, slowed, aggressive, withdrawn, hyperactive or combative. Behavior is cooperative.         Thought Content: Thought content normal.         Judgment: Judgment normal.       Health Maintenance   Topic Date Due    High Dose Statin  Never done    Shingles Vaccine (1 of 2) Never done    RSV Vaccine (Age 60+ and Pregnant patients) (1 - Risk 60-74 years 1-dose series) Never done    Influenza Vaccine (1) 09/01/2024    TETANUS VACCINE  08/20/2025 (Originally 2/3/1968)    COVID-19 Vaccine (6 - 2024-25 season) 11/14/2025 (Originally 9/1/2024)    Mammogram  09/18/2025    Lipid Panel  02/21/2026    DEXA Scan  08/05/2026    Colorectal Cancer Screening  08/09/2033    Hepatitis C Screening  Completed    Pneumococcal Vaccines (Age 50+)  Completed              [1]   Social History  Tobacco Use    Smoking status: Former     Current packs/day: 0.00     Average packs/day: 0.3 packs/day for 13.2 years (3.3 ttl pk-yrs)     Types: Cigarettes     Start date: 1/1/1965     Quit date: 1978     Years since  quittin.2    Smokeless tobacco: Never   Substance Use Topics    Alcohol use: Not Currently     Alcohol/week: 1.0 standard drink of alcohol     Types: 1 Glasses of wine per week     Comment: 3 times a week    Drug use: No   [2]   Current Outpatient Medications on File Prior to Visit   Medication Sig Dispense Refill    albuterol (PROVENTIL HFA) 90 mcg/actuation inhaler Inhale 2 puffs into the lungs every 6 (six) hours as needed for Wheezing. Rescue 46.9 each 5    budesonide (PULMICORT FLEXHALER) 90 mcg/actuation AePB Inhale 2 puffs (180 mcg total) into the lungs 2 (two) times a day. Controller 1 each 5    cyclobenzaprine (FLEXERIL) 10 MG tablet take 1 tablet by mouth 3 times daily AS NEEDED 10 tablet 0    desoximetasone (TOPICORT) 0.25 % cream Apply 15 g topically 2 (two) times daily.      ergocalciferol, vitamin D2, (VITAMIN D ORAL) Take by mouth Daily.      eszopiclone (LUNESTA) 2 MG Tab Take 1 tablet (2 mg total) by mouth nightly as needed. 90 tablet 1    hydroCHLOROthiazide (HYDRODIURIL) 25 MG tablet Take 1 tablet (25 mg total) by mouth once daily. 90 tablet 0    ipratropium-albuteroL (COMBIVENT RESPIMAT)  mcg/actuation inhaler Inhale 1 puff into the lungs every 6 (six) hours as needed for Wheezing or Shortness of Breath. Rescue 4 g 0    levothyroxine (SYNTHROID) 50 MCG tablet TAKE 1 TABLET BEFORE BREAKFAST (OFFICE VISIT/LABS REQUIRED FOR FURTHER REFILLS. MAY SELF-SCHEDULE VIA [facility]) 90 tablet 2    losartan (COZAAR) 50 MG tablet Take 1 tablet (50 mg total) by mouth once daily. 90 tablet 4    metoprolol tartrate (LOPRESSOR) 50 MG tablet TAKE 1 TABLET DAILY AND AS NEEDED FOR PALPITATIONS 90 tablet 3    omeprazole (PRILOSEC) 40 MG capsule Take 1 capsule (40 mg total) by mouth once daily. 90 capsule 3    [DISCONTINUED] ALPRAZolam (XANAX) 0.5 MG tablet Take 0.5 mg by mouth.      azelastine (ASTELIN) 137 mcg (0.1 %) nasal spray 1 spray (137 mcg total) by Nasal route 2 (two) times daily. for 7 days 30  mL 0    meclizine (ANTIVERT) 25 mg tablet Take 1 tablet (25 mg total) by mouth 2 (two) times daily as needed. 14 tablet 0     Current Facility-Administered Medications on File Prior to Visit   Medication Dose Route Frequency Provider Last Rate Last Admin    cyanocobalamin injection 1,000 mcg  1,000 mcg Intramuscular Q30 Days    1,000 mcg at 02/21/25 0956

## 2025-03-10 NOTE — ASSESSMENT & PLAN NOTE
Start Prednisone 20 mg x5 days. Avoid NSAIDs w/ hx of bariatric surgery. Continue Flexeril. Tramadol provided for severe pain.  reviewed discussed medication risks.    Update x-ray today. Check U/A. Referral to back and spine for further evaluation. May need to get MRI for further evaluation.

## 2025-03-11 ENCOUNTER — RESULTS FOLLOW-UP (OUTPATIENT)
Dept: FAMILY MEDICINE | Facility: CLINIC | Age: 75
End: 2025-03-11

## 2025-03-13 DIAGNOSIS — I10 ESSENTIAL HYPERTENSION: ICD-10-CM

## 2025-03-13 DIAGNOSIS — Z79.899 ENCOUNTER FOR LONG-TERM (CURRENT) USE OF MEDICATIONS: ICD-10-CM

## 2025-03-13 RX ORDER — HYDROCHLOROTHIAZIDE 25 MG/1
25 TABLET ORAL
Qty: 90 TABLET | Refills: 1 | Status: SHIPPED | OUTPATIENT
Start: 2025-03-13

## 2025-03-13 NOTE — TELEPHONE ENCOUNTER
No care due was identified.  Health [facility] Embedded Care Due Messages. Reference number: [id number].   3/13/2025 1:26:09 PM CDT

## 2025-03-13 NOTE — TELEPHONE ENCOUNTER
Refill Decision Note   Zora Davis  is requesting a refill authorization.    Brief Assessment and Rationale for Refill:   Approve       Medication Therapy Plan:         Comments:     Note composed:2:40 PM 03/13/2025

## 2025-03-21 ENCOUNTER — PATIENT MESSAGE (OUTPATIENT)
Dept: FAMILY MEDICINE | Facility: CLINIC | Age: 75
End: 2025-03-21

## 2025-03-21 ENCOUNTER — CLINICAL SUPPORT (OUTPATIENT)
Dept: FAMILY MEDICINE | Facility: CLINIC | Age: 75
End: 2025-03-21
Payer: MEDICARE

## 2025-03-21 ENCOUNTER — OFFICE VISIT (OUTPATIENT)
Dept: PAIN MEDICINE | Facility: CLINIC | Age: 75
End: 2025-03-21
Payer: MEDICARE

## 2025-03-21 VITALS
SYSTOLIC BLOOD PRESSURE: 126 MMHG | DIASTOLIC BLOOD PRESSURE: 69 MMHG | WEIGHT: 216.81 LBS | HEIGHT: 67 IN | RESPIRATION RATE: 17 BRPM | BODY MASS INDEX: 34.03 KG/M2 | HEART RATE: 59 BPM

## 2025-03-21 DIAGNOSIS — E53.8 B12 DEFICIENCY: Primary | ICD-10-CM

## 2025-03-21 DIAGNOSIS — M24.552 HIP FLEXOR TIGHTNESS, LEFT: Primary | ICD-10-CM

## 2025-03-21 DIAGNOSIS — G89.29 CHRONIC RIGHT-SIDED LOW BACK PAIN WITHOUT SCIATICA: ICD-10-CM

## 2025-03-21 DIAGNOSIS — M54.50 CHRONIC RIGHT-SIDED LOW BACK PAIN WITHOUT SCIATICA: ICD-10-CM

## 2025-03-21 PROCEDURE — 99999 PR PBB SHADOW E&M-EST. PATIENT-LVL V: CPT | Mod: PBBFAC,,, | Performed by: PHYSICAL MEDICINE & REHABILITATION

## 2025-03-21 PROCEDURE — 3008F BODY MASS INDEX DOCD: CPT | Mod: CPTII,S$GLB,, | Performed by: PHYSICAL MEDICINE & REHABILITATION

## 2025-03-21 PROCEDURE — 99214 OFFICE O/P EST MOD 30 MIN: CPT | Mod: S$GLB,,, | Performed by: PHYSICAL MEDICINE & REHABILITATION

## 2025-03-21 PROCEDURE — 1126F AMNT PAIN NOTED NONE PRSNT: CPT | Mod: CPTII,S$GLB,, | Performed by: PHYSICAL MEDICINE & REHABILITATION

## 2025-03-21 PROCEDURE — 3078F DIAST BP <80 MM HG: CPT | Mod: CPTII,S$GLB,, | Performed by: PHYSICAL MEDICINE & REHABILITATION

## 2025-03-21 PROCEDURE — 1101F PT FALLS ASSESS-DOCD LE1/YR: CPT | Mod: CPTII,S$GLB,, | Performed by: PHYSICAL MEDICINE & REHABILITATION

## 2025-03-21 PROCEDURE — 3288F FALL RISK ASSESSMENT DOCD: CPT | Mod: CPTII,S$GLB,, | Performed by: PHYSICAL MEDICINE & REHABILITATION

## 2025-03-21 PROCEDURE — 3074F SYST BP LT 130 MM HG: CPT | Mod: CPTII,S$GLB,, | Performed by: PHYSICAL MEDICINE & REHABILITATION

## 2025-03-21 PROCEDURE — 99999 PR PBB SHADOW E&M-EST. PATIENT-LVL II: CPT | Mod: PBBFAC,,,

## 2025-03-21 PROCEDURE — 1159F MED LIST DOCD IN RCRD: CPT | Mod: CPTII,S$GLB,, | Performed by: PHYSICAL MEDICINE & REHABILITATION

## 2025-03-21 RX ORDER — CYCLOBENZAPRINE HCL 10 MG
TABLET ORAL
Qty: 60 TABLET | Refills: 0 | Status: SHIPPED | OUTPATIENT
Start: 2025-03-21

## 2025-03-21 RX ADMIN — CYANOCOBALAMIN 1000 MCG: 1000 INJECTION, SOLUTION INTRAMUSCULAR; SUBCUTANEOUS at 10:03

## 2025-03-21 NOTE — PROGRESS NOTES
New Patient Chronic Pain Note (Initial Visit)    Referring Physician: Holly Schaefer NP    PCP: Carlos Pichardo MD    Chief Complaint:   Chief Complaint   Patient presents with    Back Pain        SUBJECTIVE:    Zora Davis is a 75 y.o. female who presents to the clinic for the evaluation of lower back pain.  She was referred by her primary care team for further evaluation management of this pain.  She has past medical history of lumbar facet arthropathy, anxiety, asthma, hypertension, hyperlipidemia, atrial fibrillation, hypothyroidism, s/p gastric sleeve, GERD, insomnia, JOSE DE JESUS, multiple other medical comorbidities as listed in her chart.  The pain started 4-5 years ago and symptoms have been worsening.The pain is located in the lumbosacral area and radiates to the left anterior lateral thigh and occasionally in the hamstring.  The pain is described as  sharp, stabbing, pulling  and is rated as 0/10. The pain is rated with a score of  0/10 on the BEST day and a score of 10/10 on the WORST day.  Symptoms interfere with daily activity. The pain is exacerbated by certain movements and walking.  The pain is mitigated by Tylenol, ibuprofen    Patient denies night fever/night sweats, urinary incontinence, bowel incontinence, significant weight loss, significant motor weakness, and loss of sensations.    Pain Disability Index Review:         3/21/2025    10:54 AM   Last 3 PDI Scores   Pain Disability Index (PDI) 29       Non-Pharmacologic Treatments:  Physical Therapy/Home Exercise: yes  Ice/Heat:yes  TENS: no  Acupuncture: no  Massage: no  Chiropractic: no    Other: no      Pain Medications:  - Opioids:  Tramadol, Norco  - Adjuvant Medications:  Alprazolam, Lunesta, Flexeril  - Anti-Coagulants:  None     report:  Reviewed and consistent with medication use as prescribed.    Pain Procedures:   Denies      Imaging:   X-ray lumbar spine 03/10/2025:   The vertebral bodies demonstrate a normal height. There  is mild levocurvature at the thoracolumbar junction. There is grade 1 spondylolisthesis of L3 on L4. There is grade 1 spondylolisthesis of L5 on S1. Bilateral pars defects noted at this level. Prominent bilateral facet arthropathy seen at the L3-4 through the L5-S1 levels. Unchanged appearance of some spondylosis within the visualized lower thoracic spine/upper lumbar spine. Surgical clips noted in the right upper quadrant of the abdomen.     X-ray lumbar spine 11/14/2024:  This examination consists of 3 views of the lumbar spine. There are 4 lumbar type vertebral bodies.  There is a transitional vertebral body between L4 and the sacrum.  There is grade 1 anterolisthesis of L3 on L4.  There is no fracture or scoliosis. There is normal lumbar lordosis.  There are mild degenerative changes between T10 and L1.     CT abdomen pelvis 07/25/2024:  Soft tissues/Musculature: Unremarkable   Osseous Structures: Degenerative changes of the visualized spine.    Lung bases: Cardiomegaly.   Aorta/Vasculature: Moderate atherosclerotic disease.   Lymph nodes: Unremarkable   Liver: Unremarkable   Gallbladder/Biliary System: Status post cholecystectomy   Pancreas: Unremarkable   Spleen: Unremarkable   Adrenal glands: Unremarkable   Kidneys/Ureters: Left upper pole renal simple cyst. Right renal parapelvic simple cyst.   Urinary bladder: Unremarkable   Reproductive organs: Status post hysterectomy.   Peritoneum/Mesentery: Unremarkable   Bowel/Stomach: There is bowel anastomosis suture material in the rectum. No evidence of bowel obstruction. Gastric sleeve surgical change.   Appendix: Unremarkable         Past Medical History:   Diagnosis Date    COVID-19 11/2020    Essential hypertension 6/11/2013    Fatty liver     GERD (gastroesophageal reflux disease)     Lumbar facet arthropathy 1/31/2020    Mixed hyperlipidemia 5/20/2021    JOSE DE JESUS on CPAP     Osteopenia of multiple sites 1/17/2020    Paroxysmal atrial fibrillation 3/22/2019     Primary hypothyroidism 2010    PVC (premature ventricular contraction) 2020    Sigmoid diverticulosis      Past Surgical History:   Procedure Laterality Date    BREAST BIOPSY      BREAST SURGERY  2000     SECTION      x 2    CHOLECYSTECTOMY      COLON SURGERY Bilateral 2024    COLONOSCOPY  2014    Dr. Richey; diverticulosis; repeat in 5 years    ESOPHAGOGASTRODUODENOSCOPY N/A 2018    Dr. Steel; gastritis; gastric polyps    ESOPHAGOGASTRODUODENOSCOPY N/A 2020    Procedure: EGD (ESOPHAGOGASTRODUODENOSCOPY);  Surgeon: Efrain Steel MD;  Location: [facility] ENDO;  Service: Endoscopy;  Laterality: N/A;    HYSTERECTOMY      LAPAROSCOPIC LYSIS OF ADHESIONS N/A 2019    Procedure: LYSIS, ADHESIONS, LAPAROSCOPIC;  Surgeon: Cole Armstrong MD;  Location: [facility] OR;  Service: General;  Laterality: N/A;    ROBOT-ASSISTED LAPAROSCOPIC SLEEVE GASTRECTOMY USING DA LARRY XI N/A 2019    Procedure: XI ROBOTIC SLEEVE GASTRECTOMY;  Surgeon: Cole Armstrong MD;  Location: [facility] OR;  Service: General;  Laterality: N/A;    TOTAL REDUCTION MAMMOPLASTY Bilateral     UPPER GASTROINTESTINAL ENDOSCOPY  2015    Dr. Padron     Social History[1]  Family History   Problem Relation Name Age of Onset    Colon cancer Paternal Aunt      COPD Paternal Aunt          colon    Hypertension Father Dad     Arthritis Father Dad     Arthritis Mother Mother     Asthma Mother Mother     Depression Mother Mother     Hypertension Mother Mother     Crohn's disease Neg Hx      Stomach cancer Neg Hx      Ulcerative colitis Neg Hx      Esophageal cancer Neg Hx         Review of patient's allergies indicates:   Allergen Reactions    Hydrocodone-acetaminophen Itching    Trazodone Other (See Comments)     Tongue tingling       Current Outpatient Medications   Medication Sig    albuterol (PROVENTIL HFA) 90 mcg/actuation inhaler Inhale 2 puffs into the lungs every 6 (six) hours as needed for Wheezing. Rescue     ALPRAZolam (XANAX) 0.5 MG tablet Take 1 tablet (0.5 mg total) by mouth daily as needed for Anxiety.    budesonide (PULMICORT FLEXHALER) 90 mcg/actuation AePB Inhale 2 puffs (180 mcg total) into the lungs 2 (two) times a day. Controller    cyclobenzaprine (FLEXERIL) 10 MG tablet take 1 tablet by mouth 3 times daily AS NEEDED    desoximetasone (TOPICORT) 0.25 % cream Apply 15 g topically 2 (two) times daily.    ergocalciferol, vitamin D2, (VITAMIN D ORAL) Take by mouth Daily.    eszopiclone (LUNESTA) 2 MG Tab Take 1 tablet (2 mg total) by mouth nightly as needed.    hydroCHLOROthiazide (HYDRODIURIL) 25 MG tablet TAKE 1 TABLET DAILY    ipratropium-albuteroL (COMBIVENT RESPIMAT)  mcg/actuation inhaler Inhale 1 puff into the lungs every 6 (six) hours as needed for Wheezing or Shortness of Breath. Rescue    levothyroxine (SYNTHROID) 50 MCG tablet TAKE 1 TABLET BEFORE BREAKFAST (OFFICE VISIT/LABS REQUIRED FOR FURTHER REFILLS. MAY SELF-SCHEDULE VIA [facility])    losartan (COZAAR) 50 MG tablet Take 1 tablet (50 mg total) by mouth once daily.    metoprolol tartrate (LOPRESSOR) 50 MG tablet TAKE 1 TABLET DAILY AND AS NEEDED FOR PALPITATIONS    omeprazole (PRILOSEC) 40 MG capsule Take 1 capsule (40 mg total) by mouth once daily.    predniSONE (DELTASONE) 10 MG tablet Take 1 tablet (10 mg total) by mouth 2 (two) times daily.    azelastine (ASTELIN) 137 mcg (0.1 %) nasal spray 1 spray (137 mcg total) by Nasal route 2 (two) times daily. for 7 days    cyclobenzaprine (FLEXERIL) 10 MG tablet Take 1/2 to 1 tab BID PRN muscle spasms. May cause drowsiness.    meclizine (ANTIVERT) 25 mg tablet Take 1 tablet (25 mg total) by mouth 2 (two) times daily as needed.     Current Facility-Administered Medications   Medication    cyanocobalamin injection 1,000 mcg       Review of Systems     GENERAL:  No weight loss, malaise or fevers.  HEENT:   No recent changes in vision or hearing  NECK:  Negative for lumps, no difficulty with  "swallowing.  RESPIRATORY:  Negative for cough, wheezing or shortness of breath, patient denies any recent URI.  CARDIOVASCULAR:  Negative for chest pain, leg swelling or palpitations.  GI:  Negative for abdominal discomfort, blood in stools or black stools or change in bowel habits.  MUSCULOSKELETAL:  See HPI.  SKIN:  Negative for lesions, rash, and itching.  PSYCH:  No mood disorder or recent psychosocial stressors.  Patients sleep is not disturbed secondary to pain.  HEMATOLOGY/LYMPHOLOGY:  Negative for prolonged bleeding, bruising easily or swollen nodes.  Patient is not currently taking any anti-coagulants  NEURO:   No history of headaches, syncope, paralysis, seizures or tremors.  All other reviewed and negative other than HPI.    OBJECTIVE:    /69   Pulse (!) 59   Resp 17   Ht 5' 7" (1.702 m)   Wt 98.3 kg (216 lb 12.8 oz)   LMP  (LMP Unknown)   BMI 33.96 kg/m²         Physical Exam    GENERAL: Well appearing, in no acute distress, alert and oriented x3.  PSYCH:  Mood and affect appropriate.  SKIN: Skin color, texture, turgor normal, no rashes or lesions.  HEAD/FACE:  Normocephalic, atraumatic. Cranial nerves grossly intact.  CV: RRR with palpation of the radial artery.  PULM: No evidence of respiratory difficulty, symmetric chest rise.  GI:  Soft and non-tender.  BACK: Straight leg raising in the sitting and supine positions is negative to radicular pain.  Minimal pain to palpation over the facet joints of the lumbar spine and lumbar paraspinals.  Pain in the lower lumbar area with forced flexion on the left.  Fair range of motion without significant pain reproduction. Directional preference:  Pain with both flexion and extension  EXTREMITIES:  No deformities, edema, or skin discoloration. Good capillary refill.  MUSCULOSKELETAL: Shoulder, hip, and knee provocative maneuvers are negative.  There is minimal pain with palpation over the sacroiliac joints bilaterally.  FABERs test is unremarkable.  " FADIRs test is unremarkable.   Bilateral upper and lower extremity strength is normal and symmetric.  No atrophy or tone abnormalities are noted.  NEURO: Bilateral upper and lower extremity coordination and muscle stretch reflexes are physiologic and symmetric.  Plantar response are downgoing. No clonus.  No loss of sensation is noted.  GAIT: normal.      LABS:  Lab Results   Component Value Date    WBC 6.29 07/30/2024    HGB 12.8 08/20/2024    HCT 41.3 07/30/2024    MCV 88 07/30/2024     07/30/2024       CMP  Sodium   Date Value Ref Range Status   08/20/2024 140 136 - 145 mmol/L Final     Potassium   Date Value Ref Range Status   08/20/2024 3.6 3.5 - 5.1 mmol/L Final     Chloride   Date Value Ref Range Status   08/20/2024 102 95 - 110 mmol/L Final     CO2   Date Value Ref Range Status   08/20/2024 29 23 - 29 mmol/L Final     Glucose   Date Value Ref Range Status   08/20/2024 90 70 - 110 mg/dL Final     BUN   Date Value Ref Range Status   08/20/2024 13 8 - 23 mg/dL Final     Creatinine   Date Value Ref Range Status   08/20/2024 0.8 0.5 - 1.4 mg/dL Final     Calcium   Date Value Ref Range Status   08/20/2024 10.1 8.7 - 10.5 mg/dL Final     Total Protein   Date Value Ref Range Status   08/20/2024 7.7 6.0 - 8.4 g/dL Final     Albumin   Date Value Ref Range Status   08/20/2024 3.7 3.5 - 5.2 g/dL Final     Total Bilirubin   Date Value Ref Range Status   08/20/2024 0.6 0.1 - 1.0 mg/dL Final     Comment:     For infants and newborns, interpretation of results should be based  on gestational age, weight and in agreement with clinical  observations.    Premature Infant recommended reference ranges:  Up to 24 hours.............<8.0 mg/dL  Up to 48 hours............<12.0 mg/dL  3-5 days..................<15.0 mg/dL  6-29 days.................<15.0 mg/dL       Alkaline Phosphatase   Date Value Ref Range Status   08/20/2024 75 55 - 135 U/L Final     AST   Date Value Ref Range Status   08/20/2024 21 10 - 40 U/L Final      ALT   Date Value Ref Range Status   08/20/2024 19 10 - 44 U/L Final     Anion Gap   Date Value Ref Range Status   08/20/2024 9 8 - 16 mmol/L Final     eGFR if    Date Value Ref Range Status   05/19/2021 >60.0 >60 mL/min/1.73 m^2 Final     eGFR if non    Date Value Ref Range Status   05/19/2021 >60.0 >60 mL/min/1.73 m^2 Final     Comment:     Calculation used to obtain the estimated glomerular filtration  rate (eGFR) is the CKD-EPI equation.          Lab Results   Component Value Date    HGBA1C 5.2 08/20/2024             ASSESSMENT: 75 y.o. year old female with lower back and left hip pain, consistent with     1. Hip flexor tightness, left        2. Chronic right-sided low back pain without sciatica  Ambulatory referral/consult to Spine Care            PLAN:   - Interventions:  None at this time, consider iliopsoas trigger point injections in the future on the left    - Anticoagulation use:  None      - Medications: I have stressed the importance of physical activity and a home exercise plan to help with pain and improve health. and Patient can continue with medications for now since they are providing benefits, using them appropriately, and without side effects.     Prescribed Flexeril 5-10 mg b.i.d. p.r.n.        - Therapy:  Providing patient with hip flexor stretching program    - Psychological:  Discussed coping mechanisms to help address chronic pain issues    - Labs:  Reviewed    - Imaging: Reviewed available imaging with patient and answered any questions they had regarding study.    - Consults/Referrals:  None at this time    - Records:  Reviewed/Obtain old records from outside physicians and imaging    - Follow up visit: return to clinic in three-month or as needed    - Counseled patient regarding the importance of activity modification and physical therapy    - This condition does not require this patient to take time off of work, and the primary goal of our Pain  Management services is to improve the patient's functional capacity.    - Patient Questions: Answered all of the patient's questions regarding diagnosis, therapy, and treatment        The above plan and management options were discussed at length with patient. Patient is in agreement with the above and verbalized understanding.    I discussed the goals of interventional chronic pain management with the patient on today's visit.  I explained the utility of injections for diagnostic and therapeutic purposes.  We discussed a multimodal approach to pain including treating the patient's given worst pain at any given time.  We will use a systematic approach to addressing pain.  We will also adopt a multimodal approach that includes injections, adjuvant medications, physical therapy, at times psychiatry.  There may be a limited role for opioid use intermittently in the treatment of pain, more particularly for acute pain although no one approach can be used as a sole treatment modality.    I emphasized the importance of regular exercise, core strengthening and stretching, diet and weight loss as a cornerstone of long-term pain management.      Elvin Vasques MD  Interventional Pain Management  [facility]    Disclaimer:  This note was prepared using voice recognition system and is likely to have sound alike errors that may have been overlooked even after proof reading.  Please call me with any questions         [1]   Social History  Socioeconomic History    Marital status:     Number of children: 2   Occupational History    Occupation: retired    Tobacco Use    Smoking status: Former     Current packs/day: 0.00     Average packs/day: 0.3 packs/day for 13.2 years (3.3 ttl pk-yrs)     Types: Cigarettes     Start date: 1965     Quit date:      Years since quittin.2    Smokeless tobacco: Never   Substance and Sexual Activity    Alcohol use: Not Currently     Alcohol/week: 1.0  standard drink of alcohol     Types: 1 Glasses of wine per week     Comment: 3 times a week    Drug use: No    Sexual activity: Yes     Partners: Male     Birth control/protection: None     Social Drivers of Health     Financial Resource Strain: Low Risk  (7/29/2024)    Overall Financial Resource Strain (CARDIA)     Difficulty of Paying Living Expenses: Not very hard   Food Insecurity: No Food Insecurity (7/29/2024)    Hunger Vital Sign     Worried About Running Out of Food in the Last Year: Never true     Ran Out of Food in the Last Year: Never true   Transportation Needs: Unknown (1/9/2024)    Received from [facility]    PRAPARE - Transportation     Lack of Transportation (Medical): No   Physical Activity: Inactive (7/29/2024)    Exercise Vital Sign     Days of Exercise per Week: 0 days     Minutes of Exercise per Session: 0 min   Stress: Stress Concern Present (7/29/2024)    [employer] - Occupational Stress Questionnaire     Feeling of Stress : To some extent   Housing Stability: Unknown (7/29/2024)    Housing Stability Vital Sign     Unable to Pay for Housing in the Last Year: No

## 2025-04-02 ENCOUNTER — PATIENT MESSAGE (OUTPATIENT)
Dept: FAMILY MEDICINE | Facility: CLINIC | Age: 75
End: 2025-04-02
Payer: MEDICARE

## 2025-04-03 ENCOUNTER — E-VISIT (OUTPATIENT)
Dept: FAMILY MEDICINE | Facility: CLINIC | Age: 75
End: 2025-04-03
Payer: MEDICARE

## 2025-04-03 DIAGNOSIS — Z87.09 HISTORY OF ASTHMA: ICD-10-CM

## 2025-04-03 DIAGNOSIS — U07.1 COVID-19: Primary | ICD-10-CM

## 2025-04-03 DIAGNOSIS — R05.1 ACUTE COUGH: ICD-10-CM

## 2025-04-03 RX ORDER — PREDNISONE 20 MG/1
20 TABLET ORAL DAILY
Qty: 5 TABLET | Refills: 0 | Status: SHIPPED | OUTPATIENT
Start: 2025-04-03 | End: 2025-04-08

## 2025-04-03 RX ORDER — PROMETHAZINE HYDROCHLORIDE AND DEXTROMETHORPHAN HYDROBROMIDE 6.25; 15 MG/5ML; MG/5ML
5 SYRUP ORAL EVERY 6 HOURS PRN
Qty: 118 ML | Refills: 0 | Status: SHIPPED | OUTPATIENT
Start: 2025-04-03 | End: 2025-04-13

## 2025-04-03 RX ORDER — ALBUTEROL SULFATE 90 UG/1
2 INHALANT RESPIRATORY (INHALATION) EVERY 6 HOURS PRN
Qty: 18 G | Refills: 1 | Status: SHIPPED | OUTPATIENT
Start: 2025-04-03

## 2025-04-03 NOTE — PROGRESS NOTES
Patient ID: Zora Davis is a 75 y.o. female.    Chief Complaint: URI (Entered automatically based on patient selection in [employer].)    The patient initiated a request through [employer] on 4/3/2025 for evaluation and management with a chief complaint of URI (Entered automatically based on patient selection in [employer].)     I evaluated the questionnaire submission on 4/3/25    [facility] Peq E-Visit Covid    4/3/2025  8:12 AM CDT - Filed by Patient   Do you agree to participate in an E-Visit? Yes   If you have any of the following symptoms, go to your local emergency room or call 911: I acknowledge   Choose the state of your primary residence [place]   What is the main issue you would like addressed today? Covid   Do you think you might have COVID-19 or the Flu? COVID-19   Have you tested positive for COVID-19 or Flu? COVID-19   What symptoms do you have? Chills;  Cough;  Fatigue;  Fever;  Headache;  Stuffy nose;  Muscle or body aches;  Sore throat   When did your concern begin? 2025   Have you tried any of the following to help your symptoms? Cold medication;  Cough syrup;  Drinking more fluids;  Rest and sleep;  Tylenol   Provide any additional information you feel is important. Nandini had Covid on 2 ither occasions and it usally effects my asthma. I was given albuterol and Pulmicort. My prescription is .   Please attach any relevant images or files    Are you able to take your vital signs? Yes   Systolic Blood Pressure: 119   Diastolic Blood Pressure: 66   Weight: 214   Height: 67   Pulse: 69   Temperature: 98   Respiration rate:    Pulse Oxygen:           Active Problem List with Overview Notes    Diagnosis Date Noted    Anxiety 03/10/2025     Chronic. She is severely claustrophobic when in small spaces including traveling in a car for long distances as well as airplane. She takes xanax for these situations which provides relief.       Chronic right-sided low back pain without sciatica 03/10/2025      "Chronic. Intermittent flares. She has tried PT in the past and this made it worse. No recent injuries or known trauma.    X-Ray Lumbar Spine AP And Lateral  Narrative & Impression     EXAM:  XR LUMBAR SPINE AP AND LATERAL     CLINICAL HISTORY: [M47.816]-Spondylosis without myelopathy or radiculopathy, lumbar region.     COMPARISON: None     FINDINGS: This examination consists of 3 views of the lumbar spine. There are 4 lumbar type vertebral bodies.  There is a transitional vertebral body between L4 and the sacrum.  There is grade 1 anterolisthesis of L3 on L4.  There is no fracture or scoliosis. There is normal lumbar lordosis.  There are mild degenerative changes between T10 and L1.        Impression:     1.  There is grade 1 anterolisthesis of L3 on L4.  2.  There are mild degenerative changes between T10 and L1.     Finalized on: 11/14/2024 3:21 PM By:  Erik Trejo MD  [facility]# [id number]      2024-11-14 15:23:[id number]    [facility]        Exam Ended: 11/14/24 15:16 CST         Aortic atherosclerosis 11/14/2024     Aortic Atherosclerosis in Imaging: Date:25-JUL-24; CT Abdomen Pelvis With IV Contrast: "..SCULATURE: MODERATE ATHEROSCLEROTIC DISEASE. . CHRONIC. STABLE. Lab analysis reviewed.   (-) CP, SOB, abdominal pain, N/V/D, constipation, jaundice, skin changes.  (-) Myalgias  Lab Results   Component Value Date    CHOL 221 (H) 08/20/2024    CHOL 221 (H) 03/04/2024    CHOL 189 07/18/2023     Lab Results   Component Value Date    HDL 56 08/20/2024    HDL 53 03/04/2024    HDL 47 07/18/2023     Lab Results   Component Value Date    LDLCALC 135.2 08/20/2024    LDLCALC 137 (H) 03/04/2024    LDLCALC 122 (H) 07/18/2023     Lab Results   Component Value Date    TRIG 149 08/20/2024    TRIG 154 03/04/2024    TRIG 101 07/18/2023     Lab Results   Component Value Date    CHOLHDL 25.3 08/20/2024    CHOLHDL 4.2 03/04/2024    CHOLHDL 4.0 07/18/2023     Lab Results   Component Value Date    TOTALCHOLEST 3.9 08/20/2024    TOTALCHOLEST 3.8 " 09/20/2022    TOTALCHOLEST 3.4 05/19/2021     Lab Results   Component Value Date    ALT 19 08/20/2024    AST 21 08/20/2024    ALKPHOS 75 08/20/2024    BILITOT 0.6 08/20/2024     ======================================================        Dysfunction of eustachian tube 11/14/2024    Abnormal TSH 08/20/2024     See hypothyroidism      Encounter for long-term (current) use of medications 08/20/2024 November 2024:  Reviewed labs.  CHRONIC. Stable. Compliant with medications for managed conditions. See medication list. No SE reported.   Routine lab analysis is being monitored. Refills were addressed.  Lab Results   Component Value Date    WBC 6.29 07/30/2024    HGB 12.8 08/20/2024    HCT 41.3 07/30/2024    MCV 88 07/30/2024     07/30/2024         Chemistry        Component Value Date/Time     08/20/2024 0945    K 3.6 08/20/2024 0945     08/20/2024 0945    CO2 29 08/20/2024 0945    BUN 13 08/20/2024 0945    CREATININE 0.8 08/20/2024 0945    GLU 90 08/20/2024 0945        Component Value Date/Time    CALCIUM 10.1 08/20/2024 0945    ALKPHOS 75 08/20/2024 0945    AST 21 08/20/2024 0945    ALT 19 08/20/2024 0945    BILITOT 0.6 08/20/2024 0945    ESTGFRAFRICA >60.0 05/19/2021 1221    EGFRNONAA >60.0 05/19/2021 1221          Lab Results   Component Value Date    TSH 2.516 08/20/2024    FREET4 0.94 08/20/2024           Medication side effect 08/20/2024     Triage Note Reviewed    History    Chief Complaint  Patient presents with  Emesis    History of Present IllnessZora Davis is a 74 y.o. female with a significant medical history of hypertension, diabetes, presenting with nausea and vomiting since Sunday. Discontinued Ozempic 9 days ago. Increased dose 9 days ago. Has been having some vertigo as well for the last 7 to 10 days. Room not spinning around her but she feels unsteady at times. Cells been having some abdominal pain and cramping. She thinks it is all from the Ozempic but she is not sure.  "Pain currently mild. Nothing makes better or worse. Some constipation.    Triage note:  Complains of dizziness, N/V since , abdominal cramping to mid abdomen, has been constipated, took 2 dulcolax yesterday, has had scant results.    Began on semiglutide 6 weeks ago, 10 units initially, now on 20 units, last dose was 9 days ago due to what she describes as above side effects.    Review of Systems        Allergies  Allergen Reactions  Hydrocodone-Acetaminophen Itching  Trazodone Other (See Comments)  Tongue tingling    Past Medical History:  Diagnosis Date  Asthma  Diverticulitis  Hypertension  Insulin resistance  Sleep apnea  Tachycardia  Thyroid disease    Past Surgical History:  Procedure Laterality Date   SECTION  x2  CHOLECYSTECTOMY  gastric sleeve  HYSTERECTOMY  REDUCTION MAMMAPLASTY      Family History  Problem Relation Name Age of Onset  Hypertension Father  Hypertension Mother    Social History    Tobacco Use  Smoking status: Former  Types: Cigarettes  Smokeless tobacco: Never  Tobacco comments:  2024: quit over 40 years ago  Vaping Use  Vaping status: Never Used  Substance Use Topics  Alcohol use: Yes  Comment: occasionally  Drug use: Never    Tobacco Cessation Program    E-Cigarette/Vaping  E-cigarette/Vaping Use Never User    Physical Exam  Visit Vitals  /73 (BP Location: Right arm, Patient Position: Lying)  Pulse 78  Temp 98.4 °F (36.9 °C) (Oral)  Resp 17  Ht 5' 7" (1.702 m)  Wt 95.2 kg  SpO2 99%  BMI 32.87 kg/m²    Physical Exam  Vitals and nursing note reviewed.  Constitutional:  General: She is not in acute distress.  Appearance: Normal appearance. She is well-developed. She is not ill-appearing, toxic-appearing or diaphoretic.  HENT:  Head: Normocephalic and atraumatic.  Right Ear: External ear normal.  Left Ear: External ear normal.  Nose: Nose normal.  Mouth/Throat:  Mouth: Mucous membranes are moist.  Pharynx: Oropharynx is clear. No oropharyngeal exudate.  Eyes:  General: No " scleral icterus.  Right eye: No discharge.  Left eye: No discharge.  Conjunctiva/sclera: Conjunctivae normal.  Pupils: Pupils are equal, round, and reactive to light.  Neck:  Vascular: No JVD.  Cardiovascular:  Rate and Rhythm: Normal rate and regular rhythm.  Pulses: Normal pulses.  Heart sounds: Normal heart sounds. No murmur heard.  No friction rub. No gallop.  Pulmonary:  Effort: Pulmonary effort is normal. No respiratory distress.  Breath sounds: Normal breath sounds. No stridor. No wheezing, rhonchi or rales.  Chest:  Chest wall: No tenderness.  Abdominal:  General: There is no distension.  Palpations: Abdomen is soft. There is no mass.  Tenderness: There is no abdominal tenderness. There is no guarding or rebound.  Comments: Benign abdominal exam  Musculoskeletal:  General: No tenderness, deformity or signs of injury. Normal range of motion.  Cervical back: Normal range of motion and neck supple.  Skin:  General: Skin is warm and dry.  Capillary Refill: Capillary refill takes less than 2 seconds.  Coloration: Skin is not jaundiced or pale.  Findings: No bruising, erythema, lesion or rash.  Neurological:  General: No focal deficit present.  Mental Status: She is alert and oriented to person, place, and time. Mental status is at baseline.  Cranial Nerves: No cranial nerve deficit.  Sensory: No sensory deficit.  Motor: No abnormal muscle tone.  Psychiatric:  Mood and Affect: Mood normal.  Behavior: Behavior normal.  Thought Content: Thought content normal.  Judgment: Judgment normal.    ED Course    Labs Reviewed  HEPATIC FUNCTION PANEL - Abnormal; Notable for the following components:  Result Value  Total Protein 8.0 (*)  All other components within normal limits  CBC WITH DIFFERENTIAL  BMP W/ REFLEX TO MG  LIPASE  TROPONIN I  GLOMERULAR FILTRATION RATE    Lab Results for last 36Hrs:  Recent Results (from the past 36 hour(s))  ECG 12 lead  Collection Time: 07/25/24 12:00 PM  Result Value Ref Range  Ventricular  Rate 71 BPM  P-R Interval 132 ms  QRS Duration 82 ms  Q-T Interval 398 ms  QTC Calculation 432 ms  Calculated P Axis 43 degrees  Calculated R Axis 22 degrees  Calculated T Axis 24 degrees  Interpretation  Normal sinus rhythm with sinus arrhythmia  Normal ECG  When compared with ECG of 02-JAN-2024 13:09,  Nonspecific T wave abnormality no longer evident in Lateral leads    CBC with Differential  Collection Time: 07/25/24 12:05 PM  Result Value Ref Range  WBC 9.1 4.4 - 11.2 10*3/uL  RBC 4.63 4.20 - 5.40 10*6/uL  HGB 13.5 12.0 - 16.0 g/dL  HCT 39.6 37.0 - 47.0 %  MCV 85.5 81.0 - 99.0 fL  MCH 29.2 27.0 - 31.0 pg  MCHC 34.1 33.0 - 37.0 g/dL  RDW 13.3 11.5 - 14.5 %  Platelet Count 280 130 - 375 10*3/uL  MPV 10.1 8.7 - 13.0 fL  Neutrophils Percent 57.5 36.0 - 66.0 %  Lymphocytes Percent 32.6 21.0 - 50.0 %  Monocytes Percent 8.0 2.0 - 10.0 %  Eosinophils Percent 1.0 0.0 - 10.0 %  Basophils Percent 0.4 0.0 - 1.0 %  Immature Granulocyte % 0.1 0.0 - 0.4 %  Neutrophils Absolute 5.2 1.4 - 6.5 10*3/uL  Lymphocytes Absolute 3.0 1.2 - 3.4 10*3/uL  Monocytes Absolute 0.7 0.1 - 1.0 10*3/uL  Eosinophils Absolute 0.1 0.0 - 0.7 10*3/uL  Basophils Absolute 0.0 0.0 - 0.2 10*3/uL  # Immature Granulocyte 0.01 0.00 - 0.03 10*3/uL  BMP w/ Reflex to Mg  Collection Time: 07/25/24 12:05 PM  Result Value Ref Range  Glucose 88 65 - 99 mg/dL  Sodium 138 136 - 144 mmol/L  Potassium 3.6 3.6 - 5.1 mmol/L  Chloride 101 101 - 111 mmol/L  CO2 27 22 - 32 mmol/L  BUN 14 8 - 20 mg/dL  Calcium 10.0 8.9 - 10.3 mg/dL  Creatinine 0.81 0.60 - 1.10 mg/dL  Anion Gap 10 7 - 16 mmol/L  Hepatic function panel  Collection Time: 07/25/24 12:05 PM  Result Value Ref Range  Albumin 4.4 3.5 - 4.8 g/dL  Total Bilirubin 0.6 0.4 - 2.0 mg/dL  Bilirubin, Direct 0.2 0.0 - 0.4 mg/dL  Bilirubin, Indirect 0.4 0.0 - 1.1 mg/dL  Total Protein 8.0 (H) 6.1 - 7.9 g/dL  ALT 16 5 - 41 U/L  AST 19 10 - 34 U/L  ALKP 63 28 - 116 U/L  Lipase  Collection Time: 07/25/24 12:05 PM  Result Value  Ref Range  Lipase 50 8 - 57 U/L  Troponin I  Collection Time: 07/25/24 12:05 PM  Result Value Ref Range  Troponin I <0.03 0.00 - 0.04 ng/mL  Glomerular Filtration Rate  Collection Time: 07/25/24 12:05 PM  Result Value Ref Range  GFR Non African American >60 >59 mL/min  GFR African American >60 >59 mL/min    Diagnostic Results for last 36Hrs:  CT Abdomen Pelvis W Contrast    Result Date: 7/25/2024  REASON FOR EXAM: abdominal pain, hx of diverticulitis, but also recently on ozempic TECHNICAL FACTORS: Multiple contiguous axial CT images were obtained of the abdomen and pelvis after administration of intravenous contrast. 2D reformatted images were performed. Automated exposure control was utilized for radiation dose reduction. COMPARISON: 10/19/2023 FINDINGS: Soft tissues/Musculature: Unremarkable Osseous Structures: Degenerative changes of the visualized spine. Lung bases: Cardiomegaly. Aorta/Vasculature: Moderate atherosclerotic disease. Lymph nodes: Unremarkable Liver: Unremarkable Gallbladder/Biliary System: Status post cholecystectomy Pancreas: Unremarkable Spleen: Unremarkable Adrenal glands: Unremarkable Kidneys/Ureters: Left upper pole renal simple cyst. Right renal parapelvic simple cyst. Urinary bladder: Unremarkable Reproductive organs: Status post hysterectomy. Peritoneum/Mesentery: Unremarkable Bowel/Stomach: There is bowel anastomosis suture material in the rectum. No evidence of bowel obstruction. Gastric sleeve surgical change. Appendix: Unremarkable IMPRESSION: 1. No CT evidence of acute pathology within the abdomen or pelvis. 2. Cholecystectomy, hysterectomy, sleeve gastrectomy, and partial colectomy. 3. Mild cardiomegaly. 4. Additional incidental, age-related, and/or chronic findings, as described above. Electronically signed by Carie Carvalho MD on 7/25/2024 1:36 PM    MRI Brain WO Contrast    Result Date: 7/25/2024  REASON FOR EXAM: vertigo TECHNICAL FACTORS: Multiplanar, multisequence MRI of the  brain was performed without administration of intravenous contrast. COMPARISON: None FINDINGS: Prominent ventricles and sulci, likely mild parenchymal atrophy. There is no evidence of acute intracranial hemorrhage or infarct. There is no evidence of mass, mass effect, or midline shift. Periventricular white matter T2/FLAIR hyperintensity, likely moderate chronic microvascular ischemia. The visualized orbits are normal in appearance. Paranasal sinuses are clear. Normal flow voids are present.    1. No acute intracranial abnormality. 2. Parenchymal atrophy and chronic microvascular ischemia. Electronically signed by Chema Jerez MD on 7/25/2024 1:19 PM    Wet Read Results  CT Abdomen Pelvis W Contrast  Final Result    MRI Brain WO Contrast  Final Result  1. No acute intracranial abnormality.  2. Parenchymal atrophy and chronic microvascular ischemia.      Electronically signed by Chema Jerez MD on 7/25/2024 1:19 PM        Medications  0.9% NaCl bolus 1,000 mL (0 mLs Intravenous Complete 7/25/24 1448)  LORazepam (ATIVAN) 2 mg/mL vial for inj 1 mg (1 mg Intravenous $Given 7/25/24 1208)  iopamidoL (ISOVUE-370) 370 mg iodine /mL (76 %) solution 100 mL (100 mLs Intravenous $Given 7/25/24 1326)    Procedures        Medical Decision Making  I personally reviewed the patient's medical chart: No recent visits or admissions for similar complaints    Patient's pulse oximetry is 99% which is not hypoxic    Zora Davis is a 74 y.o. female presenting with vertigo and abdominal pain. I suspect this is all secondary to Ozempic but she has never had vertigo before so we did an MRI that was negative to rule out posterior circulation stroke. We also did a CT of the abdomen pelvis because she was concerned it could be diverticulitis. CT scan is negative. Symptoms improved in the emergency department. Vital signs normal, workup negative. She will discontinue the Ozempic and follow-up with her PCP. She has Zofran and  meclizine at home and will take those. Will return if she is unable to tolerate oral fluids or has any concerns whatsoever.    History provided by patient as well as  at bedside    I independently interpreted the patient's lab results which showed no significant abnormalities    I independently interpreted the patient's ECG which showed normal sinus rhythm, no acute ischemic changes    Prior to Admission medications  Medication Sig Start Date End Date Taking?  albuterol (VENTOLIN) 90 mcg/actuation HFAA inhaler Inhale 2 puffs into the lungs every 6 (six) hours as needed 12/30/21  ALPRAZolam (Xanax) 0.5 MG Tab tablet Take 1 tablet (0.5 mg total) by mouth at bedtime nightly as needed for Sleep 7/16/24  celecoxib (CeleBREX) 100 MG Cap capsule Take 1 capsule (100 mg total) by mouth 2 (two) times daily 4/26/24  cholecalciferol, vitamin D3, (Vitamin D3) 125 mcg (5,000 unit) Tab tablet Take 1 tablet (5,000 Units total) by mouth daily  Combivent Respimat  mcg/actuation Mist INHALE 1 PUFF EVERY 6 HOURS AS NEEDED 5/16/23  dicyclomine (BENTYL) 10 MG Cap capsule Take 1 capsule (10 mg total) by mouth 4 (four) times daily before meals and nightly 9/22/23  eszopiclone (Lunesta) 2 MG Tab Take 1 tablet (2 mg total) by mouth at bedtime nightly as needed for Sleep Take immediately before bedtime 2/16/24  fluticasone (FLONASE) 50 mcg/actuation nasal spray 1 spray each nostril twice daily 12/16/16  guaiFENesin (Mucinex) 1,200 mg Ta12 Mucinex Take 1 tablet (oral) 2 times per day PRN - Congestion take with full glass of water [id number] tablet extended release 12hr 2 times per day oral No set duration recorded No set duration amount recorded active 1,200 mg 10/28/20  hydroCHLOROthiazide (HYDRODIURIL) 25 MG Tab tablet Take 1 tablet (25 mg total) by mouth daily 3/16/22  levothyroxine (SYNTHROID) 50 MCG Tab tablet Take 1 tablet (50 mcg total) by mouth every morning before breakfast 12/4/23  losartan (COZAAR) 50 MG Tab tablet Take 1  tablet (50 mg total) by mouth daily 5/27/22  melatonin 5 mg Cap Take 1 capsule (5 mg total) by mouth at bedtime nightly as needed  metoprolol tartrate (LOPRESSOR) 50 MG Tab tablet Take 1 tablet (50 mg total) by mouth nightly 7/22/22  multivitamin (THERAGRAN) Tab per tablet Take 1 tablet by mouth daily  mupirocin (BACTROBAN) 2 % Oint topical ointment Apply topically 3 (three) times daily 4/26/24  predniSONE (DELTASONE) 20 MG Tab tablet Take 1 tablet (20 mg total) by mouth daily 4/26/24    ED Critical Care Time        Diagnosis:    Final diagnoses:  Medication side effect  Vertigo    MD Greg ORNELAS Patrick, MD  07/25/24 1500    Electronically signed by Myles Garza MD at 07/25/2024 3:00 PM CDT   Back to top of ED Notes  Pushpa Pineda RN - 07/25/2024 10:11 AM CDT  Formatting of this note might be different from the original.  Complains of dizziness, N/V since Sunday, abdominal cramping to mid abdomen, has been constipated, took 2 dulcolax yesterday, has had scant results.    Began on semiglutide 6 weeks ago, 10 units initially, now on 20 units, last dose was 9 days ago due to what she describes as above side effects.  Electronically signed by Pushpa Pineda RN at 07/25/2024 10:19 AM CDT      Nasal sore 08/20/2024     Chronic.  Uncontrolled.  Patient has been using Bactroban with little to no improvement.      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  Patient  reports she was initially as high as 240 pounds she lost weight down to 215 pounds prior to surgery.  After surgery she lost weight down to 175 pounds.  She is currently at 206 pounds.      Intolerance of continuous positive airway pressure (CPAP) ventilation 08/20/2024     Chronic.  Patient reports history of JOSE DE JESUS intolerant to CPAP.  She is not wearing the machine.  She reports insomnia.  She has tried Lunesta in the past.      JOSE DE JESUS (obstructive sleep apnea) 08/20/2024     Patient intolerant to CPAP.  See other problem.      History of atrial fibrillation 02/16/2022    Palpitations 02/16/2022    History of asthma 02/15/2022     Chronic.  Intermittent control.  Patient needing refill on inhalers.  Uses albuterol as needed.  Pulmicort and Combivent.  She does not follow with Pulmonary.      Insomnia 02/15/2022     Chronic.  Intermittent control.  Patient is on Lunesta.      Shirley cardiac risk 10-20% in next 10 years 05/20/2021     The 10-year ASCVD risk score ([place] GORDON Jr., et al., 2013) is: 14.5%    Values used to calculate the score:      Age: 71 years      Sex: Female      Is Non- : No      Diabetic: No      Tobacco smoker: No      Systolic Blood Pressure: 139 mmHg      Is BP treated: Yes      HDL Cholesterol: 62 mg/dL      Total Cholesterol: 208 mg/dL        Mixed hyperlipidemia 05/20/2021    Fatty liver     GERD (gastroesophageal reflux disease)      Chronic.  Intermittent control.  Patient has upset stomach when using anti-inflammatory medication.      Sigmoid diverticulosis     Preop cardiovascular exam 09/23/2020    Lumbar facet arthropathy 01/31/2020     Chronic intermittent control.        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   Chronic. Recommend increase exercise to 30-60  minutes daily.  Low fat, low cholesterol diet and weight loss. Complications of Obesity are Coronary Artery Disease, Diabetes Mellitus, Hypertension, sleep apnea, gallbladder disease, depression, stroke non alcoholic liver disease and high cholesterol.      S/P laparoscopic sleeve gastrectomy 03/22/2019     S/p sleeve gastrectomy by Dr. Armstrong March 2019.       Essential hypertension 06/11/2013 November 2024:  Hypertension Medications               hydroCHLOROthiazide (HYDRODIURIL) 25 MG tablet Take 1 tablet (25 mg total) by mouth once daily.    losartan (COZAAR) 50 MG tablet Take 1 tablet (50 mg total) by mouth once daily.    metoprolol tartrate (LOPRESSOR) 50 MG tablet TAKE 1 TABLET DAILY AND AS NEEDED FOR PALPITATIONS          CHRONIC. STABLE. BP Reviewed.  Compliant with BP medications. No SE reported.   (-) CP, SOB, palpitations, dizziness, lightheadedness, HA, arm numbness, tingling or weakness, syncope.  Creatinine   Date Value Ref Range Status   08/20/2024 0.8 0.5 - 1.4 mg/dL Final     Hypertension Medications               losartan (COZAAR) 50 MG tablet TAKE 1 TABLET DAILY    metoprolol tartrate (LOPRESSOR) 50 MG tablet TAKE 1 TABLET ONCE DAILY AND AS NEEDED FOR PALPITATIONS    hydroCHLOROthiazide (HYDRODIURIL) 25 MG tablet Take 1 tablet (25 mg total) by mouth once daily.                Hypothyroidism (acquired) 05/11/2010     Chronic.  Stable.  Patient on levothyroxine 50 micrograms daily.  Reports compliance.  No side effects reported.  She is having fatigue.    Lab Results   Component Value Date    TSH 2.516 08/20/2024    FREET4 0.94 08/20/2024             Recent Labs Obtained:  No visits with results within 7 Day(s) from this visit.   Latest known visit with results is:   Office Visit on 03/10/2025   Component Date Value Ref Range Status    Specimen UA 03/10/2025 Urine, Clean Catch   Final    Color, UA 03/10/2025 Yellow  Yellow, Straw, Gill Final    Appearance, UA 03/10/2025 Clear  Clear Final     pH, UA 03/10/2025 6.0  5.0 - 8.0 Final    Specific Gravity, UA 03/10/2025 1.010  1.005 - 1.030 Final    Protein, UA 03/10/2025 Negative  Negative Final    Comment: Recommend a 24 hour urine protein or a urine   protein/creatinine ratio if globulin induced proteinuria is  clinically suspected.      Glucose, UA 03/10/2025 Negative  Negative Final    Ketones, UA 03/10/2025 Negative  Negative Final    Bilirubin (UA) 03/10/2025 Negative  Negative Final    Occult Blood UA 03/10/2025 Negative  Negative Final    Nitrite, UA 03/10/2025 Negative  Negative Final    Leukocytes, UA 03/10/2025 Negative  Negative Final       Encounter Diagnoses   Name Primary?    History of asthma     COVID-19 Yes    Acute cough         No orders of the defined types were placed in this encounter.     Medications Ordered This Encounter   Medications    albuterol (PROVENTIL HFA) 90 mcg/actuation inhaler     Sig: Inhale 2 puffs into the lungs every 6 (six) hours as needed for Wheezing or Shortness of Breath. Rescue     Dispense:  18 g     Refill:  1    budesonide (PULMICORT FLEXHALER) 90 mcg/actuation AePB     Sig: Inhale 2 puffs (180 mcg total) into the lungs 2 (two) times a day. Controller     Dispense:  1 each     Refill:  5    predniSONE (DELTASONE) 20 MG tablet     Sig: Take 1 tablet (20 mg total) by mouth once daily. for 5 days     Dispense:  5 tablet     Refill:  0    promethazine-dextromethorphan (PROMETHAZINE-DM) 6.25-15 mg/5 mL Syrp     Sig: Take 5 mLs by mouth every 6 (six) hours as needed (cough).     Dispense:  118 mL     Refill:  0        E-Visit Time Trackin minutes

## 2025-04-25 ENCOUNTER — CLINICAL SUPPORT (OUTPATIENT)
Dept: FAMILY MEDICINE | Facility: CLINIC | Age: 75
End: 2025-04-25
Payer: MEDICARE

## 2025-04-25 DIAGNOSIS — E53.8 B12 DEFICIENCY: Primary | ICD-10-CM

## 2025-04-25 PROCEDURE — 99999 PR PBB SHADOW E&M-EST. PATIENT-LVL II: CPT | Mod: PBBFAC,,,

## 2025-04-25 RX ADMIN — CYANOCOBALAMIN 1000 MCG: 1000 INJECTION, SOLUTION INTRAMUSCULAR; SUBCUTANEOUS at 10:04

## 2025-04-27 DIAGNOSIS — I10 ESSENTIAL HYPERTENSION: ICD-10-CM

## 2025-04-27 NOTE — TELEPHONE ENCOUNTER
No care due was identified.  [facility] Embedded Care Due Messages. Reference number: [id number].   4/27/2025 8:41:26 AM CDT

## 2025-04-28 ENCOUNTER — PATIENT MESSAGE (OUTPATIENT)
Dept: ADMINISTRATIVE | Facility: OTHER | Age: 75
End: 2025-04-28
Payer: MEDICARE

## 2025-04-28 RX ORDER — LOSARTAN POTASSIUM 50 MG/1
50 TABLET ORAL DAILY
Qty: 90 TABLET | Refills: 4 | Status: SHIPPED | OUTPATIENT
Start: 2025-04-28

## 2025-05-23 ENCOUNTER — CLINICAL SUPPORT (OUTPATIENT)
Dept: FAMILY MEDICINE | Facility: CLINIC | Age: 75
End: 2025-05-23
Payer: MEDICARE

## 2025-05-23 ENCOUNTER — TELEPHONE (OUTPATIENT)
Dept: FAMILY MEDICINE | Facility: CLINIC | Age: 75
End: 2025-05-23

## 2025-05-23 DIAGNOSIS — E53.8 B12 DEFICIENCY: Primary | ICD-10-CM

## 2025-05-23 PROCEDURE — 99999 PR PBB SHADOW E&M-EST. PATIENT-LVL II: CPT | Mod: PBBFAC,,,

## 2025-05-23 RX ADMIN — CYANOCOBALAMIN 1000 MCG: 1000 INJECTION, SOLUTION INTRAMUSCULAR; SUBCUTANEOUS at 10:05

## 2025-05-23 NOTE — PROGRESS NOTES
Administered 1,000 mcg to pt Left Deltoid. Pt tolerated well, advised to wait 15 minutes in clinic. Pt received last ordered dosage today. Message routed to PCP.

## 2025-05-23 NOTE — Clinical Note
Administered 1,000 mcg to pt Left Deltoid. Pt tolerated well, advised to wait 15 minutes in clinic. Pt received last ordered dosage today. Please advise if pt needs more labs or can continue injection.

## 2025-05-23 NOTE — TELEPHONE ENCOUNTER
----- Message from Nurse Flores sent at 5/23/2025 10:06 AM CDT -----  Administered 1,000 mcg to pt Left Deltoid. Pt tolerated well, advised to wait 15 minutes in clinic. Pt received last ordered dosage today. Please advise if pt needs more labs or can continue injection.

## 2025-05-30 ENCOUNTER — LAB VISIT (OUTPATIENT)
Dept: LAB | Facility: HOSPITAL | Age: 75
End: 2025-05-30
Attending: FAMILY MEDICINE
Payer: MEDICARE

## 2025-05-30 DIAGNOSIS — E53.8 B12 DEFICIENCY: ICD-10-CM

## 2025-05-30 LAB — VIT B12 SERPL-MCNC: 966 PG/ML (ref 210–950)

## 2025-05-30 PROCEDURE — 36415 COLL VENOUS BLD VENIPUNCTURE: CPT | Mod: PO

## 2025-05-30 PROCEDURE — 82607 VITAMIN B-12: CPT

## 2025-06-01 ENCOUNTER — RESULTS FOLLOW-UP (OUTPATIENT)
Dept: FAMILY MEDICINE | Facility: CLINIC | Age: 75
End: 2025-06-01

## 2025-06-01 NOTE — PROGRESS NOTES
1st check to see if patient has seen the results.  If not then  CALL patient with results and Document verification.  Schedule follow-up if needed.  [phone]    High Dose Statin Never done  Shingles Vaccine(1 of 2) Never done  RSV Vaccine (Age 60+ and Pregnant patients)(1 - Risk 60-74 years 1-dose series) Never done      B12 level is stable.  Continue B12 supplementation.

## 2025-06-02 NOTE — TELEPHONE ENCOUNTER
Patient believes b12 injections are increasing her cravings for carbs and sugars. Could she try OTC b12 supplement to see if this help with this...

## 2025-06-02 NOTE — TELEPHONE ENCOUNTER
As she can try over-the-counter B12 supplement.  Also I recommend probiotic over-the-counter to help with gastrointestinal health which may help with her cravings.

## 2025-07-16 ENCOUNTER — HOSPITAL ENCOUNTER (OUTPATIENT)
Dept: RADIOLOGY | Facility: HOSPITAL | Age: 75
Discharge: HOME | End: 2025-07-16
Attending: NURSE PRACTITIONER
Payer: MEDICARE

## 2025-07-16 ENCOUNTER — OFFICE VISIT (OUTPATIENT)
Dept: FAMILY MEDICINE | Facility: CLINIC | Age: 75
End: 2025-07-16
Payer: MEDICARE

## 2025-07-16 ENCOUNTER — PATIENT MESSAGE (OUTPATIENT)
Dept: FAMILY MEDICINE | Facility: CLINIC | Age: 75
End: 2025-07-16

## 2025-07-16 VITALS
SYSTOLIC BLOOD PRESSURE: 182 MMHG | HEIGHT: 67 IN | WEIGHT: 218.63 LBS | DIASTOLIC BLOOD PRESSURE: 80 MMHG | OXYGEN SATURATION: 98 % | HEART RATE: 73 BPM | BODY MASS INDEX: 34.31 KG/M2

## 2025-07-16 DIAGNOSIS — N39.0 URINARY TRACT INFECTION WITHOUT HEMATURIA, SITE UNSPECIFIED: Primary | ICD-10-CM

## 2025-07-16 DIAGNOSIS — E03.9 HYPOTHYROIDISM (ACQUIRED): Chronic | ICD-10-CM

## 2025-07-16 DIAGNOSIS — R30.0 DYSURIA: ICD-10-CM

## 2025-07-16 DIAGNOSIS — E66.1 CLASS 1 DRUG-INDUCED OBESITY WITH SERIOUS COMORBIDITY AND BODY MASS INDEX (BMI) OF 32.0 TO 32.9 IN ADULT: ICD-10-CM

## 2025-07-16 DIAGNOSIS — I10 ESSENTIAL HYPERTENSION: Chronic | ICD-10-CM

## 2025-07-16 DIAGNOSIS — E78.2 MIXED HYPERLIPIDEMIA: Chronic | ICD-10-CM

## 2025-07-16 DIAGNOSIS — Z79.899 ENCOUNTER FOR LONG-TERM (CURRENT) USE OF MEDICATIONS: ICD-10-CM

## 2025-07-16 DIAGNOSIS — K59.00 CONSTIPATION, UNSPECIFIED CONSTIPATION TYPE: ICD-10-CM

## 2025-07-16 DIAGNOSIS — E66.811 CLASS 1 DRUG-INDUCED OBESITY WITH SERIOUS COMORBIDITY AND BODY MASS INDEX (BMI) OF 32.0 TO 32.9 IN ADULT: ICD-10-CM

## 2025-07-16 PROBLEM — Z01.810 PREOP CARDIOVASCULAR EXAM: Status: RESOLVED | Noted: 2020-09-23 | Resolved: 2025-07-16

## 2025-07-16 LAB
BACTERIA #/AREA URNS HPF: ABNORMAL /HPF
BILIRUB UR QL STRIP.AUTO: NEGATIVE
CLARITY UR: CLEAR
COLOR UR AUTO: YELLOW
GLUCOSE UR QL STRIP: NEGATIVE
HGB UR QL STRIP: NEGATIVE
KETONES UR QL STRIP: NEGATIVE
LEUKOCYTE ESTERASE UR QL STRIP: ABNORMAL
MICROSCOPIC COMMENT: ABNORMAL
NITRITE UR QL STRIP: NEGATIVE
PH UR STRIP: 6 [PH]
PROT UR QL STRIP: NEGATIVE
RBC #/AREA URNS HPF: 0 /HPF (ref 0–4)
SP GR UR STRIP: 1
WBC #/AREA URNS HPF: 7 /HPF (ref 0–5)
WBC CLUMPS URNS QL MICRO: ABNORMAL

## 2025-07-16 PROCEDURE — 81000 URINALYSIS NONAUTO W/SCOPE: CPT | Mod: PO | Performed by: NURSE PRACTITIONER

## 2025-07-16 PROCEDURE — 99999 PR PBB SHADOW E&M-EST. PATIENT-LVL V: CPT | Mod: PBBFAC,,, | Performed by: NURSE PRACTITIONER

## 2025-07-16 PROCEDURE — 74019 RADEX ABDOMEN 2 VIEWS: CPT | Mod: 26,,, | Performed by: RADIOLOGY

## 2025-07-16 PROCEDURE — 74019 RADEX ABDOMEN 2 VIEWS: CPT | Mod: TC,PO

## 2025-07-16 RX ORDER — NITROFURANTOIN 25; 75 MG/1; MG/1
100 CAPSULE ORAL 2 TIMES DAILY
Qty: 14 CAPSULE | Refills: 0 | Status: SHIPPED | OUTPATIENT
Start: 2025-07-16 | End: 2025-07-23

## 2025-07-16 NOTE — ASSESSMENT & PLAN NOTE
Check x-ray flat and erect today. Proceed with colonoscopy. Please be advised constipation condition course. I recommend increase daily water intake to an acceptable level.  Increase fiber.  Recommend stool softener and laxative if needed. Goal of 1 bowel movement daily.  Follow-up to clinic or notify me if no improvement or symptoms are persistent or worsening.  ER precautions were given.  Recommend daily exercise as tolerated, adequate water intake (six 8-oz glasses of water daily), and high fiber diet. OTC fiber supplements are recommended if diet does not reach daily fiber goal (20-30 grams daily), such as Metamucil, Citrucel, or FiberCon (take as directed, separate from other oral medications by >2 hours).  - Continue OTC stool softener such as Colace as directed to avoid hard stools and straining with bowel movements PRN  -If still no improvement with these measures, call/follow-up

## 2025-07-16 NOTE — PATIENT INSTRUCTIONS
Juanjo Blakely,     If you are due for any health screening(s) below please notify me so we can arrange them to be ordered and scheduled. Most healthy patients at your age complete them, but you are free to accept or refuse.     If you can't do it, I'll definitely understand. If you can, I'd certainly appreciate it!    All of your core healthy metrics are met.

## 2025-07-16 NOTE — ASSESSMENT & PLAN NOTE
Update labs. Continue Synthroid. Please be advised of hypothyroidism disease course.  We will plan to monitor thyroid labs at routine intervals.  Please be advised of risks and benefits of medication use, see medication insert for complete details.  ER precautions.    Common complaints from hypothyroidism include weight gain, fatigue, cold intolerance, constipation, dry and flaky shin, coarse or loss of hair, and trouble with memory.     Complaints with hyperthyroidism are weight loss or decrease in appetite, weakness and fatigue, visual changes, fast heart rate, decreased heat tolerance, thinning scalp hair, change in bowel habits, and palpations.

## 2025-07-16 NOTE — ASSESSMENT & PLAN NOTE
Counseled on hyperlipidemia disease course, healthy diet and increased need for exercise.  Please be advised of the risk of cardiovascular disease, increase stroke and heart attack risk with uncontrolled/untreated hyperlipidemia. Patient voiced understanding and understood the treatment plan. All questions were answered.

## 2025-07-16 NOTE — ASSESSMENT & PLAN NOTE
BP is elevated to 182/80. Asymptomatic. She monitors blood pressure at home and reports BP has not been elevated on recent checks at home. She has taken her medications this morning. She will continue to monitor BP at home and will send update via [email or web address] later today. She will follow up in 2 weeks for nurse visit/BP check.    Continue current blood pressure medication regimen.Counseled on importance of hypertension disease course, I recommend ongoing Education for DASH-diet and exercise. Counseled on medication regimen importance of treating high blood pressure. Please be advised of risk of untreated blood pressure as discussed. Please advised of ER precautions were given for symptoms of hypertensive urgency and emergency.

## 2025-07-16 NOTE — PROGRESS NOTES
Assessment/Plan:    1. Urinary tract infection without hematuria, site unspecified  -     nitrofurantoin, macrocrystal-monohydrate, (MACROBID) 100 MG capsule; Take 1 capsule (100 mg total) by mouth 2 (two) times daily. for 7 days  Dispense: 14 capsule; Refill: 0    2. Dysuria  -     Urinalysis, Reflex to Urine Culture Urine, Clean Catch  -     GREY TOP URINE HOLD  -     Urinalysis Microscopic    3. Constipation, unspecified constipation type  Overview:  Chronic. She has tried some OTC medications with some relief. Plans for colonoscopy next week.     Assessment & Plan:  Check x-ray flat and erect today. Proceed with colonoscopy. Please be advised constipation condition course. I recommend increase daily water intake to an acceptable level.  Increase fiber.  Recommend stool softener and laxative if needed. Goal of 1 bowel movement daily.  Follow-up to clinic or notify me if no improvement or symptoms are persistent or worsening.  ER precautions were given.  Recommend daily exercise as tolerated, adequate water intake (six 8-oz glasses of water daily), and high fiber diet. OTC fiber supplements are recommended if diet does not reach daily fiber goal (20-30 grams daily), such as Metamucil, Citrucel, or FiberCon (take as directed, separate from other oral medications by >2 hours).  - Continue OTC stool softener such as Colace as directed to avoid hard stools and straining with bowel movements PRN  -If still no improvement with these measures, call/follow-up    Orders:  -     X-Ray Abdomen Flat And Erect; Future; Expected date: 07/16/2025    4. Class 1 drug-induced obesity with serious comorbidity and body mass index (BMI) of 32.0 to 32.9 in adult  Overview:  Wt Readings from Last 3 Encounters:   07/16/25 0957 99.2 kg (218 lb 9.6 oz)   03/21/25 1043 98.3 kg (216 lb 12.8 oz)   03/10/25 0943 98 kg (216 lb 1.6 oz)       Assessment & Plan:  Encourage increased physical activity, healthy diet choices, and weight loss for  prevention of progression of comorbid conditions.       5. Hypothyroidism (acquired)  Overview:  Chronic.  Stable.  Patient on levothyroxine 50 micrograms daily.  Reports compliance.  No side effects reported.      Lab Results   Component Value Date    TSH 2.516 08/20/2024    FREET4 0.94 08/20/2024         Assessment & Plan:  Update labs. Continue Synthroid. Please be advised of hypothyroidism disease course.  We will plan to monitor thyroid labs at routine intervals.  Please be advised of risks and benefits of medication use, see medication insert for complete details.  ER precautions.    Common complaints from hypothyroidism include weight gain, fatigue, cold intolerance, constipation, dry and flaky shin, coarse or loss of hair, and trouble with memory.     Complaints with hyperthyroidism are weight loss or decrease in appetite, weakness and fatigue, visual changes, fast heart rate, decreased heat tolerance, thinning scalp hair, change in bowel habits, and palpations.    Orders:  -     TSH; Future; Expected date: 07/16/2025    6. Mixed hyperlipidemia  Overview:  CHRONIC. STABLE. Lab analysis reviewed.   (-) CP, SOB, abdominal pain, N/V/D, constipation, jaundice, skin changes.  (-) Myalgias    Lab Results   Component Value Date    CHOL 193 02/21/2025    CHOL 221 (H) 08/20/2024    CHOL 221 (H) 03/04/2024     Lab Results   Component Value Date    HDL 49 02/21/2025    HDL 56 08/20/2024    HDL 53 03/04/2024     Lab Results   Component Value Date    LDLCALC 115.6 02/21/2025    LDLCALC 135.2 08/20/2024    LDLCALC 137 (H) 03/04/2024     Lab Results   Component Value Date    TRIG 142 02/21/2025    TRIG 149 08/20/2024    TRIG 154 03/04/2024     Lab Results   Component Value Date    CHOLHDL 25.4 02/21/2025    CHOLHDL 25.3 08/20/2024    CHOLHDL 4.2 03/04/2024     Lab Results   Component Value Date    TOTALCHOLEST 3.9 02/21/2025    TOTALCHOLEST 3.9 08/20/2024    TOTALCHOLEST 3.8 09/20/2022     Lab Results   Component Value  Date    ALT 19 08/20/2024    AST 21 08/20/2024    ALKPHOS 75 08/20/2024    BILITOT 0.6 08/20/2024     ======================================================  The 10-year ASCVD risk score (Bushra MORAN, et al., 2019) is: 34.9%    Values used to calculate the score:      Age: 75 years      Sex: Female      Is Non- : No      Diabetic: No      Tobacco smoker: No      Systolic Blood Pressure: 182 mmHg      Is BP treated: Yes      HDL Cholesterol: 49 mg/dL      Total Cholesterol: 193 mg/dL      Assessment & Plan:  Counseled on hyperlipidemia disease course, healthy diet and increased need for exercise.  Please be advised of the risk of cardiovascular disease, increase stroke and heart attack risk with uncontrolled/untreated hyperlipidemia. Patient voiced understanding and understood the treatment plan. All questions were answered.     Orders:  -     Lipid Panel; Future; Expected date: 07/16/2025    7. Essential hypertension  Overview:  CHRONIC. STABLE. BP Reviewed.  Compliant with BP medications. No SE reported.   (-) CP, SOB, palpitations, dizziness, lightheadedness, HA, arm numbness, tingling or weakness, syncope.    Hypertension Medications              hydroCHLOROthiazide (HYDRODIURIL) 25 MG tablet TAKE 1 TABLET DAILY    losartan (COZAAR) 50 MG tablet Take 1 tablet (50 mg total) by mouth once daily.    metoprolol tartrate (LOPRESSOR) 50 MG tablet TAKE 1 TABLET DAILY AND AS NEEDED FOR PALPITATIONS     Creatinine   Date Value Ref Range Status   08/20/2024 0.8 0.5 - 1.4 mg/dL Final     Results for orders placed or performed during the hospital encounter of 10/24/23   SCHEDULED EKG 12-LEAD (to Muse)    Collection Time: 10/24/23 12:39 PM    Narrative    Test Reason : Z00.1    Vent. Rate : 059 BPM     Atrial Rate : 059 BPM     P-R Int : 140 ms          QRS Dur : 088 ms      QT Int : 440 ms       P-R-T Axes : 044 050 032 degrees     QTc Int : 435 ms    Sinus bradycardia with occasional Premature  ventricular complexes  Otherwise normal ECG  When compared with ECG of 12-JUL-2022 09:44,  T wave inversion less evident in Anterior leads  Confirmed by MERISSA GALDAMEZ MD ([id number]) on 10/24/2023 4:23:24 PM    Referred By: VIJAYA FELICIANO           Confirmed By:MERISSA GALDAMEZ MD         Assessment & Plan:  BP is elevated to 182/80. Asymptomatic. She monitors blood pressure at home and reports BP has not been elevated on recent checks at home. She has taken her medications this morning. She will continue to monitor BP at home and will send update via [employer] later today. She will follow up in 2 weeks for nurse visit/BP check.    Continue current blood pressure medication regimen.Counseled on importance of hypertension disease course, I recommend ongoing Education for DASH-diet and exercise. Counseled on medication regimen importance of treating high blood pressure. Please be advised of risk of untreated blood pressure as discussed. Please advised of ER precautions were given for symptoms of hypertensive urgency and emergency.       8. Encounter for long-term (current) use of medications  Overview:  CHRONIC. Stable. Compliant with medications for managed conditions. See medication list. No SE reported.   Routine lab analysis is being monitored. Refills were addressed. Reviewed labs.    Lab Results   Component Value Date    WBC 6.29 07/30/2024    HGB 12.8 08/20/2024    HCT 41.3 07/30/2024    MCV 88 07/30/2024     07/30/2024         Chemistry        Component Value Date/Time     08/20/2024 0945    K 3.6 08/20/2024 0945     08/20/2024 0945    CO2 29 08/20/2024 0945    BUN 13 08/20/2024 0945    CREATININE 0.8 08/20/2024 0945    GLU 90 08/20/2024 0945        Component Value Date/Time    CALCIUM 10.1 08/20/2024 0945    ALKPHOS 75 08/20/2024 0945    AST 21 08/20/2024 0945    ALT 19 08/20/2024 0945    BILITOT 0.6 08/20/2024 0945    ESTGFRAFRICA >60.0 05/19/2021 1221    EGFRNONAA >60.0 05/19/2021 1221           Lab Results   Component Value Date    TSH 2.516 08/20/2024    FREET4 0.94 08/20/2024         Assessment & Plan:  Update labs. Complete history and physical was completed today.  Complete and thorough medication reconciliation was performed.  Discussed risks and benefits of medications.  Advised patient on orders and health maintenance.  We discussed old records and old labs if available.  Will request any records not available through epic.  Continue current medications listed on your summary sheet.    Orders:  -     TSH; Future; Expected date: 07/16/2025  -     Lipid Panel; Future; Expected date: 07/16/2025  -     Hemoglobin A1C; Future; Expected date: 07/16/2025  -     Comprehensive Metabolic Panel; Future; Expected date: 07/16/2025  -     CBC Without Differential; Future; Expected date: 07/16/2025      Follow up in 2 weeks (on 7/30/2025), or if symptoms worsen or fail to improve.  ER precautions for severe or worsening symptoms.     Holly Schaefer NP  _____________________________________________________________________________________________________________________________________________________    CC: dysuria     HPI: Patient is a 75-year-old female who presents in clinic today as an established patient here for dysuria. She reports frequent urination with small amounts occurring multiple times daily. Onset a few days ago. She experiences a tingling sensation when urinating, particularly after holding urine, which she describes as uncomfortable. She suspects she may have a urinary tract infection. She has tried drinking cranberry juice with no relief. There is no fever, chills, hematuria, flank pain.     GASTROINTESTINAL:  She has ongoing GI issues following diverticulitis surgery in January, which involved partial colon and rectal removal. She describes chronic constipation with alternating episodes of diarrhea and irregular bowel movements. She reports abdominal pressure over the past two days,  feeling full. She is scheduled for a colonoscopy next week but is considering postponing due to current symptoms. She manages constipation by planning to take Dulcolax. She is also taking a probiotic.     BACK PAIN:  She reports chronic back pain, currently managed with muscle relaxer and Tylenol. She describes pain as constant but not severe. Pain radiates to her low back area. She has a follow-up visit scheduled with pain management in October and is on the waiting list. Plans for possible MRI at follow up with pain management.     HYPERTENSION:  The patient has a diagnosis of hypertension and the blood pressure is elevated today. She monitors blood pressure at home and reports BP has not been elevated on recent checks at home.  The patient has been compliant on the medicine listed below and has not had problems with side effects.  The patient has had no headache, vision changes, chest pain, shortness of breath, dizziness, palpitations.  Denies any identifiable exacerbating or relieving factors.    Other chronic conditions have been reviewed and remains stable. Further details as stated above.     Past Medical History:  Past Medical History:   Diagnosis Date    COVID-19 2020    Essential hypertension 2013    Fatty liver     GERD (gastroesophageal reflux disease)     Lumbar facet arthropathy 2020    Mixed hyperlipidemia 2021    JOSE DE JESUS on CPAP     Osteopenia of multiple sites 2020    Paroxysmal atrial fibrillation 3/22/2019    Primary hypothyroidism 2010    PVC (premature ventricular contraction) 2020    Sigmoid diverticulosis      Past Surgical History:   Procedure Laterality Date    BREAST BIOPSY      BREAST SURGERY  2000     SECTION      x 2    CHOLECYSTECTOMY  2018    COLON SURGERY Bilateral 2024    COLONOSCOPY  2014    Dr. Richey; diverticulosis; repeat in 5 years    ESOPHAGOGASTRODUODENOSCOPY N/A 2018    Dr. Steel; gastritis; gastric polyps     ESOPHAGOGASTRODUODENOSCOPY N/A 06/01/2020    Procedure: EGD (ESOPHAGOGASTRODUODENOSCOPY);  Surgeon: Efrain Steel MD;  Location: [facility];  Service: Endoscopy;  Laterality: N/A;    HYSTERECTOMY  1992    LAPAROSCOPIC LYSIS OF ADHESIONS N/A 03/22/2019    Procedure: LYSIS, ADHESIONS, LAPAROSCOPIC;  Surgeon: Cole Armstrong MD;  Location: [facility] OR;  Service: General;  Laterality: N/A;    ROBOT-ASSISTED LAPAROSCOPIC SLEEVE GASTRECTOMY USING DA LARRY XI N/A 03/22/2019    Procedure: XI ROBOTIC SLEEVE GASTRECTOMY;  Surgeon: Cole Armstrong MD;  Location: [facility] OR;  Service: General;  Laterality: N/A;    TOTAL REDUCTION MAMMOPLASTY Bilateral     UPPER GASTROINTESTINAL ENDOSCOPY  08/13/2015    Dr. Padron     Review of patient's allergies indicates:   Allergen Reactions    Hydrocodone-acetaminophen Itching    Trazodone Other (See Comments)     Tongue tingling     Social History[1]  Family History   Problem Relation Name Age of Onset    Colon cancer Paternal Aunt      COPD Paternal Aunt          colon    Hypertension Father Dad     Arthritis Father Dad     Arthritis Mother Mother     Asthma Mother Mother     Depression Mother Mother     Hypertension Mother Mother     Crohn's disease Neg Hx      Stomach cancer Neg Hx      Ulcerative colitis Neg Hx      Esophageal cancer Neg Hx       Medications Ordered Prior to Encounter[2]    Review of Systems   Constitutional:  Negative for appetite change, chills, fatigue and fever.   HENT:  Negative for congestion, rhinorrhea and sore throat.    Eyes:  Negative for visual disturbance.   Respiratory:  Negative for cough and shortness of breath.    Cardiovascular:  Negative for chest pain, palpitations and leg swelling.   Gastrointestinal:  Positive for constipation. Negative for abdominal pain, diarrhea and vomiting.   Genitourinary:  Positive for dysuria and frequency. Negative for difficulty urinating and hematuria.   Musculoskeletal:  Positive for arthralgias and back pain. Negative for  "myalgias.   Skin:  Negative for rash and wound.   Neurological:  Negative for dizziness and headaches.   Psychiatric/Behavioral:  Negative for behavioral problems. The patient is not nervous/anxious.      Vitals:    07/16/25 0957 07/16/25 1019   BP: (!) 180/98 (!) 182/80   Pulse: 73    SpO2: 98%    Weight: 99.2 kg (218 lb 9.6 oz)    Height: 5' 7" (1.702 m)      Wt Readings from Last 3 Encounters:   07/16/25 99.2 kg (218 lb 9.6 oz)   03/21/25 98.3 kg (216 lb 12.8 oz)   03/10/25 98 kg (216 lb 1.6 oz)     Physical Exam  Vitals reviewed.   Constitutional:       General: She is not in acute distress.     Appearance: Normal appearance. She is not ill-appearing.   HENT:      Head: Normocephalic and atraumatic.      Right Ear: External ear normal.      Left Ear: External ear normal.      Nose: Nose normal.   Eyes:      Extraocular Movements: Extraocular movements intact.      Conjunctiva/sclera: Conjunctivae normal.   Cardiovascular:      Rate and Rhythm: Normal rate.      Heart sounds: Normal heart sounds.   Pulmonary:      Effort: Pulmonary effort is normal. No respiratory distress.      Breath sounds: Normal breath sounds.   Abdominal:      General: There is no distension.      Palpations: Abdomen is soft.      Tenderness: There is no abdominal tenderness.   Musculoskeletal:         General: Normal range of motion.      Cervical back: Normal range of motion.      Right lower leg: No edema.      Left lower leg: No edema.   Skin:     General: Skin is warm and dry.      Capillary Refill: Capillary refill takes less than 2 seconds.      Coloration: Skin is not pale.      Findings: No rash.   Neurological:      General: No focal deficit present.      Mental Status: She is alert and oriented to person, place, and time. Mental status is at baseline.      Motor: No weakness.      Gait: Gait normal.   Psychiatric:         Attention and Perception: She is attentive.         Mood and Affect: Mood normal. Mood is not anxious, " depressed or elated. Affect is not labile, blunt, flat, angry or tearful.         Speech: Speech normal. She is communicative. Speech is not rapid and pressured, delayed or slurred.         Behavior: Behavior normal. Behavior is not agitated, slowed, aggressive, withdrawn, hyperactive or combative. Behavior is cooperative.         Thought Content: Thought content normal.         Judgment: Judgment normal.       Health Maintenance   Topic Date Due    High Dose Statin  Never done    Mammogram  2025    RSV Vaccine (Age 60+ and Pregnant patients) (1 - Risk 60-74 years 1-dose series) 2025 (Originally 2/3/2011)    TETANUS VACCINE  2025 (Originally 2/3/1968)    COVID-19 Vaccine (2024- season) 2025 (Originally 2024)    Shingles Vaccine (1 of 2) 2026 (Originally 2/3/2001)    Influenza Vaccine (1) 2025    Lipid Panel  2026    DEXA Scan  2026    Colorectal Cancer Screening  2033    Hepatitis C Screening  Completed    Pneumococcal Vaccines (Age 50+)  Completed     This note was generated with the assistance of ambient listening technology. Verbal consent was obtained by the patient and accompanying visitor(s) for the recording of patient appointment to facilitate this note. I attest to having reviewed and edited the generated note for accuracy, though some syntax or spelling errors may persist. Please contact the author of this note for any clarification.    Visit today included increased complexity associated with the care of the episodic problem - see above- addressed and managing the longitudinal care of the patient due to the serious and/or complex managed problem(s) - see above.         [1]   Social History  Tobacco Use    Smoking status: Former     Current packs/day: 0.00     Average packs/day: 0.3 packs/day for 13.2 years (3.3 ttl pk-yrs)     Types: Cigarettes     Start date: 1965     Quit date:      Years since quittin.5    Smokeless tobacco:  Never   Substance Use Topics    Alcohol use: Not Currently     Alcohol/week: 1.0 standard drink of alcohol     Types: 1 Glasses of wine per week     Comment: 3 times a week    Drug use: No   [2]   Current Outpatient Medications on File Prior to Visit   Medication Sig Dispense Refill    albuterol (PROVENTIL HFA) 90 mcg/actuation inhaler Inhale 2 puffs into the lungs every 6 (six) hours as needed for Wheezing or Shortness of Breath. Rescue 18 g 1    ALPRAZolam (XANAX) 0.5 MG tablet Take 1 tablet (0.5 mg total) by mouth daily as needed for Anxiety. 12 tablet 0    budesonide (PULMICORT FLEXHALER) 90 mcg/actuation AePB Inhale 2 puffs (180 mcg total) into the lungs 2 (two) times a day. Controller 1 each 5    cyclobenzaprine (FLEXERIL) 10 MG tablet take 1 tablet by mouth 3 times daily AS NEEDED 10 tablet 0    cyclobenzaprine (FLEXERIL) 10 MG tablet Take 1/2 to 1 tab BID PRN muscle spasms. May cause drowsiness. 60 tablet 0    desoximetasone (TOPICORT) 0.25 % cream Apply 15 g topically 2 (two) times daily.      ergocalciferol, vitamin D2, (VITAMIN D ORAL) Take by mouth Daily.      eszopiclone (LUNESTA) 2 MG Tab Take 1 tablet (2 mg total) by mouth nightly as needed. 90 tablet 1    hydroCHLOROthiazide (HYDRODIURIL) 25 MG tablet TAKE 1 TABLET DAILY 90 tablet 1    ipratropium-albuteroL (COMBIVENT RESPIMAT)  mcg/actuation inhaler Inhale 1 puff into the lungs every 6 (six) hours as needed for Wheezing or Shortness of Breath. Rescue 4 g 0    levothyroxine (SYNTHROID) 50 MCG tablet TAKE 1 TABLET BEFORE BREAKFAST (OFFICE VISIT/LABS REQUIRED FOR FURTHER REFILLS. MAY SELF-SCHEDULE VIA [facility]) 90 tablet 2    losartan (COZAAR) 50 MG tablet Take 1 tablet (50 mg total) by mouth once daily. 90 tablet 4    metoprolol tartrate (LOPRESSOR) 50 MG tablet TAKE 1 TABLET DAILY AND AS NEEDED FOR PALPITATIONS 90 tablet 3    omeprazole (PRILOSEC) 40 MG capsule Take 1 capsule (40 mg total) by mouth once daily. 90 capsule 3    azelastine  (ASTELIN) 137 mcg (0.1 %) nasal spray 1 spray (137 mcg total) by Nasal route 2 (two) times daily. for 7 days 30 mL 0    meclizine (ANTIVERT) 25 mg tablet Take 1 tablet (25 mg total) by mouth 2 (two) times daily as needed. 14 tablet 0     No current facility-administered medications on file prior to visit.

## 2025-07-17 ENCOUNTER — PATIENT MESSAGE (OUTPATIENT)
Dept: FAMILY MEDICINE | Facility: CLINIC | Age: 75
End: 2025-07-17
Payer: MEDICARE

## 2025-07-17 VITALS — SYSTOLIC BLOOD PRESSURE: 135 MMHG | DIASTOLIC BLOOD PRESSURE: 53 MMHG

## 2025-07-17 LAB — HOLD SPECIMEN: NORMAL

## 2025-07-22 ENCOUNTER — PATIENT MESSAGE (OUTPATIENT)
Dept: FAMILY MEDICINE | Facility: CLINIC | Age: 75
End: 2025-07-22
Payer: MEDICARE

## 2025-07-22 DIAGNOSIS — N39.0 URINARY TRACT INFECTION WITHOUT HEMATURIA, SITE UNSPECIFIED: Primary | ICD-10-CM

## 2025-07-23 ENCOUNTER — TELEPHONE (OUTPATIENT)
Dept: PAIN MEDICINE | Facility: CLINIC | Age: 75
End: 2025-07-23
Payer: MEDICARE

## 2025-07-23 ENCOUNTER — LAB VISIT (OUTPATIENT)
Dept: LAB | Facility: HOSPITAL | Age: 75
End: 2025-07-23
Attending: NURSE PRACTITIONER
Payer: MEDICARE

## 2025-07-23 DIAGNOSIS — E78.2 MIXED HYPERLIPIDEMIA: Chronic | ICD-10-CM

## 2025-07-23 DIAGNOSIS — E03.9 HYPOTHYROIDISM (ACQUIRED): Chronic | ICD-10-CM

## 2025-07-23 DIAGNOSIS — Z79.899 ENCOUNTER FOR LONG-TERM (CURRENT) USE OF MEDICATIONS: ICD-10-CM

## 2025-07-23 LAB
ALBUMIN SERPL BCP-MCNC: 3.5 G/DL (ref 3.5–5.2)
ALP SERPL-CCNC: 73 UNIT/L (ref 40–150)
ALT SERPL W/O P-5'-P-CCNC: 12 UNIT/L (ref 10–44)
ANION GAP (OHS): 9 MMOL/L (ref 8–16)
AST SERPL-CCNC: 18 UNIT/L (ref 11–45)
BILIRUB SERPL-MCNC: 0.5 MG/DL (ref 0.1–1)
BUN SERPL-MCNC: 19 MG/DL (ref 8–23)
CALCIUM SERPL-MCNC: 9.1 MG/DL (ref 8.7–10.5)
CHLORIDE SERPL-SCNC: 103 MMOL/L (ref 95–110)
CHOLEST SERPL-MCNC: 198 MG/DL (ref 120–199)
CHOLEST/HDLC SERPL: 4.1 {RATIO} (ref 2–5)
CO2 SERPL-SCNC: 27 MMOL/L (ref 23–29)
CREAT SERPL-MCNC: 0.8 MG/DL (ref 0.5–1.4)
EAG (OHS): 105 MG/DL (ref 68–131)
ERYTHROCYTE [DISTWIDTH] IN BLOOD BY AUTOMATED COUNT: 13.6 % (ref 11.5–14.5)
GFR SERPLBLD CREATININE-BSD FMLA CKD-EPI: >60 ML/MIN/1.73/M2
GLUCOSE SERPL-MCNC: 91 MG/DL (ref 70–110)
HBA1C MFR BLD: 5.3 % (ref 4–5.6)
HCT VFR BLD AUTO: 39.5 % (ref 37–48.5)
HDLC SERPL-MCNC: 48 MG/DL (ref 40–75)
HDLC SERPL: 24.2 % (ref 20–50)
HGB BLD-MCNC: 12.6 GM/DL (ref 12–16)
LDLC SERPL CALC-MCNC: 124.2 MG/DL (ref 63–159)
MCH RBC QN AUTO: 28 PG (ref 27–31)
MCHC RBC AUTO-ENTMCNC: 31.9 G/DL (ref 32–36)
MCV RBC AUTO: 88 FL (ref 82–98)
NONHDLC SERPL-MCNC: 150 MG/DL
PLATELET # BLD AUTO: 262 K/UL (ref 150–450)
PMV BLD AUTO: 10.3 FL (ref 9.2–12.9)
POTASSIUM SERPL-SCNC: 3.9 MMOL/L (ref 3.5–5.1)
PROT SERPL-MCNC: 7.6 GM/DL (ref 6–8.4)
RBC # BLD AUTO: 4.5 M/UL (ref 4–5.4)
SODIUM SERPL-SCNC: 139 MMOL/L (ref 136–145)
TRIGL SERPL-MCNC: 129 MG/DL (ref 30–150)
TSH SERPL-ACNC: 2.84 UIU/ML (ref 0.4–4)
WBC # BLD AUTO: 5.11 K/UL (ref 3.9–12.7)

## 2025-07-23 PROCEDURE — 82465 ASSAY BLD/SERUM CHOLESTEROL: CPT

## 2025-07-23 PROCEDURE — 36415 COLL VENOUS BLD VENIPUNCTURE: CPT | Mod: PO

## 2025-07-23 PROCEDURE — 80053 COMPREHEN METABOLIC PANEL: CPT

## 2025-07-23 PROCEDURE — 83036 HEMOGLOBIN GLYCOSYLATED A1C: CPT

## 2025-07-23 PROCEDURE — 84443 ASSAY THYROID STIM HORMONE: CPT

## 2025-07-23 PROCEDURE — 85027 COMPLETE CBC AUTOMATED: CPT

## 2025-07-23 NOTE — TELEPHONE ENCOUNTER
Copied from CRM #[id number]. Topic: Appointments - Appointment Access  >> Jul 23, 2025  4:39 PM Radha wrote:  .Type:  Sooner Apoointment Request    Caller is requesting a sooner appointment.  Caller declined first available appointment listed below.  Caller will not accept being placed on the waitlist and is requesting a message be sent to doctor.  Name of Caller:Zora Davis  When is the first available appointment?10/24  Symptoms:follow up on same symptoms  Would the patient rather a call back or a response via [facility]? Call back  Best Call Back Number:.[phone] (home)   Additional Information:

## 2025-07-23 NOTE — TELEPHONE ENCOUNTER
Reached out to patient to schedule appointment from messages. Apt has been made.   Pt understand. All questions answered.     Arsenio Schulz Medical Assistant

## 2025-07-23 NOTE — TELEPHONE ENCOUNTER
I would like to recheck her urine.    I have signed for the following orders AND/OR meds.  Please call the patient and ask the patient to schedule the testing AND/OR inform about any medications that were sent.     Orders Placed This Encounter   Procedures    Urinalysis, Reflex to Urine Culture Urine, Clean Catch     Standing Status:   Future     Expected Date:   7/23/2025     Expiration Date:   9/21/2026     Preferred Collection Type:   Urine, Clean Catch     Specimen Source:   Urine     Send normal result to authorizing provider's In Basket if patient is active on MyChart::   Yes

## 2025-08-26 ENCOUNTER — OFFICE VISIT (OUTPATIENT)
Dept: CARDIOLOGY | Facility: CLINIC | Age: 75
End: 2025-08-26
Payer: MEDICARE

## 2025-08-26 VITALS
HEART RATE: 68 BPM | DIASTOLIC BLOOD PRESSURE: 98 MMHG | WEIGHT: 217.69 LBS | BODY MASS INDEX: 34.17 KG/M2 | OXYGEN SATURATION: 98 % | HEIGHT: 67 IN | SYSTOLIC BLOOD PRESSURE: 164 MMHG

## 2025-08-26 DIAGNOSIS — E78.2 MIXED HYPERLIPIDEMIA: Chronic | ICD-10-CM

## 2025-08-26 DIAGNOSIS — F17.211 CIGARETTE NICOTINE DEPENDENCE IN REMISSION: Chronic | ICD-10-CM

## 2025-08-26 DIAGNOSIS — I70.0 AORTIC ATHEROSCLEROSIS: Chronic | ICD-10-CM

## 2025-08-26 DIAGNOSIS — Z79.899 ENCOUNTER FOR LONG-TERM (CURRENT) USE OF MEDICATIONS: ICD-10-CM

## 2025-08-26 DIAGNOSIS — G47.33 OSA (OBSTRUCTIVE SLEEP APNEA): Chronic | ICD-10-CM

## 2025-08-26 DIAGNOSIS — Z86.79 HISTORY OF ATRIAL FIBRILLATION: ICD-10-CM

## 2025-08-26 DIAGNOSIS — I10 ESSENTIAL HYPERTENSION: Chronic | ICD-10-CM

## 2025-08-26 DIAGNOSIS — R00.2 PALPITATIONS: Primary | ICD-10-CM

## 2025-08-26 PROCEDURE — 1126F AMNT PAIN NOTED NONE PRSNT: CPT | Mod: CPTII,S$GLB,, | Performed by: INTERNAL MEDICINE

## 2025-08-26 PROCEDURE — 99999 PR PBB SHADOW E&M-EST. PATIENT-LVL IV: CPT | Mod: PBBFAC,,, | Performed by: INTERNAL MEDICINE

## 2025-08-26 PROCEDURE — 3080F DIAST BP >= 90 MM HG: CPT | Mod: CPTII,S$GLB,, | Performed by: INTERNAL MEDICINE

## 2025-08-26 PROCEDURE — 1159F MED LIST DOCD IN RCRD: CPT | Mod: CPTII,S$GLB,, | Performed by: INTERNAL MEDICINE

## 2025-08-26 PROCEDURE — 3077F SYST BP >= 140 MM HG: CPT | Mod: CPTII,S$GLB,, | Performed by: INTERNAL MEDICINE

## 2025-08-26 PROCEDURE — 3008F BODY MASS INDEX DOCD: CPT | Mod: CPTII,S$GLB,, | Performed by: INTERNAL MEDICINE

## 2025-08-26 PROCEDURE — 1101F PT FALLS ASSESS-DOCD LE1/YR: CPT | Mod: CPTII,S$GLB,, | Performed by: INTERNAL MEDICINE

## 2025-08-26 PROCEDURE — 4010F ACE/ARB THERAPY RXD/TAKEN: CPT | Mod: CPTII,S$GLB,, | Performed by: INTERNAL MEDICINE

## 2025-08-26 PROCEDURE — 99214 OFFICE O/P EST MOD 30 MIN: CPT | Mod: S$GLB,,, | Performed by: INTERNAL MEDICINE

## 2025-08-26 PROCEDURE — 1160F RVW MEDS BY RX/DR IN RCRD: CPT | Mod: CPTII,S$GLB,, | Performed by: INTERNAL MEDICINE

## 2025-08-26 PROCEDURE — 3044F HG A1C LEVEL LT 7.0%: CPT | Mod: CPTII,S$GLB,, | Performed by: INTERNAL MEDICINE

## 2025-08-26 PROCEDURE — 3288F FALL RISK ASSESSMENT DOCD: CPT | Mod: CPTII,S$GLB,, | Performed by: INTERNAL MEDICINE

## 2025-08-26 RX ORDER — METOPROLOL TARTRATE 50 MG/1
50 TABLET ORAL 2 TIMES DAILY
Qty: 60 TABLET | Refills: 11 | Status: SHIPPED | OUTPATIENT
Start: 2025-08-26
